# Patient Record
Sex: FEMALE | Race: WHITE | NOT HISPANIC OR LATINO | Employment: FULL TIME | URBAN - METROPOLITAN AREA
[De-identification: names, ages, dates, MRNs, and addresses within clinical notes are randomized per-mention and may not be internally consistent; named-entity substitution may affect disease eponyms.]

---

## 2017-01-05 ENCOUNTER — ALLSCRIPTS OFFICE VISIT (OUTPATIENT)
Dept: OTHER | Facility: OTHER | Age: 47
End: 2017-01-05

## 2017-01-06 ENCOUNTER — LAB REQUISITION (OUTPATIENT)
Dept: LAB | Facility: HOSPITAL | Age: 47
End: 2017-01-06

## 2017-01-06 DIAGNOSIS — R22.40 LOCALIZED SWELLING, MASS, OR LUMP OF LOWER EXTREMITY: ICD-10-CM

## 2017-01-06 PROCEDURE — 87070 CULTURE OTHR SPECIMN AEROBIC: CPT | Performed by: SURGERY

## 2017-01-06 PROCEDURE — 87147 CULTURE TYPE IMMUNOLOGIC: CPT | Performed by: SURGERY

## 2017-01-06 PROCEDURE — 87186 SC STD MICRODIL/AGAR DIL: CPT | Performed by: SURGERY

## 2017-01-06 PROCEDURE — 87205 SMEAR GRAM STAIN: CPT | Performed by: SURGERY

## 2017-01-08 LAB
BACTERIA WND AEROBE CULT: NORMAL
GRAM STN SPEC: NORMAL
GRAM STN SPEC: NORMAL

## 2017-01-11 ENCOUNTER — GENERIC CONVERSION - ENCOUNTER (OUTPATIENT)
Dept: OTHER | Facility: OTHER | Age: 47
End: 2017-01-11

## 2017-05-29 ENCOUNTER — OFFICE VISIT (OUTPATIENT)
Dept: URGENT CARE | Age: 47
End: 2017-05-29
Payer: COMMERCIAL

## 2017-05-29 PROCEDURE — 99213 OFFICE O/P EST LOW 20 MIN: CPT | Performed by: FAMILY MEDICINE

## 2017-05-29 PROCEDURE — S9088 SERVICES PROVIDED IN URGENT: HCPCS | Performed by: FAMILY MEDICINE

## 2017-08-09 ENCOUNTER — APPOINTMENT (OUTPATIENT)
Dept: LAB | Facility: HOSPITAL | Age: 47
End: 2017-08-09
Payer: COMMERCIAL

## 2017-08-09 ENCOUNTER — TRANSCRIBE ORDERS (OUTPATIENT)
Dept: LAB | Facility: HOSPITAL | Age: 47
End: 2017-08-09

## 2017-08-09 DIAGNOSIS — Z00.8 HEALTH EXAMINATION IN POPULATION SURVEY: ICD-10-CM

## 2017-08-09 DIAGNOSIS — Z00.8 HEALTH EXAMINATION IN POPULATION SURVEY: Primary | ICD-10-CM

## 2017-08-09 LAB
CHOLEST SERPL-MCNC: 225 MG/DL (ref 50–200)
EST. AVERAGE GLUCOSE BLD GHB EST-MCNC: 324 MG/DL
HBA1C MFR BLD: 12.9 % (ref 4.2–6.3)
HDLC SERPL-MCNC: 64 MG/DL (ref 40–60)
LDLC SERPL CALC-MCNC: 142 MG/DL (ref 0–100)
TRIGL SERPL-MCNC: 93 MG/DL

## 2017-08-09 PROCEDURE — 36415 COLL VENOUS BLD VENIPUNCTURE: CPT

## 2017-08-09 PROCEDURE — 83036 HEMOGLOBIN GLYCOSYLATED A1C: CPT

## 2017-08-09 PROCEDURE — 80061 LIPID PANEL: CPT

## 2017-08-16 ENCOUNTER — ALLSCRIPTS OFFICE VISIT (OUTPATIENT)
Dept: OTHER | Facility: OTHER | Age: 47
End: 2017-08-16

## 2017-11-13 DIAGNOSIS — E78.5 HYPERLIPIDEMIA: ICD-10-CM

## 2017-11-13 DIAGNOSIS — E11.40 TYPE 2 DIABETES MELLITUS WITH DIABETIC NEUROPATHY (HCC): ICD-10-CM

## 2017-11-14 ENCOUNTER — TRANSCRIBE ORDERS (OUTPATIENT)
Dept: LAB | Facility: HOSPITAL | Age: 47
End: 2017-11-14

## 2017-11-14 ENCOUNTER — GENERIC CONVERSION - ENCOUNTER (OUTPATIENT)
Dept: OTHER | Facility: OTHER | Age: 47
End: 2017-11-14

## 2017-11-14 ENCOUNTER — APPOINTMENT (OUTPATIENT)
Dept: LAB | Facility: HOSPITAL | Age: 47
End: 2017-11-14
Payer: COMMERCIAL

## 2017-11-14 DIAGNOSIS — E78.5 HYPERLIPIDEMIA: ICD-10-CM

## 2017-11-14 DIAGNOSIS — E11.40 TYPE 2 DIABETES MELLITUS WITH DIABETIC NEUROPATHY (HCC): ICD-10-CM

## 2017-11-14 LAB
ALBUMIN SERPL BCP-MCNC: 3 G/DL (ref 3.5–5)
ALP SERPL-CCNC: 98 U/L (ref 46–116)
ALT SERPL W P-5'-P-CCNC: 19 U/L (ref 12–78)
ANION GAP SERPL CALCULATED.3IONS-SCNC: 4 MMOL/L (ref 4–13)
AST SERPL W P-5'-P-CCNC: 18 U/L (ref 5–45)
BILIRUB SERPL-MCNC: 0.34 MG/DL (ref 0.2–1)
BUN SERPL-MCNC: 13 MG/DL (ref 5–25)
CALCIUM SERPL-MCNC: 8.6 MG/DL (ref 8.3–10.1)
CHLORIDE SERPL-SCNC: 110 MMOL/L (ref 100–108)
CHOLEST SERPL-MCNC: 163 MG/DL (ref 50–200)
CO2 SERPL-SCNC: 27 MMOL/L (ref 21–32)
CREAT SERPL-MCNC: 0.71 MG/DL (ref 0.6–1.3)
CREAT UR-MCNC: 129 MG/DL
EST. AVERAGE GLUCOSE BLD GHB EST-MCNC: 163 MG/DL
GFR SERPL CREATININE-BSD FRML MDRD: 102 ML/MIN/1.73SQ M
GLUCOSE P FAST SERPL-MCNC: 105 MG/DL (ref 65–99)
HBA1C MFR BLD: 7.3 % (ref 4.2–6.3)
HDLC SERPL-MCNC: 51 MG/DL (ref 40–60)
LDLC SERPL CALC-MCNC: 97 MG/DL (ref 0–100)
MICROALBUMIN UR-MCNC: 184 MG/L (ref 0–20)
MICROALBUMIN/CREAT 24H UR: 143 MG/G CREATININE (ref 0–30)
POTASSIUM SERPL-SCNC: 4.6 MMOL/L (ref 3.5–5.3)
PROT SERPL-MCNC: 6.8 G/DL (ref 6.4–8.2)
SODIUM SERPL-SCNC: 141 MMOL/L (ref 136–145)
TRIGL SERPL-MCNC: 73 MG/DL
TSH SERPL DL<=0.05 MIU/L-ACNC: 1.58 UIU/ML (ref 0.36–3.74)

## 2017-11-14 PROCEDURE — 83036 HEMOGLOBIN GLYCOSYLATED A1C: CPT

## 2017-11-14 PROCEDURE — 80053 COMPREHEN METABOLIC PANEL: CPT

## 2017-11-14 PROCEDURE — 80061 LIPID PANEL: CPT

## 2017-11-14 PROCEDURE — 82043 UR ALBUMIN QUANTITATIVE: CPT

## 2017-11-14 PROCEDURE — 36415 COLL VENOUS BLD VENIPUNCTURE: CPT

## 2017-11-14 PROCEDURE — 82570 ASSAY OF URINE CREATININE: CPT

## 2017-11-14 PROCEDURE — 84443 ASSAY THYROID STIM HORMONE: CPT

## 2017-11-16 ENCOUNTER — ALLSCRIPTS OFFICE VISIT (OUTPATIENT)
Dept: OTHER | Facility: OTHER | Age: 47
End: 2017-11-16

## 2017-11-17 NOTE — PROGRESS NOTES
Assessment    1  Benign essential hypertension (401 1) (I10)   2  Diabetes mellitus with diabetic neuropathy (250 60,357 2) (E11 40)   3  Hyperlipidemia (272 4) (E78 5)   4  Peripheral neuropathy (356 9) (G62 9)    Plan  Diabetes mellitus with diabetic neuropathy    · Invokana 100 MG Oral Tablet; Take 1 tablet daily   · (1) HEMOGLOBIN A1C; Status:Active; Requested for:41Tmq4321;    · Continue with our present treatment plan ; Status:Complete;   Done: 87YXM2746   · Follow a diabetic diet with 1500 calories ; Status:Complete;   Done: 42XRE1802   · Inspect your feet daily ; Status:Complete;   Done: 71LZS9838   · It is important to take good care of your feet ; Status:Complete;   Done: 98YKD2221   · Call [phone] if: Your blood sugar is still over 300 after taking insulin  ;Status:Complete;   Done: 48HBT1496   · Follow-up visit in 3 months Evaluation and Treatment  Follow-up  Status: Complete Done: 77GUW2980    Chief Complaint  Patient here for 3 month follow up on Hypertension, Hyperlipidemia, diabetes   Patient is here today for follow up of chronic conditions described in HPI  History of Present Illness  here for follow upimproved sugars still need a little tweeking   The patient states her hyperlipidemia has been under good control since the last visit  Comorbid Illnesses: diabetes mellitus-- and-- hypertension  She has no significant interval events  Symptoms: The patient is currently asymptomatic  Medications: the patient is adherent with her medication regimen  The patient presents for follow-up of essential hypertension  The patient states she has been doing well with her blood pressure control since the last visit  She has no significant interval events  Symptoms: The patient is currently asymptomatic  Medications: the patient is adherent with her medication regimen  The patient states she has been stable with her Type II Diabetes control since the last visit   Comorbid Illnesses: hypertension-- and-- hyperlipidemia  Complications: peripheral neuropathy-- and-- nephropathy  She has no significant interval events  Symptoms: The patient is currently asymptomatic  Medications: the patient is adherent with her medication regimen  The patient is being seen for follow-up of diabetic neuropathy  The patient reports doing well  She has had no significant interval events  The patient is currently asymptomatic  Medications:  the patient is adherent to her medication regimen  Review of Systems   Constitutional: No fever, no chills, feels well, no tiredness, no recent weight gain or weight loss  Eyes: No complaints of eye pain, no red eyes, no eyesight problems, no discharge, no dry eyes, no itching of eyes  ENT: no complaints of earache, no loss of hearing, no nose bleeds, no nasal discharge, no sore throat, no hoarseness  Cardiovascular: No complaints of slow heart rate, no fast heart rate, no chest pain, no palpitations, no leg claudication, no lower extremity edema  Respiratory: No complaints of shortness of breath, no wheezing, no cough, no SOB on exertion, no orthopnea, no PND  Gastrointestinal: No complaints of abdominal pain, no constipation, no nausea or vomiting, no diarrhea, no bloody stools  Genitourinary: No complaints of dysuria, no incontinence, no pelvic pain, no dysmenorrhea, no vaginal discharge or bleeding  Musculoskeletal: No complaints of arthralgias, no myalgias, no joint swelling or stiffness, no limb pain or swelling  Integumentary: No complaints of skin rash or lesions, no itching, no skin wounds, no breast pain or lump  Neurological: No complaints of headache, no confusion, no convulsions, no numbness, no dizziness or fainting, no tingling, no limb weakness, no difficulty walking  Psychiatric: Not suicidal, no sleep disturbance, no anxiety or depression, no change in personality, no emotional problems    Endocrine: No complaints of proptosis, no hot flashes, no muscle weakness, no deepening of the voice, no feelings of weakness  Hematologic/Lymphatic: No complaints of swollen glands, no swollen glands in the neck, does not bleed easily, does not bruise easily  Active Problems    1  Benign essential hypertension (401 1) (I10)   2  Breast cancer screening (V76 10) (Z12 39)   3  Diabetes mellitus with diabetic neuropathy (250 60,357 2) (E11 40)   4  Hyperlipidemia (272 4) (E78 5)   5  Need for prophylactic vaccination against Streptococcus pneumoniae (pneumococcus) (V03 82) (Z23)   6  Need for revaccination (V05 9) (Z23)   7  Peripheral neuropathy (356 9) (G62 9)    Past Medical History    1  History of Cellulitis of lower leg (682 6) (L03 119)   2  History of Cellulitis of thigh (682 6) (L03 119)   3  History of Cellulitis of trunk (682 2) (L03 319)   4  History of Edema of both legs (782 3) (R60 0)   5  History Of 1  Previous Pregnancies (V61 5)   6  History of Nausea (787 02) (R11 0)   7  History of Right leg pain (729 5) (M79 604)   8  History of Right leg swelling (729 81) (M79 89)   9  History of Skin Abscess Of Foot (682 7)   10  History of Superficial phlebitis and thrombophlebitis of right lower extremity (451 0)  (I80 01)    The active problems and past medical history were reviewed and updated today  Surgical History  1  History of Tonsillectomy With Adenoidectomy    The surgical history was reviewed and updated today  Family History  Mother    1  Family history of Benign Essential Hypertension   2  Family history of Coronary Artery Disease (V17 49)  Father    3  Family history of Lung Cancer (V16 1)  Sister    4  Family history of Diabetes Mellitus (V18 0)  Maternal Grandfather    5  Family history of Diabetes Mellitus (V18 0)    The family history was reviewed and updated today  Social History     · Alcohol Use (History)   · Denied: History of Drug Use   · Never a smoker  The social history was reviewed and updated today        Current Meds   1  Aspirin 325 MG Oral Tablet; Therapy: (Recorded:18Xna1725) to Recorded   2  GlipiZIDE 5 MG Oral Tablet; take 1 tablet by mouth twice a day; Therapy: 01KRE4746 to (Last Rx:97Spq6775)  Requested for: 67Bip5937 Ordered   3  Lisinopril-Hydrochlorothiazide 10-12 5 MG Oral Tablet; take 1 tablet by mouth daily; Therapy: 44LIV4514 to (Last Rx:17Jln4260)  Requested for: 35Wyj5926 Ordered   4  MetFORMIN HCl - 1000 MG Oral Tablet; take 1 tablet by mouth twice a day; Therapy: 47WGH2098 to (Last Rx:25Xvv2515)  Requested for: 28Ytp7638 Ordered   5  Simvastatin 40 MG Oral Tablet; TAKE 1 TABLET AT BEDTIME; Therapy: 48KDM4620 to (Evaluate:14Nov2017)  Requested for: 13BUI9742; Last Rx:85Qvz3420 Ordered    The medication list was reviewed and updated today  Allergies  1  Keflex CAPS    Vitals  Vital Signs    Recorded: 40ZNC9756 09:47AM   Heart Rate 88   Respiration 18   Systolic 876   Diastolic 82   Height 5 ft 9 in   Weight 222 lb 2 oz   BMI Calculated 32 8   BSA Calculated 2 16       Physical Exam   Constitutional  General appearance: No acute distress, well appearing and well nourished  Pulmonary  Respiratory effort: No increased work of breathing or signs of respiratory distress  Auscultation of lungs: Clear to auscultation  Cardiovascular  Auscultation of heart: Normal rate and rhythm, normal S1 and S2, without murmurs  Examination of extremities for edema and/or varicosities: Normal    Abdomen  Abdomen: Non-tender, no masses  Liver and spleen: No hepatomegaly or splenomegaly  Lymphatic  Palpation of lymph nodes in neck: No lymphadenopathy  Musculoskeletal  Gait and station: Normal    Skin  Skin and subcutaneous tissue: Normal without rashes or lesions  Results/Data  PHQ-2 Adult Depression Screening 96GID1053 10:02AM User, Ahs     Test Name Result Flag Reference   PHQ-2 Adult Depression Score 0       Over the last two weeks, how often have you been bothered by any of the following problems? Little interest or pleasure in doing things: Not at all - 0 Feeling down, depressed, or hopeless: Not at all - 0   PHQ-2 Adult Depression Screening Negative       (1) COMPREHENSIVE METABOLIC PANEL 54BKT7311 53:09AY [facility] Order Number: [id number]     Test Name Result Flag Reference   SODIUM 141 mmol/L  136-145   POTASSIUM 4 6 mmol/L  3 5-5 3   CHLORIDE 110 mmol/L H 100-108   CARBON DIOXIDE 27 mmol/L  21-32   ANION GAP (CALC) 4 mmol/L  4-13   BLOOD UREA NITROGEN 13 mg/dL  5-25   CREATININE 0 71 mg/dL  0 60-1 30   Standardized to IDMS reference method   CALCIUM 8 6 mg/dL  8 3-10 1   BILI, TOTAL 0 34 mg/dL  0 20-1 00   ALK PHOSPHATAS 98 U/L     ALT (SGPT) 19 U/L  12-78   Specimen collection should occur prior to Sulfasalazine and/or Sulfapyridine administration due to the potential for falsely depressed results  AST(SGOT) 18 U/L  5-45   Specimen collection should occur prior to Sulfasalazine administration due to the potential for falsely depressed results  ALBUMIN 3 0 g/dL L 3 5-5 0   TOTAL PROTEIN 6 8 g/dL  6 4-8 2   eGFR 102 ml/min/1 73sq [facility] Disease Education Program recommendations are as follows: GFR calculation is accurate only with a steady state creatinine Chronic Kidney disease less than 60 ml/min/1 73 sq  meters Kidney failure less than 15 ml/min/1 73 sq  meters  GLUCOSE FASTING 105 mg/dL H 65-99   Specimen collection should occur prior to Sulfasalazine administration due to the potential for falsely depressed results  Specimen collection should occur prior to Sulfapyridine administration due to the potential for falsely elevated results  (1) HEMOGLOBIN A1C 50KHK0269 09:47AM [facility] Order Number: [id number]     Test Name Result Flag Reference   HEMOGLOBIN A1C 7 3 % H 4 2-6 3   EST  AVG   GLUCOSE 163 mg/dl       (1) LIPID PANEL FASTING W DIRECT LDL REFLEX 04UHI5029 09:47AM [facility] Order Number: [id number] Test Name Result Flag Reference   CHOLESTEROL 163 mg/dL     LDL CHOLESTEROL CALCULATED 97 mg/dL  0-100     Triglyceride:       Normal <150 mg/dl  Borderline High 150-199 mg/dl  High 200-499 mg/dl  Very High >499 mg/dl   Cholesterol:      Desirable <200 mg/dl   Borderline High 200-239 mg/dl   High >239 mg/dl   HDL Cholesterol:      High>59 mg/dL   Low <41 mg/dL   HDL Cholesterol:      High>59 mg/dL   Low <41 mg/dL   This screening LDL is a calculated result  It does not have the accuracy of the Direct Measured LDL in the monitoring of patients with hyperlipidemia and/or statin therapy  Direct Measure LDL (LWT449) must be ordered separately in these patients  TRIGLYCERIDES 73 mg/dL  <=150   Specimen collection should occur prior to N-Acetylcysteine or Metamizole administration due to the potential for falsely depressed results  HDL,DIRECT 51 mg/dL  40-60   Specimen collection should occur prior to Metamizole administration due to the potential for falsley depressed results  (1) TSH WITH FT4 REFLEX 08CNW9430 09:47AM [facility] Order Number: [id number]     Test Name Result Flag Reference   TSH 1 580 uIU/mL  0 358-3 740     Patients undergoing fluorescein dye angiography may retain small amounts of fluorescein in the body for 48-72 hours post procedure  Samples containing fluorescein can produce falsely depressed TSH values  If the patient had this procedure,a specimen should be resubmitted post fluorescein clearance       The recommended reference ranges for TSH during pregnancy are as follows: First trimester 0 1 to 2 5 uIU/mL Second trimester  0 2 to 3 0 uIU/mL Third trimester 0 3 to 3 0 uIU/m     (1) MICROALBUMIN CREATININE RATIO, RANDOM URINE 23TWT9717 09:47AM [facility] Order Number: [id number]     Test Name Result Flag Reference   MICROALBUMIN/ CREAT R 143 mg/g creatinine H 0-30   MICROALBUMIN,URINE 184 0 mg/L H 0 0-20 0   CREATININE URINE 129 0 mg/dL Future Appointments    Date/Time Provider Specialty Site   02/19/2018 10:00 AM Sade Buck DO Family Medicine [address] [facility]   11/19/2018 08:15 AM Sade Buck DO Family Medicine [address] [place] [address]       Signatures   Electronically signed by : Danny Medina DO; Nov 16 2017 10:46AM EST                       (Author)

## 2017-12-08 ENCOUNTER — HOSPITAL ENCOUNTER (EMERGENCY)
Facility: HOSPITAL | Age: 47
Discharge: HOME | End: 2017-12-09
Attending: EMERGENCY MEDICINE
Payer: COMMERCIAL

## 2017-12-08 ENCOUNTER — APPOINTMENT (EMERGENCY)
Dept: RADIOLOGY | Facility: HOSPITAL | Age: 47
End: 2017-12-08
Payer: COMMERCIAL

## 2017-12-08 DIAGNOSIS — R06.02 SOB (SHORTNESS OF BREATH): Primary | ICD-10-CM

## 2017-12-08 DIAGNOSIS — R50.9 FEVER: ICD-10-CM

## 2017-12-08 DIAGNOSIS — R05.9 COUGH: ICD-10-CM

## 2017-12-08 LAB
ALBUMIN SERPL BCP-MCNC: 3.3 G/DL (ref 3.5–5)
ALP SERPL-CCNC: 107 U/L (ref 46–116)
ALT SERPL W P-5'-P-CCNC: 19 U/L (ref 12–78)
ANION GAP SERPL CALCULATED.3IONS-SCNC: 9 MMOL/L (ref 4–13)
ANISOCYTOSIS BLD QL SMEAR: PRESENT
AST SERPL W P-5'-P-CCNC: 19 U/L (ref 5–45)
BASOPHILS # BLD MANUAL: 0.22 THOUSAND/UL (ref 0–0.1)
BASOPHILS NFR MAR MANUAL: 1 % (ref 0–1)
BILIRUB SERPL-MCNC: 0.69 MG/DL (ref 0.2–1)
BUN SERPL-MCNC: 14 MG/DL (ref 5–25)
CALCIUM SERPL-MCNC: 9.3 MG/DL (ref 8.3–10.1)
CHLORIDE SERPL-SCNC: 102 MMOL/L (ref 100–108)
CO2 SERPL-SCNC: 23 MMOL/L (ref 21–32)
CREAT SERPL-MCNC: 0.95 MG/DL (ref 0.6–1.3)
EOSINOPHIL # BLD MANUAL: 0 THOUSAND/UL (ref 0–0.4)
EOSINOPHIL NFR BLD MANUAL: 0 % (ref 0–6)
ERYTHROCYTE [DISTWIDTH] IN BLOOD BY AUTOMATED COUNT: 13.2 % (ref 11.6–15.1)
GFR SERPL CREATININE-BSD FRML MDRD: 72 ML/MIN/1.73SQ M
GLUCOSE SERPL-MCNC: 170 MG/DL (ref 65–140)
HCT VFR BLD AUTO: 37.9 % (ref 34.8–46.1)
HGB BLD-MCNC: 12.8 G/DL (ref 11.5–15.4)
LACTATE SERPL-SCNC: 1.7 MMOL/L (ref 0.5–2)
LYMPHOCYTES # BLD AUTO: 0.44 THOUSAND/UL (ref 0.6–4.47)
LYMPHOCYTES # BLD AUTO: 2 % (ref 14–44)
MCH RBC QN AUTO: 29 PG (ref 26.8–34.3)
MCHC RBC AUTO-ENTMCNC: 33.8 G/DL (ref 31.4–37.4)
MCV RBC AUTO: 86 FL (ref 82–98)
MONOCYTES # BLD AUTO: 0.88 THOUSAND/UL (ref 0–1.22)
MONOCYTES NFR BLD: 4 % (ref 4–12)
NEUTROPHILS # BLD MANUAL: 20.53 THOUSAND/UL (ref 1.85–7.62)
NEUTS SEG NFR BLD AUTO: 93 % (ref 43–75)
NRBC BLD AUTO-RTO: 0 /100 WBCS
PLATELET # BLD AUTO: 327 THOUSANDS/UL (ref 149–390)
PLATELET BLD QL SMEAR: ADEQUATE
PMV BLD AUTO: 9.1 FL (ref 8.9–12.7)
POTASSIUM SERPL-SCNC: 3.8 MMOL/L (ref 3.5–5.3)
PROT SERPL-MCNC: 8.2 G/DL (ref 6.4–8.2)
RBC # BLD AUTO: 4.42 MILLION/UL (ref 3.81–5.12)
RBC MORPH BLD: PRESENT
SODIUM SERPL-SCNC: 134 MMOL/L (ref 136–145)
SPECIMEN SOURCE: NORMAL
TROPONIN I BLD-MCNC: 0.02 NG/ML (ref 0–0.08)
WBC # BLD AUTO: 22.08 THOUSAND/UL (ref 4.31–10.16)

## 2017-12-08 PROCEDURE — 81002 URINALYSIS NONAUTO W/O SCOPE: CPT | Performed by: EMERGENCY MEDICINE

## 2017-12-08 PROCEDURE — 93005 ELECTROCARDIOGRAM TRACING: CPT | Performed by: EMERGENCY MEDICINE

## 2017-12-08 PROCEDURE — 80053 COMPREHEN METABOLIC PANEL: CPT | Performed by: EMERGENCY MEDICINE

## 2017-12-08 PROCEDURE — 96375 TX/PRO/DX INJ NEW DRUG ADDON: CPT

## 2017-12-08 PROCEDURE — 36415 COLL VENOUS BLD VENIPUNCTURE: CPT

## 2017-12-08 PROCEDURE — 85027 COMPLETE CBC AUTOMATED: CPT | Performed by: EMERGENCY MEDICINE

## 2017-12-08 PROCEDURE — 87040 BLOOD CULTURE FOR BACTERIA: CPT | Performed by: EMERGENCY MEDICINE

## 2017-12-08 PROCEDURE — 85007 BL SMEAR W/DIFF WBC COUNT: CPT | Performed by: EMERGENCY MEDICINE

## 2017-12-08 PROCEDURE — 71020 HB CHEST X-RAY 2VW FRONTAL&LATL: CPT

## 2017-12-08 PROCEDURE — 96361 HYDRATE IV INFUSION ADD-ON: CPT

## 2017-12-08 PROCEDURE — 84484 ASSAY OF TROPONIN QUANT: CPT

## 2017-12-08 PROCEDURE — 83605 ASSAY OF LACTIC ACID: CPT | Performed by: EMERGENCY MEDICINE

## 2017-12-08 PROCEDURE — 96374 THER/PROPH/DIAG INJ IV PUSH: CPT

## 2017-12-08 RX ORDER — SIMVASTATIN 40 MG
40 TABLET ORAL DAILY
COMMUNITY
Start: 2011-10-24 | End: 2018-12-17 | Stop reason: SDUPTHER

## 2017-12-08 RX ORDER — ONDANSETRON 2 MG/ML
4 INJECTION INTRAMUSCULAR; INTRAVENOUS ONCE
Status: COMPLETED | OUTPATIENT
Start: 2017-12-08 | End: 2017-12-08

## 2017-12-08 RX ORDER — GLIPIZIDE 5 MG/1
1 TABLET ORAL 2 TIMES DAILY
COMMUNITY
Start: 2013-01-20 | End: 2018-03-17 | Stop reason: SDUPTHER

## 2017-12-08 RX ORDER — ACETAMINOPHEN 325 MG/1
650 TABLET ORAL ONCE
Status: COMPLETED | OUTPATIENT
Start: 2017-12-08 | End: 2017-12-08

## 2017-12-08 RX ORDER — KETOROLAC TROMETHAMINE 30 MG/ML
15 INJECTION, SOLUTION INTRAMUSCULAR; INTRAVENOUS ONCE
Status: COMPLETED | OUTPATIENT
Start: 2017-12-08 | End: 2017-12-08

## 2017-12-08 RX ORDER — LISINOPRIL AND HYDROCHLOROTHIAZIDE 12.5; 1 MG/1; MG/1
1 TABLET ORAL DAILY
COMMUNITY
Start: 2012-04-24 | End: 2018-12-17 | Stop reason: SDUPTHER

## 2017-12-08 RX ADMIN — SODIUM CHLORIDE 1000 ML: 0.9 INJECTION, SOLUTION INTRAVENOUS at 22:42

## 2017-12-08 RX ADMIN — ACETAMINOPHEN 650 MG: 325 TABLET, FILM COATED ORAL at 22:44

## 2017-12-08 RX ADMIN — KETOROLAC TROMETHAMINE 15 MG: 30 INJECTION, SOLUTION INTRAMUSCULAR at 22:44

## 2017-12-08 RX ADMIN — ONDANSETRON 4 MG: 2 INJECTION INTRAMUSCULAR; INTRAVENOUS at 22:51

## 2017-12-09 ENCOUNTER — APPOINTMENT (EMERGENCY)
Dept: RADIOLOGY | Facility: HOSPITAL | Age: 47
End: 2017-12-09
Payer: COMMERCIAL

## 2017-12-09 VITALS
WEIGHT: 222 LBS | HEIGHT: 67 IN | BODY MASS INDEX: 34.84 KG/M2 | TEMPERATURE: 100.2 F | HEART RATE: 96 BPM | RESPIRATION RATE: 21 BRPM | SYSTOLIC BLOOD PRESSURE: 173 MMHG | DIASTOLIC BLOOD PRESSURE: 70 MMHG | OXYGEN SATURATION: 100 %

## 2017-12-09 LAB
BACTERIA UR QL AUTO: ABNORMAL /HPF
BILIRUB UR QL STRIP: NEGATIVE
CLARITY UR: ABNORMAL
COLOR UR: YELLOW
GLUCOSE UR STRIP-MCNC: ABNORMAL MG/DL
HGB UR QL STRIP.AUTO: ABNORMAL
HYALINE CASTS #/AREA URNS LPF: ABNORMAL /LPF
KETONES UR STRIP-MCNC: ABNORMAL MG/DL
LEUKOCYTE ESTERASE UR QL STRIP: NEGATIVE
NITRITE UR QL STRIP: NEGATIVE
NON-SQ EPI CELLS URNS QL MICRO: ABNORMAL /HPF
PH UR STRIP.AUTO: 5.5 [PH] (ref 4.5–8)
PROT UR STRIP-MCNC: ABNORMAL MG/DL
RBC #/AREA URNS AUTO: ABNORMAL /HPF
SP GR UR STRIP.AUTO: 1.02 (ref 1–1.03)
UROBILINOGEN UR QL STRIP.AUTO: 0.2 E.U./DL
WBC #/AREA URNS AUTO: ABNORMAL /HPF

## 2017-12-09 PROCEDURE — 74177 CT ABD & PELVIS W/CONTRAST: CPT

## 2017-12-09 PROCEDURE — 81001 URINALYSIS AUTO W/SCOPE: CPT

## 2017-12-09 PROCEDURE — 71275 CT ANGIOGRAPHY CHEST: CPT

## 2017-12-09 PROCEDURE — 99285 EMERGENCY DEPT VISIT HI MDM: CPT

## 2017-12-09 PROCEDURE — 87086 URINE CULTURE/COLONY COUNT: CPT

## 2017-12-09 RX ORDER — CEPHALEXIN 500 MG/1
500 CAPSULE ORAL EVERY 12 HOURS SCHEDULED
Qty: 10 CAPSULE | Refills: 0 | Status: SHIPPED | OUTPATIENT
Start: 2017-12-09 | End: 2017-12-15 | Stop reason: HOSPADM

## 2017-12-09 RX ADMIN — IOHEXOL 100 ML: 350 INJECTION, SOLUTION INTRAVENOUS at 01:39

## 2017-12-09 NOTE — ED ATTENDING ATTESTATION
I, Taran Bowman DO, saw and evaluated the patient  I have discussed the patient with the resident/non-physician practitioner and agree with the resident's/non-physician practitioner's findings, Plan of Care, and MDM as documented in the resident's/non-physician practitioner's note, except where noted  All available labs and Radiology studies were reviewed  At this point I agree with the current assessment done in the Emergency Department  I have conducted an independent evaluation of this patient a history and physical is as follows:      Critical Care Time  CritCare Time      52 yr old fem with URI for the last two weeks with worsening in last two days  Feverish, incr in sob and [place]  Sputum productive yellow  No week  HR to 130  No abd pain  Some flank pain  Exm: Tachypneic with good ox; Lungs: cta and no whz with cough  Skin warm and dry  Heart: tachy at 117  Pln: septic and cardiac eval; adm

## 2017-12-09 NOTE — ED PROVIDER NOTES
History  Chief Complaint   Patient presents with    Shortness of Breath     Pt states having a hard time catching breath, heart feels like it is racing, having indigestion  This is a 51-year-old female with a history of diabetes, hypertension, hyperlipidemia who presents with chest pain and shortness of breath  Over the past couple weeks, she has been experiencing URI symptoms including runny nose and cough  Today, her symptoms got acutely worse  States that she cannot catch her breath and is experiencing an intermittent substernal chest discomfort that is nonradiating  She is also experiencing a productive cough and fevers for which she is taking Motrin last taken at approximately 6:00 p m  Anderson Linares She feels extremely tired and states that she is unable to perform her regular task today because of her tiredness  Denies history of DVT/PE, unilateral calf pain/swelling, hemoptysis, recent trauma/surgery, recent travel, cancer/cancer treatment, exogenous estrogen use  She does have a history of diabetes, hypertension, hyperlipidemia, and a strong family history of MI  No tobacco use or personal history of MI  No sick contacts at home  Denies nausea/vomiting, lightheadedness/dizziness, numbness/weakness, headache, change in vision, URI symptoms, neck pain, back pain, flank pain, abdominal pain, diarrhea, hematochezia, melena, dysuria, hematuria, abnormal vaginal discharge/bleeding  Prior to Admission Medications   Prescriptions Last Dose Informant Patient Reported? Taking?    canagliflozin (INVOKANA) 100 mg 12/7/2017 at Unknown time  Yes Yes   Sig: Take 1 tablet by mouth daily   glipiZIDE (GLUCOTROL) 5 mg tablet 12/8/2017 at Unknown time  Yes Yes   Sig: Take 1 tablet by mouth 2 (two) times a day   lisinopril-hydrochlorothiazide (PRINZIDE,ZESTORETIC) 10-12 5 MG per tablet 12/8/2017 at Unknown time  Yes Yes   Sig: Take 1 tablet by mouth daily   metFORMIN (GLUCOPHAGE) 1000 MG tablet 12/8/2017 at Unknown time  Yes Yes   Sig: Take 1 tablet by mouth 2 (two) times a day   simvastatin (ZOCOR) 40 mg tablet 12/7/2017 at Unknown time  Yes Yes   Sig: Take 1 tablet by mouth      Facility-Administered Medications: None       Past Medical History:   Diagnosis Date    Diabetes mellitus ([facility] [address] )     Hyperlipidemia     Hypertension        History reviewed  No pertinent surgical history  History reviewed  No pertinent family history  I have reviewed and agree with the history as documented  Social History   Substance Use Topics    Smoking status: Never Smoker    Smokeless tobacco: Never Used    Alcohol use Yes      Comment: weekends        Review of Systems   Constitutional: Positive for fever  Negative for chills  HENT: Positive for rhinorrhea  Negative for sore throat and trouble swallowing  Eyes: Negative for photophobia and visual disturbance  Respiratory: Positive for cough, chest tightness and shortness of breath  Cardiovascular: Positive for chest pain  Negative for palpitations and leg swelling  Gastrointestinal: Negative for abdominal pain, blood in stool, diarrhea, nausea and vomiting  Endocrine: Negative for polyuria  Genitourinary: Negative for dysuria, flank pain, hematuria, vaginal bleeding and vaginal discharge  Musculoskeletal: Negative for back pain and neck pain  Skin: Negative for color change and rash  Allergic/Immunologic: Negative for immunocompromised state  Neurological: Negative for dizziness, weakness, light-headedness, numbness and headaches  All other systems reviewed and are negative        Physical Exam  ED Triage Vitals   Temperature Pulse Respirations Blood Pressure SpO2   12/08/17 2045 12/08/17 2045 12/08/17 2045 12/08/17 2045 12/08/17 2045   100 2 °F (37 9 °C) (!) 122 18 155/92 100 %      Temp Source Heart Rate Source Patient Position - Orthostatic VS BP Location FiO2 (%)   12/08/17 2045 12/08/17 2205 12/09/17 0030 12/08/17 2300 --   Tympanic Monitor Lying Right arm       Pain Score       12/08/17 2045       No Pain           Orthostatic Vital Signs  Vitals:    12/08/17 2300 12/09/17 0030 12/09/17 0100 12/09/17 0200   BP: 165/70 120/56 117/56 141/66   Pulse: 104 98 94 96   Patient Position - Orthostatic VS:  Lying Lying Lying       Physical Exam   Constitutional: Vital signs are normal  She appears well-developed  She is cooperative  No distress  HENT:   Mouth/Throat: Uvula is midline, oropharynx is clear and moist and mucous membranes are normal    Eyes: Conjunctivae and EOM are normal  Pupils are equal, round, and reactive to light  Neck: Trachea normal  No thyroid mass and no thyromegaly present  Cardiovascular: Regular rhythm, normal heart sounds, intact distal pulses and normal pulses  Tachycardia present  No murmur heard  Pulmonary/Chest: Effort normal and breath sounds normal    Abdominal: Soft  Normal appearance and bowel sounds are normal  There is no tenderness  There is CVA tenderness (right)  There is no rebound and no guarding  Neurological: She is alert  Skin: Skin is warm, dry and intact  Psychiatric: She has a normal mood and affect   Her speech is normal and behavior is normal  Thought content normal        ED Medications  Medications   sodium chloride 0 9 % bolus 1,000 mL (not administered)   sodium chloride 0 9 % bolus 1,000 mL (1,000 mL Intravenous New Bag 12/8/17 2242)   ketorolac (TORADOL) injection 15 mg (15 mg Intravenous Given 12/8/17 2244)   acetaminophen (TYLENOL) tablet 650 mg (650 mg Oral Given 12/8/17 2244)   ondansetron (ZOFRAN) injection 4 mg (4 mg Intravenous Given 12/8/17 2251)   iohexol (OMNIPAQUE) 350 MG/ML injection (MULTI-DOSE) 100 mL (100 mL Intravenous Given 12/9/17 0139)       Diagnostic Studies  Results Reviewed     Procedure Component Value Units Date/Time    Urine Microscopic [[id number]]  (Abnormal) Collected:  12/09/17 0045    Lab Status:  Final result Specimen:  Urine from Urine, Clean Catch Updated:  12/09/17 0055     RBC, UA Innumerable (A) /hpf      WBC, UA 10-20 (A) /hpf      Epithelial Cells Moderate (A) /hpf      Bacteria, UA Occasional /hpf      Hyaline Casts, UA None Seen /lpf     Urine culture [[id number]] Collected:  12/09/17 0045    Lab Status: In process Specimen:  Urine from Urine, Clean Catch Updated:  12/09/17 0055    POCT urinalysis dipstick [[id number]]  (Abnormal) Resulted:  12/09/17 0047    Lab Status:  Final result Specimen:  Urine Updated:  12/09/17 0047    ED Urine Macroscopic [[id number]]  (Abnormal) Collected:  12/09/17 0045    Lab Status:  Final result Specimen:  Urine Updated:  12/09/17 0045     Color, UA Yellow     Clarity, UA Cloudy     pH, UA 5 5     Leukocytes, UA Negative     Nitrite, UA Negative     Protein,  (2+) (A) mg/dl      Glucose,  (1/2%) (A) mg/dl      Ketones, UA Trace (A) mg/dl      Urobilinogen, UA 0 2 E U /dl      Bilirubin, UA Negative     Blood, UA Large (A)     Specific [place], UA 1 025    Narrative:       CLINITEK RESULT    Lactic acid, plasma [[id number]]  (Normal) Collected:  12/08/17 2248    Lab Status:  Final result Specimen:  Blood from Arm, Right Updated:  12/08/17 2314     LACTIC ACID 1 7 mmol/L     Narrative:         Result may be elevated if tourniquet was used during collection  Blood culture [[id number]] Collected:  12/08/17 2248    Lab Status: In process Specimen:  Blood from Arm, Right Updated:  12/08/17 2252    Blood culture [[id number]] Collected:  12/08/17 2248    Lab Status:   In process Specimen:  Blood from Arm, Left Updated:  12/08/17 2252    CBC and differential [[id number]]  (Abnormal) Collected:  12/08/17 2111    Lab Status:  Final result Specimen:  Blood from Arm, Left Updated:  12/08/17 2151     WBC 22 08 (H) Thousand/uL      RBC 4 42 Million/uL      Hemoglobin 12 8 g/dL      Hematocrit 37 9 %      MCV 86 fL      MCH 29 0 pg      MCHC 33 8 g/dL      RDW 13 2 %      MPV 9 1 fL      Platelets 071 Thousands/uL      nRBC 0 /100 WBCs     Narrative: This is an appended report  These results have been appended to a previously verified report  Comprehensive metabolic panel [[id number]]  (Abnormal) Collected:  12/08/17 2111    Lab Status:  Final result Specimen:  Blood from Arm, Left Updated:  12/08/17 2147     Sodium 134 (L) mmol/L      Potassium 3 8 mmol/L      Chloride 102 mmol/L      CO2 23 mmol/L      Anion Gap 9 mmol/L      BUN 14 mg/dL      Creatinine 0 95 mg/dL      Glucose 170 (H) mg/dL      Calcium 9 3 mg/dL      AST 19 U/L      ALT 19 U/L      Alkaline Phosphatase 107 U/L      Total Protein 8 2 g/dL      Albumin 3 3 (L) g/dL      Total Bilirubin 0 69 mg/dL      eGFR 72 ml/min/1 73sq m     Narrative:         National Kidney Disease Education Program recommendations are as follows:  GFR calculation is accurate only with a steady state creatinine  Chronic Kidney disease less than 60 ml/min/1 73 sq  meters  Kidney failure less than 15 ml/min/1 73 sq  meters  POCT troponin [[id number]]  (Normal) Collected:  12/08/17 2115    Lab Status:  Final result Updated:  12/08/17 2129     POC Troponin I 0 02 ng/ml      Specimen Type VENOUS    Narrative:         [employer] i-Stat handheld analyzer 99% cutoff is > 0 08ng/mL in [facility] Emergency Departments    o cTnI 99% cutoff is useful only when applied to patients in the clinical setting of myocardial ischemia  o cTnI 99% cutoff should be interpreted in the context of clinical history, ECG findings and possibly cardiac imaging to establish correct diagnosis  o cTnI 99% cutoff may be suggestive but clearly not indicative of a coronary event without the clinical setting of myocardial ischemia                   X-ray chest 2 views    (Results Pending)   PE Study with CT Abdomen and Pelvis with contrast    (Results Pending)         Procedures  ECG 12 Lead Documentation  Date/Time: 12/8/2017 9:03 PM  Performed by: Kishore Gates  Authorized by: Shana Wilson     ECG reviewed by me, the ED Provider: yes    Patient location:  ED  Previous ECG:     Previous ECG:  Unavailable  Interpretation:     Interpretation: abnormal    Rate:     ECG rate:  114    ECG rate assessment: tachycardic    Rhythm:     Rhythm: sinus rhythm    Ectopy:     Ectopy: none    QRS:     QRS axis:  Normal    QRS intervals:  Normal  Conduction:     Conduction: normal    ST segments:     ST segments:  Normal  T waves:     T waves: normal            Phone Consults  ED Phone Contact    ED Course  ED Course as of Dec 09 0238   Fri Dec 08, 2017   2319 LACTIC ACID: 1 7   Sat Dec 09, 2017   0047 Leukocytes, UA: Negative   0047 Nitrite, UA: Negative   0047 Blood, UA: (!) Large   0058 WBC, UA: (!) 10-20   0058 Bacteria, UA: Occasional   0227 No pneumonia or PE on CT chest   CT abdomen revealed mildly enlarged bilateral inguinal lymph nodes with mild subcutaneous stranding in the left inguinal region  Mild urinary bladder wall thickening, which may be secondary to incomplete distension or cystitis  The patient states that she is feeling slightly better  Initial Sepsis Screening     Row Name 12/08/17 5188                Is the patient's history suggestive of a new or worsening infection? (!)  Yes (Proceed)  -CC        Suspected source of infection pneumonia  -CC        Are two or more of the following signs & symptoms of infection both present and new to the patient? (!)  Yes (Proceed)  -CC        Indicate SIRS criteria Tachycardia > 90 bpm;Leukocytosis (WBC > 87[address] IJL)  -CC        If the answer is yes to both questions, suspicion of sepsis is present          If severe sepsis is present AND tissue hypoperfusion perists in the hour after fluid resuscitation or lactate > 4, the patient meets criteria for SEPTIC SHOCK          Are any of the following organ dysfunction criteria present within 6 hours of suspected infection and SIRS criteria that are NOT considered to be chronic conditions?  No  -CC        Organ dysfunction          Date of presentation of severe sepsis          Time of presentation of severe sepsis          Tissue hypoperfusion persists in the hour after crystalloid fluid administration, evidenced, by either:          Was hypotension present within one hour of the conclusion of crystalloid fluid administration?         Date of presentation of septic shock          Time of presentation of septic shock            User Key  (r) = Recorded By, (t) = Taken By, (c) = Cosigned By    [address] Name Provider Type    Andres Kimball MD Resident                  MDM  Number of Diagnoses or Management Options  Diagnosis management comments: Concern for possible pneumonia versus ACS  The patient is tachycardic and has a high white count  I will perform the septic workup and cardiac workup  I will begin with 1 L of fluid  She will likely need more fluids  Toradol and Tylenol for fever  The urinalysis for CVA tenderness  She does have a history of pyelonephritis  CritCare Time    Disposition  Final diagnoses:   SOB (shortness of breath)   Cough   Fever     Time reflects when diagnosis was documented in both MDM as applicable and the Disposition within this note     Time User Action Codes Description Comment    12/9/2017  2:30 AM Letha Pound P Add [R06 02] SOB (shortness of breath)     12/9/2017  2:30 AM Letha Cerritos P Add [R05] Cough     12/9/2017  2:30 AM Aleksandr Rebecaing Add [R50 9] Fever       ED Disposition     ED Disposition Condition Comment    Discharge  Layman Maritza discharge to home/self care      Condition at discharge: Stable        Follow-up Information     Follow up With Specialties Details Why Contact Info Additional [address] Emergency Department Emergency Medicine Go to If symptoms worsen [address] [place] [address], [address], [place], [place], [address]        Patient's Medications   Discharge Prescriptions    CEPHALEXIN (KEFLEX) 500 MG CAPSULE    Take 1 capsule by mouth every 12 (twelve) hours for 5 days       Start Date: 12/9/2017 End Date: 12/14/2017       Order Dose: 500 mg       Quantity: 10 capsule    Refills: 0     No discharge procedures on file  ED Provider  Attending physically available and evaluated Mone Bhagat I managed the patient along with the ED Attending      Electronically Signed by         Mateusz Iglesias MD  Resident  12/09/17 8548

## 2017-12-09 NOTE — ED NOTES
ECG completed at 2102  Viewed and signed by Dr Edward Shannon, copy on chart       Chika Mckay  12/08/17 9220

## 2017-12-09 NOTE — DISCHARGE INSTRUCTIONS
Acute Cough   WHAT YOU NEED TO KNOW:   An acute cough can last up to 3 weeks  Common causes of an acute cough include a cold, allergies, or a lung infection  DISCHARGE INSTRUCTIONS:   Return to the emergency department if:   · You have trouble breathing or feel short of breath  · You cough up blood, or you see blood in your mucus  · You faint or feel weak or dizzy  · You have chest pain when you cough or take a deep breath  · You have new wheezing  Contact your healthcare provider if:   · You have a fever  · Your cough lasts longer than 4 weeks  · Your symptoms do not improve with treatment  · You have questions or concerns about your condition or care  Medicines:   · Medicines  may be needed to stop the cough, decrease swelling in your airways, or help open your airways  Medicine may also be given to help you cough up mucus  Ask your healthcare provider what over-the-counter medicines you can take  If you have an infection caused by bacteria, you may need antibiotics  · Take your medicine as directed  Contact your healthcare provider if you think your medicine is not helping or if you have side effects  Tell him or her if you are allergic to any medicine  Keep a list of the medicines, vitamins, and herbs you take  Include the amounts, and when and why you take them  Bring the list or the pill bottles to follow-up visits  Carry your medicine list with you in case of an emergency  Manage your symptoms:   · Do not smoke and stay away from others who smoke  Nicotine and other chemicals in cigarettes and cigars can cause lung damage and make your cough worse  Ask your healthcare provider for information if you currently smoke and need help to quit  E-cigarettes or smokeless tobacco still contain nicotine  Talk to your healthcare provider before you use these products  · Drink extra liquids as directed  Liquids will help thin and loosen mucus so you can cough it up   Liquids will also help prevent dehydration  Examples of good liquids to drink include water, fruit juice, and broth  Do not drink liquids that contain caffeine  Caffeine can increase your risk for dehydration  Ask your healthcare provider how much liquid to drink each day  · Rest as directed  Do not do activities that make your cough worse, such as exercise  · Use a humidifier or vaporizer  Use a cool mist humidifier or a vaporizer to increase air moisture in your home  This may make it easier for you to breathe and help decrease your cough  · Eat 2 to 5 mL of honey 2 times each day  Honey can help thin mucus and decrease your cough  · Use cough drops or lozenges  These can help decrease throat irritation and your cough  Follow up with your healthcare provider as directed:  Write down your questions so you remember to ask them during your visits  © 2017 [address] [employer] Information is for End User's use only and may not be sold, redistributed or otherwise used for commercial purposes  All illustrations and images included in CareNotes® are the copyrighted property of A [employer]  or [employer]  The above information is an  only  It is not intended as medical advice for individual conditions or treatments  Talk to your doctor, nurse or pharmacist before following any medical regimen to see if it is safe and effective for you  Fever in Adults   WHAT YOU NEED TO KNOW:   A fever is an increase in your body temperature  Normal body temperature is 98 6°F (37°C)  Fever is generally defined as greater than 100 4°F (38°C)  Common causes include an infection, injury, or disease such as arthritis  DISCHARGE INSTRUCTIONS:   Return to the emergency department if:   · Your fever does not go away or gets worse even after treatment  · You have a stiff neck and a bad headache  · You are confused   You may not be able to think clearly or remember things like you normally do  · Your heart beats faster than usual even after treatment  · You have shortness of breath or chest pain when you breathe  · You urinate small amounts or not at all  · Your skin, lips, or nails turn blue  Contact your healthcare provider if:   · You have abdominal pain or you feel bloated  · You have nausea or are vomiting  · You have pain or burning when you urinate, or you have pain in your back  · You have questions or concerns about your condition or care  Medicines: You may need any of the following:  · NSAIDs , such as ibuprofen, help decrease swelling, pain, and fever  This medicine is available with or without a doctor's order  NSAIDs can cause stomach bleeding or kidney problems in certain people  If you take blood thinner medicine, always ask if NSAIDs are safe for you  Always read the medicine label and follow directions  Do not give these medicines to children under 10months of age without direction from your child's healthcare provider  · Acetaminophen  decreases pain and fever  It is available without a doctor's order  Ask how much to take and how often to take it  Follow directions  Read the labels of all other medicines you are using to see if they also contain acetaminophen, or ask your doctor or pharmacist  Acetaminophen can cause liver damage if not taken correctly  Do not use more than 4 grams (4,000 milligrams) total of acetaminophen in one day  · Antibiotics  may be given if you have an infection caused by bacteria  · Take your medicine as directed  Contact your healthcare provider if you think your medicine is not helping or if you have side effects  Tell him of her if you are allergic to any medicine  Keep a list of the medicines, vitamins, and herbs you take  Include the amounts, and when and why you take them  Bring the list or the pill bottles to follow-up visits  Carry your medicine list with you in case of an emergency    Follow up with your healthcare provider as directed:  Write down your questions so you remember to ask them during your visits  Self-care:   · Drink more liquids as directed  A fever makes you sweat  This can increase your risk for dehydration  Liquids can help prevent dehydration  ¨ Drink at least 6 to 8 eight-ounce cups of clear liquids each day  Drink water, juice, or broth  Do not drink sports drinks  They may contain caffeine  ¨ Ask your healthcare provider if you should drink an oral rehydration solution (ORS)  An ORS has the right amounts of water, salts, and sugar you need to replace body fluids  · Dress in lightweight clothes  Shivers may be a sign that your fever is rising  Do not put extra blankets or clothes on  This may cause your fever to rise even higher  Dress in light, comfortable clothing  Use a lightweight blanket or sheet when you sleep  Change your clothes, blanket, or sheets if they get wet  · Cool yourself safely  Take a bath in cool or lukewarm water  Use an ice pack wrapped in a small towel or wet a washcloth with cool water  Place the ice pack or wet washcloth on your forehead or the back of your neck  © 2017 [employer] [employer]  Information is for End User's use only and may not be sold, redistributed or otherwise used for commercial purposes  All illustrations and images included in CareNotes® are the copyrighted property of [employer]  or [address]  The above information is an  only  It is not intended as medical advice for individual conditions or treatments  Talk to your doctor, nurse or pharmacist before following any medical regimen to see if it is safe and effective for you  Shortness of Breath   WHAT YOU NEED TO KNOW:   Shortness of breath is a feeling that you cannot get enough air when you breathe in  You may have this feeling only during activity, or all the time  Your symptoms can range from mild to severe   Shortness of breath may be a sign of a serious health condition that needs immediate care  DISCHARGE INSTRUCTIONS:   Return to the emergency department if:   · Your signs and symptoms are the same or worse within 24 hours of treatment  · The skin over your ribs or on your neck sinks in when you breathe  · You feel confused or dizzy  Contact your healthcare provider if:   · You have new or worsening symptoms  · You have questions or concerns about your condition or care  Medicines:   · Medicines  may be used to treat the cause of your symptoms  You may need medicine to treat a bacterial infection or reduce anxiety  Other medicines may be used to open your airway, reduce swelling, or remove extra fluid  If you have a heart condition, you may need medicine to help your heart beat more strongly or regularly  · Take your medicine as directed  Contact your healthcare provider if you think your medicine is not helping or if you have side effects  Tell him or her if you are allergic to any medicine  Keep a list of the medicines, vitamins, and herbs you take  Include the amounts, and when and why you take them  Bring the list or the pill bottles to follow-up visits  Carry your medicine list with you in case of an emergency  Manage shortness of breath:   · Create an action plan  You and your healthcare provider can work together to create a plan for how to handle shortness of breath  The plan can include daily activities, treatment changes, and what to do if you have severe breathing problems  · Lean forward on your elbows when you sit  This helps your lungs expand and may make it easier to breathe  · Use pursed-lip breathing any time you feel short of breath  Breathe in through your nose and then slowly breathe out through your mouth with your lips slightly puckered  It should take you twice as long to breathe out as it did to breathe in  · Do not smoke    Nicotine and other chemicals in cigarettes and cigars can cause lung damage and make shortness of breath worse  Ask your healthcare provider for information if you currently smoke and need help to quit  E-cigarettes or smokeless tobacco still contain nicotine  Talk to your healthcare provider before you use these products  · Reach or maintain a healthy weight  Your healthcare provider can help you create a safe weight loss plan if you are overweight  · Exercise as directed  Exercise can help your lungs work more easily  Exercise can also help you lose weight if needed  Try to get at least 30 minutes of exercise most days of the week  Follow up with your healthcare provider or specialist as directed:  Write down your questions so you remember to ask them during your visits  © 2017 [address] [facility] Information is for End User's use only and may not be sold, redistributed or otherwise used for commercial purposes  All illustrations and images included in CareNotes® are the copyrighted property of [employer]  or [employer]  The above information is an  only  It is not intended as medical advice for individual conditions or treatments  Talk to your doctor, nurse or pharmacist before following any medical regimen to see if it is safe and effective for you

## 2017-12-10 LAB — BACTERIA UR CULT: NORMAL

## 2017-12-11 ENCOUNTER — ALLSCRIPTS OFFICE VISIT (OUTPATIENT)
Dept: OTHER | Facility: OTHER | Age: 47
End: 2017-12-11

## 2017-12-11 ENCOUNTER — HOSPITAL ENCOUNTER (INPATIENT)
Facility: HOSPITAL | Age: 47
LOS: 3 days | Discharge: HOME | DRG: 872 | End: 2017-12-15
Attending: EMERGENCY MEDICINE | Admitting: INTERNAL MEDICINE
Payer: COMMERCIAL

## 2017-12-11 ENCOUNTER — TRANSCRIBE ORDERS (OUTPATIENT)
Dept: ADMINISTRATIVE | Facility: HOSPITAL | Age: 47
End: 2017-12-11

## 2017-12-11 ENCOUNTER — APPOINTMENT (EMERGENCY)
Dept: ULTRASOUND IMAGING | Facility: HOSPITAL | Age: 47
DRG: 872 | End: 2017-12-11
Payer: COMMERCIAL

## 2017-12-11 DIAGNOSIS — L97.909 LEG ULCER (HCC): ICD-10-CM

## 2017-12-11 DIAGNOSIS — L03.116 LEFT LEG CELLULITIS: Primary | ICD-10-CM

## 2017-12-11 DIAGNOSIS — L97.509 FOOT ULCER (HCC): ICD-10-CM

## 2017-12-11 DIAGNOSIS — M79.605 LEFT LEG PAIN: Primary | ICD-10-CM

## 2017-12-11 DIAGNOSIS — R60.9 SWELLING: ICD-10-CM

## 2017-12-11 PROBLEM — E11.9 TYPE 2 DIABETES MELLITUS (HCC): Status: ACTIVE | Noted: 2017-12-11

## 2017-12-11 PROBLEM — E87.6 HYPOKALEMIA: Status: ACTIVE | Noted: 2017-12-11

## 2017-12-11 PROBLEM — I10 HYPERTENSION: Status: ACTIVE | Noted: 2017-12-11

## 2017-12-11 PROBLEM — E87.1 HYPONATREMIA: Status: ACTIVE | Noted: 2017-12-11

## 2017-12-11 PROBLEM — D72.829 LEUKOCYTOSIS: Status: ACTIVE | Noted: 2017-12-11

## 2017-12-11 LAB
ALBUMIN SERPL BCP-MCNC: 2.4 G/DL (ref 3.5–5)
ALP SERPL-CCNC: 117 U/L (ref 46–116)
ALT SERPL W P-5'-P-CCNC: 21 U/L (ref 12–78)
ANION GAP SERPL CALCULATED.3IONS-SCNC: 12 MMOL/L (ref 4–13)
APTT PPP: 42 SECONDS (ref 23–35)
AST SERPL W P-5'-P-CCNC: 21 U/L (ref 5–45)
ATRIAL RATE: 114 BPM
BASOPHILS # BLD AUTO: 0.05 THOUSANDS/ΜL (ref 0–0.1)
BASOPHILS NFR BLD AUTO: 0 % (ref 0–1)
BILIRUB SERPL-MCNC: 0.4 MG/DL (ref 0.2–1)
BUN SERPL-MCNC: 15 MG/DL (ref 5–25)
CALCIUM SERPL-MCNC: 9.1 MG/DL (ref 8.3–10.1)
CHLORIDE SERPL-SCNC: 97 MMOL/L (ref 100–108)
CO2 SERPL-SCNC: 22 MMOL/L (ref 21–32)
CREAT SERPL-MCNC: 0.92 MG/DL (ref 0.6–1.3)
EOSINOPHIL # BLD AUTO: 0.02 THOUSAND/ΜL (ref 0–0.61)
EOSINOPHIL NFR BLD AUTO: 0 % (ref 0–6)
ERYTHROCYTE [DISTWIDTH] IN BLOOD BY AUTOMATED COUNT: 13 % (ref 11.6–15.1)
GFR SERPL CREATININE-BSD FRML MDRD: 74 ML/MIN/1.73SQ M
GLUCOSE SERPL-MCNC: 197 MG/DL (ref 65–140)
GLUCOSE SERPL-MCNC: 235 MG/DL (ref 65–140)
HCT VFR BLD AUTO: 33.1 % (ref 34.8–46.1)
HGB BLD-MCNC: 11 G/DL (ref 11.5–15.4)
INR PPP: 1.07 (ref 0.86–1.16)
LACTATE SERPL-SCNC: 1.2 MMOL/L (ref 0.5–2)
LYMPHOCYTES # BLD AUTO: 1.73 THOUSANDS/ΜL (ref 0.6–4.47)
LYMPHOCYTES NFR BLD AUTO: 8 % (ref 14–44)
MCH RBC QN AUTO: 28.1 PG (ref 26.8–34.3)
MCHC RBC AUTO-ENTMCNC: 33.2 G/DL (ref 31.4–37.4)
MCV RBC AUTO: 84 FL (ref 82–98)
MONOCYTES # BLD AUTO: 2.57 THOUSAND/ΜL (ref 0.17–1.22)
MONOCYTES NFR BLD AUTO: 12 % (ref 4–12)
NEUTROPHILS # BLD AUTO: 17.36 THOUSANDS/ΜL (ref 1.85–7.62)
NEUTS SEG NFR BLD AUTO: 80 % (ref 43–75)
P AXIS: 8 DEGREES
PLATELET # BLD AUTO: 314 THOUSANDS/UL (ref 149–390)
PMV BLD AUTO: 9.4 FL (ref 8.9–12.7)
POTASSIUM SERPL-SCNC: 3.3 MMOL/L (ref 3.5–5.3)
PR INTERVAL: 122 MS
PROT SERPL-MCNC: 7.5 G/DL (ref 6.4–8.2)
PROTHROMBIN TIME: 14.2 SECONDS (ref 12.1–14.4)
QRS AXIS: 52 DEGREES
QRSD INTERVAL: 80 MS
QT INTERVAL: 302 MS
QTC INTERVAL: 416 MS
RBC # BLD AUTO: 3.92 MILLION/UL (ref 3.81–5.12)
SODIUM SERPL-SCNC: 131 MMOL/L (ref 136–145)
SPECIMEN SOURCE: NORMAL
T WAVE AXIS: -7 DEGREES
TROPONIN I BLD-MCNC: 0 NG/ML (ref 0–0.08)
VENTRICULAR RATE: 114 BPM
WBC # BLD AUTO: 21.73 THOUSAND/UL (ref 4.31–10.16)

## 2017-12-11 PROCEDURE — 87040 BLOOD CULTURE FOR BACTERIA: CPT | Performed by: EMERGENCY MEDICINE

## 2017-12-11 PROCEDURE — 96375 TX/PRO/DX INJ NEW DRUG ADDON: CPT

## 2017-12-11 PROCEDURE — 85730 THROMBOPLASTIN TIME PARTIAL: CPT | Performed by: EMERGENCY MEDICINE

## 2017-12-11 PROCEDURE — 93005 ELECTROCARDIOGRAM TRACING: CPT | Performed by: EMERGENCY MEDICINE

## 2017-12-11 PROCEDURE — 96361 HYDRATE IV INFUSION ADD-ON: CPT

## 2017-12-11 PROCEDURE — 93971 EXTREMITY STUDY: CPT

## 2017-12-11 PROCEDURE — 85610 PROTHROMBIN TIME: CPT | Performed by: EMERGENCY MEDICINE

## 2017-12-11 PROCEDURE — 96365 THER/PROPH/DIAG IV INF INIT: CPT

## 2017-12-11 PROCEDURE — 82948 REAGENT STRIP/BLOOD GLUCOSE: CPT

## 2017-12-11 PROCEDURE — 99285 EMERGENCY DEPT VISIT HI MDM: CPT

## 2017-12-11 PROCEDURE — 85025 COMPLETE CBC W/AUTO DIFF WBC: CPT | Performed by: EMERGENCY MEDICINE

## 2017-12-11 PROCEDURE — 36415 COLL VENOUS BLD VENIPUNCTURE: CPT | Performed by: EMERGENCY MEDICINE

## 2017-12-11 PROCEDURE — 84484 ASSAY OF TROPONIN QUANT: CPT

## 2017-12-11 PROCEDURE — 80053 COMPREHEN METABOLIC PANEL: CPT | Performed by: EMERGENCY MEDICINE

## 2017-12-11 PROCEDURE — 96374 THER/PROPH/DIAG INJ IV PUSH: CPT

## 2017-12-11 PROCEDURE — 83605 ASSAY OF LACTIC ACID: CPT | Performed by: EMERGENCY MEDICINE

## 2017-12-11 RX ORDER — INSULIN GLARGINE 100 [IU]/ML
10 INJECTION, SOLUTION SUBCUTANEOUS
Status: DISCONTINUED | OUTPATIENT
Start: 2017-12-11 | End: 2017-12-13

## 2017-12-11 RX ORDER — ONDANSETRON 2 MG/ML
4 INJECTION INTRAMUSCULAR; INTRAVENOUS ONCE
Status: COMPLETED | OUTPATIENT
Start: 2017-12-11 | End: 2017-12-11

## 2017-12-11 RX ORDER — OXYCODONE HYDROCHLORIDE AND ACETAMINOPHEN 5; 325 MG/1; MG/1
2 TABLET ORAL EVERY 4 HOURS PRN
Status: DISCONTINUED | OUTPATIENT
Start: 2017-12-11 | End: 2017-12-15 | Stop reason: HOSPADM

## 2017-12-11 RX ORDER — CLINDAMYCIN PHOSPHATE 600 MG/50ML
600 INJECTION INTRAVENOUS ONCE
Status: COMPLETED | OUTPATIENT
Start: 2017-12-11 | End: 2017-12-11

## 2017-12-11 RX ORDER — KETOROLAC TROMETHAMINE 30 MG/ML
30 INJECTION, SOLUTION INTRAMUSCULAR; INTRAVENOUS ONCE
Status: COMPLETED | OUTPATIENT
Start: 2017-12-11 | End: 2017-12-11

## 2017-12-11 RX ORDER — PRAVASTATIN SODIUM 20 MG
20 TABLET ORAL
Status: DISCONTINUED | OUTPATIENT
Start: 2017-12-12 | End: 2017-12-15 | Stop reason: HOSPADM

## 2017-12-11 RX ORDER — OXYCODONE HYDROCHLORIDE AND ACETAMINOPHEN 5; 325 MG/1; MG/1
1 TABLET ORAL EVERY 4 HOURS PRN
Status: DISCONTINUED | OUTPATIENT
Start: 2017-12-11 | End: 2017-12-15 | Stop reason: HOSPADM

## 2017-12-11 RX ORDER — SODIUM CHLORIDE AND POTASSIUM CHLORIDE .9; .15 G/100ML; G/100ML
125 SOLUTION INTRAVENOUS CONTINUOUS
Status: DISCONTINUED | OUTPATIENT
Start: 2017-12-11 | End: 2017-12-14

## 2017-12-11 RX ORDER — VANCOMYCIN HYDROCHLORIDE 1 G/200ML
10 INJECTION, SOLUTION INTRAVENOUS EVERY 12 HOURS
Status: DISCONTINUED | OUTPATIENT
Start: 2017-12-11 | End: 2017-12-13

## 2017-12-11 RX ORDER — ACETAMINOPHEN 325 MG/1
650 TABLET ORAL EVERY 6 HOURS PRN
Status: DISCONTINUED | OUTPATIENT
Start: 2017-12-11 | End: 2017-12-15 | Stop reason: HOSPADM

## 2017-12-11 RX ORDER — POTASSIUM CHLORIDE 20 MEQ/1
40 TABLET, EXTENDED RELEASE ORAL ONCE
Status: COMPLETED | OUTPATIENT
Start: 2017-12-11 | End: 2017-12-11

## 2017-12-11 RX ORDER — MORPHINE SULFATE 2 MG/ML
2 INJECTION, SOLUTION INTRAMUSCULAR; INTRAVENOUS EVERY 4 HOURS PRN
Status: DISCONTINUED | OUTPATIENT
Start: 2017-12-11 | End: 2017-12-15 | Stop reason: HOSPADM

## 2017-12-11 RX ADMIN — ONDANSETRON 4 MG: 2 INJECTION INTRAMUSCULAR; INTRAVENOUS at 16:58

## 2017-12-11 RX ADMIN — SODIUM CHLORIDE 1000 ML: 0.9 INJECTION, SOLUTION INTRAVENOUS at 15:41

## 2017-12-11 RX ADMIN — POTASSIUM CHLORIDE 40 MEQ: 1500 TABLET, EXTENDED RELEASE ORAL at 21:10

## 2017-12-11 RX ADMIN — SODIUM CHLORIDE AND POTASSIUM CHLORIDE 125 ML/HR: .9; .15 SOLUTION INTRAVENOUS at 21:11

## 2017-12-11 RX ADMIN — OXYCODONE HYDROCHLORIDE AND ACETAMINOPHEN 2 TABLET: 5; 325 TABLET ORAL at 21:10

## 2017-12-11 RX ADMIN — KETOROLAC TROMETHAMINE 30 MG: 30 INJECTION, SOLUTION INTRAMUSCULAR at 16:57

## 2017-12-11 RX ADMIN — INSULIN LISPRO 2 UNITS: 100 INJECTION, SOLUTION INTRAVENOUS; SUBCUTANEOUS at 21:21

## 2017-12-11 RX ADMIN — CLINDAMYCIN PHOSPHATE 600 MG: 12 INJECTION, SOLUTION INTRAMUSCULAR; INTRAVENOUS at 16:20

## 2017-12-11 RX ADMIN — VANCOMYCIN HYDROCHLORIDE 1000 MG: 1 INJECTION, SOLUTION INTRAVENOUS at 21:17

## 2017-12-11 NOTE — ED PROVIDER NOTES
History  Chief Complaint   Patient presents with    Leg Swelling     l/le swelling since friday, fevers & SOB as well; seen on friday @[facility], CT chest/abd pelvis were neg, blood work was neg; since friday SOB and fevers are about the same but leg swelling is much worse; deneis trauma, no plane/car rides     Complains of worsening left lower extremity swelling for 3 days  +fevers, nausea/vomiting  She was seen at [facility] ER 3 days ago for the same and shortness of breath-they did a CT of the chest which was negative for PE  They sent her home on Keflex for a UTI  Patient states that her left lower extremity became red over the past day            Prior to Admission Medications   Prescriptions Last Dose Informant Patient Reported? Taking? canagliflozin (INVOKANA) 100 mg 12/10/2017 at 2130 Self Yes Yes   Sig: Take 1 tablet by mouth daily   cephalexin (KEFLEX) 500 mg capsule 12/11/2017 at 0900 Self No Yes   Sig: Take 1 capsule by mouth every 12 (twelve) hours for 5 days   glipiZIDE (GLUCOTROL) 5 mg tablet 12/10/2017 at 2130 Self Yes Yes   Sig: Take 1 tablet by mouth 2 (two) times a day   lisinopril-hydrochlorothiazide (PRINZIDE,ZESTORETIC) 10-12 5 MG per tablet 12/10/2017 at 0900 Self Yes Yes   Sig: Take 1 tablet by mouth daily   metFORMIN (GLUCOPHAGE) 1000 MG tablet 12/10/2017 at 2130 Self Yes Yes   Sig: Take 500 mg by mouth 2 (two) times a day     simvastatin (ZOCOR) 40 mg tablet 12/10/2017 at 2130 Self Yes Yes   Sig: Take 40 mg by mouth daily        Facility-Administered Medications: None       Past Medical History:   Diagnosis Date    Diabetes mellitus ([facility] [place] [address] )     Hyperlipidemia     Hypertension        History reviewed  No pertinent surgical history  Family History   Problem Relation Age of Onset    Heart disease Mother     Diabetes Mother     Cancer Father     Diabetes Sister     Diabetes Maternal Grandfather      I have reviewed and agree with the history as documented      Social History   Substance Use Topics    Smoking status: Never Smoker    Smokeless tobacco: Never Used    Alcohol use Yes      Comment: weekends        Review of Systems   Constitutional: Positive for fever  Negative for fatigue  HENT: Negative for rhinorrhea and sore throat  Respiratory: Negative for cough  Cardiovascular: Negative for chest pain and leg swelling  Gastrointestinal: Positive for nausea and vomiting  Negative for abdominal pain and diarrhea  Genitourinary: Negative for difficulty urinating and flank pain  Musculoskeletal: Negative for back pain and myalgias  Skin: Positive for rash  Negative for color change  Neurological: Negative for dizziness, syncope and headaches  Physical Exam  ED Triage Vitals   Temperature Pulse Respirations Blood Pressure SpO2   12/11/17 1252 12/11/17 1252 12/11/17 1252 12/11/17 1252 12/11/17 1252   99 9 °F (37 7 °C) (!) 116 20 155/76 98 %      Temp Source Heart Rate Source Patient Position - Orthostatic VS BP Location FiO2 (%)   12/11/17 1252 12/11/17 1457 12/11/17 1252 12/11/17 1252 --   Oral Monitor Sitting Left arm       Pain Score       12/11/17 1457       Worst Possible Pain           Orthostatic Vital Signs  Vitals:    12/11/17 1457 12/11/17 1630 12/11/17 1730 12/11/17 1830   BP: (!) 180/80 (!) 187/79 (!) 180/79 157/72   Pulse: 100 102 100 102   Patient Position - Orthostatic VS: Lying Lying Lying Lying       Physical Exam   Constitutional: She is oriented to person, place, and time  She appears well-developed and well-nourished  HENT:   Head: Normocephalic and atraumatic  Neck: Normal range of motion  Neck supple  Cardiovascular: Normal rate and regular rhythm  Pulmonary/Chest: Effort normal and breath sounds normal    Abdominal: Soft  There is no tenderness  Musculoskeletal:   Left lower extremity-+swelling, tenderness, redness, no abscess, + N/v intact   Neurological: She is alert and oriented to person, place, and time     Skin: Skin is warm and dry  Nursing note and vitals reviewed  ED Medications  Medications   sodium chloride 0 9 % bolus 1,000 mL (0 mL Intravenous Stopped 12/11/17 1755)   clindamycin (CLEOCIN) IVPB (premix) 600 mg (0 mg Intravenous Stopped 12/11/17 1655)   ketorolac (TORADOL) injection 30 mg (30 mg Intravenous Given 12/11/17 1657)   ondansetron (ZOFRAN) injection 4 mg (4 mg Intravenous Given 12/11/17 1658)       Diagnostic Studies  Results Reviewed     Procedure Component Value Units Date/Time    Protime-INR [[id number]]  (Normal) Collected:  12/11/17 1614    Lab Status:  Final result Specimen:  Blood from Arm, Right Updated:  12/11/17 1655     Protime 14 2 seconds      INR 1 07    APTT [[id number]]  (Abnormal) Collected:  12/11/17 1614    Lab Status:  Final result Specimen:  Blood from Arm, Right Updated:  12/11/17 1655     PTT 42 (H) seconds     Narrative: Therapeutic Heparin Range = 60-90 seconds    Lactic acid, plasma [[id number]]  (Normal) Collected:  12/11/17 1614    Lab Status:  Final result Specimen:  Blood from Arm, Right Updated:  12/11/17 1653     LACTIC ACID 1 2 mmol/L     Narrative:         Result may be elevated if tourniquet was used during collection      Comprehensive metabolic panel [[id number]]  (Abnormal) Collected:  12/11/17 1614    Lab Status:  Final result Specimen:  Blood from Arm, Right Updated:  12/11/17 1650     Sodium 131 (L) mmol/L      Potassium 3 3 (L) mmol/L      Chloride 97 (L) mmol/L      CO2 22 mmol/L      Anion Gap 12 mmol/L      BUN 15 mg/dL      Creatinine 0 92 mg/dL      Glucose 197 (H) mg/dL      Calcium 9 1 mg/dL      AST 21 U/L      ALT 21 U/L      Alkaline Phosphatase 117 (H) U/L      Total Protein 7 5 g/dL      Albumin 2 4 (L) g/dL      Total Bilirubin 0 40 mg/dL      eGFR 74 ml/min/1 73sq m     Narrative:         National Kidney Disease Education Program recommendations are as follows:  GFR calculation is accurate only with a steady state creatinine  Chronic Kidney disease less than 60 ml/min/1 73 sq  meters  Kidney failure less than 15 ml/min/1 73 sq  meters  POCT troponin [[id number]]  (Normal) Collected:  12/11/17 1614    Lab Status:  Final result Updated:  12/11/17 1628     POC Troponin I 0 00 ng/ml      Specimen Type VENOUS    Narrative:         [employer] i-Stat handheld analyzer 99% cutoff is > 0 08ng/mL in network Emergency Departments    o cTnI 99% cutoff is useful only when applied to patients in the clinical setting of myocardial ischemia  o cTnI 99% cutoff should be interpreted in the context of clinical history, ECG findings and possibly cardiac imaging to establish correct diagnosis  o cTnI 99% cutoff may be suggestive but clearly not indicative of a coronary event without the clinical setting of myocardial ischemia  Blood culture #1 [[id number]] Collected:  12/11/17 1617    Lab Status: In process Specimen:  Blood from Arm, Right Updated:  12/11/17 1622    CBC and differential [[id number]]  (Abnormal) Collected:  12/11/17 1614    Lab Status:  Final result Specimen:  Blood from Arm, Right Updated:  12/11/17 1621     WBC 21 73 (H) Thousand/uL      RBC 3 92 Million/uL      Hemoglobin 11 0 (L) g/dL      Hematocrit 33 1 (L) %      MCV 84 fL      MCH 28 1 pg      MCHC 33 2 g/dL      RDW 13 0 %      MPV 9 4 fL      Platelets 426 Thousands/uL      Neutrophils Relative 80 (H) %      Lymphocytes Relative 8 (L) %      Monocytes Relative 12 %      Eosinophils Relative 0 %      Basophils Relative 0 %      Neutrophils Absolute 17 36 (H) Thousands/µL      Lymphocytes Absolute 1 73 Thousands/µL      Monocytes Absolute 2 57 (H) Thousand/µL      Eosinophils Absolute 0 02 Thousand/µL      Basophils Absolute 0 05 Thousands/µL     Blood culture #2 [[id number]] Collected:  12/11/17 1435    Lab Status:   In process Specimen:  Blood from Arm, Left Updated:  12/11/17 1437                 VAS lower limb venous duplex study, unilateral/limited   Final Result by Anabel San MD (12/11 1531) Procedures  ECG 12 Lead Documentation  Date/Time: 12/11/2017 2:43 PM  Performed by: SEBASTIÁN Eli  Authorized by: SEBASTIÁN Eli     Rate:     ECG rate:  100    ECG rate assessment: normal    Rhythm:     Rhythm: sinus rhythm    Comments:      No st elevation or depression           Phone Contacts  ED Phone Contact    ED Course  ED Course                                MDM  Number of Diagnoses or Management Options  Left leg cellulitis:      Amount and/or Complexity of Data Reviewed  Clinical lab tests: ordered and reviewed  Tests in the radiology section of CPT®: ordered and reviewed    Risk of Complications, Morbidity, and/or Mortality  Presenting problems: moderate  General comments: Venous dopplers of LLE - no dvt    Patient admitted for further workup including IV antibiotics  She failed outpatient treatment with Keflex      CritCare Time    Disposition  Final diagnoses:   Left leg cellulitis     Time reflects when diagnosis was documented in both MDM as applicable and the Disposition within this note     Time User Action Codes Description Comment    12/11/2017  6:46 PM Pancho Muhammad Add [L03 [id number]] Left leg cellulitis       ED Disposition     ED Disposition Condition Comment    Admit  Case was discussed with RYAN and the patient's admission status was agreed to be observation/med/surg  Follow-up Information    None       Patient's Medications   Discharge Prescriptions    No medications on file     No discharge procedures on file      ED Provider  Electronically Signed by           Clay Braun MD  12/11/17 0191

## 2017-12-11 NOTE — ED NOTES
Charge RN unable to obtain bloodwork  Midline team, greg notified  He will try next with US guidance  Dr Gilmar Sykes notified       Solitario Betancur RN  12/11/17 3459

## 2017-12-11 NOTE — ED NOTES
Unable to obtain Bloodwork or IV access, Dr Melia Bro notified  Charge RN will attempt next when US done       Katie Tao RN  12/11/17 1611

## 2017-12-12 LAB
ALBUMIN SERPL BCP-MCNC: 2 G/DL (ref 3.5–5)
ALP SERPL-CCNC: 104 U/L (ref 46–116)
ALT SERPL W P-5'-P-CCNC: 17 U/L (ref 12–78)
ANION GAP SERPL CALCULATED.3IONS-SCNC: 11 MMOL/L (ref 4–13)
AST SERPL W P-5'-P-CCNC: 20 U/L (ref 5–45)
ATRIAL RATE: 100 BPM
BILIRUB SERPL-MCNC: 0.4 MG/DL (ref 0.2–1)
BUN SERPL-MCNC: 16 MG/DL (ref 5–25)
CALCIUM SERPL-MCNC: 8.3 MG/DL (ref 8.3–10.1)
CHLORIDE SERPL-SCNC: 102 MMOL/L (ref 100–108)
CO2 SERPL-SCNC: 19 MMOL/L (ref 21–32)
CREAT SERPL-MCNC: 0.83 MG/DL (ref 0.6–1.3)
ERYTHROCYTE [DISTWIDTH] IN BLOOD BY AUTOMATED COUNT: 13.5 % (ref 11.6–15.1)
GFR SERPL CREATININE-BSD FRML MDRD: 84 ML/MIN/1.73SQ M
GLUCOSE SERPL-MCNC: 143 MG/DL (ref 65–140)
GLUCOSE SERPL-MCNC: 169 MG/DL (ref 65–140)
GLUCOSE SERPL-MCNC: 173 MG/DL (ref 65–140)
GLUCOSE SERPL-MCNC: 203 MG/DL (ref 65–140)
GLUCOSE SERPL-MCNC: 261 MG/DL (ref 65–140)
HCT VFR BLD AUTO: 31.8 % (ref 34.8–46.1)
HGB BLD-MCNC: 10.9 G/DL (ref 11.5–15.4)
MCH RBC QN AUTO: 28 PG (ref 26.8–34.3)
MCHC RBC AUTO-ENTMCNC: 34.3 G/DL (ref 31.4–37.4)
MCV RBC AUTO: 82 FL (ref 82–98)
P AXIS: 13 DEGREES
PLATELET # BLD AUTO: 337 THOUSANDS/UL (ref 149–390)
PMV BLD AUTO: 9.2 FL (ref 8.9–12.7)
POTASSIUM SERPL-SCNC: 3.9 MMOL/L (ref 3.5–5.3)
PR INTERVAL: 120 MS
PROT SERPL-MCNC: 6.8 G/DL (ref 6.4–8.2)
QRS AXIS: 41 DEGREES
QRSD INTERVAL: 78 MS
QT INTERVAL: 340 MS
QTC INTERVAL: 438 MS
RBC # BLD AUTO: 3.89 MILLION/UL (ref 3.81–5.12)
SODIUM SERPL-SCNC: 132 MMOL/L (ref 136–145)
T WAVE AXIS: 15 DEGREES
VENTRICULAR RATE: 100 BPM
WBC # BLD AUTO: 19.83 THOUSAND/UL (ref 4.31–10.16)

## 2017-12-12 PROCEDURE — 80053 COMPREHEN METABOLIC PANEL: CPT | Performed by: FAMILY MEDICINE

## 2017-12-12 PROCEDURE — 82948 REAGENT STRIP/BLOOD GLUCOSE: CPT

## 2017-12-12 PROCEDURE — 85027 COMPLETE CBC AUTOMATED: CPT | Performed by: FAMILY MEDICINE

## 2017-12-12 RX ORDER — LISINOPRIL 10 MG/1
10 TABLET ORAL DAILY
Status: DISCONTINUED | OUTPATIENT
Start: 2017-12-13 | End: 2017-12-15 | Stop reason: HOSPADM

## 2017-12-12 RX ADMIN — LISINOPRIL: 10 TABLET ORAL at 08:02

## 2017-12-12 RX ADMIN — INSULIN LISPRO 1 UNITS: 100 INJECTION, SOLUTION INTRAVENOUS; SUBCUTANEOUS at 21:19

## 2017-12-12 RX ADMIN — OXYCODONE HYDROCHLORIDE AND ACETAMINOPHEN 2 TABLET: 5; 325 TABLET ORAL at 05:18

## 2017-12-12 RX ADMIN — PRAVASTATIN SODIUM 20 MG: 20 TABLET ORAL at 17:26

## 2017-12-12 RX ADMIN — SODIUM CHLORIDE AND POTASSIUM CHLORIDE 125 ML/HR: .9; .15 SOLUTION INTRAVENOUS at 16:01

## 2017-12-12 RX ADMIN — ACETAMINOPHEN 650 MG: 325 TABLET ORAL at 15:57

## 2017-12-12 RX ADMIN — INSULIN LISPRO 2 UNITS: 100 INJECTION, SOLUTION INTRAVENOUS; SUBCUTANEOUS at 12:47

## 2017-12-12 RX ADMIN — INSULIN LISPRO 1 UNITS: 100 INJECTION, SOLUTION INTRAVENOUS; SUBCUTANEOUS at 07:59

## 2017-12-12 RX ADMIN — INSULIN LISPRO 3 UNITS: 100 INJECTION, SOLUTION INTRAVENOUS; SUBCUTANEOUS at 07:58

## 2017-12-12 RX ADMIN — INSULIN GLARGINE 10 UNITS: 100 INJECTION, SOLUTION SUBCUTANEOUS at 21:19

## 2017-12-12 RX ADMIN — SODIUM CHLORIDE AND POTASSIUM CHLORIDE 125 ML/HR: .9; .15 SOLUTION INTRAVENOUS at 05:25

## 2017-12-12 RX ADMIN — VANCOMYCIN HYDROCHLORIDE 1000 MG: 1 INJECTION, SOLUTION INTRAVENOUS at 21:19

## 2017-12-12 RX ADMIN — INSULIN LISPRO 1 UNITS: 100 INJECTION, SOLUTION INTRAVENOUS; SUBCUTANEOUS at 17:26

## 2017-12-12 RX ADMIN — VANCOMYCIN HYDROCHLORIDE 1000 MG: 1 INJECTION, SOLUTION INTRAVENOUS at 08:05

## 2017-12-12 RX ADMIN — ENOXAPARIN SODIUM 40 MG: 40 INJECTION SUBCUTANEOUS at 08:01

## 2017-12-12 RX ADMIN — OXYCODONE HYDROCHLORIDE AND ACETAMINOPHEN 2 TABLET: 5; 325 TABLET ORAL at 20:26

## 2017-12-12 NOTE — CONSULTS
Consultation - Infectious Disease   Jordan Pierre 52 y o  female MRN: [id number]  Unit/Bed#: -01 Encounter: [id number]      IMPRESSION & RECOMMENDATIONS:   Impression/Recommendations:  1  Sepsis, POA  Leukocytosis, tachycardia  Likely secondary to #2  T-max was 99 9° on admission  Temp has normalized  Lactic acid was 1 2  Patient was started on vancomycin on admission and is showing clinical improvement  Otherwise, hemodynamically stable and nontoxic appearing  Blood cultures drawn from the ER on 12/08 remain negative  Blood cultures from admission on 12/11 are pending     -antibiotic as below  -monitor temperatures and white blood cell count  -monitor hemodynamics  -follow-up blood cultures    2  Left lower extremity cellulitis  Patient does have onychomycosis as well as a small scabbed over lesion on 1 of her toes, which may have been the portal of entry for infection  This clinically looks like a strep infection  No evidence of purulence to suggest a staph infection  However, it seemed to have worsened while patient was on oral Keflex  Now clinically improving on IV vancomycin  Would continue for now since we are seeing good clinical response     -continue with IV vancomycin  Check vancomycin trough prior to 4th dose tomorrow   -serial leg exams    3  Asymptomatic bacteriuria  Patient did not have any urinary symptoms when she presented to the ER on 12/08  CT revealed mild possible cystitis  However, urine culture grew only mixed contaminants  Patient remains asymptomatic  No indication to treat for a UTI  -monitor symptoms    4  Diabetes mellitus type 2, with hyperglycemia  -recommend good blood sugar control in the setting of acute infection    5  Onychomycosis  May have contributed to the development of cellulitis  -recommend outpatient follow-up with Podiatry    6  Cough  Has been ongoing for about 2 and half weeks as per patient    CT of chest did not show any evidence of pulmonary infection  This is probably a resolving viral URI  O2 sat stable on room air  Patient currently not short of breath   -monitor symptoms    Antibiotics:  Vancomycin D2    Thank you for this consultation  We will follow along with you  HISTORY OF PRESENT ILLNESS:  Reason for Consult:  Cellulitis    HPI: Erlin Shipley is a 52 y o  female with history of diabetes admitted on 12/11 with worsening swelling of her left lower extremity, as well as areas of redness and pain  Patient had presented to [facility] ER on 12/08 with complaint of shortness of breath along with fever  She had a CT which was suggestive of a mild cystitis and patient was given oral Keflex  She did not have urinary symptoms at that time  Patient also had some left thigh pain at the time but thought nothing of it  She did take the Keflex but developed worsening swelling of her left leg along with significant redness and pain  She also complained of subjective fevers and chills  Patient denies any trauma to the leg  No open wounds  She has dogs at home but no known scratches or bites  She was started on IV vancomycin and states today that the redness and swelling is already subsiding  Her fevers have improved  She denies shortness of breath now  She has had a cough for the past 2 and half weeks with congestion, but this has not worsened  No sore throat  No nausea, vomiting, diarrhea  No urinary symptoms  No abdominal pain  No chest pain or palpitations  REVIEW OF SYSTEMS:  A complete system-based review of systems is otherwise negative  PAST MEDICAL HISTORY:  Past Medical History:   Diagnosis Date    Diabetes mellitus ([facility] [address] )     Hyperlipidemia     Hypertension      History reviewed  No pertinent surgical history      FAMILY HISTORY:  Non-contributory    SOCIAL HISTORY:  History   Alcohol Use    Yes     Comment: weekends     History   Drug Use No     History   Smoking Status    Never Smoker Smokeless Tobacco    Never Used       ALLERGIES:  No Known Allergies    MEDICATIONS:  All current active medications have been reviewed  PHYSICAL EXAM:  Vitals:  HR:  [] 100  Resp:  [14-20] 16  BP: (132-187)/(60-80) 139/62  SpO2:  [97 %-100 %] 98 %  Temp (24hrs), Av 6 °F (37 °C), Min:98 1 °F (36 7 °C), Max:99 9 °F (37 7 °C)  Current: Temperature: 98 1 °F (36 7 °C)     Physical Exam:  General:  Well-nourished, well-developed, in no acute distress  Eyes:  Conjunctive clear with no hemorrhages or effusions  Oropharynx:  No ulcers, no lesions  Neck:  Supple, no lymphadenopathy  Lungs:  Clear to auscultation bilaterally, no accessory muscle use  Cardiac:  Regular rate and rhythm, no murmurs  Abdomen:  Soft, non-tender, non-distended  Extremities:  No peripheral cyanosis, clubbing, or edema  Skin:  Diffuse left lower extremity edema  Scattered areas of erythema with streaking up to the thigh  No open wounds or purulence  Onychomycosis is noted  Neurological:  Moves all four extremities spontaneously, sensation grossly intact      LABS, IMAGING, & OTHER STUDIES:  Lab Results:  I have personally reviewed pertinent labs      Results from last 7 days  Lab Units 17  0501 17  1614 17  2111   SODIUM mmol/L 132* 131* 134*   POTASSIUM mmol/L 3 9 3 3* 3 8   CHLORIDE mmol/L 102 97* 102   CO2 mmol/L 19* 22 23   ANION GAP mmol/L 11 12 9   BUN mg/dL 16 15 14   CREATININE mg/dL 0 83 0 92 0 95   EGFR ml/min/1 73sq m 84 74 72   GLUCOSE RANDOM mg/dL 143* 197* 170*   CALCIUM mg/dL 8 3 9 1 9 3   AST U/L 20 21 19   ALT U/L 17 21 19   ALK PHOS U/L 104 117* 107   TOTAL PROTEIN g/dL 6 8 7 5 8 2   ALBUMIN g/dL 2 0* 2 4* 3 3*   BILIRUBIN TOTAL mg/dL 0 40 0 40 0 69       Results from last 7 days  Lab Units 17  0532 17  1614 17  2111   WBC Thousand/uL 19 83* 21 73* 22 08*   HEMOGLOBIN g/dL 10 9* 11 0* 12 8   PLATELETS Thousands/uL 337 314 327       Results from last 7 days  Lab Units 12/09/17  0045 12/08/17  2248   BLOOD CULTURE   --  No Growth at 72 hrs  No Growth at 72 hrs  URINE CULTURE  50,000-59,000 cfu/ml   --        Imaging Studies:   I have personally reviewed pertinent imaging study reports and images in PACS  CT of chest, abdomen, pelvis from 12/9 negative for PE  Mild abnormal appearance of the urinary bladder which may suggest mild cystitis  Bilateral inguinal lymphadenitis  Lower extremity Doppler negative for DVT    EKG, Pathology, and Other Studies:   I have personally reviewed pertinent reports      Urine culture showed mixed contaminant

## 2017-12-12 NOTE — PROGRESS NOTES
Assessment    1  Cellulitis of left lower leg (682 6) (L03 116)   2  Acute bronchitis (466 0) (J20 9)    Plan  Acute bronchitis, Cellulitis of left lower leg    · Follow-up PRN Evaluation and Treatment  Follow-up  Status: Complete  Done:[id number]    Discussion/Summary    Janeen Lemus does not appear well at this time - I have significant concern for cellulitis of the LLE in a diabetic; also with superimposed bronchitic infection as well  Case was discussed with Senior ER Resident at the [facility], and they will evaluate the pt  She is to f/u PRN  was discharged in stable condition, with her son Michael Dowling), driving her to the ER  The patient, patient's family was counseled regarding instructions for management,-- impressions,-- importance of compliance with treatment  Possible side effects of new medications were reviewed with the patient/guardian today  The treatment plan was reviewed with the patient/guardian  The patient/guardian understands and agrees with the treatment plan      Chief Complaint  PT is being seen today due to having, SOB, cough, nasal congestion, chest congestion  PT also has a left swollen leg with pain from thigh to ankle  PT ankle is swollen, red and warm to touch - pain is severe in the LLE, to the groin  PT has had the pain in thigh since yesterday and has gone to [facility] for SOB  Feverish since Friday, but sick for 2 weeks  No CP  No diarrhea  Glucose at home running a little high  Presents with her son  reviewed - WBC 22K, ECG tachy, Troponin neg, CT chest negative for PE, negative for stone  Cephalexin  History of Present Illness  HPI: As above  Review of Systems   Constitutional: as noted in HPI   ENT: as noted in HPI  Cardiovascular: as noted in HPI  Respiratory: as noted in HPI  Gastrointestinal: as noted in HPI  Musculoskeletal: as noted in HPI  Integumentary: as noted in HPI  Active Problems    1   Benign essential hypertension (401 1) (I10) 2  Breast cancer screening (V76 10) (Z12 39)   3  Diabetes mellitus with diabetic neuropathy (250 60,357 2) (E11 40)   4  Hyperlipidemia (272 4) (E78 5)   5  Need for prophylactic vaccination against Streptococcus pneumoniae (pneumococcus) (V03 82) (Z23)   6  Need for revaccination (V05 9) (Z23)   7  Peripheral neuropathy (356 9) (G62 9)    Past Medical History    1  History of Cellulitis of lower leg (682 6) (L03 119)   2  History of Cellulitis of thigh (682 6) (L03 119)   3  History of Cellulitis of trunk (682 2) (L03 319)   4  History of Edema of both legs (782 3) (R60 0)   5  History Of 1  Previous Pregnancies (V61 5)   6  History of Nausea (787 02) (R11 0)   7  History of Right leg pain (729 5) (M79 604)   8  History of Right leg swelling (729 81) (M79 89)   9  History of Skin Abscess Of Foot (682 7)   10  History of Superficial phlebitis and thrombophlebitis of right lower extremity (451 0)  (I80 01)  Active Problems And Past Medical History Reviewed: The active problems and past medical history were reviewed and updated today  Family History  Mother    1  Family history of Benign Essential Hypertension   2  Family history of Coronary Artery Disease (V17 49)  Father    3  Family history of Lung Cancer (V16 1)  Sister    4  Family history of Diabetes Mellitus (V18 0)  Maternal Grandfather    5  Family history of Diabetes Mellitus (V18 0)    Social History   · Alcohol Use (History)   · Denied: History of Drug Use   · Never a smoker    Surgical History    1  History of Tonsillectomy With Adenoidectomy    Current Meds   1  Aspirin 325 MG Oral Tablet; Therapy: (Recorded:29Oct2014) to Recorded   2  GlipiZIDE 5 MG Oral Tablet; take 1 tablet by mouth twice a day; Therapy: 65SCE8350 to (Last Rx:70Iqb9654)  Requested for: 71Jgj1625 Ordered   3  Invokana 100 MG Oral Tablet; Take 1 tablet daily; Therapy: 03WIW7004 to (Evaluate:63Kna5962)  Requested for: 63UAS9244; Last Rx:64Bvm3321 Ordered   4  Lisinopril-Hydrochlorothiazide 10-12 5 MG Oral Tablet; take one tablet by mouth every day; Therapy: 38LER6257 to (Last Rx:28Nov2017)  Requested for: 28Nov2017 Ordered   5  MetFORMIN HCl - 1000 MG Oral Tablet; take 1 tablet by mouth twice a day; Therapy: 36UMY3864 to (Last Rx:46Mqs9331)  Requested for: 16Aug2017 Ordered   6  Simvastatin 40 MG Oral Tablet; TAKE 1 TABLET AT BEDTIME; Therapy: 69YNW2001 to (Last [id number])  Requested for: 28Nov2017 Ordered    The medication list was reviewed and updated today  Allergies  1  Keflex CAPS    Vitals   Recorded: 11Dec2017 11:33AM   Temperature 100 8 F   Heart Rate 112   Respiration 16   Systolic 245   Diastolic 50       Physical Exam   Constitutional  General appearance: Abnormal   well developed,-- acutely ill,-- uncomfortable,-- well nourished,-- clothing appropriate,-- well groomed,-- appears tired,-- well hydrated-- and-- appearance reflects stated age  Eyes  Conjunctiva and lids: No swelling, erythema or discharge  Pulmonary  Respiratory effort: No increased work of breathing or signs of respiratory distress  Auscultation of lungs: Clear to auscultation  Cardiovascular  Auscultation of heart: Normal rate and rhythm, normal S1 and S2, without murmurs  -- Slightly tachy today, but regular  Examination of extremities for edema and/or varicosities: Abnormal   right ankle 2+ pitting edema-- and-- right pretibial 2+ pitting edema, but-- no left ankle edema,-- no left pretibial edema,-- no right knee edema-- and-- no left knee edema  +TTP to the skin of the medial left thigh, calf, anterior tibial region  There is pinkness to the skin of the distal LLE - with straking up the medial / posterior calf to just about the knee  Abdomen  Abdomen: Non-tender, no masses  Liver and spleen: No hepatomegaly or splenomegaly  Lymphatic  Palpation of lymph nodes in neck: No lymphadenopathy     Psychiatric  Orientation to person, place, and time: Normal    Mood and affect: Normal          Future Appointments    Date/Time Provider Specialty Site   02/19/2018 10:00 AM Vandana Lund DO Family Medicine [address] [facility]   11/19/2018 08:15 AM Vandana Lund DO Family Medicine [facility]       Signatures   Electronically signed by : Shanell Turner DO; Dec 11 2017 12:13PM EST                       (Author)

## 2017-12-12 NOTE — H&P
History and Physical - [facility] Medicine    Patient Information: Tri Thomas 52 y o  female MRN: [id number]  Unit/Bed#: ED 26 Encounter: [id number]  Admitting Physician: Loni Somers MD  PCP: Lolly Rouse MD  Date of Admission:  12/11/17    Assessment/Plan:    Hospital Problem List:     Active Problems:    Cellulitis of left lower extremity    Leukocytosis    Hypokalemia    Hypertension    Type 2 diabetes mellitus ([facility] [address] )    Hyponatremia      Plan  1  Cellulitis of the left lower extremity-patient will be started on vancomycin  Patient was on cefazolin as an outpatient but she developed dot the cellulitis on the cefazolin  Patient was given a dose of clindamycin in the emergency room and not sure patient need to be on clindamycin at this point  Will obtain infectious disease consultation  Doppler is negative for any DVT  2   Leukocytosis-likely secondary  To the infectious process monitor for  now  3  Hyponatremia-IV fluids and monitor likely secondary to  Decreased p o  intake/vomiting  Patient is on hydrochlorothiazide  If the hyponatremia is persistent we will discontinue hydrochlorothiazide  4  Diabetes mellitus-patient patient is on metformin and glipizide and also Invokana as an outpatient  Changed to Lantus and sliding scale insulin  5  Hypertension-patient is on lisinopril hydrochlorothiazide combination at home  Blood pressure appears to be elevated continue with the medication and monitor  6   Hyperlipidemia-continue with the statin  VTE Prophylaxis: Enoxaparin (Lovenox)  / sequential compression device   Code Status: full code  POLST: POLST form is not discussed and not completed at this time  Anticipated Length of Stay:  Patient will be admitted on an Observation basis with an anticipated length of stay of less  2 midnights  Total Time for Visit, including Counseling / Coordination of Care: 1 hour    Greater than 50% of this total time spent on direct patient counseling and coordination of care  Chief Complaint:   Left lower extremity swelling    History of Present Illness:    Radha Baron is a 52 y o  female who presents with left lower extremities swelling  Patient was recently in [facility] emergency room with shortness of breath and indigestion along with the fever  Patient was recently seen in the emergency room in [facility] at that time she properly was diagnosed with cystitis and started on Keflex  Patient reported that she was taking Keflex as prescribed for the past 2 and half the days  Today morning patient noted that she had left-sided leg swelling along with redness and pain  Patient also reported that she was having fever and chills  Patient denies any trauma including any itching or scratching  Patient with type 2 diabetes with diabetic neuropathy  Review of Systems:    Review of Systems   Constitutional: Positive for appetite change, chills, fatigue and fever  Respiratory: Negative  Cardiovascular: Positive for leg swelling  Gastrointestinal: Positive for nausea and vomiting  Genitourinary: Negative  Musculoskeletal: Negative  Neurological: Negative  Hematological: Negative  Psychiatric/Behavioral: Negative  Past Medical and Surgical History:     Past Medical History:   Diagnosis Date    Diabetes mellitus ([facility] [address] )     Hyperlipidemia     Hypertension        History reviewed  No pertinent surgical history  Meds/Allergies:    Prior to Admission medications    Medication Sig Start Date End Date Taking?  Authorizing Provider   canagliflozin [facility]) 100 mg Take 1 tablet by mouth daily 11/16/17  Yes Historical Provider, MD   cephalexin (KEFLEX) 500 mg capsule Take 1 capsule by mouth every 12 (twelve) hours for 5 days 12/9/17 12/14/17 Yes Lalitha Yip MD   glipiZIDE (GLUCOTROL) 5 mg tablet Take 1 tablet by mouth 2 (two) times a day 1/20/13  Yes Historical Provider, MD   lisinopril-hydrochlorothiazide Antionette Nett) 10-12 5 MG per tablet Take 1 tablet by mouth daily 4/24/12  Yes Historical Provider, MD   metFORMIN (GLUCOPHAGE) 1000 MG tablet Take 500 mg by mouth 2 (two) times a day   10/24/11  Yes Historical Provider, MD   simvastatin (ZOCOR) 40 mg tablet Take 40 mg by mouth daily   10/24/11  Yes Historical Provider, MD     I have reviewed home medications with patient personally  Allergies: No Known Allergies    Social History:     Marital Status: /Civil Union   Occupation: nurse in the [employer]  Patient Pre-hospital Living Situation:  lives at home with family  Patient Pre-hospital Level of Mobility:  Independent  Patient Pre-hospital Diet Restrictions:  Substance Use History:   History   Alcohol Use    Yes     Comment: weekends     History   Smoking Status    Never Smoker   Smokeless Tobacco    Never Used     History   Drug Use No       Family History:    Family History   Problem Relation Age of Onset    Heart disease Mother     Diabetes Mother     Cancer Father     Diabetes Sister     Diabetes Maternal Grandfather        Physical Exam:     Vitals:   Blood Pressure: 157/72 (12/11/17 1830)  Pulse: 102 (12/11/17 1830)  Temperature: 99 9 °F (37 7 °C) (12/11/17 1252)  Temp Source: Oral (12/11/17 1252)  Respirations: 14 (12/11/17 1830)  SpO2: 99 % (12/11/17 1830)    Physical Exam   Constitutional: She is oriented to person, place, and time  She appears well-developed and well-nourished  HENT:   Head: Normocephalic and atraumatic  Eyes: EOM are normal  Pupils are equal, round, and reactive to light  Neck: Normal range of motion  Cardiovascular: Normal rate and regular rhythm  No murmur heard  Pulmonary/Chest: Effort normal and breath sounds normal  No respiratory distress  She has no wheezes  Abdominal: Soft  Bowel sounds are normal  She exhibits no distension  Musculoskeletal: Normal range of motion  She exhibits edema     Left  lower extremity erythema and edema noted  Tenderness to palpation present  Streaking up to the upper thighs  See the picture    Neurological: She is alert and oriented to person, place, and time  Skin: Skin is warm and dry  Additional Data:     Lab Results: I have personally reviewed pertinent reports  Results from last 7 days  Lab Units 12/11/17  1614   WBC Thousand/uL 21 73*   HEMOGLOBIN g/dL 11 0*   HEMATOCRIT % 33 1*   PLATELETS Thousands/uL 314   NEUTROS PCT % 80*   LYMPHS PCT % 8*   MONOS PCT % 12   EOS PCT % 0       Results from last 7 days  Lab Units 12/11/17  1614   SODIUM mmol/L 131*   POTASSIUM mmol/L 3 3*   CHLORIDE mmol/L 97*   CO2 mmol/L 22   BUN mg/dL 15   CREATININE mg/dL 0 92   CALCIUM mg/dL 9 1   TOTAL PROTEIN g/dL 7 5   BILIRUBIN TOTAL mg/dL 0 40   ALK PHOS U/L 117*   ALT U/L 21   AST U/L 21   GLUCOSE RANDOM mg/dL 197*       Results from last 7 days  Lab Units 12/11/17  1614   INR  1 07       Imaging: I have personally reviewed pertinent films in PACS    X-ray Chest 2 Views    Result Date: 12/9/2017  Narrative: CHEST INDICATION:  Shortness of breath and congestion for 2 weeks  Fever  COMPARISON:  None VIEWS:  Frontal and lateral projections IMAGES:  2 FINDINGS:     Cardiomediastinal silhouette appears unremarkable  The lungs are clear  No pneumothorax or pleural effusion  Visualized osseous structures appear within normal limits for the patient's age  Impression: No active pulmonary disease  Workstation performed: [id number]     Pe Study With Ct Abdomen And Pelvis With Contrast    Result Date: 12/9/2017  Narrative: CT PULMONARY ANGIOGRAM OF THE CHEST AND CT ABDOMEN AND PELVIS WITH INTRAVENOUS CONTRAST INDICATION:  Chest pain, cough, fever and flank tenderness  COMPARISON: CT abdomen pelvis November 29, 2014  TECHNIQUE:  CT examination of the chest, abdomen and pelvis was performed    Thin section CT angiographic technique was used in the chest in order to evaluate for pulmonary embolus and coronal 3D MIP postprocessing was performed on the acquisition scanner  Reformatted images were created in axial, sagittal, and coronal planes  Radiation dose length product (DLP) for this visit:  1741 34 mGy-cm   This examination, like all CT scans performed in the [facility], was performed utilizing techniques to minimize radiation dose exposure, including the use of iterative reconstruction and automated exposure control  IV Contrast:  100 mL of iohexol (OMNIPAQUE)         Enteric Contrast:  Enteric contrast was not administered  FINDINGS: CHEST PULMONARY ARTERIAL TREE:  No emboli are seen in the 1st and 2nd order branches  Tertiary branches are obscured by respiratory motion  LUNGS:  The lungs are well aerated  There is a 5 mm noncalcified nodule posterior lateral aspect right lower lobe (series 4, image 30 and series 604, image 104)  There is a 5 mm nodule in the posterior medial aspect of the right lower lobe (series 4, image 37 and series 604, image 126)  This has not significantly changed from the prior study  No focal pneumonia  PLEURA:  Unremarkable  HEART/AORTA:  The heart is mildly enlarged  Coronary artery calcifications  No evidence of a pericardial effusion  MEDIASTINUM AND KURT:  Unremarkable  CHEST WALL AND LOWER NECK:       Normal  ABDOMEN LIVER/BILIARY TREE:  Mild fatty infiltrative changes  GALLBLADDER:  No calcified gallstones  No pericholecystic inflammatory change  SPLEEN:  Unremarkable  PANCREAS:  Unremarkable  ADRENAL GLANDS: Unremarkable  KIDNEYS/URETERS:  Mild caliectasis  Kidneys otherwise normal  STOMACH AND BOWEL:  Evaluation limited due to lack of oral contrast material   Hiatal hernia  No obstruction  Mild amount of feces throughout the colon, compatible with constipation  Scattered diverticula in the sigmoid colon  Nothing to suggest acute diverticulitis  APPENDIX:  No findings to suggest appendicitis  ABDOMINOPELVIC CAVITY:  No ascites or free intraperitoneal air  No lymphadenopathy   VESSELS: Unremarkable for patient's age  PELVIS REPRODUCTIVE ORGANS:  Unremarkable for patient's age  URINARY BLADDER:  Incompletely distended  Circumferential bladder wall thickening  ABDOMINAL WALL/INGUINAL REGIONS:  Enlarged bilateral inguinal lymph nodes  Mild hyperemia of the soft tissues surrounding the left inguinal lymph nodes  OSSEOUS STRUCTURES:  No acute fracture or destructive osseous lesion  Degenerative changes throughout the thoracolumbar spine  Impression: 1  No CT evidence of pulmonary embolism within the 1st and 2nd order branches  Tertiary branches are obscured by respiratory motion  2   5 mm noncalcified lower lobe pulmonary nodules as described above  Inferomedial nodule unchanged from prior study  Based on current Fleischner Society 2017 Guidelines on incidental pulmonary nodule, no routine follow-up is needed if the patient is considered low risk for lung cancer  If the patient is considered high risk for lung cancer, 12 month follow-up non-contrast chest CT is recommended  3   Mild abnormal appearance of the urinary bladder  Although the findings may be related to underdistention, mild cystitis not excluded  4   Enlarged bilateral inguinal lymph nodes with mild hyperemia surrounding the left inguinal nodes  Correlate for lymphadenitis  Findings are consistent with the preliminary report from [facility] which was provided shortly after completion of the exam              Workstation performed: [id number]     Vas Lower Limb Venous Duplex Study, Unilateral/limited    Result Date: 12/11/2017  Narrative:  [facility] REPORT CLINICAL: Indications: Left Limb Pain [M79 609]  The patient has left leg swelling, warmth and redness since last night  Operative History: denies Risk Factors: The patient has no history of DVT, limb trauma or malignancy  Clinical:Left Lower Limb There is complaint of pain, edema, tenderness and inflammation     FINDINGS:  Segment  Right            Left Impression       Impression       CFV      Normal (Patent)  Normal (Patent)     CONCLUSION: Impression: RIGHT LOWER LIMB LIMITED: NORMAL Evaluation shows no evidence of thrombus in the common femoral vein  Doppler evaluation shows a normal response to augmentation maneuvers  LEFT LOWER LIMB: NORMAL No evidence of acute or chronic deep vein thrombosis No evidence of superficial thrombophlebitis noted  Doppler evaluation shows a normal response to augmentation maneuvers  Popliteal, posterior tibial and anterior tibial arterial Doppler waveforms are biphasic  Technical findings were given to Dr Alberta Pratt at time of exam   SIGNATURE: Electronically Signed by: Anny Sánchez MD, [address] on 2017-12-11 03:31:46 PM      EKG, Pathology, and Other Studies Reviewed on Admission:   · EKG:     Allscripts / Epic Records Reviewed: Yes     ** Please Note: This note has been constructed using a voice recognition system   **

## 2017-12-12 NOTE — CASE MANAGEMENT
Initial Clinical Review    Admission: Date/Time/Statement: 12/11/2017  1845 OBSERVATION  AND CHANGED TO INPATIENT 12/12/2017  1128   Inpatient Admission (Order [id number])   Admission   Date: 12/12/2017 Department: [facility] 4th Floor Med Surg Unit Released By/Authorizing: Heraclio Carlin MD (auto-released)   Order History   Inpatient   Date/Time Action Taken User Additional Information   12/12/17 1127 Release Heraclio Carlin MD (auto-released) Annika Gonzalez   12/12/17 1127 Complete Heraclio Carlin MD    Order Details     Frequency Duration Priority Order Class   Once 1  occurrence Routine Hospital Performed   Order Questions     Question Answer Comment   Admitting Physician ELVIRA MCGARRY    Level of Care Med Surg    Estimated length of stay More than 2 Midnights    Certification I certify that inpatient services are medically necessary for this patient for a duration of greater than two midnights   See H&P and MD Progress Notes for additional information about the patient's course of treatment            Orders Placed This Encounter   Procedures    Place in Observation (expected length of stay for this patient is less than two midnights)     Standing Status:   Standing     Number of Occurrences:   1     Order Specific Question:   Admitting Physician     Answer:   Christopher Kulkarni     Order Specific Question:   Level of Care     Answer:   Med Surg [16]         ED: Date/Time/Mode of Arrival:   ED Arrival Information     Expected Arrival Acuity Means of Arrival Escorted By Service Admission Type    - 12/11/2017 12:12 Urgent Walk-In Self General Medicine Urgent    Arrival Complaint    leg swelling          Chief Complaint:   Chief Complaint   Patient presents with    Leg Swelling     l/le swelling since friday, fevers & SOB as well; seen on friday @[facility], CT chest/abd pelvis were neg, blood work was neg; since friday SOB and fevers are about the same but leg swelling is much worse; deneis trauma, no plane/car rides       History of Illness: 52 y o  female who presents with left lower extremities swelling  Patient was recently in [place] emergency room with shortness of breath and indigestion along with the fever  Patient was recently seen in the emergency room in [place] at that time she properly was diagnosed with cystitis and started on Keflex  Patient reported that she was taking Keflex as prescribed for the past 2 and half the days  Today morning patient noted that she had left-sided leg swelling along with redness and pain  Patient also reported that she was having fever and chills  Patient denies any trauma including any itching or scratching  Patient with type 2 diabetes with diabetic neuropathy  ED Vital Signs:   ED Triage Vitals   Temperature Pulse Respirations Blood Pressure SpO2   12/11/17 1252 12/11/17 1252 12/11/17 1252 12/11/17 1252 12/11/17 1252   99 9 °F (37 7 °C) (!) 116 20 155/76 98 %      Temp Source Heart Rate Source Patient Position - Orthostatic VS BP Location FiO2 (%)   12/11/17 1252 12/11/17 1457 12/11/17 1252 12/11/17 1252 --   Oral Monitor Sitting Left arm       Pain Score       12/11/17 1457       Worst Possible Pain        Wt Readings from Last 1 Encounters:   12/11/17 98 8 kg (217 lb 13 oz)       Vital Signs (abnormal):  Pulse 100 - 116  /80 - 187/89  Exam - LLE + swelling, tenderness, redness  Streaking up to the upper thighs  Abnormal Labs/Diagnostic Test Results:   Na 131  K 3 3 cl 97  Glucose 197  Alkaline phosphatase 117  Albumin 2 4  Wbc 21 73, hgb 11, hct 33 1     2028- glucose 235  Labs 12/12/2017- wbc 19 83, hgb 10 9, hct 31 8  Na 132  CO2-19  Glucose 143  Albumin 2      1048 - glucose 261    ED Treatment: blood cultures     Medication Administration from 12/11/2017 1212 to 12/11/2017 2009       Date/Time Order Dose Route Action Action by Comments     12/11/2017 9901 sodium chloride 0 9 % bolus 1,000 mL 0 mL Intravenous [address], RN      12/11/2017 1541 sodium chloride 0 9 % bolus 1,000 mL 1,000 mL Intravenous [address] Lizzie Krishna RN      12/11/2017 1655 clindamycin (CLEOCIN) IVPB (premix) 600 mg 0 mg Intravenous Stopped Lizzie Krishna RN      12/11/2017 1620 clindamycin (CLEOCIN) IVPB (premix) 600 mg 600 mg Intravenous [address] Lizzie Krishna RN      12/11/2017 1657 ketorolac (TORADOL) injection 30 mg 30 mg Intravenous Given Lizzie Krishna RN      12/11/2017 1658 ondansetron (ZOFRAN) injection 4 mg 4 mg Intravenous Given Lizzie Krishna RN           Past Medical/Surgical History: Active Ambulatory Problems     Diagnosis Date Noted    No Active Ambulatory Problems     Resolved Ambulatory Problems     Diagnosis Date Noted    No Resolved Ambulatory Problems     Past Medical History:   Diagnosis Date    Diabetes mellitus ([facility] [address] )     Hyperlipidemia     Hypertension        Admitting Diagnosis: Balloon like swelling in an artery of the leg ([facility] [address] ) [I77 89]  Left leg cellulitis [G45 282]    Age/Sex: 52 y o  female    Assessment/Plan: Cellulitis of the left lower extremity-patient will be started on vancomycin  Patient was on cefazolin as an outpatient but she developed dot the cellulitis on the cefazolin  Patient was given a dose of clindamycin in the emergency room and not sure patient need to be on clindamycin at this point  Will obtain infectious disease consultation  Doppler is negative for any DVT  2   Leukocytosis-likely secondary  To the infectious process monitor for  now  3  Hyponatremia-IV fluids and monitor likely secondary to  Decreased p o  intake/vomiting  Patient is on hydrochlorothiazide  If the hyponatremia is persistent we will discontinue hydrochlorothiazide  4  Diabetes mellitus-patient patient is on metformin and glipizide and also Invokana as an outpatient  Changed to Lantus and sliding scale insulin  5    Hypertension-patient is on lisinopril hydrochlorothiazide combination at home   Blood pressure appears to be elevated continue with the medication and monitor  6   Hyperlipidemia-continue with the statin    Admission Orders:  12/11/2017  1845 OBSERVATION AND CHANGED TO INPATIENT 12/12/2017  1128  Scheduled Meds:   enoxaparin 40 mg Subcutaneous Daily   insulin glargine 10 Units Subcutaneous HS   insulin lispro 1-5 Units Subcutaneous TID AC   insulin lispro 1-5 Units Subcutaneous HS   insulin lispro 3 Units Subcutaneous Daily With Breakfast   lisinopril-hydrochlorothiazide (PRINZIDE 10/12  5) combo dose  Oral Daily   pravastatin 20 mg Oral Daily With Dinner   vancomycin 10 mg/kg Intravenous Q12H     Continuous Infusions:   sodium chloride 0 9 % with KCl 20 mEq/L 125 mL/hr Last Rate: 125 mL/hr (12/12/17 9213)     PRN Meds:   acetaminophen    morphine injection    oxyCODONE-acetaminophen - used x 2      oxyCODONE-acetaminophen    OTHER ORDERS:  Fingerstick glucose qid    scds  Continuous cardiac monitoring  Consult ID      Per ID 12/12:   Sepsis, POA  Leukocytosis, tachycardia  Likely secondary to #2  T-max was 99 9° on admission  Temp has normalized  Lactic acid was 1 2  Patient was started on vancomycin on admission and is showing clinical improvement  Otherwise, hemodynamically stable and nontoxic appearing  Blood cultures drawn from the ER on 12/08 remain negative  Blood cultures from admission on 12/11 are pending      -antibiotic as below  -monitor temperatures and white blood cell count  -monitor hemodynamics  -follow-up blood cultures     2  Left lower extremity cellulitis  Patient does have onychomycosis as well as a small scabbed over lesion on 1 of her toes, which may have been the portal of entry for infection  This clinically looks like a strep infection  No evidence of purulence to suggest a staph infection  However, it seemed to have worsened while patient was on oral Keflex  Now clinically improving on IV vancomycin    Would continue for now since we are seeing good clinical response      -continue with IV vancomycin  Check vancomycin trough prior to 4th dose tomorrow   -serial leg exams     3  Asymptomatic bacteriuria  Patient did not have any urinary symptoms when she presented to the ER on 12/08  CT revealed mild possible cystitis  However, urine culture grew only mixed contaminants  Patient remains asymptomatic  No indication to treat for a UTI  -monitor symptoms     4  Diabetes mellitus type 2, with hyperglycemia  -recommend good blood sugar control in the setting of acute infection     5  Onychomycosis  May have contributed to the development of cellulitis  -recommend outpatient follow-up with Podiatry     6  Cough  Has been ongoing for about 2 and half weeks as per patient  CT of chest did not show any evidence of pulmonary infection  This is probably a resolving viral URI  O2 sat stable on room air  Patient currently not short of breath   -monitor symptoms    [address] [facility] in the [facility] by [address] for 2017  Network Utilization Review Department  Phone: [phone]; Fax [phone]  ATTENTION: The Network Utilization Review Department is now centralized for our 7 Facilities  Make a note that we have a new phone and fax numbers for our Department  Please call with any questions or concerns to [phone] and carefully follow the prompts so that you are directed to the right person  All voicemails are confidential  Fax any determinations, approvals, denials, and requests for initial or continue stay review clinical to [phone]  Due to HIGH CALL volume, it would be easier if you could please send faxed requests to expedite your requests and in part, help us provide discharge notifications faster

## 2017-12-13 LAB
ANION GAP SERPL CALCULATED.3IONS-SCNC: 9 MMOL/L (ref 4–13)
BASOPHILS # BLD AUTO: 0.05 THOUSANDS/ΜL (ref 0–0.1)
BASOPHILS NFR BLD AUTO: 0 % (ref 0–1)
BUN SERPL-MCNC: 10 MG/DL (ref 5–25)
CALCIUM SERPL-MCNC: 8.1 MG/DL (ref 8.3–10.1)
CHLORIDE SERPL-SCNC: 103 MMOL/L (ref 100–108)
CO2 SERPL-SCNC: 22 MMOL/L (ref 21–32)
CREAT SERPL-MCNC: 0.87 MG/DL (ref 0.6–1.3)
EOSINOPHIL # BLD AUTO: 0.19 THOUSAND/ΜL (ref 0–0.61)
EOSINOPHIL NFR BLD AUTO: 1 % (ref 0–6)
ERYTHROCYTE [DISTWIDTH] IN BLOOD BY AUTOMATED COUNT: 13.6 % (ref 11.6–15.1)
GFR SERPL CREATININE-BSD FRML MDRD: 80 ML/MIN/1.73SQ M
GLUCOSE SERPL-MCNC: 123 MG/DL (ref 65–140)
GLUCOSE SERPL-MCNC: 148 MG/DL (ref 65–140)
GLUCOSE SERPL-MCNC: 160 MG/DL (ref 65–140)
GLUCOSE SERPL-MCNC: 176 MG/DL (ref 65–140)
GLUCOSE SERPL-MCNC: 207 MG/DL (ref 65–140)
HCT VFR BLD AUTO: 30.2 % (ref 34.8–46.1)
HGB BLD-MCNC: 9.7 G/DL (ref 11.5–15.4)
LYMPHOCYTES # BLD AUTO: 1.77 THOUSANDS/ΜL (ref 0.6–4.47)
LYMPHOCYTES NFR BLD AUTO: 11 % (ref 14–44)
MCH RBC QN AUTO: 27.9 PG (ref 26.8–34.3)
MCHC RBC AUTO-ENTMCNC: 32.1 G/DL (ref 31.4–37.4)
MCV RBC AUTO: 87 FL (ref 82–98)
MONOCYTES # BLD AUTO: 1.72 THOUSAND/ΜL (ref 0.17–1.22)
MONOCYTES NFR BLD AUTO: 10 % (ref 4–12)
NEUTROPHILS # BLD AUTO: 12.97 THOUSANDS/ΜL (ref 1.85–7.62)
NEUTS SEG NFR BLD AUTO: 78 % (ref 43–75)
PLATELET # BLD AUTO: 328 THOUSANDS/UL (ref 149–390)
PMV BLD AUTO: 9.2 FL (ref 8.9–12.7)
POTASSIUM SERPL-SCNC: 3.8 MMOL/L (ref 3.5–5.3)
RBC # BLD AUTO: 3.48 MILLION/UL (ref 3.81–5.12)
SODIUM SERPL-SCNC: 134 MMOL/L (ref 136–145)
VANCOMYCIN TROUGH SERPL-MCNC: 11 UG/ML (ref 10–20)
WBC # BLD AUTO: 16.7 THOUSAND/UL (ref 4.31–10.16)

## 2017-12-13 PROCEDURE — 80202 ASSAY OF VANCOMYCIN: CPT | Performed by: INTERNAL MEDICINE

## 2017-12-13 PROCEDURE — 82948 REAGENT STRIP/BLOOD GLUCOSE: CPT

## 2017-12-13 PROCEDURE — 80048 BASIC METABOLIC PNL TOTAL CA: CPT | Performed by: INTERNAL MEDICINE

## 2017-12-13 PROCEDURE — 85025 COMPLETE CBC W/AUTO DIFF WBC: CPT | Performed by: INTERNAL MEDICINE

## 2017-12-13 RX ORDER — INSULIN GLARGINE 100 [IU]/ML
14 INJECTION, SOLUTION SUBCUTANEOUS
Status: DISCONTINUED | OUTPATIENT
Start: 2017-12-13 | End: 2017-12-15 | Stop reason: HOSPADM

## 2017-12-13 RX ADMIN — LISINOPRIL 10 MG: 10 TABLET ORAL at 09:14

## 2017-12-13 RX ADMIN — SODIUM CHLORIDE AND POTASSIUM CHLORIDE 125 ML/HR: .9; .15 SOLUTION INTRAVENOUS at 09:17

## 2017-12-13 RX ADMIN — VANCOMYCIN HYDROCHLORIDE 1250 MG: 1 INJECTION, POWDER, LYOPHILIZED, FOR SOLUTION INTRAVENOUS at 23:04

## 2017-12-13 RX ADMIN — PRAVASTATIN SODIUM 20 MG: 20 TABLET ORAL at 17:33

## 2017-12-13 RX ADMIN — SODIUM CHLORIDE AND POTASSIUM CHLORIDE 125 ML/HR: .9; .15 SOLUTION INTRAVENOUS at 19:39

## 2017-12-13 RX ADMIN — INSULIN LISPRO 1 UNITS: 100 INJECTION, SOLUTION INTRAVENOUS; SUBCUTANEOUS at 17:34

## 2017-12-13 RX ADMIN — OXYCODONE HYDROCHLORIDE AND ACETAMINOPHEN 2 TABLET: 5; 325 TABLET ORAL at 19:42

## 2017-12-13 RX ADMIN — SODIUM CHLORIDE AND POTASSIUM CHLORIDE 125 ML/HR: .9; .15 SOLUTION INTRAVENOUS at 01:08

## 2017-12-13 RX ADMIN — ENOXAPARIN SODIUM 40 MG: 40 INJECTION SUBCUTANEOUS at 09:14

## 2017-12-13 RX ADMIN — INSULIN LISPRO 1 UNITS: 100 INJECTION, SOLUTION INTRAVENOUS; SUBCUTANEOUS at 12:49

## 2017-12-13 RX ADMIN — VANCOMYCIN HYDROCHLORIDE 1250 MG: 1 INJECTION, POWDER, LYOPHILIZED, FOR SOLUTION INTRAVENOUS at 11:49

## 2017-12-13 RX ADMIN — INSULIN LISPRO 1 UNITS: 100 INJECTION, SOLUTION INTRAVENOUS; SUBCUTANEOUS at 21:29

## 2017-12-13 RX ADMIN — OXYCODONE HYDROCHLORIDE AND ACETAMINOPHEN 1 TABLET: 5; 325 TABLET ORAL at 09:14

## 2017-12-13 RX ADMIN — INSULIN GLARGINE 14 UNITS: 100 INJECTION, SOLUTION SUBCUTANEOUS at 21:29

## 2017-12-13 NOTE — PROGRESS NOTES
[facility] Progress Note  Patient: Monie Quintero 52 y o  female   MRN: [id number]  PCP: Jose Angel Tucker MD  Unit/Bed#: -E[facility]ardo Encounter: [id number]  Date Of Visit: 17    Assessment/plan:  1  Left lower extremity cellulitis - failed Keflex as outpatient  On Vancomycin(D#3)  Erythema and tenderness improved but has increased left leg edema temperature spike of 100 9 last evening  Discussed with Dr Johannah Lanes - CT left lower extremity with contrast if she has any spiking temperature  Continue vancomycin  Monitor local response, temperature, WBC  2  Hyponatremia - improved with IV fluids  Continue to hold HCTZ  Monitor  3  HTN - continue lisinopril  HCTZ held per #2  BP acceptable  4  Type 2 diabetes with hyperglycemia - oral hypoglycemics held  Was on Invokana/glipizide/metformin  Increase dose of Lantus to 14 units hs and Humalog to 4 units 3 times a day  5  HLD - statins  6   5 mm right lower lobe nodules - outpatient follow-up with serial CT    VTE Pharmacologic Prophylaxis:   Pharmacologic: Enoxaparin (Lovenox)  Mechanical: Mechanical VTE prophylaxis in place  Discussions with Specialists or Other Care Team Provider: Discussed with Dr Ijeoma Stanford with RN    Education and Discussions with Family / Patient: Rashawn Dill family update    Time Spent for Care: 20 minutes  More than 50% of total time spent on counseling and coordination of care as described above  Current Length of Stay: 1 day(s)    Current Patient Status: Inpatient   Certification Statement: The patient will continue to require additional inpatient hospital stay due to Cellulitis    Code Status: Level 1 - Full Code    Subjective:   Left lower extremity erythema improved but leg tense and edematous today  T-max 100 9° last evening  No nausea or vomiting  No diarrhea      Objective:     Vitals:   Temp (24hrs), Av 3 °F (36 8 °C), Min:98 1 °F (36 7 °C), Max:98 5 °F (36 9 °C)    HR:  [] 90  Resp: [18-20] 20  BP: (114-150)/(56-69) 135/64  SpO2:  [96 %-100 %] 100 %  Body mass index is 34 11 kg/m²  Physical Exam:     Physical Exam   HENT:   Head: Atraumatic  Eyes: EOM are normal    Neck: Neck supple  No JVD present  No tracheal deviation present  No thyromegaly present  Cardiovascular: Normal rate, regular rhythm and normal heart sounds  Pulmonary/Chest: Effort normal and breath sounds normal  No respiratory distress  She has no wheezes  She has no rales  Abdominal: Soft  Bowel sounds are normal  She exhibits no distension  There is no tenderness  There is no rebound  Musculoskeletal:   Left leg 2+ edema   Neurological: She is alert  Skin:   Left thigh and leg erythema and tenderness improved   Psychiatric: She has a normal mood and affect  Additional Data:     Labs:      Results from last 7 days  Lab Units 12/13/17  0457   WBC Thousand/uL 16 70*   HEMOGLOBIN g/dL 9 7*   HEMATOCRIT % 30 2*   PLATELETS Thousands/uL 328   NEUTROS PCT % 78*   LYMPHS PCT % 11*   MONOS PCT % 10   EOS PCT % 1       Results from last 7 days  Lab Units 12/13/17  0457 12/12/17  0501   SODIUM mmol/L 134* 132*   POTASSIUM mmol/L 3 8 3 9   CHLORIDE mmol/L 103 102   CO2 mmol/L 22 19*   BUN mg/dL 10 16   CREATININE mg/dL 0 87 0 83   CALCIUM mg/dL 8 1* 8 3   TOTAL PROTEIN g/dL  --  6 8   BILIRUBIN TOTAL mg/dL  --  0 40   ALK PHOS U/L  --  104   ALT U/L  --  17   AST U/L  --  20   GLUCOSE RANDOM mg/dL 123 143*       Results from last 7 days  Lab Units 12/11/17  1614   INR  1 07       * I Have Reviewed All Lab Data Listed Above  * Additional Pertinent Lab Tests Reviewed:  Anand 66 Admission Reviewed      Last 24 Hours Medication List:     enoxaparin 40 mg Subcutaneous Daily   insulin glargine 10 Units Subcutaneous HS   insulin lispro 1-5 Units Subcutaneous TID AC   insulin lispro 1-5 Units Subcutaneous HS   insulin lispro 3 Units Subcutaneous TID With Meals   lisinopril 10 mg Oral Daily pravastatin 20 mg Oral Daily With Dinner   vancomycin 12 5 mg/kg Intravenous Q12H        Today, Patient Was Seen By: Ricardo Arias MD    ** Please Note: Dragon 360 Dictation voice to text software may have been used in the creation of this document   **

## 2017-12-13 NOTE — PROGRESS NOTES
[facility] Progress Note  Patient: Clare Perdue 52 y o  female   MRN: [id number]  PCP: Logan Fleming MD  Unit/Bed#: -E[facility]ardo Encounter: [id number]  Date Of Visit: 17    Assessment/plan:  1  Left lower extremity cellulitis - failed Keflex as outpatient  On vancomycin  Monitor local response, temperature, WBC  2  Hyponatremia - hold HCTZ  Continue IV fluids  Monitor  3  HTN - continue lisinopril  HCTZ held per #2   4   Type 2 diabetes - oral hypoglycemics held  Continue current dose of insulin  5  HLD - statins      VTE Pharmacologic Prophylaxis:   Pharmacologic: Enoxaparin (Lovenox)  Mechanical: Mechanical VTE prophylaxis in place  Discussions with Specialists or Other Care Team Provider: Rounded with RN    Education and Discussions with Family / Patient: Alivia Prieto family update    Time Spent for Care: 20 minutes  More than 50% of total time spent on counseling and coordination of care as described above  Current Length of Stay: 0 day(s)    Current Patient Status: Inpatient   Certification Statement: The patient, admitted on an observation basis, will now require > 2 midnight hospital stay due to Severe cellulitis requiring prolonged intravenous antibiotics    Code Status: Level 1 - Full Code    Subjective:   Mild improvement in redness over left lower extremity  No nausea, vomiting or diarrhea    Objective:     Vitals:   Temp (24hrs), Av 5 °F (37 5 °C), Min:98 1 °F (36 7 °C), Max:100 9 °F (38 3 °C)    HR:  [] 66  Resp:  [16-17] 17  BP: (128-139)/(60-62) 128/60  SpO2:  [98 %-99 %] 99 %  Body mass index is 34 11 kg/m²  Physical Exam:     Physical Exam   Constitutional: She is oriented to person, place, and time  HENT:   Head: Atraumatic  Eyes: EOM are normal    Neck: Neck supple  No JVD present  No tracheal deviation present  No thyromegaly present  Cardiovascular: Normal rate and normal heart sounds      Pulmonary/Chest: Effort normal and breath sounds normal  No respiratory distress  She has no wheezes  She has no rales  Abdominal: Soft  Bowel sounds are normal  She exhibits no distension  There is no tenderness  There is no rebound  Musculoskeletal: She exhibits edema  Neurological: She is alert and oriented to person, place, and time  Skin:   Diffuse erythema over left lower extremity  Right foot ulcer   Psychiatric: She has a normal mood and affect  Additional Data:     Labs:      Results from last 7 days  Lab Units 12/12/17  0532 12/11/17  1614   WBC Thousand/uL 19 83* 21 73*   HEMOGLOBIN g/dL 10 9* 11 0*   HEMATOCRIT % 31 8* 33 1*   PLATELETS Thousands/uL 337 314   NEUTROS PCT %  --  80*   LYMPHS PCT %  --  8*   MONOS PCT %  --  12   EOS PCT %  --  0       Results from last 7 days  Lab Units 12/12/17  0501   SODIUM mmol/L 132*   POTASSIUM mmol/L 3 9   CHLORIDE mmol/L 102   CO2 mmol/L 19*   BUN mg/dL 16   CREATININE mg/dL 0 83   CALCIUM mg/dL 8 3   TOTAL PROTEIN g/dL 6 8   BILIRUBIN TOTAL mg/dL 0 40   ALK PHOS U/L 104   ALT U/L 17   AST U/L 20   GLUCOSE RANDOM mg/dL 143*       Results from last 7 days  Lab Units 12/11/17  1614   INR  1 07       * I Have Reviewed All Lab Data Listed Above  * Additional Pertinent Lab Tests Reviewed: Anand 66 Admission Reviewed        Last 24 Hours Medication List:     enoxaparin 40 mg Subcutaneous Daily   insulin glargine 10 Units Subcutaneous HS   insulin lispro 1-5 Units Subcutaneous TID AC   insulin lispro 1-5 Units Subcutaneous HS   insulin lispro 3 Units Subcutaneous TID With Meals   [START ON 12/13/2017] lisinopril 10 mg Oral Daily   pravastatin 20 mg Oral Daily With Dinner   vancomycin 10 mg/kg Intravenous Q12H        Today, Patient Was Seen By: Irina Lux MD    ** Please Note: Dragon 360 Dictation voice to text software may have been used in the creation of this document   **

## 2017-12-13 NOTE — PROGRESS NOTES
Progress Note - Infectious Disease   Tri Thomas 52 y o  female MRN: [id number]  Unit/Bed#: -01 Encounter: [id number]      Impression/Recommendations:  1  Sepsis, POA  Leukocytosis, tachycardia  Likely secondary to #2  Patient had a temperature of a 100 9° yesterday  Otherwise, hemodynamically stable and nontoxic appearing  Leukocytosis trending down  Blood cultures drawn from the ER on 12/08 remain negative  Blood cultures from admission on 12/11 are negative      -antibiotic as below  -monitor temperatures and white blood cell count  -monitor hemodynamics  -follow-up blood cultures-so far no growth to date     2  Left lower extremity cellulitis  Patient does have onychomycosis as well as a small scabbed over lesion on 1 of her toes, which may have been the portal of entry for infection  This clinically looks like a strep infection  No evidence of purulence to suggest a staph infection  However, it seemed to have progressed while patient was on oral Keflex  Erythema has improved on IV vancomycin  There is still persistent edema and tenderness of left lower extremity traveling up the left thigh, which may be worsened by the IV fluids patient has been receiving  However, she had a low grade fever of 100 9 yesterday  If she has another fever, would have a low threshold to obtain a CT of the leg  Leukocytosis is trending down, which is reassuring      -continue with IV vancomycin  Vancomycin trough of 11 is acceptable for this infection     -low threshold to obtain imaging of lower extremity if fevers, leg swelling and pain persist  -serial leg exams     3  Asymptomatic bacteriuria  Patient did not have any urinary symptoms when she presented to the ER on 12/08  CT revealed mild possible cystitis  However, urine culture grew only mixed contaminants  Patient remains asymptomatic  No indication to treat for a UTI  -monitor symptoms     4    Diabetes mellitus type 2, with hyperglycemia  -recommend good blood sugar control in the setting of acute infection     5  Onychomycosis  May have contributed to the development of cellulitis  -recommend outpatient follow-up with Podiatry     6  Cough  Has been ongoing for about 2 and half weeks as per patient  CT of chest did not show any evidence of pulmonary infection  This is probably a resolving viral URI  O2 sat stable on room air  Patient currently not short of breath   -monitor symptoms     Antibiotics:  Vancomycin D3    Spoke with patient in detail regarding plan of care  Spoke with Dr Rhianna Walker in detail regarding plan of care  Subjective:  Patient had a low grade temperature of a 100 9° yesterday evening  She does feel a little feverish right now  No nausea, vomiting, diarrhea  She feels like the redness in her leg is better, but still has persistent swelling and pain  No drainage  Objective:  Vitals:  HR:  [] 102  Resp:  [17-18] 18  BP: (114-150)/(56-69) 150/69  SpO2:  [96 %-99 %] 97 %  Temp (24hrs), Av 3 °F (37 4 °C), Min:98 4 °F (36 9 °C), Max:100 9 °F (38 3 °C)  Current: Temperature: 98 5 °F (36 9 °C)    Physical Exam:   General:  No acute distress  Psychiatric:  Awake and alert  Pulmonary:  Normal respiratory excursion without accessory muscle use  Abdomen:  Soft, nontender  Extremities:  Left lower extremity edema  Skin:  Decreased erythema of left lower extremity, but persistent edema and tenderness with mild lymphangitis streak up the left thigh  No fluctuance or drainage    Lab Results:  I have personally reviewed pertinent labs      Results from last 7 days  Lab Units 17  0457 17  0501 17  1614 17  2111   SODIUM mmol/L 134* 132* 131* 134*   POTASSIUM mmol/L 3 8 3 9 3 3* 3 8   CHLORIDE mmol/L 103 102 97* 102   CO2 mmol/L 22 19* 22 23   ANION GAP mmol/L 9 11 12 9   BUN mg/dL 10 16 15 14   CREATININE mg/dL 0 87 0 83 0 92 0 95   EGFR ml/min/1 73sq m 80 84 74 72   GLUCOSE RANDOM mg/dL 123 143* 197* 170*   CALCIUM mg/dL 8 1* 8 3 9 1 9 3   AST U/L  --  20 21 19   ALT U/L  --  17 21 19   ALK PHOS U/L  --  104 117* 107   TOTAL PROTEIN g/dL  --  6 8 7 5 8 2   ALBUMIN g/dL  --  2 0* 2 4* 3 3*   BILIRUBIN TOTAL mg/dL  --  0 40 0 40 0 69       Results from last 7 days  Lab Units 12/13/17  0457 12/12/17  0532 12/11/17  1614   WBC Thousand/uL 16 70* 19 83* 21 73*   HEMOGLOBIN g/dL 9 7* 10 9* 11 0*   PLATELETS Thousands/uL 328 337 314       Results from last 7 days  Lab Units 12/11/17  1617 12/11/17  1435 12/09/17  0045 12/08/17  2248   BLOOD CULTURE  No Growth at 24 hrs  No Growth at 24 hrs   --  No Growth After 4 Days  No Growth After 4 Days  URINE CULTURE   --   --  50,000-59,000 cfu/ml   --        Imaging Studies:   I have personally reviewed pertinent imaging study reports and images in PACS  EKG, Pathology, and Other Studies:   I have personally reviewed pertinent reports

## 2017-12-13 NOTE — CONSULTS
Consult - Podiatry   Hali Ross 52 y o  female MRN: [id number]  Unit/Bed#: -01 Encounter: [id number]    Assessment/Plan     Assessment:  1  Left lower extremity cellulitis  2  Right submetatarsal 1 Singleton 1 ulcer, not infected  3   Left lesser digit abrasions, no infection  4  Diabetes with neuropathy    Plan:  1  The cellulitis is improving compared to her admission picture in the medial file in epic  Will continue vancomycin for now  There is no obvious area for portal of entry to her left lower extremity  The abrasions to the tips of the toes are relatively benign and there is no cellulitis on the foot  This could have served as a portal of entry previously but there is no further care is needed today foot  Will defer to Infectious Disease for antibiotic management  Encouraged the patient to elevate the foot as much as possible  She may benefit from Ace compression however today the leg is relatively painful and it may be too tight  2   Right foot diabetic ulcer  Wound care instructions given  There is no sign of infection  The wound is not deep  History of Present Illness     HPI:  Hali Ross is a 52 y o  female who presents with cellulitis of her left lower extremity  Over the course of the last week the patient began to feel sick and nauseous  Leg began to turn red  She went to the emergency department last Friday and was put on oral Keflex  By Sunday she felt terrible in came back to the hospital for admission  She is feeling slightly better today other left leg is very painful to touch  She has seen my partner Dr Anjelica Easton for diabetic foot wounds in the past   She has had a wound on her right foot for many weeks but has not seen him recently  She is admittedly noncompliant with following up on diabetic wounds with her podiatrist   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  She denies any recent wounds to her left foot       Consults  Review of Systems   Constitutional: Negative  HENT: Negative  Eyes: Negative  Respiratory: Negative  Cardiovascular: Negative  Gastrointestinal: Negative  Musculoskeletal:  Left lower extremity pain   Skin:  Right foot ulcer   Neurological:  Neuropathy  Psych: negative      Historical Information   Past Medical History:   Diagnosis Date    Diabetes mellitus ([facility] [address] )     Hyperlipidemia     Hypertension      History reviewed  No pertinent surgical history    Social History   History   Alcohol Use    Yes     Comment: weekends     History   Drug Use No     History   Smoking Status    Never Smoker   Smokeless Tobacco    Never Used     Family History:   Family History   Problem Relation Age of Onset    Heart disease Mother     Diabetes Mother     Cancer Father     Diabetes Sister     Diabetes Maternal Grandfather        Meds/Allergies   Prescriptions Prior to Admission   Medication    canagliflozin (INVOKANA) 100 mg    cephalexin (KEFLEX) 500 mg capsule    glipiZIDE (GLUCOTROL) 5 mg tablet    lisinopril-hydrochlorothiazide (PRINZIDE,ZESTORETIC) 10-12 5 MG per tablet    metFORMIN (GLUCOPHAGE) 1000 MG tablet    simvastatin (ZOCOR) 40 mg tablet     No Known Allergies    Objective   First Vitals:   Blood Pressure: 155/76 (12/11/17 1252)  Pulse: (!) 116 (12/11/17 1252)  Temperature: 99 9 °F (37 7 °C) (12/11/17 1252)  Temp Source: Oral (12/11/17 1252)  Respirations: 20 (12/11/17 1252)  Height: 5' 7" (170 2 cm) (12/11/17 2015)  Weight - Scale: 98 8 kg (217 lb 13 oz) (12/11/17 2015)  SpO2: 98 % (12/11/17 1252)    Current Vitals:   Blood Pressure: 150/69 (12/13/17 0700)  Pulse: 102 (12/13/17 0700)  Temperature: 98 5 °F (36 9 °C) (12/13/17 0700)  Temp Source: Oral (12/13/17 0700)  Respirations: 18 (12/13/17 0700)  Height: 5' 7" (170 2 cm) (12/11/17 2015)  Weight - Scale: 98 8 kg (217 lb 13 oz) (12/11/17 2015)  SpO2: 97 % (12/13/17 0700)        /69   Pulse 102   Temp 98 5 °F (36 9 °C) (Oral) Resp 18   Ht 5' 7" (1 702 m)   Wt 98 8 kg (217 lb 13 oz)   LMP 12/07/2017   SpO2 97%   BMI 34 11 kg/m²   Physical Exam:  General:    Alert, cooperative, no distress   Head:    Normocephalic, without obvious abnormality, atraumatic   Eyes:    PERRL, conjunctiva/corneas clear, EOM's intact            Nose:   Moist mucous membranes, no drainage or sinus tenderness   Throat:   No tenderness, no exudates   Neck:   Supple, symmetrical, trachea midline, no JVD   Back:     Symmetric, no CVA tenderness   Lungs:     Clear to auscultation bilaterally, respirations unlabored   Chest wall:    No tenderness or deformity   Heart:    Regular rate and rhythm, S1 and S2 normal   Abdomen:     Soft, non-tender, bowel sounds active all four quadrants           Extremities:   Left lower extremity cellulitis to the leg and lower thigh  The entire left lower extremity is edematous from the foot up to the thigh  There is no specific area of induration however the entire leg is tender to touch  No radha open wounds to suggest portal of entry  Patient does have palpable pedal pulses  Left 3rd 4th and 5th dorsal digit areas of abrasion likely from shoe gear  No open wound or drainage at this time no erythema to the foot  No sign of pedal infection  Interdigital spaces are benign  Right lower extremity submetatarsal 1 Singleton 1 ulceration  No drainage today  Wound appears partial thickness  No sign of any infection  Diminished protective sensation bilateral lower extremity  Neurologic:   CNII-XII intact        Lab Results:   Admission on 12/11/2017   Component Date Value    WBC 12/11/2017 21 73*    RBC 12/11/2017 3 92     Hemoglobin 12/11/2017 11 0*    Hematocrit 12/11/2017 33 1*    MCV 12/11/2017 84     [address] 12/11/2017 28 1     MCHC 12/11/2017 33 2     RDW 12/11/2017 13 0     MPV 12/11/2017 9 4     Platelets 62/67/4044 314     Neutrophils Relative 12/11/2017 80*    Lymphocytes Relative 12/11/2017 8*    Monocytes Relative 12/11/2017 12     Eosinophils Relative 12/11/2017 0     Basophils Relative 12/11/2017 0     Neutrophils Absolute 12/11/2017 17 36*    Lymphocytes Absolute 12/11/2017 1 73     Monocytes Absolute 12/11/2017 2 57*    Eosinophils Absolute 12/11/2017 0 02     Basophils Absolute 12/11/2017 0 05     Protime 12/11/2017 14 2     INR 12/11/2017 1 07     PTT 12/11/2017 42*    Sodium 12/11/2017 131*    Potassium 12/11/2017 3 3*    Chloride 12/11/2017 97*    CO2 12/11/2017 22     Anion Gap 12/11/2017 12     BUN 12/11/2017 15     Creatinine 12/11/2017 0 92     Glucose 12/11/2017 197*    Calcium 12/11/2017 9 1     AST 12/11/2017 21     ALT 12/11/2017 21     Alkaline Phosphatase 12/11/2017 117*    Total Protein 12/11/2017 7 5     Albumin 12/11/2017 2 4*    Total Bilirubin 12/11/2017 0 40     eGFR 12/11/2017 74     Blood Culture 12/12/2017 No Growth at 24 hrs   Blood Culture 12/12/2017 No Growth at 24 hrs       LACTIC ACID 12/11/2017 1 2     Ventricular Rate 12/12/2017 100     Atrial Rate 12/12/2017 100     MO Interval 12/12/2017 120     QRSD Interval 12/12/2017 78     QT Interval 12/12/2017 340     QTC Interval 12/12/2017 438     P Axis 12/12/2017 13     QRS Axis 12/12/2017 41     T Wave Axis 12/12/2017 15     POC Troponin I 12/11/2017 0 00     Specimen Type 12/11/2017 VENOUS     POC Glucose 12/11/2017 235*    WBC 12/12/2017 19 83*    RBC 12/12/2017 3 89     Hemoglobin 12/12/2017 10 9*    Hematocrit 12/12/2017 31 8*    MCV 12/12/2017 82     MCH 12/12/2017 28 0     MCHC 12/12/2017 34 3     RDW 12/12/2017 13 5     Platelets 79/65/6884 337     MPV 12/12/2017 9 2     Sodium 12/12/2017 132*    Potassium 12/12/2017 3 9     Chloride 12/12/2017 102     CO2 12/12/2017 19*    Anion Gap 12/12/2017 11     BUN 12/12/2017 16     Creatinine 12/12/2017 0 83     Glucose 12/12/2017 143*    Calcium 12/12/2017 8 3     AST 12/12/2017 20     ALT 12/12/2017 17     Alkaline Phosphatase 12/12/2017 104     Total Protein 12/12/2017 6 8     Albumin 12/12/2017 2 0*    Total Bilirubin 12/12/2017 0 40     eGFR 12/12/2017 84     POC Glucose 12/12/2017 173*    POC Glucose 12/12/2017 261*    POC Glucose 12/12/2017 203*    POC Glucose 12/12/2017 169*    Vancomycin Tr 12/13/2017 11 0     WBC 12/13/2017 16 70*    RBC 12/13/2017 3 48*    Hemoglobin 12/13/2017 9 7*    Hematocrit 12/13/2017 30 2*    MCV 12/13/2017 87     MCH 12/13/2017 27 9     MCHC 12/13/2017 32 1     RDW 12/13/2017 13 6     MPV 12/13/2017 9 2     Platelets 39/11/4500 328     Neutrophils Relative 12/13/2017 78*    Lymphocytes Relative 12/13/2017 11*    Monocytes Relative 12/13/2017 10     Eosinophils Relative 12/13/2017 1     Basophils Relative 12/13/2017 0     Neutrophils Absolute 12/13/2017 12 97*    Lymphocytes Absolute 12/13/2017 1 77     Monocytes Absolute 12/13/2017 1 72*    Eosinophils Absolute 12/13/2017 0 19     Basophils Absolute 12/13/2017 0 05     Sodium 12/13/2017 134*    Potassium 12/13/2017 3 8     Chloride 12/13/2017 103     CO2 12/13/2017 22     Anion Gap 12/13/2017 9     BUN 12/13/2017 10     Creatinine 12/13/2017 0 87     Glucose 12/13/2017 123     Calcium 12/13/2017 8 1*    eGFR 12/13/2017 80      Imaging: I have personally reviewed pertinent films in PACS  EKG, Pathology, and Other Studies: I have personally reviewed pertinent reports  Code Status: Level 1 - Full Code  Advance Directive and Living Will:      Power of :    POLST:        Portions of the record may have been created with voice recognition software  Occasional wrong word or "sound a like" substitutions may have occurred due to the inherent limitations of voice recognition software  Read the chart carefully and recognize, using context, where substitutions have occurred

## 2017-12-13 NOTE — PLAN OF CARE
INFECTION - ADULT     Absence or prevention of progression during hospitalization Progressing     Absence of fever/infection during neutropenic period Progressing        METABOLIC, FLUID AND ELECTROLYTES - ADULT     Glucose maintained within target range Progressing        PAIN - ADULT     Verbalizes/displays adequate comfort level or baseline comfort level Progressing        Potential for Falls     Patient will remain free of falls Progressing        SAFETY ADULT     Maintain or return to baseline ADL function Progressing     Maintain or return mobility status to optimal level Progressing

## 2017-12-14 LAB
ANION GAP SERPL CALCULATED.3IONS-SCNC: 11 MMOL/L (ref 4–13)
BACTERIA BLD CULT: NORMAL
BACTERIA BLD CULT: NORMAL
BUN SERPL-MCNC: 7 MG/DL (ref 5–25)
CALCIUM SERPL-MCNC: 8.3 MG/DL (ref 8.3–10.1)
CHLORIDE SERPL-SCNC: 107 MMOL/L (ref 100–108)
CO2 SERPL-SCNC: 19 MMOL/L (ref 21–32)
CREAT SERPL-MCNC: 0.8 MG/DL (ref 0.6–1.3)
ERYTHROCYTE [DISTWIDTH] IN BLOOD BY AUTOMATED COUNT: 13.8 % (ref 11.6–15.1)
GFR SERPL CREATININE-BSD FRML MDRD: 88 ML/MIN/1.73SQ M
GLUCOSE SERPL-MCNC: 103 MG/DL (ref 65–140)
GLUCOSE SERPL-MCNC: 106 MG/DL (ref 65–140)
GLUCOSE SERPL-MCNC: 109 MG/DL (ref 65–140)
GLUCOSE SERPL-MCNC: 131 MG/DL (ref 65–140)
GLUCOSE SERPL-MCNC: 158 MG/DL (ref 65–140)
HCT VFR BLD AUTO: 28.8 % (ref 34.8–46.1)
HGB BLD-MCNC: 9 G/DL (ref 11.5–15.4)
MCH RBC QN AUTO: 27.5 PG (ref 26.8–34.3)
MCHC RBC AUTO-ENTMCNC: 31.3 G/DL (ref 31.4–37.4)
MCV RBC AUTO: 88 FL (ref 82–98)
PLATELET # BLD AUTO: 369 THOUSANDS/UL (ref 149–390)
PMV BLD AUTO: 9.4 FL (ref 8.9–12.7)
POTASSIUM SERPL-SCNC: 3.7 MMOL/L (ref 3.5–5.3)
RBC # BLD AUTO: 3.27 MILLION/UL (ref 3.81–5.12)
SODIUM SERPL-SCNC: 137 MMOL/L (ref 136–145)
WBC # BLD AUTO: 15.46 THOUSAND/UL (ref 4.31–10.16)

## 2017-12-14 PROCEDURE — 82948 REAGENT STRIP/BLOOD GLUCOSE: CPT

## 2017-12-14 PROCEDURE — 85027 COMPLETE CBC AUTOMATED: CPT | Performed by: INTERNAL MEDICINE

## 2017-12-14 PROCEDURE — 80048 BASIC METABOLIC PNL TOTAL CA: CPT | Performed by: INTERNAL MEDICINE

## 2017-12-14 RX ADMIN — PRAVASTATIN SODIUM 20 MG: 20 TABLET ORAL at 17:23

## 2017-12-14 RX ADMIN — OXYCODONE HYDROCHLORIDE AND ACETAMINOPHEN 1 TABLET: 5; 325 TABLET ORAL at 21:54

## 2017-12-14 RX ADMIN — ENOXAPARIN SODIUM 40 MG: 40 INJECTION SUBCUTANEOUS at 09:01

## 2017-12-14 RX ADMIN — VANCOMYCIN HYDROCHLORIDE 1250 MG: 1 INJECTION, POWDER, LYOPHILIZED, FOR SOLUTION INTRAVENOUS at 11:55

## 2017-12-14 RX ADMIN — INSULIN GLARGINE 14 UNITS: 100 INJECTION, SOLUTION SUBCUTANEOUS at 21:53

## 2017-12-14 RX ADMIN — VANCOMYCIN HYDROCHLORIDE 1250 MG: 1 INJECTION, POWDER, LYOPHILIZED, FOR SOLUTION INTRAVENOUS at 23:34

## 2017-12-14 RX ADMIN — SODIUM CHLORIDE AND POTASSIUM CHLORIDE 125 ML/HR: .9; .15 SOLUTION INTRAVENOUS at 05:51

## 2017-12-14 RX ADMIN — OXYCODONE HYDROCHLORIDE AND ACETAMINOPHEN 1 TABLET: 5; 325 TABLET ORAL at 05:50

## 2017-12-14 RX ADMIN — LISINOPRIL 10 MG: 10 TABLET ORAL at 09:01

## 2017-12-14 RX ADMIN — INSULIN LISPRO 1 UNITS: 100 INJECTION, SOLUTION INTRAVENOUS; SUBCUTANEOUS at 12:23

## 2017-12-14 NOTE — PROGRESS NOTES
Progress Note - Infectious Disease   Danielle Abraham 52 y o  female MRN: [id number]  Unit/Bed#: -01 Encounter: [id number]         Impression/Recommendations:  1   Sepsis, POA   Leukocytosis, tachycardia   Likely secondary to #2  Fevers now improved    Otherwise, hemodynamically stable and nontoxic appearing    Leukocytosis continues to trend down  Blood cultures drawn from the ER on 12/08 negative   Blood cultures from admission on 12/11 are negative      -antibiotic as below  -monitor temperatures and white blood cell count  -monitor hemodynamics  -follow-up blood cultures-so far no growth to date     2   Left lower extremity cellulitis   Patient does have onychomycosis as well as a small scabbed over lesion on 1 of her toes, which may have been the portal of entry for infection   This clinically looks like a strep infection   No evidence of purulence to suggest a staph infection   However, it seemed to have progressed while patient was on oral Keflex    Erythema has significantly improved on IV vancomycin  Edema and tenderness also appears to be improving  I suspect edema was worsened by IV fluids patient was receiving  Her fevers have improved  Leukocytosis continues to improve      -continue with IV vancomycin  Vancomycin trough of 11 is acceptable for this infection  If continues to improve with no more fevers by tomorrow, will switch to oral antibiotic   -would try to limit IV fluids at this point as patient is taking p o  well      -low threshold to obtain imaging of lower extremity if fevers, leg swelling and pain persist  -serial leg exams     3   Asymptomatic bacteriuria   Patient did not have any urinary symptoms when she presented to the ER on 12/08   CT revealed mild possible cystitis   However, urine culture grew only mixed contaminants   Patient remains asymptomatic   No indication to treat for a UTI  -monitor symptoms     4   Diabetes mellitus type 2, with hyperglycemia    -recommend good blood sugar control in the setting of acute infection     5   Onychomycosis   May have contributed to the development of cellulitis  -recommend outpatient follow-up with Podiatry     6  Ane Calhoun been ongoing for about 2 and half weeks as per patient   CT of chest did not show any evidence of pulmonary infection   This is probably a resolving viral URI   O2 sat stable on room air   Patient currently not short of breath   -monitor symptoms     Antibiotics:  Vancomycin D4     Spoke with patient in detail regarding plan of care  Subjective:  Erythema much improved of left lower extremity  There is still some edema and tenderness, but this is improving  No fevers or chills  No shortness of breath, cough, nausea, vomiting, diarrhea  Objective:  Vitals:  HR:  [85-90] 85  Resp:  [18-20] 18  BP: (124-135)/(58-64) 129/64  SpO2:  [96 %-100 %] 97 %  Temp (24hrs), Av 1 °F (36 7 °C), Min:98 °F (36 7 °C), Max:98 1 °F (36 7 °C)  Current: Temperature: 98 1 °F (36 7 °C)    Physical Exam:   General:  No acute distress  Psychiatric:  Awake and alert  Pulmonary:  Normal respiratory excursion without accessory muscle use  Abdomen:  Soft, nontender  Extremities:  Left lower extremity edema  Skin:  Decreased left lower extremity edema and tenderness compared to yesterday  Also significantly decreased erythema of the left lower extremity  No fluctuance or drainage  Lab Results:  I have personally reviewed pertinent labs      Results from last 7 days  Lab Units 17  0539 17  0457 17  0501 17  1614 17  2111   SODIUM mmol/L 137 134* 132* 131* 134*   POTASSIUM mmol/L 3 7 3 8 3 9 3 3* 3 8   CHLORIDE mmol/L 107 103 102 97* 102   CO2 mmol/L 19* 22 19* 22 23   ANION GAP mmol/L 11 9 11 12 9   BUN mg/dL 7 10 16 15 14   CREATININE mg/dL 0 80 0 87 0 83 0 92 0 95   EGFR ml/min/1 73sq m 88 80 84 74 72   GLUCOSE RANDOM mg/dL 103 123 143* 197* 170*   CALCIUM mg/dL 8 3 8 1* 8 3 9 1 9 3   AST U/L  -- --  20 21 19   ALT U/L  --   --  17 21 19   ALK PHOS U/L  --   --  104 117* 107   TOTAL PROTEIN g/dL  --   --  6 8 7 5 8 2   ALBUMIN g/dL  --   --  2 0* 2 4* 3 3*   BILIRUBIN TOTAL mg/dL  --   --  0 40 0 40 0 69       Results from last 7 days  Lab Units 12/14/17  0539 12/13/17  0457 12/12/17  0532   WBC Thousand/uL 15 46* 16 70* 19 83*   HEMOGLOBIN g/dL 9 0* 9 7* 10 9*   PLATELETS Thousands/uL 369 328 337       Results from last 7 days  Lab Units 12/11/17  1617 12/11/17  1435 12/09/17  0045 12/08/17  2248   BLOOD CULTURE  No Growth at 48 hrs  No Growth at 48 hrs  --  No Growth After 5 Days  No Growth After 5 Days  URINE CULTURE   --   --  50,000-59,000 cfu/ml   --        Imaging Studies:   I have personally reviewed pertinent imaging study reports and images in PACS  EKG, Pathology, and Other Studies:   I have personally reviewed pertinent reports

## 2017-12-14 NOTE — PLAN OF CARE
INFECTION - ADULT     Absence of fever/infection during neutropenic period Completed          INFECTION - ADULT     Absence or prevention of progression during hospitalization Progressing        METABOLIC, FLUID AND ELECTROLYTES - ADULT     Glucose maintained within target range Progressing        PAIN - ADULT     Verbalizes/displays adequate comfort level or baseline comfort level Progressing        Potential for Falls     Patient will remain free of falls Progressing        SAFETY ADULT     Maintain or return to baseline ADL function Progressing     Maintain or return mobility status to optimal level Progressing

## 2017-12-14 NOTE — PLAN OF CARE
Problem: Potential for Falls  Goal: Patient will remain free of falls  INTERVENTIONS:  - Assess patient frequently for physical needs  -  Identify cognitive and physical deficits and behaviors that affect risk of falls    -  [place] fall precautions as indicated by assessment   - Educate patient/family on patient safety including physical limitations  - Instruct patient to call for assistance with activity based on assessment  - Modify environment to reduce risk of injury  - Consider OT/PT consult to assist with strengthening/mobility   Outcome: Progressing      Problem: METABOLIC, FLUID AND ELECTROLYTES - ADULT  Goal: Glucose maintained within target range  INTERVENTIONS:  - Monitor Blood Glucose as ordered  - Assess for signs and symptoms of hyperglycemia and hypoglycemia  - Administer ordered medications to maintain glucose within target range  - Assess nutritional intake and initiate nutrition service referral as needed   Outcome: Progressing      Problem: PAIN - ADULT  Goal: Verbalizes/displays adequate comfort level or baseline comfort level  Interventions:  - Encourage patient to monitor pain and request assistance  - Assess pain using appropriate pain scale  - Administer analgesics based on type and severity of pain and evaluate response  - Implement non-pharmacological measures as appropriate and evaluate response  - Consider cultural and social influences on pain and pain management  - Notify physician/advanced practitioner if interventions unsuccessful or patient reports new pain   Outcome: Progressing      Problem: INFECTION - ADULT  Goal: Absence or prevention of progression during hospitalization  INTERVENTIONS:  - Assess and monitor for signs and symptoms of infection  - Monitor lab/diagnostic results  - Monitor all insertion sites, i e  indwelling lines, tubes, and drains  - Monitor endotracheal (as able) and nasal secretions for changes in amount and color  - [place] appropriate cooling/warming therapies per order  - Administer medications as ordered  - Instruct and encourage patient and family to use good hand hygiene technique  - Identify and instruct in appropriate isolation precautions for identified infection/condition   Outcome: Progressing    Goal: Absence of fever/infection during neutropenic period  INTERVENTIONS:  - Monitor WBC  - Implement neutropenic guidelines   Outcome: Progressing      Problem: SAFETY ADULT  Goal: Maintain or return to baseline ADL function  INTERVENTIONS:  -  Assess patient's ability to carry out ADLs; assess patient's baseline for ADL function and identify physical deficits which impact ability to perform ADLs (bathing, care of mouth/teeth, toileting, grooming, dressing, etc )  - Assess/evaluate cause of self-care deficits   - Assess range of motion  - Assess patient's mobility; develop plan if impaired  - Assess patient's need for assistive devices and provide as appropriate  - Encourage maximum independence but intervene and supervise when necessary  ¯ Involve family in performance of ADLs  ¯ Assess for home care needs following discharge   ¯ Request OT consult to assist with ADL evaluation and planning for discharge  ¯ Provide patient education as appropriate   Outcome: Progressing    Goal: Maintain or return mobility status to optimal level  INTERVENTIONS:  - Assess patient's baseline mobility status (ambulation, transfers, stairs, etc )    - Identify cognitive and physical deficits and behaviors that affect mobility  - Identify mobility aids required to assist with transfers and/or ambulation (gait belt, sit-to-stand, lift, walker, cane, etc )  - [place] fall precautions as indicated by assessment  - Record patient progress and toleration of activity level on Mobility SBAR; progress patient to next Phase/Stage  - Instruct patient to call for assistance with activity based on assessment  - Request Rehabilitation consult to assist with strengthening/weightbearing, etc    Outcome: Progressing

## 2017-12-14 NOTE — PROGRESS NOTES
Epitaxis noted /56 pulse 96  No distress noted  Room noted to be very warm and dry  Temp in room lowered per her request   Will observe

## 2017-12-15 VITALS
OXYGEN SATURATION: 97 % | RESPIRATION RATE: 18 BRPM | HEART RATE: 78 BPM | HEIGHT: 67 IN | WEIGHT: 217.81 LBS | TEMPERATURE: 98.3 F | SYSTOLIC BLOOD PRESSURE: 141 MMHG | DIASTOLIC BLOOD PRESSURE: 69 MMHG | BODY MASS INDEX: 34.19 KG/M2

## 2017-12-15 PROBLEM — E87.1 HYPONATREMIA: Status: RESOLVED | Noted: 2017-12-11 | Resolved: 2017-12-15

## 2017-12-15 LAB
ANION GAP SERPL CALCULATED.3IONS-SCNC: 10 MMOL/L (ref 4–13)
BUN SERPL-MCNC: 5 MG/DL (ref 5–25)
CALCIUM SERPL-MCNC: 8.5 MG/DL (ref 8.3–10.1)
CHLORIDE SERPL-SCNC: 106 MMOL/L (ref 100–108)
CO2 SERPL-SCNC: 22 MMOL/L (ref 21–32)
CREAT SERPL-MCNC: 0.7 MG/DL (ref 0.6–1.3)
ERYTHROCYTE [DISTWIDTH] IN BLOOD BY AUTOMATED COUNT: 13.7 % (ref 11.6–15.1)
GFR SERPL CREATININE-BSD FRML MDRD: 104 ML/MIN/1.73SQ M
GLUCOSE SERPL-MCNC: 163 MG/DL (ref 65–140)
GLUCOSE SERPL-MCNC: 83 MG/DL (ref 65–140)
GLUCOSE SERPL-MCNC: 88 MG/DL (ref 65–140)
HCT VFR BLD AUTO: 29.2 % (ref 34.8–46.1)
HGB BLD-MCNC: 9.2 G/DL (ref 11.5–15.4)
MAGNESIUM SERPL-MCNC: 2.2 MG/DL (ref 1.6–2.6)
MCH RBC QN AUTO: 27.5 PG (ref 26.8–34.3)
MCHC RBC AUTO-ENTMCNC: 31.5 G/DL (ref 31.4–37.4)
MCV RBC AUTO: 87 FL (ref 82–98)
PLATELET # BLD AUTO: 464 THOUSANDS/UL (ref 149–390)
PMV BLD AUTO: 9.4 FL (ref 8.9–12.7)
POTASSIUM SERPL-SCNC: 4.5 MMOL/L (ref 3.5–5.3)
RBC # BLD AUTO: 3.34 MILLION/UL (ref 3.81–5.12)
SODIUM SERPL-SCNC: 138 MMOL/L (ref 136–145)
WBC # BLD AUTO: 15.29 THOUSAND/UL (ref 4.31–10.16)

## 2017-12-15 PROCEDURE — 83735 ASSAY OF MAGNESIUM: CPT | Performed by: INTERNAL MEDICINE

## 2017-12-15 PROCEDURE — 85027 COMPLETE CBC AUTOMATED: CPT | Performed by: INTERNAL MEDICINE

## 2017-12-15 PROCEDURE — 80048 BASIC METABOLIC PNL TOTAL CA: CPT | Performed by: INTERNAL MEDICINE

## 2017-12-15 PROCEDURE — 82948 REAGENT STRIP/BLOOD GLUCOSE: CPT

## 2017-12-15 RX ORDER — AMOXICILLIN AND CLAVULANATE POTASSIUM 875; 125 MG/1; MG/1
1 TABLET, FILM COATED ORAL EVERY 12 HOURS SCHEDULED
Qty: 10 TABLET | Refills: 0 | Status: SHIPPED | OUTPATIENT
Start: 2017-12-15 | End: 2017-12-24 | Stop reason: HOSPADM

## 2017-12-15 RX ADMIN — LISINOPRIL 10 MG: 10 TABLET ORAL at 09:34

## 2017-12-15 RX ADMIN — INSULIN LISPRO 1 UNITS: 100 INJECTION, SOLUTION INTRAVENOUS; SUBCUTANEOUS at 12:17

## 2017-12-15 RX ADMIN — VANCOMYCIN HYDROCHLORIDE 1250 MG: 1 INJECTION, POWDER, LYOPHILIZED, FOR SOLUTION INTRAVENOUS at 12:58

## 2017-12-15 RX ADMIN — OXYCODONE HYDROCHLORIDE AND ACETAMINOPHEN 1 TABLET: 5; 325 TABLET ORAL at 13:03

## 2017-12-15 RX ADMIN — ENOXAPARIN SODIUM 40 MG: 40 INJECTION SUBCUTANEOUS at 09:34

## 2017-12-15 NOTE — PROGRESS NOTES
Progress Note - Infectious Disease   Gregor Howell 52 y o  female MRN: [id number]  Unit/Bed#: -01 Encounter: [id number]      Impression/Recommendations:  1   Sepsis, POA   Leukocytosis, tachycardia  Marycruz Bingham secondary to #2  Surjit Haq now improved    Otherwise, hemodynamically stable and nontoxic appearing    Leukocytosis continues to trend down   Blood cultures drawn from the ER on 12/08 negative   Blood cultures from admission on 12/11 are negative      -antibiotic as below  -monitor temperatures and white blood cell count  -monitor hemodynamics  -follow-up blood cultures-so far no growth to date     2   Left lower extremity cellulitis   Patient does have onychomycosis as well as a small scabbed over lesion on 1 of her toes, which may have been the portal of entry for infection   This clinically looks like a strep infection   No evidence of purulence to suggest a staph infection   However, it seemed to have progressed while patient was on oral Keflex    Erythema has significantly improved on IV vancomycin  Edema and tenderness also appears to be improving  I suspect edema was worsened by IV fluids patient was receiving  Her fevers have improved  Leukocytosis continues to improve      -we can transition to oral Augmentin 875/125 mg twice a day  Would give for another 5 days, through 12/20  Patient instructed to keep her leg elevated at home  If there is worsening of fevers, leg swelling, pain, erythema, she should return to hospital   If she has any questions or concerns, she can contact me       3   Asymptomatic bacteriuria   Patient did not have any urinary symptoms when she presented to the ER on 12/08   CT revealed mild possible cystitis   However, urine culture grew only mixed contaminants   Patient remains asymptomatic   No indication to treat for a UTI  -monitor symptoms     4   Diabetes mellitus type 2, with hyperglycemia    -recommend good blood sugar control in the setting of acute infection     5   Onychomycosis   May have contributed to the development of cellulitis  -recommend outpatient follow-up with Podiatry     6  Phong Jones been ongoing for about 2 and half weeks as per patient   CT of chest did not show any evidence of pulmonary infection   This is probably a resolving viral URI   O2 sat stable on room air   Patient currently not short of breath   -monitor symptoms     Antibiotics:  Vancomycin D5     Spoke with patient in detail regarding plan of care  Spoke with Dr María Davis regarding plan of care        Subjective:  Patient feels like there is less redness in her lower extremity  Still has some swelling, but it has shown some improvement  No more fevers or chills  No nausea, vomiting, diarrhea  Patient would like to go home  Objective:  Vitals:  HR:  [] 78  Resp:  [18-20] 18  BP: (141-165)/(69-77) 141/69  SpO2:  [96 %-97 %] 97 %  Temp (24hrs), Av 7 °F (37 1 °C), Min:98 3 °F (36 8 °C), Max:99 6 °F (37 6 °C)  Current: Temperature: 98 3 °F (36 8 °C)    Physical Exam:   General:  No acute distress  Psychiatric:  Awake and alert  Pulmonary:  Normal respiratory excursion without accessory muscle use  Abdomen:  Soft, nontender  Extremities:  No edema  Skin:  Decreased left lower extremity edema and tenderness  Decreased erythema of the left lower extremity  No fluctuance or drainage  Lab Results:  I have personally reviewed pertinent labs      Results from last 7 days  Lab Units 12/15/17  0444 17  0539 17  0457 17  0501 17  1614 17  2111   SODIUM mmol/L 138 137 134* 132* 131* 134*   POTASSIUM mmol/L 4 5 3 7 3 8 3 9 3 3* 3 8   CHLORIDE mmol/L 106 107 103 102 97* 102   CO2 mmol/L 22 19* 22 19* 22 23   ANION GAP mmol/L 10 11 9 11 12 9   BUN mg/dL 5 7 10 16 15 14   CREATININE mg/dL 0 70 0 80 0 87 0 83 0 92 0 95   EGFR ml/min/1 73sq m 104 88 80 84 74 72   GLUCOSE RANDOM mg/dL 83 103 123 143* 197* 170*   CALCIUM mg/dL 8 5 8 3 8 1* 8 3 9 1 9 3 AST U/L  --   --   --  20 21 19   ALT U/L  --   --   --  17 21 19   ALK PHOS U/L  --   --   --  104 117* 107   TOTAL PROTEIN g/dL  --   --   --  6 8 7 5 8 2   ALBUMIN g/dL  --   --   --  2 0* 2 4* 3 3*   BILIRUBIN TOTAL mg/dL  --   --   --  0 40 0 40 0 69       Results from last 7 days  Lab Units 12/15/17  0444 12/14/17  0539 12/13/17  0457   WBC Thousand/uL 15 29* 15 46* 16 70*   HEMOGLOBIN g/dL 9 2* 9 0* 9 7*   PLATELETS Thousands/uL 464* 369 328       Results from last 7 days  Lab Units 12/11/17  1617 12/11/17  1435 12/09/17  0045 12/08/17  2248   BLOOD CULTURE  No Growth at 72 hrs  No Growth at 72 hrs   --  No Growth After 5 Days  No Growth After 5 Days  URINE CULTURE   --   --  50,000-59,000 cfu/ml   --        Imaging Studies:   I have personally reviewed pertinent imaging study reports and images in PACS  EKG, Pathology, and Other Studies:   I have personally reviewed pertinent reports

## 2017-12-15 NOTE — PLAN OF CARE
Problem: Potential for Falls  Goal: Patient will remain free of falls  INTERVENTIONS:  - Assess patient frequently for physical needs  -  Identify cognitive and physical deficits and behaviors that affect risk of falls    -  [place] fall precautions as indicated by assessment   - Educate patient/family on patient safety including physical limitations  - Instruct patient to call for assistance with activity based on assessment  - Modify environment to reduce risk of injury  - Consider OT/PT consult to assist with strengthening/mobility   Outcome: Adequate for Discharge      Problem: METABOLIC, FLUID AND ELECTROLYTES - ADULT  Goal: Glucose maintained within target range  INTERVENTIONS:  - Monitor Blood Glucose as ordered  - Assess for signs and symptoms of hyperglycemia and hypoglycemia  - Administer ordered medications to maintain glucose within target range  - Assess nutritional intake and initiate nutrition service referral as needed   Outcome: Progressing      Problem: PAIN - ADULT  Goal: Verbalizes/displays adequate comfort level or baseline comfort level  Interventions:  - Encourage patient to monitor pain and request assistance  - Assess pain using appropriate pain scale  - Administer analgesics based on type and severity of pain and evaluate response  - Implement non-pharmacological measures as appropriate and evaluate response  - Consider cultural and social influences on pain and pain management  - Notify physician/advanced practitioner if interventions unsuccessful or patient reports new pain   Outcome: Progressing      Problem: INFECTION - ADULT  Goal: Absence or prevention of progression during hospitalization  INTERVENTIONS:  - Assess and monitor for signs and symptoms of infection  - Monitor lab/diagnostic results  - Monitor all insertion sites, i e  indwelling lines, tubes, and drains  - Monitor endotracheal (as able) and nasal secretions for changes in amount and color  - [place] appropriate cooling/warming therapies per order  - Administer medications as ordered  - Instruct and encourage patient and family to use good hand hygiene technique  - Identify and instruct in appropriate isolation precautions for identified infection/condition   Outcome: Progressing      Problem: SAFETY ADULT  Goal: Maintain or return to baseline ADL function  INTERVENTIONS:  -  Assess patient's ability to carry out ADLs; assess patient's baseline for ADL function and identify physical deficits which impact ability to perform ADLs (bathing, care of mouth/teeth, toileting, grooming, dressing, etc )  - Assess/evaluate cause of self-care deficits   - Assess range of motion  - Assess patient's mobility; develop plan if impaired  - Assess patient's need for assistive devices and provide as appropriate  - Encourage maximum independence but intervene and supervise when necessary  ¯ Involve family in performance of ADLs  ¯ Assess for home care needs following discharge   ¯ Request OT consult to assist with ADL evaluation and planning for discharge  ¯ Provide patient education as appropriate   Outcome: Progressing    Goal: Maintain or return mobility status to optimal level  INTERVENTIONS:  - Assess patient's baseline mobility status (ambulation, transfers, stairs, etc )    - Identify cognitive and physical deficits and behaviors that affect mobility  - Identify mobility aids required to assist with transfers and/or ambulation (gait belt, sit-to-stand, lift, walker, cane, etc )  - [place] fall precautions as indicated by assessment  - Record patient progress and toleration of activity level on Mobility SBAR; progress patient to next Phase/Stage  - Instruct patient to call for assistance with activity based on assessment  - Request Rehabilitation consult to assist with strengthening/weightbearing, etc    Outcome: Progressing

## 2017-12-15 NOTE — PLAN OF CARE
INFECTION - ADULT     Absence or prevention of progression during hospitalization Progressing        METABOLIC, FLUID AND ELECTROLYTES - ADULT     Glucose maintained within target range Progressing        PAIN - ADULT     Verbalizes/displays adequate comfort level or baseline comfort level Progressing        Potential for Falls     Patient will remain free of falls Progressing        SAFETY ADULT     Maintain or return to baseline ADL function Progressing     Maintain or return mobility status to optimal level Progressing

## 2017-12-15 NOTE — PROGRESS NOTES
[facility] Progress Note  Patient: Anthony Park 52 y o  female   MRN: [id number]  PCP: Celso Urbina MD  Unit/Bed#: -[facility] Encounter: [id number]  Date Of Visit: 17    Assessment/plan:  1   Left lower extremity cellulitis - failed Keflex as outpatient   On Vancomycin(D#4)  Gradual improvement  2   Hyponatremia - resolved with IV fluids  Continue to hold HCTZ     3   HTN - continue lisinopril   HCTZ held per #2  BP acceptable  4   Type 2 diabetes with hyperglycemia - oral hypoglycemics held    Was on Invokana/glipizide/metformin  Blood sugars improved - ct current dose of insulin  5  HLD - statins  6   5 mm right lower lobe nodules - outpatient follow-up with serial CT    VTE Pharmacologic Prophylaxis:   Pharmacologic: Enoxaparin (Lovenox)  Mechanical: Mechanical VTE prophylaxis in place  Discussions with Specialists or Other Care Team Provider: Rounded with RN    Education and Discussions with Family / Patient: Francisco J Ortiz family update    Time Spent for Care: 20 minutes  More than 50% of total time spent on counseling and coordination of care as described above  Current Length of Stay: 2 day(s)    Current Patient Status: Inpatient   Certification Statement: The patient will continue to require additional inpatient hospital stay due to Cellulitis    Code Status: Level 1 - Full Code    Subjective:   Left lower extremity redness and tenderness continue to improve  Mild improvement in leg swelling  No fever  Objective:     Vitals:   Temp (24hrs), Av 5 °F (36 9 °C), Min:98 °F (36 7 °C), Max:99 4 °F (37 4 °C)    HR:  [85-92] 92  Resp:  [18-20] 20  BP: (124-149)/(58-69) 149/69  SpO2:  [96 %-98 %] 98 %  Body mass index is 34 11 kg/m²  Physical Exam:     Physical Exam   HENT:   Head: Atraumatic  Eyes: EOM are normal    Neck: Neck supple  No JVD present  No tracheal deviation present  No thyromegaly present     Cardiovascular: Normal rate, regular rhythm and normal heart sounds  Pulmonary/Chest: Effort normal and breath sounds normal  No respiratory distress  She has no wheezes  She has no rales  Abdominal: Soft  Bowel sounds are normal  She exhibits no distension  There is no tenderness  There is no rebound  Musculoskeletal: She exhibits edema  Neurological: She is alert  Skin:   Diffuse left lower extremity erythema and tenderness - improved   Psychiatric: She has a normal mood and affect  Additional Data:     Labs:      Results from last 7 days  Lab Units 12/14/17  0539 12/13/17  0457   WBC Thousand/uL 15 46* 16 70*   HEMOGLOBIN g/dL 9 0* 9 7*   HEMATOCRIT % 28 8* 30 2*   PLATELETS Thousands/uL 369 328   NEUTROS PCT %  --  78*   LYMPHS PCT %  --  11*   MONOS PCT %  --  10   EOS PCT %  --  1       Results from last 7 days  Lab Units 12/14/17  0539  12/12/17  0501   SODIUM mmol/L 137  < > 132*   POTASSIUM mmol/L 3 7  < > 3 9   CHLORIDE mmol/L 107  < > 102   CO2 mmol/L 19*  < > 19*   BUN mg/dL 7  < > 16   CREATININE mg/dL 0 80  < > 0 83   CALCIUM mg/dL 8 3  < > 8 3   TOTAL PROTEIN g/dL  --   --  6 8   BILIRUBIN TOTAL mg/dL  --   --  0 40   ALK PHOS U/L  --   --  104   ALT U/L  --   --  17   AST U/L  --   --  20   GLUCOSE RANDOM mg/dL 103  < > 143*   < > = values in this interval not displayed  Results from last 7 days  Lab Units 12/11/17  1614   INR  1 07       * I Have Reviewed All Lab Data Listed Above  * Additional Pertinent Lab Tests Reviewed:  [facility]    Last 24 Hours Medication List:     enoxaparin 40 mg Subcutaneous Daily   insulin glargine 14 Units Subcutaneous HS   insulin lispro 1-5 Units Subcutaneous TID AC   insulin lispro 1-5 Units Subcutaneous HS   insulin lispro 4 Units Subcutaneous TID With Meals   lisinopril 10 mg Oral Daily   pravastatin 20 mg Oral Daily With Dinner   vancomycin 12 5 mg/kg Intravenous Q12H        Today, Patient Was Seen By: Aftab Zacarias MD    ** Please Note: Dragon 360 Dictation voice to text software may have been used in the creation of this document   **

## 2017-12-15 NOTE — CASE MANAGEMENT
Continued Stay Review    Date: 12/15/2017     Vital Signs: /70   Pulse 93   Temp 98 3 °F (36 8 °C) (Oral)   Resp 18   Ht 5' 7" (1 702 m)   Wt 98 8 kg (217 lb 13 oz)   LMP 12/07/2017   SpO2 96%   BMI 34 11 kg/m²     Medications:   Scheduled Meds:   enoxaparin 40 mg Subcutaneous Daily   insulin glargine 14 Units Subcutaneous HS   insulin lispro 1-5 Units Subcutaneous TID AC   insulin lispro 1-5 Units Subcutaneous HS   insulin lispro 4 Units Subcutaneous TID With Meals   lisinopril 10 mg Oral Daily   pravastatin 20 mg Oral Daily With Dinner   vancomycin 12 5 mg/kg Intravenous Q12H     Continuous Infusions:    PRN Meds: not used:   acetaminophen    morphine injection    oxyCODONE-acetaminophen    oxyCODONE-acetaminophen    Abnormal Labs/Diagnostic Results:   Glucose 163  Wbc 15 29, hgb 9 2, hct 29 2  Platelets 149  Age/Sex: 52 y o  female     Assessment/Plan: Left lower extremity cellulitis - failed Keflex as outpatient   On Vancomycin(D#5)   Gradual improvement  2   Hyponatremia - resolved with IV fluids   Continue to hold HCTZ     3   HTN - continue lisinopril   HCTZ held per #2   BP acceptable  4   Type 2 diabetes with hyperglycemia - oral hypoglycemics held    HJV on Invokana/glipizide/metformin   Blood sugars improved - ct current dose of insulin  5  HLD - statins  6   5 mm right lower lobe nodules - outpatient follow-up with serial CT    Discharge Plan: To be determined  [address] [place] [address] in the [facility] by Yao Cornelius for 2017  Network Utilization Review Department  Phone: [phone]; Fax [phone]  ATTENTION: The Network Utilization Review Department is now centralized for our 7 Facilities  Make a note that we have a new phone and fax numbers for our Department  Please call with any questions or concerns to [phone] and carefully follow the prompts so that you are directed to the right person   All voicemails are confidential  Fax any determinations, approvals, denials, and requests for initial or continue stay review clinical to [phone]  Due to HIGH CALL volume, it would be easier if you could please send faxed requests to expedite your requests and in part, help us provide discharge notifications faster

## 2017-12-15 NOTE — PROGRESS NOTES
[facility] Progress Note  Patient: Ulysses Mcgee 52 y o  female   MRN: [id number]  PCP: Raiford Brittle, MD  Unit/Bed#: -[facility] Encounter: [id number]  Date Of Visit: 12/15/17    Assessment/plan:  1   Left lower extremity cellulitis - clinically improved  On Vancomycin(D#5)     Discussed with Dr Yanira Linton - ok for discharge on Augmentin 875 mg BID x 5 days  2   Hyponatremia - resolved with IV fluids   Restart HCTZ   3   HTN - ct Lisinopril  Restart HCTZ on discharge  BP acceptable  4   Type 2 diabetes with hyperglycemia - Restart Invokana/glipizide/metformin on discharge     5  HLD - statins  6   5 mm right lower lobe nodules - outpatient follow-up with serial CT    VTE Pharmacologic Prophylaxis:   Pharmacologic: Enoxaparin (Lovenox)  Mechanical: Mechanical VTE prophylaxis in place  Discussions with Specialists or Other Care Team Provider: Discussed with Dr Chiquita Zambrano with RN    Education and Discussions with Family / Patient: Discussed with  at the bedside    Time Spent for Care: 20 minutes  More than 50% of total time spent on counseling and coordination of care as described above  Current Length of Stay: 3 day(s)    Current Patient Status: Inpatient     Discharge Plan: Discharge home today    Code Status: Level 1 - Full Code    Subjective:   Feels much better  Left lower extremity redness and tenderness improved  Swelling mildly improved  Wants to go home  No fever  No nausea, vomiting or diarrhea  Objective:     Vitals:   Temp (24hrs), Av 7 °F (37 1 °C), Min:98 3 °F (36 8 °C), Max:99 6 °F (37 6 °C)    HR:  [] 78  Resp:  [18-20] 18  BP: (141-165)/(69-77) 141/69  SpO2:  [96 %-97 %] 97 %  Body mass index is 34 11 kg/m²  Physical Exam:     Physical Exam   Constitutional: She is oriented to person, place, and time  HENT:   Head: Atraumatic  Eyes: EOM are normal    Neck: Neck supple  No JVD present  No tracheal deviation present  No thyromegaly present  Cardiovascular: Normal rate, regular rhythm and normal heart sounds  Pulmonary/Chest: Effort normal and breath sounds normal  No respiratory distress  She has no wheezes  She has no rales  Abdominal: Soft  Bowel sounds are normal  She exhibits no distension  There is no tenderness  There is no rebound  Musculoskeletal:   Left lower extremity 2+ edema   Neurological: She is alert and oriented to person, place, and time  Skin:   Erythema over the left lower leg - improved  No erythema over thigh       Additional Data:     Labs:      Results from last 7 days  Lab Units 12/15/17  0444  12/13/17  0457   WBC Thousand/uL 15 29*  < > 16 70*   HEMOGLOBIN g/dL 9 2*  < > 9 7*   HEMATOCRIT % 29 2*  < > 30 2*   PLATELETS Thousands/uL 464*  < > 328   NEUTROS PCT %  --   --  78*   LYMPHS PCT %  --   --  11*   MONOS PCT %  --   --  10   EOS PCT %  --   --  1   < > = values in this interval not displayed  Results from last 7 days  Lab Units 12/15/17  0444  12/12/17  0501   SODIUM mmol/L 138  < > 132*   POTASSIUM mmol/L 4 5  < > 3 9   CHLORIDE mmol/L 106  < > 102   CO2 mmol/L 22  < > 19*   BUN mg/dL 5  < > 16   CREATININE mg/dL 0 70  < > 0 83   CALCIUM mg/dL 8 5  < > 8 3   TOTAL PROTEIN g/dL  --   --  6 8   BILIRUBIN TOTAL mg/dL  --   --  0 40   ALK PHOS U/L  --   --  104   ALT U/L  --   --  17   AST U/L  --   --  20   GLUCOSE RANDOM mg/dL 83  < > 143*   < > = values in this interval not displayed  Results from last 7 days  Lab Units 12/11/17  1614   INR  1 07       * I Have Reviewed All Lab Data Listed Above  * Additional Pertinent Lab Tests Reviewed:  [facility] Admission Reviewed    Last 24 Hours Medication List:     enoxaparin 40 mg Subcutaneous Daily   insulin glargine 14 Units Subcutaneous HS   insulin lispro 1-5 Units Subcutaneous TID AC   insulin lispro 1-5 Units Subcutaneous HS   insulin lispro 4 Units Subcutaneous TID With Meals   lisinopril 10 mg Oral Daily   pravastatin 20 mg Oral Daily With Dinner   vancomycin 12 5 mg/kg Intravenous Q12H        Today, Patient Was Seen By: Alpesh Sinclair MD    ** Please Note: Dragon 360 Dictation voice to text software may have been used in the creation of this document   **

## 2017-12-16 LAB
BACTERIA BLD CULT: NORMAL
BACTERIA BLD CULT: NORMAL

## 2017-12-16 NOTE — DISCHARGE SUMMARY
Discharge Summary - [place] [address] Internal Medicine    Patient Information: Donny Benjamin 52 y o  female MRN: [id number]  Unit/Bed#: -01 Encounter: [id number]    Discharging Physician / Practitioner: Lisa Burns MD  PCP: Fidencio Sosa DO  Admission Date: 12/11/2017  Discharge Date: 12/15/17    Principal discharge diagnosis:  Left lower extremity cellulitis    Secondary diagnoses:  1  Hypertension  2  Type 2 diabetes  3  Hyperlipidemia      Consultations During Hospital Stay:  Infectious Disease  Podiatry    Procedures Performed:   Lower extremity venous Doppler - no evidence of acute or chronic deep vein thrombosis     Outpatient Tests Requested:  Recent CTA chest done on 12/09/2017 showed 5 mm nodules in the right lower lobe - repeat CT chest in 6 months  Hospital Course:     Donny Benjamin is a 52 y o  female patient who originally presented to the hospital on 12/11/2017 with left lower extremity swelling, areas of redness and pain  She had been evaluated at [facility] ED on 12/08/2017 with shortness of breath and fever  CT was suggestive mild cystitis and she was discharged on Keflex 500 mg twice daily  She was noted to have left lower extremity cellulitis and was begun on vancomycin with clinical improvement  She is being discharged on Augmentin 875/125 mg twice daily for 5 days  She has a right foot diabetic ulcer and was evaluated by Podiatry  Her ulcer was not infected and wound care instructions were given by Podiatry  Left foot showed no ulcers  Condition at Discharge: stable     Discharge Day Visit / Exam:     * Please refer to separate progress for these details *    Discharge instructions/Information to patient and family:   See after visit summary for information provided to patient and family  Provisions for Follow-Up Care:  See after visit summary for information related to follow-up care and any pertinent home health orders        Disposition: Home    Planned Readmission: No    Discharge Statement:  I spent 40 minutes discharging the patient  This time was spent on the day of discharge  I had direct contact with the patient on the day of discharge  Greater than 50% of the total time was spent examining patient, answering all patient questions, arranging and discussing plan of care with patient as well as directly providing post-discharge instructions  Additional time then spent on discharge activities  Discharge Medications:  See after visit summary for reconciled discharge medications provided to patient and family  ** Please Note: Dragon 360 Dictation voice to text software may have been used in the creation of this document   **

## 2017-12-18 ENCOUNTER — GENERIC CONVERSION - ENCOUNTER (OUTPATIENT)
Dept: OTHER | Facility: OTHER | Age: 47
End: 2017-12-18

## 2017-12-18 ENCOUNTER — APPOINTMENT (EMERGENCY)
Dept: CT IMAGING | Facility: HOSPITAL | Age: 47
DRG: 872 | End: 2017-12-18
Payer: COMMERCIAL

## 2017-12-18 ENCOUNTER — ALLSCRIPTS OFFICE VISIT (OUTPATIENT)
Dept: OTHER | Facility: OTHER | Age: 47
End: 2017-12-18

## 2017-12-18 ENCOUNTER — HOSPITAL ENCOUNTER (INPATIENT)
Facility: HOSPITAL | Age: 47
LOS: 6 days | Discharge: HOME | DRG: 872 | End: 2017-12-24
Attending: EMERGENCY MEDICINE | Admitting: INTERNAL MEDICINE
Payer: COMMERCIAL

## 2017-12-18 ENCOUNTER — APPOINTMENT (EMERGENCY)
Dept: ULTRASOUND IMAGING | Facility: HOSPITAL | Age: 47
DRG: 872 | End: 2017-12-18
Payer: COMMERCIAL

## 2017-12-18 DIAGNOSIS — L03.116 LEFT LEG CELLULITIS: ICD-10-CM

## 2017-12-18 DIAGNOSIS — A41.9 SEPSIS (HCC): Primary | ICD-10-CM

## 2017-12-18 DIAGNOSIS — L03.116 CELLULITIS OF LEFT LOWER EXTREMITY: ICD-10-CM

## 2017-12-18 PROBLEM — E11.9 TYPE 2 DIABETES MELLITUS (HCC): Chronic | Status: ACTIVE | Noted: 2017-12-11

## 2017-12-18 PROBLEM — E78.5 HLD (HYPERLIPIDEMIA): Chronic | Status: ACTIVE | Noted: 2017-12-18

## 2017-12-18 LAB
ALBUMIN SERPL BCP-MCNC: 2 G/DL (ref 3.5–5)
ALP SERPL-CCNC: 114 U/L (ref 46–116)
ALT SERPL W P-5'-P-CCNC: 20 U/L (ref 12–78)
ANION GAP SERPL CALCULATED.3IONS-SCNC: 9 MMOL/L (ref 4–13)
APTT PPP: 44 SECONDS (ref 23–35)
AST SERPL W P-5'-P-CCNC: 23 U/L (ref 5–45)
ATRIAL RATE: 91 BPM
BASOPHILS # BLD AUTO: 0.08 THOUSANDS/ΜL (ref 0–0.1)
BASOPHILS NFR BLD AUTO: 0 % (ref 0–1)
BILIRUB DIRECT SERPL-MCNC: 0.13 MG/DL (ref 0–0.2)
BILIRUB SERPL-MCNC: 0.3 MG/DL (ref 0.2–1)
BUN SERPL-MCNC: 8 MG/DL (ref 5–25)
CALCIUM SERPL-MCNC: 9.4 MG/DL (ref 8.3–10.1)
CHLORIDE SERPL-SCNC: 100 MMOL/L (ref 100–108)
CO2 SERPL-SCNC: 27 MMOL/L (ref 21–32)
CREAT SERPL-MCNC: 0.78 MG/DL (ref 0.6–1.3)
EOSINOPHIL # BLD AUTO: 0.35 THOUSAND/ΜL (ref 0–0.61)
EOSINOPHIL NFR BLD AUTO: 2 % (ref 0–6)
ERYTHROCYTE [DISTWIDTH] IN BLOOD BY AUTOMATED COUNT: 14.1 % (ref 11.6–15.1)
GFR SERPL CREATININE-BSD FRML MDRD: 91 ML/MIN/1.73SQ M
GLUCOSE SERPL-MCNC: 147 MG/DL (ref 65–140)
GLUCOSE SERPL-MCNC: 176 MG/DL (ref 65–140)
GLUCOSE SERPL-MCNC: 194 MG/DL (ref 65–140)
HCT VFR BLD AUTO: 30.6 % (ref 34.8–46.1)
HGB BLD-MCNC: 9.7 G/DL (ref 11.5–15.4)
INR PPP: 0.97 (ref 0.86–1.16)
LACTATE SERPL-SCNC: 1.4 MMOL/L (ref 0.5–2)
LYMPHOCYTES # BLD AUTO: 1.6 THOUSANDS/ΜL (ref 0.6–4.47)
LYMPHOCYTES NFR BLD AUTO: 8 % (ref 14–44)
MCH RBC QN AUTO: 27.9 PG (ref 26.8–34.3)
MCHC RBC AUTO-ENTMCNC: 31.7 G/DL (ref 31.4–37.4)
MCV RBC AUTO: 88 FL (ref 82–98)
MONOCYTES # BLD AUTO: 1.94 THOUSAND/ΜL (ref 0.17–1.22)
MONOCYTES NFR BLD AUTO: 10 % (ref 4–12)
NEUTROPHILS # BLD AUTO: 15.68 THOUSANDS/ΜL (ref 1.85–7.62)
NEUTS SEG NFR BLD AUTO: 80 % (ref 43–75)
NT-PROBNP SERPL-MCNC: 1086 PG/ML
P AXIS: 56 DEGREES
PLATELET # BLD AUTO: 615 THOUSANDS/UL (ref 149–390)
PMV BLD AUTO: 9.8 FL (ref 8.9–12.7)
POTASSIUM SERPL-SCNC: 3.8 MMOL/L (ref 3.5–5.3)
PR INTERVAL: 126 MS
PROT SERPL-MCNC: 8 G/DL (ref 6.4–8.2)
PROTHROMBIN TIME: 13.2 SECONDS (ref 12.1–14.4)
QRS AXIS: 11 DEGREES
QRSD INTERVAL: 82 MS
QT INTERVAL: 350 MS
QTC INTERVAL: 430 MS
RBC # BLD AUTO: 3.48 MILLION/UL (ref 3.81–5.12)
SODIUM SERPL-SCNC: 136 MMOL/L (ref 136–145)
T WAVE AXIS: -6 DEGREES
TROPONIN I SERPL-MCNC: <0.02 NG/ML
VENTRICULAR RATE: 91 BPM
WBC # BLD AUTO: 19.65 THOUSAND/UL (ref 4.31–10.16)

## 2017-12-18 PROCEDURE — 83880 ASSAY OF NATRIURETIC PEPTIDE: CPT | Performed by: EMERGENCY MEDICINE

## 2017-12-18 PROCEDURE — 73701 CT LOWER EXTREMITY W/DYE: CPT

## 2017-12-18 PROCEDURE — 85610 PROTHROMBIN TIME: CPT | Performed by: EMERGENCY MEDICINE

## 2017-12-18 PROCEDURE — 80076 HEPATIC FUNCTION PANEL: CPT | Performed by: EMERGENCY MEDICINE

## 2017-12-18 PROCEDURE — 36415 COLL VENOUS BLD VENIPUNCTURE: CPT | Performed by: EMERGENCY MEDICINE

## 2017-12-18 PROCEDURE — 85730 THROMBOPLASTIN TIME PARTIAL: CPT | Performed by: EMERGENCY MEDICINE

## 2017-12-18 PROCEDURE — 93005 ELECTROCARDIOGRAM TRACING: CPT

## 2017-12-18 PROCEDURE — 82948 REAGENT STRIP/BLOOD GLUCOSE: CPT

## 2017-12-18 PROCEDURE — 96375 TX/PRO/DX INJ NEW DRUG ADDON: CPT

## 2017-12-18 PROCEDURE — 93971 EXTREMITY STUDY: CPT

## 2017-12-18 PROCEDURE — 96365 THER/PROPH/DIAG IV INF INIT: CPT

## 2017-12-18 PROCEDURE — 87040 BLOOD CULTURE FOR BACTERIA: CPT | Performed by: EMERGENCY MEDICINE

## 2017-12-18 PROCEDURE — 80048 BASIC METABOLIC PNL TOTAL CA: CPT | Performed by: EMERGENCY MEDICINE

## 2017-12-18 PROCEDURE — 83605 ASSAY OF LACTIC ACID: CPT | Performed by: EMERGENCY MEDICINE

## 2017-12-18 PROCEDURE — 85025 COMPLETE CBC W/AUTO DIFF WBC: CPT | Performed by: EMERGENCY MEDICINE

## 2017-12-18 PROCEDURE — 96367 TX/PROPH/DG ADDL SEQ IV INF: CPT

## 2017-12-18 PROCEDURE — 93005 ELECTROCARDIOGRAM TRACING: CPT | Performed by: EMERGENCY MEDICINE

## 2017-12-18 PROCEDURE — 84484 ASSAY OF TROPONIN QUANT: CPT | Performed by: EMERGENCY MEDICINE

## 2017-12-18 PROCEDURE — 99285 EMERGENCY DEPT VISIT HI MDM: CPT

## 2017-12-18 RX ORDER — MORPHINE SULFATE 2 MG/ML
2 INJECTION, SOLUTION INTRAMUSCULAR; INTRAVENOUS EVERY 4 HOURS PRN
Status: DISCONTINUED | OUTPATIENT
Start: 2017-12-18 | End: 2017-12-24 | Stop reason: HOSPADM

## 2017-12-18 RX ORDER — INSULIN GLARGINE 100 [IU]/ML
10 INJECTION, SOLUTION SUBCUTANEOUS
Status: DISCONTINUED | OUTPATIENT
Start: 2017-12-18 | End: 2017-12-19

## 2017-12-18 RX ORDER — PRAVASTATIN SODIUM 80 MG/1
80 TABLET ORAL
Status: DISCONTINUED | OUTPATIENT
Start: 2017-12-18 | End: 2017-12-24 | Stop reason: HOSPADM

## 2017-12-18 RX ORDER — MORPHINE SULFATE 10 MG/ML
6 INJECTION, SOLUTION INTRAMUSCULAR; INTRAVENOUS ONCE
Status: COMPLETED | OUTPATIENT
Start: 2017-12-18 | End: 2017-12-18

## 2017-12-18 RX ORDER — ACETAMINOPHEN 325 MG/1
650 TABLET ORAL EVERY 6 HOURS PRN
Status: DISCONTINUED | OUTPATIENT
Start: 2017-12-18 | End: 2017-12-24 | Stop reason: HOSPADM

## 2017-12-18 RX ORDER — OXYCODONE HYDROCHLORIDE 5 MG/1
5 TABLET ORAL EVERY 4 HOURS PRN
Status: DISCONTINUED | OUTPATIENT
Start: 2017-12-18 | End: 2017-12-24 | Stop reason: HOSPADM

## 2017-12-18 RX ORDER — ONDANSETRON 2 MG/ML
4 INJECTION INTRAMUSCULAR; INTRAVENOUS ONCE
Status: COMPLETED | OUTPATIENT
Start: 2017-12-18 | End: 2017-12-18

## 2017-12-18 RX ORDER — ONDANSETRON 2 MG/ML
4 INJECTION INTRAMUSCULAR; INTRAVENOUS EVERY 6 HOURS PRN
Status: DISCONTINUED | OUTPATIENT
Start: 2017-12-18 | End: 2017-12-24 | Stop reason: HOSPADM

## 2017-12-18 RX ADMIN — INSULIN GLARGINE 10 UNITS: 100 INJECTION, SOLUTION SUBCUTANEOUS at 21:38

## 2017-12-18 RX ADMIN — INSULIN LISPRO 1 UNITS: 100 INJECTION, SOLUTION INTRAVENOUS; SUBCUTANEOUS at 21:38

## 2017-12-18 RX ADMIN — MORPHINE SULFATE 6 MG: 10 INJECTION, SOLUTION INTRAMUSCULAR; INTRAVENOUS at 14:30

## 2017-12-18 RX ADMIN — ACETAMINOPHEN 650 MG: 325 TABLET ORAL at 23:49

## 2017-12-18 RX ADMIN — VANCOMYCIN HYDROCHLORIDE 1500 MG: 1 INJECTION, POWDER, LYOPHILIZED, FOR SOLUTION INTRAVENOUS at 14:54

## 2017-12-18 RX ADMIN — IOHEXOL 100 ML: 350 INJECTION, SOLUTION INTRAVENOUS at 16:35

## 2017-12-18 RX ADMIN — LISINOPRIL: 10 TABLET ORAL at 19:23

## 2017-12-18 RX ADMIN — OXYCODONE HYDROCHLORIDE 5 MG: 5 TABLET ORAL at 19:30

## 2017-12-18 RX ADMIN — PRAVASTATIN SODIUM 80 MG: 80 TABLET ORAL at 19:23

## 2017-12-18 RX ADMIN — ONDANSETRON 4 MG: 2 INJECTION INTRAMUSCULAR; INTRAVENOUS at 14:30

## 2017-12-18 RX ADMIN — SODIUM CHLORIDE 3 G: 9 INJECTION, SOLUTION INTRAVENOUS at 13:56

## 2017-12-18 NOTE — ED NOTES
Pt is sleeping comfortably w/ the lights off  Denies any needs or complaints at this time        Hira Martínez RN  12/18/17 3391

## 2017-12-18 NOTE — ED PROVIDER NOTES
History  Chief Complaint   Patient presents with    Leg Swelling     Pt presents w/ cellulitis of the left leg, she was tx in the hospital x5 days with IV abx, was d/c saturday and now returns w/ increased swelling and redness      80-year-old female Re presents for cellulitis of left leg  , was recently discharged from the hospital for the cellulitis was discharged on oral Augmentin for which patient has been taking correctly, patient followed up her PCP today, unfortunately patient's course has worsened  , erythema has significantly returned, patient has large amount of worsening edema  Pain has become severe, greatly worsened with any palpation or movement  Currently an 8/10        History provided by:  Patient  Leg Pain   Location:  Leg  Time since incident:  2 weeks  Injury: no    Leg location:  L leg  Pain details:     Quality:  Aching    Radiates to:  Does not radiate    Severity:  Severe    Onset quality:  Gradual    Duration:  2 weeks    Timing:  Intermittent    Progression:  Worsening  Chronicity:  Recurrent  Prior injury to area:  No  Relieved by:  Rest  Worsened by: Activity and bearing weight  Ineffective treatments: Antibiotics  Associated symptoms: fever    Associated symptoms: no neck pain    Risk factors comment:  Recent hospitalization      Prior to Admission Medications   Prescriptions Last Dose Informant Patient Reported?  Taking?   amoxicillin-clavulanate (AUGMENTIN) 875-125 mg per tablet   No Yes   Sig: Take 1 tablet by mouth every 12 (twelve) hours for 5 days   canagliflozin (INVOKANA) 100 mg  Self Yes Yes   Sig: Take 1 tablet by mouth daily   glipiZIDE (GLUCOTROL) 5 mg tablet  Self Yes Yes   Sig: Take 1 tablet by mouth 2 (two) times a day   lisinopril-hydrochlorothiazide (PRINZIDE,ZESTORETIC) 10-12 5 MG per tablet  Self Yes Yes   Sig: Take 1 tablet by mouth daily   metFORMIN (GLUCOPHAGE) 1000 MG tablet  Self Yes Yes   Sig: Take 500 mg by mouth 2 (two) times a day     simvastatin (ZOCOR) 40 mg tablet  Self Yes Yes   Sig: Take 40 mg by mouth daily        Facility-Administered Medications: None       Past Medical History:   Diagnosis Date    Diabetes mellitus ([address] )     Hyperlipidemia     Hypertension        History reviewed  No pertinent surgical history  Family History   Problem Relation Age of Onset    Heart disease Mother     Diabetes Mother     Cancer Father     Diabetes Sister     Diabetes Maternal Grandfather      I have reviewed and agree with the history as documented  Social History   Substance Use Topics    Smoking status: Never Smoker    Smokeless tobacco: Never Used    Alcohol use Yes      Comment: weekends        Review of Systems   Constitutional: Positive for chills and fever  Negative for activity change and diaphoresis  HENT: Negative for congestion, sinus pressure and sore throat  Eyes: Negative for pain and visual disturbance  Respiratory: Negative for cough, chest tightness, shortness of breath, wheezing and stridor  Cardiovascular: Negative for chest pain and palpitations  Gastrointestinal: Negative for abdominal distention, abdominal pain, constipation, diarrhea, nausea and vomiting  Genitourinary: Negative for dysuria and frequency  Musculoskeletal: Negative for neck pain and neck stiffness  Skin: Negative for rash  Neurological: Negative for dizziness, speech difficulty, light-headedness, numbness and headaches         Physical Exam  ED Triage Vitals   Temperature Pulse Respirations Blood Pressure SpO2   12/18/17 1238 12/18/17 1238 12/18/17 1238 12/18/17 1238 12/18/17 1238   98 °F (36 7 °C) 92 16 163/74 99 %      Temp Source Heart Rate Source Patient Position - Orthostatic VS BP Location FiO2 (%)   12/18/17 1238 12/18/17 1238 12/18/17 1238 12/18/17 1238 --   Oral Monitor Sitting Right arm       Pain Score       12/18/17 1421       8           Orthostatic Vital Signs  Vitals:    12/18/17 1754 12/18/17 1813 12/18/17 2345 12/19/17 0700 BP: 168/76 163/72 149/68 146/68   Pulse: 96 105 (!) 107 95   Patient Position - Orthostatic VS: Sitting Lying Lying Lying       Physical Exam   Constitutional: She is oriented to person, place, and time  She appears well-developed  She appears distressed  HENT:   Head: Normocephalic and atraumatic  Eyes: Pupils are equal, round, and reactive to light  Neck: Normal range of motion  Neck supple  No tracheal deviation present  Cardiovascular: Normal rate, regular rhythm, normal heart sounds and intact distal pulses  No murmur heard  Pulmonary/Chest: Effort normal and breath sounds normal  No stridor  No respiratory distress  Abdominal: Soft  She exhibits no distension  There is no tenderness  There is no rebound and no guarding  Musculoskeletal: Normal range of motion  She exhibits edema  Neurological: She is alert and oriented to person, place, and time  Skin: Skin is warm and dry  She is not diaphoretic  There is erythema  No pallor  Psychiatric: She has a normal mood and affect  Vitals reviewed  ED Medications  Medications   lisinopril-hydrochlorothiazide (PRINZIDE 10/12  5) combo dose ( Oral Given 12/19/17 0809)   pravastatin (PRAVACHOL) tablet 80 mg (80 mg Oral Given 12/18/17 1923)   ondansetron (ZOFRAN) injection 4 mg (not administered)   enoxaparin (LOVENOX) subcutaneous injection 40 mg (40 mg Subcutaneous Given 12/19/17 0914)   acetaminophen (TYLENOL) tablet 650 mg (650 mg Oral Given 12/18/17 2349)   vancomycin (VANCOCIN) 1,500 mg in sodium chloride 0 9 % 250 mL IVPB (1,500 mg Intravenous New Bag 12/19/17 0805)   oxyCODONE (ROXICODONE) IR tablet 5 mg (5 mg Oral Given 12/18/17 1930)   morphine injection 2 mg (not administered)   insulin lispro (HumaLOG) 100 units/mL subcutaneous injection 3 Units (3 Units Subcutaneous Given 12/19/17 0911)   insulin glargine (LANTUS) subcutaneous injection 10 Units (10 Units Subcutaneous Given 12/18/17 2138)   insulin lispro (HumaLOG) 100 units/mL subcutaneous injection 1-5 Units (1 Units Subcutaneous Not Given 12/19/17 0809)   insulin lispro (HumaLOG) 100 units/mL subcutaneous injection 1-5 Units (1 Units Subcutaneous Given 12/18/17 2138)   ampicillin-sulbactam (UNASYN) 3 g in sodium chloride 0 9 % 100 mL IVPB (0 g Intravenous Stopped 12/18/17 1450)   vancomycin (VANCOCIN) 1,500 mg in sodium chloride 0 9 % 250 mL IVPB (0 mg/kg × 104 kg Intravenous Stopped 12/18/17 1624)   ondansetron (ZOFRAN) injection 4 mg (4 mg Intravenous Given 12/18/17 1430)   morphine (PF) 10 mg/mL injection 6 mg (6 mg Intravenous Given 12/18/17 1430)   iohexol (OMNIPAQUE) 350 MG/ML injection (MULTI-DOSE) 100 mL (100 mL Intravenous Given 12/18/17 1635)       Diagnostic Studies  Results Reviewed     Procedure Component Value Units Date/Time    Basic metabolic panel [[id number]]  (Abnormal) Collected:  12/18/17 1419    Lab Status:  Final result Specimen:  Blood from Arm, Left Updated:  12/18/17 1608     Sodium 136 mmol/L      Potassium 3 8 mmol/L      Chloride 100 mmol/L      CO2 27 mmol/L      Anion Gap 9 mmol/L      BUN 8 mg/dL      Creatinine 0 78 mg/dL      Glucose 176 (H) mg/dL      Calcium 9 4 mg/dL      eGFR 91 ml/min/1 73sq m     Narrative:         National Kidney Disease Education Program recommendations are as follows:  GFR calculation is accurate only with a steady state creatinine  Chronic Kidney disease less than 60 ml/min/1 73 sq  meters  Kidney failure less than 15 ml/min/1 73 sq  meters      Hepatic function panel [[id number]]  (Abnormal) Collected:  12/18/17 1419    Lab Status:  Final result Specimen:  Blood from Arm, Left Updated:  12/18/17 1554     Total Bilirubin 0 30 mg/dL      Bilirubin, Direct 0 13 mg/dL      Alkaline Phosphatase 114 U/L      AST 23 U/L      ALT 20 U/L      Total Protein 8 0 g/dL      Albumin 2 0 (L) g/dL     Troponin I [[id number]]  (Normal) Collected:  12/18/17 1419    Lab Status:  Final result Specimen:  Blood from Arm, Left Updated:  12/18/17 3346 Troponin I <0 02 ng/mL     Narrative:         [employer] Chemistry analyzer 99% cutoff is > 0 04 ng/mL in network labs    o cTnI 99% cutoff is useful only when applied to patients in the clinical setting of myocardial ischemia  o cTnI 99% cutoff should be interpreted in the context of clinical history, ECG findings and possibly cardiac imaging to establish correct diagnosis  o cTnI 99% cutoff may be suggestive but clearly not indicative of a coronary event without the clinical setting of myocardial ischemia  B-type natriuretic peptide [[id number]]  (Abnormal) Collected:  12/18/17 1355    Lab Status:  Final result Specimen:  Blood from Arm, Right Updated:  12/18/17 1443     NT-proBNP 1,086 (H) pg/mL     Protime-INR [[id number]]  (Normal) Collected:  12/18/17 1303    Lab Status:  Final result Specimen:  Blood from Arm, Left Updated:  12/18/17 1415     Protime 13 2 seconds      INR 0 97    APTT [[id number]]  (Abnormal) Collected:  12/18/17 1303    Lab Status:  Final result Specimen:  Blood from Arm, Left Updated:  12/18/17 1415     PTT 44 (H) seconds     Narrative: Therapeutic Heparin Range = 60-90 seconds    Blood culture #1 [[id number]] Collected:  12/18/17 1355    Lab Status: In process Specimen:  Blood from Arm, Right Updated:  12/18/17 1401    Lactic acid, plasma [[id number]]  (Normal) Collected:  12/18/17 1304    Lab Status:  Final result Specimen:  Blood from Arm, Left Updated:  12/18/17 1341     LACTIC ACID 1 4 mmol/L     Narrative:         Result may be elevated if tourniquet was used during collection      CBC and differential [[id number]]  (Abnormal) Collected:  12/18/17 1304    Lab Status:  Final result Specimen:  Blood from Arm, Left Updated:  12/18/17 1317     WBC 19 65 (H) Thousand/uL      RBC 3 48 (L) Million/uL      Hemoglobin 9 7 (L) g/dL      Hematocrit 30 6 (L) %      MCV 88 fL      MCH 27 9 pg      MCHC 31 7 g/dL      RDW 14 1 %      MPV 9 8 fL      Platelets 492 (H) Thousands/uL      Neutrophils Relative 80 (H) %      Lymphocytes Relative 8 (L) %      Monocytes Relative 10 %      Eosinophils Relative 2 %      Basophils Relative 0 %      Neutrophils Absolute 15 68 (H) Thousands/µL      Lymphocytes Absolute 1 60 Thousands/µL      Monocytes Absolute 1 94 (H) Thousand/µL      Eosinophils Absolute 0 35 Thousand/µL      Basophils Absolute 0 08 Thousands/µL     Blood culture #2 [[id number]] Collected:  12/18/17 1303    Lab Status: In process Specimen:  Blood from Arm, Left Updated:  12/18/17 1311                 VAS lower limb venous duplex study, unilateral/limited   Final Result by Alexia Albarado MD (12/18 7151)      CT femur left w contrast   Final Result by Celi Caro MD (12/18 4794)   Left lower extremity diffuse subcutaneous edema, especially below the knee with reactive left inguinal lymphadenopathy  Findings are consistent with nonspecific cellulitis without discrete abscess formation or osseous destructive change is    evident to indicate osteomyelitis  Workstation performed: [id number]                    Procedures  Procedures   Physician placed IV:  Multiple attempts by nursing staff for peripheral IV placement unsuccessful  Under ultrasound guidance I placed a 20-gauge IV in the left antecubital fossa  Area of skin was prepped with chlorhexidine prior to IV insertion  Patient tolerated procedure well  No complications  IV flushed without local infiltration or pain  Phone Contacts  ED Phone Contact    ED Course  ED Course                          Initial Sepsis Screening     [address] Name 12/18/17 1706                Is the patient's history suggestive of a new or worsening infection? (!)  Yes (Proceed)  -DA        Suspected source of infection soft tissue  -DA        Are two or more of the following signs & symptoms of infection both present and new to the patient?  (!)  Yes (Proceed)  -DA        Indicate SIRS criteria Tachycardia > 90 bpm;Leukocytosis (WBC > 13984 IJL)  -DA        If the answer is yes to both questions, suspicion of sepsis is present  --        If severe sepsis is present AND tissue hypoperfusion perists in the hour after fluid resuscitation or lactate > 4, the patient meets criteria for SEPTIC SHOCK  --        Are any of the following organ dysfunction criteria present within 6 hours of suspected infection and SIRS criteria that are NOT considered to be chronic conditions? No  -DA        Organ dysfunction  --        Date of presentation of severe sepsis  --        Time of presentation of severe sepsis  --        Tissue hypoperfusion persists in the hour after crystalloid fluid administration, evidenced, by either:  --        Was hypotension present within one hour of the conclusion of crystalloid fluid administration?  --        Date of presentation of septic shock  --        Time of presentation of septic shock  --          User Key  (r) = Recorded By, (t) = Taken By, (c) = Cosigned By    [address] Name Provider Type    VALERIE Wilder, DO Physician                  MDM  Number of Diagnoses or Management Options  Left leg cellulitis: new and requires workup  Sepsis [facility]): new and requires workup  Diagnosis management comments:  51-year-old female recurrent cellulitis, sepsis criteria, leukocytosis, tachycardia, normal lactate no other severe sepsis criteria  As this is so recurrence patient is taking antibiotics correctly will perform CT scan repeat ultrasound of the lower extremity to exclude deep abscess or DVT  Patient recently had a CT of chest abdomen pelvis, so I doubt anything that would be obstructing venous outflow such as an inferior vena cava syndrome  Sole not include abdomen in CT scan  , patient will warrant Re admission to hospital for IV antibiotics and likely repeat infectious disease consultation         Amount and/or Complexity of Data Reviewed  Clinical lab tests: ordered and reviewed  Tests in the radiology section of CPT®: ordered and reviewed  Review and summarize past medical records: yes  Discuss the patient with other providers: yes  Independent visualization of images, tracings, or specimens: yes      The patient presented with a condition in which there was a high probability of imminent or life-threatening deterioration, and critical care services (excluding separately billable procedures) totalled 30-74 minutes  Disposition  Final diagnoses:   Sepsis ([facility] [address] )   Left leg cellulitis     Time reflects when diagnosis was documented in both MDM as applicable and the Disposition within this note     Time User Action Codes Description Comment    12/18/2017  5:07 PM Min MORALES Add [A41 [id number]] Sepsis ([facility] [address] )     12/18/2017  5:07 PM Raven Oneill Add [[id number]] Left leg cellulitis     12/18/2017  6:15 PM Félix Padilla Add [[id number]] Cellulitis of left lower extremity     12/18/2017  6:15 PM Ekaterina Bee, [address] [[id number]] Cellulitis of left lower extremity       ED Disposition     ED Disposition Condition Comment    Admit  Case was discussed with Dr Ekaterina Bee and the patient's admission status was agreed to be Admission Status: inpatient status to the service of Dr Ekaterina Bee   Follow-up Information    None       Current Discharge Medication List      CONTINUE these medications which have NOT CHANGED    Details   amoxicillin-clavulanate (AUGMENTIN) 875-125 mg per tablet Take 1 tablet by mouth every 12 (twelve) hours for 5 days  Qty: 10 tablet, Refills: 0      canagliflozin (INVOKANA) 100 mg Take 1 tablet by mouth daily      glipiZIDE (GLUCOTROL) 5 mg tablet Take 1 tablet by mouth 2 (two) times a day      lisinopril-hydrochlorothiazide (PRINZIDE,ZESTORETIC) 10-12 5 MG per tablet Take 1 tablet by mouth daily      metFORMIN (GLUCOPHAGE) 1000 MG tablet Take 500 mg by mouth 2 (two) times a day        simvastatin (ZOCOR) 40 mg tablet Take 40 mg by mouth daily             No discharge procedures on file      ED Provider  Electronically Signed by           Lucinda Guevara Susan Clark, [facility]  12/19/17 1574

## 2017-12-18 NOTE — ED NOTES
Patient transported to [address] [place] [address]  12/18/17 [facility]

## 2017-12-18 NOTE — H&P
History and Physical - [facility] Internal Medicine    Patient Information: Ras Nair 52 y o  female MRN: [id number]  Unit/Bed#: -01 Encounter: [id number]  Admitting Physician: Luc Gil MD  PCP: Keanu Rojas DO  Date of Admission:  12/18/17     Assessment/plan:  1  Left lower extremity cellulitis - had improved on Vancomycin - symptoms now worsened  Discussed with Dr Tariq Flowers - r/o MRSA  Ct Vancomycin, f/u cultures, monitor temp, WBC  2  Type 2 diabetes - hold oral meds(Canagliflozin, Glipizide, Metformin)  Place on Lantus 10 hs, Humalog 3 TID, SSI  3  Hypertension - ct home meds - Lisinopril/HCTZ (10/12 5 mg) daily  4  Hyperlipidemia - ct statins  5  Leucocytosis - due to #1  Monitor    VTE Prophylaxis: Enoxaparin (Lovenox)  / sequential compression device   Code Status: Full code  POLST: There is no POLST form on file for this patient (pre-hospital)    Anticipated Length of Stay:  Patient will be admitted on an Inpatient basis with an anticipated length of stay of  Greater than 2 midnights  Justification for Hospital Stay: left lower extremity cellulitis    Total Time for Visit, including Counseling / Coordination of Care: 45 minutes  Greater than 50% of this total time spent on direct patient counseling and coordination of care  Chief Complaint:     Left leg swelling and redness    History of Present Illness:    Ras Nair is a 52 y o  female who presents with left leg swelling and redness  She was admitted from 12/11/17 to 12/15/17 with left lower extremity cellulitis and improved clinically with Vancomycin  She was discharged on Augmentin (875/125) mg twice daily  She had left leg swelling at the time of discharge with minimal erythema  Since 1 day she noted increased erythema and tenderness over her left leg with diffuse tender spots over her left thigh  She has fever and chills since last night  Temperature was not recorded at home   She was seen at her primary care physician's office today who advised her to come to the ED  CT of the left lower extremity in the ED showed no abscess  Lower extremity venous doppler study repeated showed no DVT  1 soft stool this morning  No nausea or vomiting  Review of Systems:    Review of Systems   Constitutional: Positive for chills and fever  Cardiovascular: Positive for leg swelling  Musculoskeletal:        Left lower extremity pain   Skin: Positive for color change  Past Medical and Surgical History:     Past Medical History:   Diagnosis Date    Diabetes mellitus ([address] )     Hyperlipidemia     Hypertension        History reviewed  No pertinent surgical history  Meds/Allergies:    all medications and allergies reviewed    Allergies: No Known Allergies  History:     Marital Status: /Civil Union   Substance Use History:   History   Alcohol Use    Yes     Comment: weekends     History   Smoking Status    Never Smoker   Smokeless Tobacco    Never Used     History   Drug Use No       Family History:    Family History   Problem Relation Age of Onset    Heart disease Mother     Diabetes Mother     Cancer Father     Diabetes Sister     Diabetes Maternal Grandfather        Physical Exam:     Vitals:   Blood Pressure: 163/72 (12/18/17 1813)  Pulse: 105 (12/18/17 1813)  Temperature: 99 2 °F (37 3 °C) (12/18/17 1813)  Temp Source: Oral (12/18/17 1813)  Respirations: 18 (12/18/17 1813)  Height: 5' 7" (170 2 cm) (12/18/17 1813)  Weight - Scale: 104 kg (228 lb 2 8 oz) (12/18/17 1813)  SpO2: 100 % (12/18/17 1813)    Physical Exam   Constitutional: She is oriented to person, place, and time  HENT:   Head: Atraumatic  Eyes: EOM are normal    Neck: Neck supple  No JVD present  No tracheal deviation present  No thyromegaly present  Cardiovascular: Normal rate, regular rhythm and normal heart sounds  Pulmonary/Chest: Effort normal and breath sounds normal  No respiratory distress  She has no wheezes  She has no rales     Abdominal: Soft  Bowel sounds are normal  She exhibits no distension  There is no tenderness  There is no rebound  Musculoskeletal:   Left leg 2-3+ edema   Neurological: She is alert and oriented to person, place, and time  Skin:   Left leg diffuse erythema/tenderness  Left thigh/groin - tender lymphadenopathy   Psychiatric: She has a normal mood and affect  Lab Results: I have personally reviewed pertinent reports  Results from last 7 days  Lab Units 12/18/17  1304   WBC Thousand/uL 19 65*   HEMOGLOBIN g/dL 9 7*   HEMATOCRIT % 30 6*   PLATELETS Thousands/uL 615*   NEUTROS PCT % 80*   LYMPHS PCT % 8*   MONOS PCT % 10   EOS PCT % 2       Results from last 7 days  Lab Units 12/18/17  1419   SODIUM mmol/L 136   POTASSIUM mmol/L 3 8   CHLORIDE mmol/L 100   CO2 mmol/L 27   BUN mg/dL 8   CREATININE mg/dL 0 78   CALCIUM mg/dL 9 4   TOTAL PROTEIN g/dL 8 0   BILIRUBIN TOTAL mg/dL 0 30   ALK PHOS U/L 114   ALT U/L 20   AST U/L 23   GLUCOSE RANDOM mg/dL 176*       Results from last 7 days  Lab Units 12/18/17  1303   INR  0 97       Imaging: I have personally reviewed pertinent reports  X-ray Chest 2 Views    Result Date: 12/9/2017  Narrative: CHEST INDICATION:  Shortness of breath and congestion for 2 weeks  Fever  COMPARISON:  None VIEWS:  Frontal and lateral projections IMAGES:  2 FINDINGS:     Cardiomediastinal silhouette appears unremarkable  The lungs are clear  No pneumothorax or pleural effusion  Visualized osseous structures appear within normal limits for the patient's age  Impression: No active pulmonary disease  Workstation performed: [id number]     Ct Femur Left W Contrast    Result Date: 12/18/2017  Narrative: CT left femur with IV contrast INDICATION: Recurrent cellulitis  COMPARISON: None  TECHNIQUE: CT examination of the left femur was performed    This examination, like all CT scans performed in the [facility], was performed utilizing techniques to minimize radiation dose exposure, including the use of iterative reconstruction and automated exposure control software  Sagittal and coronal two dimensional reconstructed images were also submitted for interpretation  100 ml of Omnipaque 350 was injected intravenously  Rad dose  707 mGy-cm FINDINGS: OSSEOUS STRUCTURES:  No fracture, dislocation or destructive osseous lesion  VISUALIZED MUSCULATURE:  Unremarkable  SOFT TISSUES:  Left lower extremity demonstrates diffuse subcutaneous edema especially below the knee consistent with nonspecific cellulitis  No discrete abscess collection identified  No radha osseous destructive changes evident to indicate osteomyelitis at this time  Left inguinal reactive lymphadenopathy noted  OTHER PERTINENT FINDINGS:  None  Impression: Left lower extremity diffuse subcutaneous edema, especially below the knee with reactive left inguinal lymphadenopathy  Findings are consistent with nonspecific cellulitis without discrete abscess formation or osseous destructive change is evident to indicate osteomyelitis  Workstation performed: [id number]     Pe Study With Ct Abdomen And Pelvis With Contrast    Result Date: 12/9/2017  Narrative: CT PULMONARY ANGIOGRAM OF THE CHEST AND CT ABDOMEN AND PELVIS WITH INTRAVENOUS CONTRAST INDICATION:  Chest pain, cough, fever and flank tenderness  COMPARISON: CT abdomen pelvis November 29, 2014  TECHNIQUE:  CT examination of the chest, abdomen and pelvis was performed  Thin section CT angiographic technique was used in the chest in order to evaluate for pulmonary embolus and coronal 3D MIP postprocessing was performed on the acquisition scanner  Reformatted images were created in axial, sagittal, and coronal planes  Radiation dose length product (DLP) for this visit:  1741 34 mGy-cm     This examination, like all CT scans performed in the [facility], was performed utilizing techniques to minimize radiation dose exposure, including the use of iterative reconstruction and automated exposure control  IV Contrast:  100 mL of iohexol (OMNIPAQUE)         Enteric Contrast:  Enteric contrast was not administered  FINDINGS: CHEST PULMONARY ARTERIAL TREE:  No emboli are seen in the 1st and 2nd order branches  Tertiary branches are obscured by respiratory motion  LUNGS:  The lungs are well aerated  There is a 5 mm noncalcified nodule posterior lateral aspect right lower lobe (series 4, image 30 and series 604, image 104)  There is a 5 mm nodule in the posterior medial aspect of the right lower lobe (series 4, image 37 and series 604, image 126)  This has not significantly changed from the prior study  No focal pneumonia  PLEURA:  Unremarkable  HEART/AORTA:  The heart is mildly enlarged  Coronary artery calcifications  No evidence of a pericardial effusion  MEDIASTINUM AND UKRT:  Unremarkable  CHEST WALL AND LOWER NECK:       Normal  ABDOMEN LIVER/BILIARY TREE:  Mild fatty infiltrative changes  GALLBLADDER:  No calcified gallstones  No pericholecystic inflammatory change  SPLEEN:  Unremarkable  PANCREAS:  Unremarkable  ADRENAL GLANDS: Unremarkable  KIDNEYS/URETERS:  Mild caliectasis  Kidneys otherwise normal  STOMACH AND BOWEL:  Evaluation limited due to lack of oral contrast material   Hiatal hernia  No obstruction  Mild amount of feces throughout the colon, compatible with constipation  Scattered diverticula in the sigmoid colon  Nothing to suggest acute diverticulitis  APPENDIX:  No findings to suggest appendicitis  ABDOMINOPELVIC CAVITY:  No ascites or free intraperitoneal air  No lymphadenopathy  VESSELS:  Unremarkable for patient's age  PELVIS REPRODUCTIVE ORGANS:  Unremarkable for patient's age  URINARY BLADDER:  Incompletely distended  Circumferential bladder wall thickening  ABDOMINAL WALL/INGUINAL REGIONS:  Enlarged bilateral inguinal lymph nodes  Mild hyperemia of the soft tissues surrounding the left inguinal lymph nodes   OSSEOUS STRUCTURES: No acute fracture or destructive osseous lesion  Degenerative changes throughout the thoracolumbar spine  Impression: 1  No CT evidence of pulmonary embolism within the 1st and 2nd order branches  Tertiary branches are obscured by respiratory motion  2   5 mm noncalcified lower lobe pulmonary nodules as described above  Inferomedial nodule unchanged from prior study  Based on current Fleischner Society 2017 Guidelines on incidental pulmonary nodule, no routine follow-up is needed if the patient is considered low risk for lung cancer  If the patient is considered high risk for lung cancer, 12 month follow-up non-contrast chest CT is recommended  3   Mild abnormal appearance of the urinary bladder  Although the findings may be related to underdistention, mild cystitis not excluded  4   Enlarged bilateral inguinal lymph nodes with mild hyperemia surrounding the left inguinal nodes  Correlate for lymphadenitis  Findings are consistent with the preliminary report from [facility] which was provided shortly after completion of the exam              Workstation performed: [id number]     Vas Lower Limb Venous Duplex Study, Unilateral/limited    Result Date: 12/18/2017  Narrative:  [facility] REPORT CLINICAL: Indications: Left Limb Pain [M79 609]  The patient is being treated for left leg cellulitis  She states the pain and swellin has increased  Operative History: denies Risk Factors: The patient has history of obesity  She has no history of DVT or limb trauma  Clinical:Left Lower Limb There is complaint of pain, edema, tenderness and inflammation  FINDINGS:  Segment  Right            Left              Impression       Impression       CFV      Normal (Patent)  Normal (Patent)     CONCLUSION: Impression: RIGHT LOWER LIMB LIMITED: NORMAL Evaluation shows no evidence of thrombus in the common femoral vein  Doppler evaluation shows a normal response to augmentation maneuvers    LEFT LOWER LIMB: NORMAL No evidence of acute or chronic deep vein thrombosis No evidence of superficial thrombophlebitis noted  Doppler evaluation shows a normal response to augmentation maneuvers  Popliteal, posterior tibial and anterior tibial arterial Doppler waveforms are triphasic  Tech Note: There is an echogenic structure located in the inguinal region and is consistent with enlarged lymph node and channels  Technical findings were given to Dr Delfin Caicedo at time of exam   SIGNATURE: Electronically Signed by: Joel Khan MD on 2017-12-18 05:57:23 PM    Vas Lower Limb Venous Duplex Study, Unilateral/limited    Result Date: 12/11/2017  Narrative:  [facility] REPORT CLINICAL: Indications: Left Limb Pain [M79 609]  The patient has left leg swelling, warmth and redness since last night  Operative History: denies Risk Factors: The patient has no history of DVT, limb trauma or malignancy  Clinical:Left Lower Limb There is complaint of pain, edema, tenderness and inflammation  FINDINGS:  Segment  Right            Left              Impression       Impression       CFV      Normal (Patent)  Normal (Patent)     CONCLUSION: Impression: RIGHT LOWER LIMB LIMITED: NORMAL Evaluation shows no evidence of thrombus in the common femoral vein  Doppler evaluation shows a normal response to augmentation maneuvers  LEFT LOWER LIMB: NORMAL No evidence of acute or chronic deep vein thrombosis No evidence of superficial thrombophlebitis noted  Doppler evaluation shows a normal response to augmentation maneuvers  Popliteal, posterior tibial and anterior tibial arterial Doppler waveforms are biphasic  Technical findings were given to Dr Argelia Gunter at time of exam   SIGNATURE: Electronically Signed by: Torin Rodríguez MD, [address] on 2017-12-11 03:31:46 PM    [employer] Records Reviewed: Yes     ** Please Note: Dragon 360 Dictation voice to text software may have been used in the creation of this document   **

## 2017-12-19 PROBLEM — D72.829 LEUKOCYTOSIS: Status: ACTIVE | Noted: 2017-12-19

## 2017-12-19 PROBLEM — E87.6 HYPOKALEMIA: Status: ACTIVE | Noted: 2017-12-19

## 2017-12-19 PROBLEM — D64.9 ANEMIA: Status: ACTIVE | Noted: 2017-12-19

## 2017-12-19 LAB
ANION GAP SERPL CALCULATED.3IONS-SCNC: 9 MMOL/L (ref 4–13)
BACTERIA UR QL AUTO: ABNORMAL /HPF
BILIRUB UR QL STRIP: NEGATIVE
BUN SERPL-MCNC: 8 MG/DL (ref 5–25)
CALCIUM SERPL-MCNC: 8.3 MG/DL (ref 8.3–10.1)
CHLORIDE SERPL-SCNC: 102 MMOL/L (ref 100–108)
CLARITY UR: CLEAR
CO2 SERPL-SCNC: 25 MMOL/L (ref 21–32)
COLOR UR: YELLOW
CREAT SERPL-MCNC: 0.84 MG/DL (ref 0.6–1.3)
ERYTHROCYTE [DISTWIDTH] IN BLOOD BY AUTOMATED COUNT: 14 % (ref 11.6–15.1)
GFR SERPL CREATININE-BSD FRML MDRD: 83 ML/MIN/1.73SQ M
GLUCOSE SERPL-MCNC: 104 MG/DL (ref 65–140)
GLUCOSE SERPL-MCNC: 109 MG/DL (ref 65–140)
GLUCOSE SERPL-MCNC: 114 MG/DL (ref 65–140)
GLUCOSE SERPL-MCNC: 131 MG/DL (ref 65–140)
GLUCOSE SERPL-MCNC: 177 MG/DL (ref 65–140)
GLUCOSE UR STRIP-MCNC: NEGATIVE MG/DL
HCT VFR BLD AUTO: 27.4 % (ref 34.8–46.1)
HGB BLD-MCNC: 8.3 G/DL (ref 11.5–15.4)
HGB UR QL STRIP.AUTO: ABNORMAL
KETONES UR STRIP-MCNC: NEGATIVE MG/DL
LEUKOCYTE ESTERASE UR QL STRIP: NEGATIVE
MCH RBC QN AUTO: 26.9 PG (ref 26.8–34.3)
MCHC RBC AUTO-ENTMCNC: 30.3 G/DL (ref 31.4–37.4)
MCV RBC AUTO: 89 FL (ref 82–98)
NITRITE UR QL STRIP: NEGATIVE
NON-SQ EPI CELLS URNS QL MICRO: ABNORMAL /HPF
PH UR STRIP.AUTO: 5 [PH] (ref 4.5–8)
PLATELET # BLD AUTO: 553 THOUSANDS/UL (ref 149–390)
PMV BLD AUTO: 9 FL (ref 8.9–12.7)
POTASSIUM SERPL-SCNC: 3.3 MMOL/L (ref 3.5–5.3)
PROT UR STRIP-MCNC: NEGATIVE MG/DL
RBC # BLD AUTO: 3.09 MILLION/UL (ref 3.81–5.12)
RBC #/AREA URNS AUTO: ABNORMAL /HPF
SODIUM SERPL-SCNC: 136 MMOL/L (ref 136–145)
SP GR UR STRIP.AUTO: 1.01 (ref 1–1.03)
UROBILINOGEN UR QL STRIP.AUTO: 0.2 E.U./DL
WBC # BLD AUTO: 17.93 THOUSAND/UL (ref 4.31–10.16)
WBC #/AREA URNS AUTO: ABNORMAL /HPF

## 2017-12-19 PROCEDURE — 80048 BASIC METABOLIC PNL TOTAL CA: CPT | Performed by: INTERNAL MEDICINE

## 2017-12-19 PROCEDURE — 81001 URINALYSIS AUTO W/SCOPE: CPT | Performed by: INTERNAL MEDICINE

## 2017-12-19 PROCEDURE — 82948 REAGENT STRIP/BLOOD GLUCOSE: CPT

## 2017-12-19 PROCEDURE — 85027 COMPLETE CBC AUTOMATED: CPT | Performed by: INTERNAL MEDICINE

## 2017-12-19 RX ORDER — POTASSIUM CHLORIDE 20 MEQ/1
40 TABLET, EXTENDED RELEASE ORAL ONCE
Status: COMPLETED | OUTPATIENT
Start: 2017-12-19 | End: 2017-12-19

## 2017-12-19 RX ORDER — INSULIN GLARGINE 100 [IU]/ML
12 INJECTION, SOLUTION SUBCUTANEOUS
Status: DISCONTINUED | OUTPATIENT
Start: 2017-12-19 | End: 2017-12-24 | Stop reason: HOSPADM

## 2017-12-19 RX ADMIN — INSULIN LISPRO 3 UNITS: 100 INJECTION, SOLUTION INTRAVENOUS; SUBCUTANEOUS at 17:10

## 2017-12-19 RX ADMIN — INSULIN LISPRO 1 UNITS: 100 INJECTION, SOLUTION INTRAVENOUS; SUBCUTANEOUS at 12:19

## 2017-12-19 RX ADMIN — OXYCODONE HYDROCHLORIDE 5 MG: 5 TABLET ORAL at 20:03

## 2017-12-19 RX ADMIN — OXYCODONE HYDROCHLORIDE 5 MG: 5 TABLET ORAL at 11:07

## 2017-12-19 RX ADMIN — INSULIN LISPRO 3 UNITS: 100 INJECTION, SOLUTION INTRAVENOUS; SUBCUTANEOUS at 09:11

## 2017-12-19 RX ADMIN — POTASSIUM CHLORIDE 40 MEQ: 1500 TABLET, EXTENDED RELEASE ORAL at 15:48

## 2017-12-19 RX ADMIN — INSULIN GLARGINE 12 UNITS: 100 INJECTION, SOLUTION SUBCUTANEOUS at 22:13

## 2017-12-19 RX ADMIN — ENOXAPARIN SODIUM 40 MG: 40 INJECTION SUBCUTANEOUS at 09:14

## 2017-12-19 RX ADMIN — VANCOMYCIN HYDROCHLORIDE 1500 MG: 1 INJECTION, POWDER, LYOPHILIZED, FOR SOLUTION INTRAVENOUS at 08:05

## 2017-12-19 RX ADMIN — INSULIN LISPRO 3 UNITS: 100 INJECTION, SOLUTION INTRAVENOUS; SUBCUTANEOUS at 12:19

## 2017-12-19 RX ADMIN — LISINOPRIL: 10 TABLET ORAL at 08:09

## 2017-12-19 RX ADMIN — VANCOMYCIN HYDROCHLORIDE 1500 MG: 1 INJECTION, POWDER, LYOPHILIZED, FOR SOLUTION INTRAVENOUS at 20:03

## 2017-12-19 RX ADMIN — PRAVASTATIN SODIUM 80 MG: 80 TABLET ORAL at 17:10

## 2017-12-19 NOTE — ASSESSMENT & PLAN NOTE
Appears to have chronic anemia by reviewing the records hemoglobin is down to 8 2    Obtain an studies and also vitamin B12 and folate

## 2017-12-19 NOTE — CASE MANAGEMENT
Initial Clinical Review    Admission: Date/Time/Statement: 12/18/17 @ 1713     Orders Placed This Encounter   Procedures    Inpatient Admission (expected length of stay for this patient is greater than two midnights)     Standing Status:   Standing     Number of Occurrences:   1     Order Specific Question:   Admitting Physician     Answer:   Maddie Garber [[id number]]     Order Specific Question:   Level of Care     Answer:   Med Surg [[id number]]     Order Specific Question:   Estimated length of stay     Answer:   More than 2 Midnights     Order Specific Question:   Certification     Answer:   I certify that inpatient services are medically necessary for this patient for a duration of greater than two midnights  See H&P and MD Progress Notes for additional information about the patient's course of treatment  ED: Date/Time/Mode of Arrival:   ED Arrival Information     Expected Arrival Acuity Means of Arrival Escorted By Service Admission Type    - 12/18/2017 12:28 Urgent Walk-In Self General Medicine Urgent    Arrival Complaint    leg swelling          Chief Complaint:   Chief Complaint   Patient presents with    Leg Swelling     Pt presents w/ cellulitis of the left leg, she was tx in the hospital x5 days with IV abx, was d/c saturday and now returns w/ increased swelling and redness       History of Illness: 52 y o  female who presents with left leg swelling and redness  She was admitted from 12/11/17 to 12/15/17 with left lower extremity cellulitis and improved clinically with Vancomycin  She was discharged on Augmentin (875/125) mg twice daily  She had left leg swelling at the time of discharge with minimal erythema  Since 1 day she noted increased erythema and tenderness over her left leg with diffuse tender spots over her left thigh  She has fever and chills since last night  Temperature was not recorded at home  She was seen at her primary care physician's office today who advised her to come to the ED   CT of the left lower extremity in the ED showed no abscess  Lower extremity venous doppler study repeated showed no DVT  1 soft stool this morning  No nausea or vomiting  ED Vital Signs:   ED Triage Vitals   Temperature Pulse Respirations Blood Pressure SpO2   12/18/17 1238 12/18/17 1238 12/18/17 1238 12/18/17 1238 12/18/17 1238   98 °F (36 7 °C) 92 16 163/74 99 %      Temp Source Heart Rate Source Patient Position - Orthostatic VS BP Location FiO2 (%)   12/18/17 1238 12/18/17 1238 12/18/17 1238 12/18/17 1238 --   Oral Monitor Sitting Right arm       Pain Score       12/18/17 1421       8        Wt Readings from Last 1 Encounters:   12/18/17 104 kg (228 lb 2 8 oz)       Vital Signs (abnormal):  Maximum pulse 107  Maximum temperature 100 5  Exam - distressed  Left leg +2- +3 Edema; Erythema  Left thigh/groin tender lymphadenopathy  Abnormal Labs/Diagnostic Test Results:   glucose 176  Albumin 2  NT-proBNP 1086  Wbc 19 65  hgb 9 7, hct 30 6  Ct left femur - Left lower extremity diffuse subcutaneous edema, especially below the knee with reactive left inguinal lymphadenopathy   Findings are consistent with nonspecific cellulitis without discrete abscess formation or osseous destructive change is   evident to indicate osteomyelitis  ED Treatment: blood cultures     Medication Administration from 12/18/2017 1205 to 12/18/2017 1817       Date/Time Order Dose Route Action Action by Comments     12/18/2017 1450 ampicillin-sulbactam (UNASYN) 3 g in sodium chloride 0 9 % 100 mL IVPB 0 g Intravenous Stopped Beatrice Mitchell RN      12/18/2017 1356 ampicillin-sulbactam (UNASYN) 3 g in sodium chloride 0 9 % 100 mL IVPB 3 g Intravenous New Bag Beatrice Mitchell RN      12/18/2017 1624 vancomycin (VANCOCIN) 1,500 mg in sodium chloride 0 9 % 250 mL IVPB 0 mg/kg Intravenous Stopped Beatrice Mitchell RN      12/18/2017 1454 vancomycin (VANCOCIN) 1,500 mg in sodium chloride 0 9 % 250 mL IVPB 1,500 mg Intravenous New Bag Tamar Dave, RN      12/18/2017 1356 vancomycin (VANCOCIN) 1,500 mg in sodium chloride 0 9 % 250 mL IVPB 0 mg Intravenous Hold Tamar Dave RN first abx infusing     12/18/2017 1430 ondansetron (ZOFRAN) injection 4 mg 4 mg Intravenous Given Tamar Dave, RN      12/18/2017 1430 morphine (PF) 10 mg/mL injection 6 mg 6 mg Intravenous Given Tamar Dave, RN      12/18/2017 1635 iohexol (OMNIPAQUE) 350 MG/ML injection (MULTI-DOSE) 100 mL 100 mL Intravenous Given Julia Cirilo           Past Medical/Surgical History: Active Ambulatory Problems     Diagnosis Date Noted    Cellulitis of left lower extremity 12/11/2017    Hypertension 12/11/2017    Type 2 diabetes mellitus ([facility] ) 12/11/2017     Resolved Ambulatory Problems     Diagnosis Date Noted    Hyponatremia 12/11/2017     Past Medical History:   Diagnosis Date    Diabetes mellitus ([facility] )     Hyperlipidemia     Hypertension        Admitting Diagnosis: Leg swelling [M79 89]  Left leg cellulitis [L03 116]  Cellulitis of left lower extremity [L03 116]  Sepsis ([facility] [address] ) [A41 9]    Age/Sex: 52 y o  female    Assessment/Plan: Left lower extremity cellulitis - had improved on Vancomycin - symptoms now worsened  Discussed with Dr Maya Kaiser - r/o MRSA  Ct Vancomycin, f/u cultures, monitor temp, WBC  2  Type 2 diabetes - hold oral meds(Canagliflozin, Glipizide, Metformin)  Place on Lantus 10 hs, Humalog 3 TID, SSI  3  Hypertension - ct home meds - Lisinopril/HCTZ (10/12 5 mg) daily  4  Hyperlipidemia - ct statins  5  Leucocytosis - due to #1  Monitor    Admission Orders:  Scheduled Meds:   enoxaparin 40 mg Subcutaneous Daily   insulin glargine 10 Units Subcutaneous HS   insulin lispro 1-5 Units Subcutaneous TID AC   insulin lispro 1-5 Units Subcutaneous HS   insulin lispro 3 Units Subcutaneous TID With Meals   lisinopril-hydrochlorothiazide (PRINZIDE 10/12  5) combo dose  Oral Daily   pravastatin 80 mg Oral Daily With Dinner   vancomycin 15 mg/kg Intravenous Q12H     Continuous Infusions:    PRN Meds:   acetaminophen    morphine injection    ondansetron    oxyCODONE     OTHER ORDERS:      [address] [facility] in the [facility] [place] [place] by [address] for 2017  Network Utilization Review Department  Phone: [phone]; Fax [phone]  ATTENTION: The Network Utilization Review Department is now centralized for our 7 Facilities  Make a note that we have a new phone and fax numbers for our Department  Please call with any questions or concerns to [phone] and carefully follow the prompts so that you are directed to the right person  All voicemails are confidential  Fax any determinations, approvals, denials, and requests for initial or continue stay review clinical to [phone]  Due to HIGH CALL volume, it would be easier if you could please send faxed requests to expedite your requests and in part, help us provide discharge notifications faster

## 2017-12-19 NOTE — ASSESSMENT & PLAN NOTE
Patient is currently on IV vancomycin with improvement in the rash and also pain and swelling      Infectious Disease on board antibiotics per Infectious Disease

## 2017-12-19 NOTE — ASSESSMENT & PLAN NOTE
Likely secondary to the infectious process    Monitor, appears to be improving from yesterday, recheck in the morning

## 2017-12-19 NOTE — CASE MANAGEMENT
Continued Stay Review    Date:  12/19/2017     Vital Signs: /68   Pulse 95   Temp 99 4 °F (37 4 °C) (Oral)   Resp 18   Ht 5' 7" (1 702 m)   Wt 104 kg (228 lb 2 8 oz)   LMP 12/07/2017   SpO2 97%   BMI 35 74 kg/m²     Medications:   Scheduled Meds:   enoxaparin 40 mg Subcutaneous Daily   insulin glargine 10 Units Subcutaneous HS   insulin lispro 1-5 Units Subcutaneous TID AC   insulin lispro 1-5 Units Subcutaneous HS   insulin lispro 3 Units Subcutaneous TID With Meals   lisinopril-hydrochlorothiazide (PRINZIDE 10/12  5) combo dose  Oral Daily   pravastatin 80 mg Oral Daily With Dinner   vancomycin 15 mg/kg Intravenous Q12H     Continuous Infusions:    PRN Meds:   Acetaminophen - used x 1    morphine injection    ondansetron    oxyCODONE - used x 2  Abnormal Labs/Diagnostic Results:   Serial glucose 109; 177  K 3 3  Wbc 17 93, hgb 8 3, hct 27 4    Age/Sex: 52 y o  female     Assessment/Plan:  Per ID- Sepsis, POA  Tachycardia, leukocytosis  Likely secondary to #2  Patient started on IV vancomycin on admission  Leukocytosis already trending down  Patient had a low-grade fever of 100 5, but no documented high-grade fevers  All previous blood cultures have been negative  She is hemodynamically stable and nontoxic appearing      -antibiotics as below  -monitor temperatures and white blood cell count closely  -monitor hemodynamics  -follow-up admission blood cultures  -supportive care per primary team     2  Severe cellulitis of left lower extremity  Refractory to oral antibiotic treatment  The erythema did markedly improve during last hospitalization while the patient was on IV vancomycin  She still had some swelling on the date of discharge, but this was felt to have been worsened due to the IV fluids she had been receiving  Currently, the erythema and edema does clinically appear better than when she first presented on last admission  CT negative for deeper infection or abscess  Patient denies any exposure history that would make me concerned for an atypical cause of the cellulitis  She is not immunocompromised  The fact that there was good clinical response to IV vancomycin is reassuring      -continue with IV vancomycin   -check vancomycin trough prior to 4th dose  -close serial leg exams     3  Left inguinal lymphangitis  Likely the cause of her left thigh pain on examination and is related to #2  There is no erythema or significant edema of the left thigh itself      -antibiotics as above  -monitor left thigh pain     4  Diabetes type 2, with hyperglycemia  Glucose is likely elevated in the setting of acute infection  -recommend good blood glucose control  -management per primary team     5   Urinary frequency and mild dysuria  This has developed since hospital admission  No pelvic or flank pain  May be related to IV fluids   -check urinalysis with reflex to culture    Discharge Plan: To be determined  [address] [place] [address] in the [employer] by [facility]el[facility] for 2017  Network Utilization Review Department  Phone: [phone]; Fax [phone]  ATTENTION: The Network Utilization Review Department is now centralized for our 7 Facilities  Make a note that we have a new phone and fax numbers for our Department  Please call with any questions or concerns to [phone] and carefully follow the prompts so that you are directed to the right person  All voicemails are confidential  Fax any determinations, approvals, denials, and requests for initial or continue stay review clinical to [phone]  Due to HIGH CALL volume, it would be easier if you could please send faxed requests to expedite your requests and in part, help us provide discharge notifications faster

## 2017-12-19 NOTE — CONSULTS
Consultation - Infectious Disease   Monie Quintero 52 y o  female MRN: [id number]  Unit/Bed#: -01 Encounter: [id number]      IMPRESSION & RECOMMENDATIONS:   Impression/Recommendations:  1  Sepsis, POA  Tachycardia, leukocytosis  Likely secondary to #2  Patient started on IV vancomycin on admission  Leukocytosis already trending down  Patient had a low-grade fever of 100 5, but no documented high-grade fevers  All previous blood cultures have been negative  She is hemodynamically stable and nontoxic appearing     -antibiotics as below  -monitor temperatures and white blood cell count closely  -monitor hemodynamics  -follow-up admission blood cultures  -supportive care per primary team    2  Severe cellulitis of left lower extremity  Refractory to oral antibiotic treatment  The erythema did markedly improve during last hospitalization while the patient was on IV vancomycin  She still had some swelling on the date of discharge, but this was felt to have been worsened due to the IV fluids she had been receiving  Currently, the erythema and edema does clinically appear better than when she first presented on last admission  CT negative for deeper infection or abscess  Patient denies any exposure history that would make me concerned for an atypical cause of the cellulitis  She is not immunocompromised  The fact that there was good clinical response to IV vancomycin is reassuring     -continue with IV vancomycin   -check vancomycin trough prior to 4th dose  -close serial leg exams    3  Left inguinal lymphangitis  Likely the cause of her left thigh pain on examination and is related to #2  There is no erythema or significant edema of the left thigh itself     -antibiotics as above  -monitor left thigh pain    4  Diabetes type 2, with hyperglycemia  Glucose is likely elevated in the setting of acute infection  -recommend good blood glucose control  -management per primary team    5    Urinary frequency and mild dysuria  This has developed since hospital admission  No pelvic or flank pain  May be related to IV fluids   -check urinalysis with reflex to culture    Antibiotics:  Vancomycin D2    Spoke with patient in detail regarding plan of care  Sp[facility] with Dr Lexie Cameron from [facility] regarding plan of care  Thank you for this consultation  We will follow along with you  HISTORY OF PRESENT ILLNESS:  Reason for Consult:  Cellulitis    HPI: Ras Nair is a 52 y o  female with history of diabetes, recent admission on 12/11 to 12/15 for left lower extremity cellulitis that had improved on IV vancomycin  Patient was discharged on oral Augmentin as there was no signs to suggest purulent cellulitis  Patient took the oral Augmentin as prescribed and followed up with her PCP on 12/18 at which time there was concern for worsening redness and swelling of her left lower extremity, so she was referred back to the ER  On last admission, patient was maintained on IV vancomycin as her cellulitis had developed while she had been on low-dose Keflex as outpatient  The redness and swelling had improved during hospitalization  Her fevers had improved as well  On the night of 12/17, patient states she started feeling feverish again at home  She does not have a thermometer at home so did not take her temperature  In the ER, patient had a CT of the left lower extremity which was negative for deeper infection or abscess formation  Lower extremity Doppler was negative for DVT  She was admitted and restarted on IV vancomycin  She had a low grade fever of 100 5 last evening  She denies shortness of breath, cough, nausea, vomiting, diarrhea  She does feel some urinary frequency, but no burning she since she was admitted  No abdominal pain  No drainage from the leg  No trauma to the leg  She does have dogs and a cat at home but cannot recall a bite or scratch    Denies any water exposure such as hot tubs, may also wants, salt water or freshwater exposure  No tick or bug bites  REVIEW OF SYSTEMS:  A complete system-based review of systems is otherwise negative  PAST MEDICAL HISTORY:  Past Medical History:   Diagnosis Date    Diabetes mellitus ([address] )     Hyperlipidemia     Hypertension      History reviewed  No pertinent surgical history  FAMILY HISTORY:  Non-contributory    SOCIAL HISTORY:  History   Alcohol Use    Yes     Comment: weekends     History   Drug Use No     History   Smoking Status    Never Smoker   Smokeless Tobacco    Never Used       ALLERGIES:  No Known Allergies    MEDICATIONS:  All current active medications have been reviewed  PHYSICAL EXAM:  Vitals:  HR:  [] 95  Resp:  [14-18] 18  BP: (146-168)/(68-77) 146/68  SpO2:  [95 %-100 %] 97 %  Temp (24hrs), Av 4 °F (37 4 °C), Min:98 °F (36 7 °C), Max:100 5 °F (38 1 °C)  Current: Temperature: 99 4 °F (37 4 °C)     Physical Exam:  General:  Well-nourished, well-developed, in no acute distress  Eyes:  Conjunctive clear with no hemorrhages or effusions  Oropharynx:  No ulcers, no lesions  Neck:  Supple, no lymphadenopathy  Lungs:  Clear to auscultation bilaterally, no accessory muscle use  Cardiac:  Regular rate and rhythm, no murmurs  Abdomen:  Soft, non-tender, non-distended  Extremities:  Diffuse edema of left lower extremity with erythema mostly involving the shin  No fluctuance, open lesions, active drainage noted  Range of motion of left ankle and knee are intact  Skin:  No rashes, no ulcers  Neurological:  Moves all four extremities spontaneously, sensation grossly intact      LABS, IMAGING, & OTHER STUDIES:  Lab Results:  I have personally reviewed pertinent labs      Results from last 7 days  Lab Units 17  0434 17  1419 12/15/17  0444   SODIUM mmol/L 136 136 138   POTASSIUM mmol/L 3 3* 3 8 4 5   CHLORIDE mmol/L 102 100 106   CO2 mmol/L 25 27 22   ANION GAP mmol/L 9 9 10   BUN mg/dL 8 8 5   CREATININE mg/dL 0 84 0 78 0 70   EGFR ml/min/1 73sq m 83 91 104   GLUCOSE RANDOM mg/dL 114 176* 83   CALCIUM mg/dL 8 3 9 4 8 5   AST U/L  --  23  --    ALT U/L  --  20  --    ALK PHOS U/L  --  114  --    TOTAL PROTEIN g/dL  --  8 0  --    ALBUMIN g/dL  --  2 0*  --    BILIRUBIN TOTAL mg/dL  --  0 30  --        Results from last 7 days  Lab Units 12/19/17  0434 12/18/17  1304 12/15/17  0444   WBC Thousand/uL 17 93* 19 65* 15 29*   HEMOGLOBIN g/dL 8 3* 9 7* 9 2*   PLATELETS Thousands/uL 553* 615* 464*           Imaging Studies:   I have personally reviewed pertinent imaging study reports and images in PACS  CT of the left femur with contrast shows left lower extremity diffuse subcutaneous edema, especially below the knee with reactive left inguinal lymphadenopathy consistent with nonspecific cellulitis  No discrete abscess or osseous destruction noted  EKG, Pathology, and Other Studies:   I have personally reviewed pertinent reports

## 2017-12-19 NOTE — ASSESSMENT & PLAN NOTE
Blood sugar is not currently at goal likely secondary to the infectious process adjust the dose of insulin and monitor  Obtain a hemoglobin A1c

## 2017-12-19 NOTE — PROGRESS NOTES
Progress Note - Rosa M Salas 1970, 52 y o  female MRN: [id number]    Unit/Bed#: -01 Encounter: [id number]    Primary Care Provider: Rossy Rucker DO   Date and time admitted to hospital: 12/18/2017 12:33 PM        Hypokalemia   Assessment & Plan    Replete today and monitor        Leukocytosis   Assessment & Plan    Likely secondary to the infectious process  Monitor, appears to be improving from yesterday, recheck in the morning        * Cellulitis of left lower extremity   Assessment & Plan    Patient is currently on IV vancomycin with improvement in the rash and also pain and swelling    Infectious Disease on board antibiotics per Infectious Disease          HLD (hyperlipidemia)   Assessment & Plan    Continue with the statin        Type 2 diabetes mellitus ([facility] )   Assessment & Plan    Blood sugar is not currently at goal likely secondary to the infectious process adjust the dose of insulin and monitor  Obtain a hemoglobin A1c        Anemia   Assessment & Plan    Appears to have chronic anemia by reviewing the records hemoglobin is down to 8 2  Obtain an studies and also vitamin B12 and folate            VTE Pharmacologic Prophylaxis:   Pharmacologic: Enoxaparin (Lovenox)  Mechanical VTE Prophylaxis in Place: Yes    Patient Centered Rounds: I have performed bedside rounds with nursing staff today  Discussions with Specialists or Other Care Team Provider:  Infectious Disease    Education and Discussions with Family / Patient:  Patient updated in detail  Time Spent for Care:  30 minutes  More than 50% of total time spent on counseling and coordination of care as described above      Current Length of Stay: 1 day(s)    Current Patient Status: Inpatient   Certification Statement: The patient will continue to require additional inpatient hospital stay due to Continued need for intravenous antibiotics for cellulitis    Discharge Plan:     Code Status: Level 1 - Full Code      Subjective: Patient seen and examined  Patient reported that her pain is improving  Patient also reported that the redness and swelling is not as bad as last time    Objective:     Vitals:   Temp (24hrs), Av 6 °F (37 6 °C), Min:99 1 °F (37 3 °C), Max:100 5 °F (38 1 °C)    HR:  [] 95  Resp:  [14-18] 18  BP: (146-168)/(68-77) 146/68  SpO2:  [95 %-100 %] 97 %  Body mass index is 35 74 kg/m²  Input and Output Summary (last 24 hours): Intake/Output Summary (Last 24 hours) at 17 1437  Last data filed at 17 1207   Gross per 24 hour   Intake             1140 ml   Output                0 ml   Net             1140 ml       Physical Exam:     Physical Exam   Constitutional: She is oriented to person, place, and time  She appears well-developed and well-nourished  HENT:   Head: Normocephalic and atraumatic  Eyes: EOM are normal  Pupils are equal, round, and reactive to light  Neck: Normal range of motion  Cardiovascular: Normal rate and regular rhythm  No murmur heard  Pulmonary/Chest: Effort normal and breath sounds normal  No respiratory distress  She has no wheezes  Abdominal: Soft  Bowel sounds are normal  She exhibits no distension  There is no tenderness  Musculoskeletal: Normal range of motion  She exhibits edema  Edema and erythema of the left lower extremity noted  Warmth and tenderness on palpation  Her right sole showed a small ulceration without any signs of infection   Neurological: She is alert and oriented to person, place, and time  No cranial nerve deficit  Skin: Skin is warm and dry         Additional Data:     Labs:      Results from last 7 days  Lab Units 17  0434 17  1304   WBC Thousand/uL 17 93* 19 65*   HEMOGLOBIN g/dL 8 3* 9 7*   HEMATOCRIT % 27 4* 30 6*   PLATELETS Thousands/uL 553* 615*   NEUTROS PCT %  --  80*   LYMPHS PCT %  --  8*   MONOS PCT %  --  10   EOS PCT %  --  2       Results from last 7 days  Lab Units 17  0434 17  1419 SODIUM mmol/L 136 136   POTASSIUM mmol/L 3 3* 3 8   CHLORIDE mmol/L 102 100   CO2 mmol/L 25 27   BUN mg/dL 8 8   CREATININE mg/dL 0 84 0 78   CALCIUM mg/dL 8 3 9 4   TOTAL PROTEIN g/dL  --  8 0   BILIRUBIN TOTAL mg/dL  --  0 30   ALK PHOS U/L  --  114   ALT U/L  --  20   AST U/L  --  23   GLUCOSE RANDOM mg/dL 114 176*       Results from last 7 days  Lab Units 12/18/17  1303   INR  0 97       * I Have Reviewed All Lab Data Listed Above  * Additional Pertinent Lab Tests Reviewed: [facility] Admission Reviewed    Imaging:    Imaging Reports Reviewed Today Include:  CT, Doppler  Imaging Personally Reviewed by Myself Includes:     Recent Cultures (last 7 days):           Last 24 Hours Medication List:     enoxaparin 40 mg Subcutaneous Daily   insulin glargine 12 Units Subcutaneous HS   insulin lispro 1-5 Units Subcutaneous TID AC   insulin lispro 1-5 Units Subcutaneous HS   insulin lispro 3 Units Subcutaneous TID With Meals   lisinopril-hydrochlorothiazide (PRINZIDE 10/12  5) combo dose  Oral Daily   potassium chloride 40 mEq Oral Once   pravastatin 80 mg Oral Daily With Dinner   vancomycin 15 mg/kg Intravenous Q12H        Today, Patient Was Seen By: Renato Strong MD    ** Please Note: Dictation voice to text software may have been used in the creation of this document   **

## 2017-12-20 ENCOUNTER — APPOINTMENT (INPATIENT)
Dept: MRI IMAGING | Facility: HOSPITAL | Age: 47
DRG: 872 | End: 2017-12-20
Payer: COMMERCIAL

## 2017-12-20 LAB
ALBUMIN SERPL BCP-MCNC: 1.6 G/DL (ref 3.5–5)
ALP SERPL-CCNC: 86 U/L (ref 46–116)
ALT SERPL W P-5'-P-CCNC: 17 U/L (ref 12–78)
ANION GAP SERPL CALCULATED.3IONS-SCNC: 10 MMOL/L (ref 4–13)
AST SERPL W P-5'-P-CCNC: 21 U/L (ref 5–45)
BASOPHILS # BLD AUTO: 0.09 THOUSANDS/ΜL (ref 0–0.1)
BASOPHILS NFR BLD AUTO: 1 % (ref 0–1)
BILIRUB SERPL-MCNC: 0.3 MG/DL (ref 0.2–1)
BUN SERPL-MCNC: 7 MG/DL (ref 5–25)
CALCIUM SERPL-MCNC: 8.6 MG/DL (ref 8.3–10.1)
CHLORIDE SERPL-SCNC: 100 MMOL/L (ref 100–108)
CO2 SERPL-SCNC: 26 MMOL/L (ref 21–32)
CREAT SERPL-MCNC: 0.77 MG/DL (ref 0.6–1.3)
EOSINOPHIL # BLD AUTO: 0.41 THOUSAND/ΜL (ref 0–0.61)
EOSINOPHIL NFR BLD AUTO: 2 % (ref 0–6)
ERYTHROCYTE [DISTWIDTH] IN BLOOD BY AUTOMATED COUNT: 13.7 % (ref 11.6–15.1)
FERRITIN SERPL-MCNC: 295 NG/ML (ref 8–388)
FOLATE SERPL-MCNC: 10.9 NG/ML (ref 3.1–17.5)
GFR SERPL CREATININE-BSD FRML MDRD: 92 ML/MIN/1.73SQ M
GLUCOSE SERPL-MCNC: 102 MG/DL (ref 65–140)
GLUCOSE SERPL-MCNC: 129 MG/DL (ref 65–140)
GLUCOSE SERPL-MCNC: 160 MG/DL (ref 65–140)
GLUCOSE SERPL-MCNC: 180 MG/DL (ref 65–140)
GLUCOSE SERPL-MCNC: 98 MG/DL (ref 65–140)
HCT VFR BLD AUTO: 28.6 % (ref 34.8–46.1)
HGB BLD-MCNC: 8.9 G/DL (ref 11.5–15.4)
IRON SATN MFR SERPL: 9 %
IRON SERPL-MCNC: 16 UG/DL (ref 50–170)
LYMPHOCYTES # BLD AUTO: 2.06 THOUSANDS/ΜL (ref 0.6–4.47)
LYMPHOCYTES NFR BLD AUTO: 11 % (ref 14–44)
MCH RBC QN AUTO: 27.3 PG (ref 26.8–34.3)
MCHC RBC AUTO-ENTMCNC: 31.1 G/DL (ref 31.4–37.4)
MCV RBC AUTO: 88 FL (ref 82–98)
MONOCYTES # BLD AUTO: 2.22 THOUSAND/ΜL (ref 0.17–1.22)
MONOCYTES NFR BLD AUTO: 12 % (ref 4–12)
NEUTROPHILS # BLD AUTO: 14.43 THOUSANDS/ΜL (ref 1.85–7.62)
NEUTS SEG NFR BLD AUTO: 74 % (ref 43–75)
PLATELET # BLD AUTO: 606 THOUSANDS/UL (ref 149–390)
PMV BLD AUTO: 9 FL (ref 8.9–12.7)
POTASSIUM SERPL-SCNC: 3.3 MMOL/L (ref 3.5–5.3)
PROT SERPL-MCNC: 6.2 G/DL (ref 6.4–8.2)
RBC # BLD AUTO: 3.26 MILLION/UL (ref 3.81–5.12)
SODIUM SERPL-SCNC: 136 MMOL/L (ref 136–145)
TIBC SERPL-MCNC: 180 UG/DL (ref 250–450)
VANCOMYCIN TROUGH SERPL-MCNC: 12.2 UG/ML (ref 10–20)
VIT B12 SERPL-MCNC: 1050 PG/ML (ref 100–900)
WBC # BLD AUTO: 19.21 THOUSAND/UL (ref 4.31–10.16)

## 2017-12-20 PROCEDURE — 83540 ASSAY OF IRON: CPT | Performed by: FAMILY MEDICINE

## 2017-12-20 PROCEDURE — 82728 ASSAY OF FERRITIN: CPT | Performed by: FAMILY MEDICINE

## 2017-12-20 PROCEDURE — 82607 VITAMIN B-12: CPT | Performed by: FAMILY MEDICINE

## 2017-12-20 PROCEDURE — 82746 ASSAY OF FOLIC ACID SERUM: CPT | Performed by: FAMILY MEDICINE

## 2017-12-20 PROCEDURE — 73720 MRI LWR EXTREMITY W/O&W/DYE: CPT

## 2017-12-20 PROCEDURE — A9585 GADOBUTROL INJECTION: HCPCS | Performed by: FAMILY MEDICINE

## 2017-12-20 PROCEDURE — 80202 ASSAY OF VANCOMYCIN: CPT | Performed by: INTERNAL MEDICINE

## 2017-12-20 PROCEDURE — 83550 IRON BINDING TEST: CPT | Performed by: FAMILY MEDICINE

## 2017-12-20 PROCEDURE — 85025 COMPLETE CBC W/AUTO DIFF WBC: CPT | Performed by: FAMILY MEDICINE

## 2017-12-20 PROCEDURE — 82948 REAGENT STRIP/BLOOD GLUCOSE: CPT

## 2017-12-20 PROCEDURE — 80053 COMPREHEN METABOLIC PANEL: CPT | Performed by: FAMILY MEDICINE

## 2017-12-20 RX ORDER — POTASSIUM CHLORIDE 20 MEQ/1
40 TABLET, EXTENDED RELEASE ORAL ONCE
Status: COMPLETED | OUTPATIENT
Start: 2017-12-20 | End: 2017-12-20

## 2017-12-20 RX ADMIN — INSULIN GLARGINE 12 UNITS: 100 INJECTION, SOLUTION SUBCUTANEOUS at 22:12

## 2017-12-20 RX ADMIN — PRAVASTATIN SODIUM 80 MG: 80 TABLET ORAL at 15:36

## 2017-12-20 RX ADMIN — INSULIN LISPRO 1 UNITS: 100 INJECTION, SOLUTION INTRAVENOUS; SUBCUTANEOUS at 22:13

## 2017-12-20 RX ADMIN — LISINOPRIL: 10 TABLET ORAL at 08:06

## 2017-12-20 RX ADMIN — OXYCODONE HYDROCHLORIDE 5 MG: 5 TABLET ORAL at 22:12

## 2017-12-20 RX ADMIN — VANCOMYCIN HYDROCHLORIDE 1750 MG: 1 INJECTION, POWDER, LYOPHILIZED, FOR SOLUTION INTRAVENOUS at 08:07

## 2017-12-20 RX ADMIN — VANCOMYCIN HYDROCHLORIDE 1750 MG: 1 INJECTION, POWDER, LYOPHILIZED, FOR SOLUTION INTRAVENOUS at 19:47

## 2017-12-20 RX ADMIN — INSULIN LISPRO 3 UNITS: 100 INJECTION, SOLUTION INTRAVENOUS; SUBCUTANEOUS at 16:43

## 2017-12-20 RX ADMIN — INSULIN LISPRO 3 UNITS: 100 INJECTION, SOLUTION INTRAVENOUS; SUBCUTANEOUS at 08:15

## 2017-12-20 RX ADMIN — POTASSIUM CHLORIDE 40 MEQ: 1500 TABLET, EXTENDED RELEASE ORAL at 11:23

## 2017-12-20 RX ADMIN — INSULIN LISPRO 3 UNITS: 100 INJECTION, SOLUTION INTRAVENOUS; SUBCUTANEOUS at 11:23

## 2017-12-20 RX ADMIN — INSULIN LISPRO 1 UNITS: 100 INJECTION, SOLUTION INTRAVENOUS; SUBCUTANEOUS at 11:24

## 2017-12-20 RX ADMIN — ENOXAPARIN SODIUM 40 MG: 40 INJECTION SUBCUTANEOUS at 08:06

## 2017-12-20 RX ADMIN — OXYCODONE HYDROCHLORIDE 5 MG: 5 TABLET ORAL at 10:07

## 2017-12-20 RX ADMIN — GADOBUTROL 10 ML: 604.72 INJECTION INTRAVENOUS at 19:40

## 2017-12-20 RX ADMIN — OXYCODONE HYDROCHLORIDE 5 MG: 5 TABLET ORAL at 17:54

## 2017-12-20 NOTE — PROGRESS NOTES
Progress Note - Infectious Disease   Aura Manzo 52 y o  female MRN: [id number]  Unit/Bed#: -01 Encounter: [id number]      Impression/Recommendations:  1  Sepsis, POA  Tachycardia, leukocytosis  Likely secondary to #2  Patient started on IV vancomycin on admission  Leukocytosis persistent  Patient having very low grade fevers, but no documented high-grade fevers  All previous blood cultures have been negative  She is hemodynamically stable and nontoxic appearing      -antibiotics as below  -monitor temperatures and white blood cell count closely  -monitor hemodynamics  -follow-up admission blood cultures-so far no growth to date  -supportive care per primary team     2  Severe cellulitis of left lower extremity  Refractory to oral antibiotic treatment  The erythema did markedly improve during last hospitalization while the patient was on IV vancomycin  She still had some swelling on the date of discharge, but this was felt to have been worsened due to the IV fluids she had been receiving  Currently, the erythema and edema does clinically appear better than when she first presented on last admission  CT negative for deeper infection or abscess  Patient denies any exposure history that would make me concerned for an atypical cause of the cellulitis  She is not immunocompromised  The fact that there was good clinical response to IV vancomycin is reassuring  Although CT is negative, we may need to consider an MRI to better evaluate the deeper soft tissues and definitively rule out any potential drainable collection      -continue with IV vancomycin  Vancomycin trough was 12 2  Being that the cellulitis is so slow to respond, will target a higher trough of 15-20  Dose has been increased to 1750 mg q 12 hours  Will need another vancomycin trough prior to 4th dose   -check MRI of left lower extremity  -close serial leg exams     3  Left inguinal lymphangitis    Likely the cause of her left thigh pain on examination and is related to #2  There is no erythema or significant edema of the left thigh itself      -antibiotics as above  -monitor left thigh pain     4  Diabetes type 2, with hyperglycemia  Glucose is likely elevated in the setting of acute infection  -recommend good blood glucose control  -management per primary team     5   Urinary frequency and mild dysuria  This has developed since hospital admission  No pelvic or flank pain  May be related to IV fluids  Urinalysis was negative for signs of infection      Antibiotics:  Vancomycin D3     Spoke with patient in detail regarding plan of care  Spoke with Dr Yonis Duran from [facility] regarding plan of care  Subjective:  Denies fevers or chills  She states the left lower extremity swelling and pain is about the same, not worse  She only is able to walk with difficulty due to pain in the leg  No nausea, vomiting, diarrhea, shortness of breath, cough, abdominal pain, urinary symptoms  Objective:  Vitals:  HR:  [92-98] 92  Resp:  [18-19] 18  BP: (130-159)/(59-70) 142/67  SpO2:  [93 %-94 %] 94 %  Temp (24hrs), Av 8 °F (37 7 °C), Min:99 5 °F (37 5 °C), Max:100 2 °F (37 9 °C)  Current: Temperature: 99 5 °F (37 5 °C)    Physical Exam:   General:  No acute distress  Psychiatric:  Awake and alert  Pulmonary:  Normal respiratory excursion without accessory muscle use  Abdomen:  Soft, nontender  Extremities:  Diffuse edema of left lower extremity with erythema mostly involving the shin  No definitive area of fluctuance or open lesions  No drainage noted  Range of motion of left ankle and knee are intact  Skin:  No diffuse rash    Lab Results:  I have personally reviewed pertinent labs      Results from last 7 days  Lab Units 17  0515 17  0434 17  1419   SODIUM mmol/L 136 136 136   POTASSIUM mmol/L 3 3* 3 3* 3 8   CHLORIDE mmol/L 100 102 100   CO2 mmol/L 26 25 27   ANION GAP mmol/L 10 9 9   BUN mg/dL 7 8 8   CREATININE mg/dL 0  77 0 84 0 78   EGFR ml/min/1 73sq m 92 83 91   GLUCOSE RANDOM mg/dL 102 114 176*   CALCIUM mg/dL 8 6 8 3 9 4   AST U/L 21  --  23   ALT U/L 17  --  20   ALK PHOS U/L 86  --  114   TOTAL PROTEIN g/dL 6 2*  --  8 0   ALBUMIN g/dL 1 6*  --  2 0*   BILIRUBIN TOTAL mg/dL 0 30  --  0 30       Results from last 7 days  Lab Units 12/20/17  0515 12/19/17  0434 12/18/17  1304   WBC Thousand/uL 19 21* 17 93* 19 65*   HEMOGLOBIN g/dL 8 9* 8 3* 9 7*   PLATELETS Thousands/uL 606* 553* 615*       Results from last 7 days  Lab Units 12/18/17  1355 12/18/17  1303   BLOOD CULTURE  No Growth at 24 hrs  No Growth at 24 hrs  Imaging Studies:   I have personally reviewed pertinent imaging study reports and images in PACS  EKG, Pathology, and Other Studies:   I have personally reviewed pertinent reports

## 2017-12-20 NOTE — PROGRESS NOTES
Progress Note - Carmen Mliler 1970, 52 y o  female MRN: [id number]    Unit/Bed#: -01 Encounter: [id number]    Primary Care Provider: Robe Nagy DO   Date and time admitted to hospital: 12/18/2017 12:33 PM        * Cellulitis of left lower extremity   Assessment & Plan    Patient is currently on IV vancomycin with only slight improvement from yesterday to today  Still with persistent edema and tenderness to palpation  Discussed with infectious disease plan is to get an MRI to rule out any deep infection        Type 2 diabetes mellitus ([facility] [address] )   Assessment & Plan    Blood sugar is acceptable at this point monitor for now          Anemia   Assessment & Plan    Appears to have chronic anemia by reviewing the records, discussed with the patient patient is not aware that she has anemia  Patient also reported that she has heavy periods which is likely contributing to her anemia        Hypokalemia   Assessment & Plan    Replete today and monitor  Obtain a magnesium level for tomorrow        HLD (hyperlipidemia)   Assessment & Plan    Continue with the statin,        Hypertension   Assessment & Plan    Blood pressure is acceptable continue with the current        Leukocytosis   Assessment & Plan    Leukocytosis is worse today monitor for now ,will obtain an MRI            VTE Pharmacologic Prophylaxis:   Pharmacologic: Enoxaparin (Lovenox)  Mechanical VTE Prophylaxis in Place: Yes    Patient Centered Rounds: I have performed bedside rounds with nursing staff today  Discussions with Specialists or Other Care Team Provider:   infectious disease  Education and Discussions with Family / Patient:  Updated patient in detail    Time Spent for Care: 30 minutes  More than 50% of total time spent on counseling and coordination of care as described above      Current Length of Stay: 2 day(s)    Current Patient Status: Inpatient   Certification Statement: The patient will continue to require additional inpatient hospital stay due to The cellulitis requiring IV antibiotics    Discharge Plan:     Code Status: Level 1 - Full Code      Subjective:   Patient seen and examined  Patient still have persistent lower extremity edema and tenderness to palpation  Plan is to get an MRI    Objective:     Vitals:   Temp (24hrs), Av 7 °F (37 6 °C), Min:99 5 °F (37 5 °C), Max:100 2 °F (37 9 °C)    HR:  [92-97] 96  Resp:  [18] 18  BP: (130-145)/(59-67) 145/65  SpO2:  [93 %-98 %] 98 %  Body mass index is 35 74 kg/m²  Input and Output Summary (last 24 hours): Intake/Output Summary (Last 24 hours) at 17 1558  Last data filed at 17 1230   Gross per 24 hour   Intake              480 ml   Output              600 ml   Net             -120 ml       Physical Exam:     Physical Exam   Constitutional: She is oriented to person, place, and time  She appears well-developed and well-nourished  HENT:   Head: Normocephalic and atraumatic  Eyes: EOM are normal  Pupils are equal, round, and reactive to light  Neck: Normal range of motion  Neck supple  Cardiovascular: Normal rate and regular rhythm  Pulmonary/Chest: Breath sounds normal  She is in respiratory distress  Abdominal: Soft  Bowel sounds are normal  She exhibits no distension  Musculoskeletal:   Left lower extremity erythema and edema present tenderness to palpation present  Warm to touch   Neurological: She is alert and oriented to person, place, and time  Skin: Skin is warm and dry         Additional Data:     Labs:      Results from last 7 days  Lab Units 17  0515   WBC Thousand/uL 19 21*   HEMOGLOBIN g/dL 8 9*   HEMATOCRIT % 28 6*   PLATELETS Thousands/uL 606*   NEUTROS PCT % 74   LYMPHS PCT % 11*   MONOS PCT % 12   EOS PCT % 2       Results from last 7 days  Lab Units 17  0515   SODIUM mmol/L 136   POTASSIUM mmol/L 3 3*   CHLORIDE mmol/L 100   CO2 mmol/L 26   BUN mg/dL 7   CREATININE mg/dL 0 77   CALCIUM mg/dL 8 6   TOTAL PROTEIN g/dL 6 2*   BILIRUBIN TOTAL mg/dL 0 30   ALK PHOS U/L 86   ALT U/L 17   AST U/L 21   GLUCOSE RANDOM mg/dL 102       Results from last 7 days  Lab Units 12/18/17  1303   INR  0 97       * I Have Reviewed All Lab Data Listed Above  * Additional Pertinent Lab Tests Reviewed: Anand 66 Admission Reviewed    Imaging:    Imaging Reports Reviewed Today Include:   Imaging Personally Reviewed by Myself Includes:      Recent Cultures (last 7 days):       Results from last 7 days  Lab Units 12/18/17  1355 12/18/17  1303   BLOOD CULTURE  No Growth at 24 hrs  No Growth at 24 hrs  Last 24 Hours Medication List:     enoxaparin 40 mg Subcutaneous Daily   insulin glargine 12 Units Subcutaneous HS   insulin lispro 1-5 Units Subcutaneous TID AC   insulin lispro 1-5 Units Subcutaneous HS   insulin lispro 3 Units Subcutaneous TID With Meals   lisinopril-hydrochlorothiazide (PRINZIDE 10/12  5) combo dose  Oral Daily   pravastatin 80 mg Oral Daily With Dinner   vancomycin 17 5 mg/kg Intravenous Q12H        Today, Patient Was Seen By: Jimmy Roth MD    ** Please Note: Dictation voice to text software may have been used in the creation of this document   **

## 2017-12-20 NOTE — PLAN OF CARE
DISCHARGE PLANNING     Discharge to home or other facility with appropriate resources Progressing        INFECTION - ADULT     Absence or prevention of progression during hospitalization Progressing     Absence of fever/infection during neutropenic period Progressing        PAIN - ADULT     Verbalizes/displays adequate comfort level or baseline comfort level Progressing        Potential for Falls     Patient will remain free of falls Progressing

## 2017-12-20 NOTE — ASSESSMENT & PLAN NOTE
Patient is currently on IV vancomycin with only slight improvement from yesterday to today  Still with persistent edema and tenderness to palpation    Discussed with infectious disease plan is to get an MRI to rule out any deep infection

## 2017-12-20 NOTE — PLAN OF CARE
Problem: Potential for Falls  Goal: Patient will remain free of falls  INTERVENTIONS:  - Assess patient frequently for physical needs  -  Identify cognitive and physical deficits and behaviors that affect risk of falls    -  [place] fall precautions as indicated by assessment   - Educate patient/family on patient safety including physical limitations  - Instruct patient to call for assistance with activity based on assessment  - Modify environment to reduce risk of injury  - Consider OT/PT consult to assist with strengthening/mobility   Outcome: Progressing

## 2017-12-20 NOTE — ASSESSMENT & PLAN NOTE
Appears to have chronic anemia by reviewing the records, discussed with the patient patient is not aware that she has anemia    Patient also reported that she has heavy periods which is likely contributing to her anemia

## 2017-12-21 LAB
ANION GAP SERPL CALCULATED.3IONS-SCNC: 9 MMOL/L (ref 4–13)
BASOPHILS # BLD MANUAL: 0.18 THOUSAND/UL (ref 0–0.1)
BASOPHILS NFR MAR MANUAL: 1 % (ref 0–1)
BUN SERPL-MCNC: 7 MG/DL (ref 5–25)
CALCIUM SERPL-MCNC: 8.5 MG/DL (ref 8.3–10.1)
CHLORIDE SERPL-SCNC: 100 MMOL/L (ref 100–108)
CO2 SERPL-SCNC: 26 MMOL/L (ref 21–32)
CREAT SERPL-MCNC: 0.69 MG/DL (ref 0.6–1.3)
EOSINOPHIL # BLD MANUAL: 0.18 THOUSAND/UL (ref 0–0.4)
EOSINOPHIL NFR BLD MANUAL: 1 % (ref 0–6)
ERYTHROCYTE [DISTWIDTH] IN BLOOD BY AUTOMATED COUNT: 13.7 % (ref 11.6–15.1)
GFR SERPL CREATININE-BSD FRML MDRD: 104 ML/MIN/1.73SQ M
GLUCOSE SERPL-MCNC: 106 MG/DL (ref 65–140)
GLUCOSE SERPL-MCNC: 160 MG/DL (ref 65–140)
GLUCOSE SERPL-MCNC: 191 MG/DL (ref 65–140)
GLUCOSE SERPL-MCNC: 240 MG/DL (ref 65–140)
GLUCOSE SERPL-MCNC: 99 MG/DL (ref 65–140)
HCT VFR BLD AUTO: 26.7 % (ref 34.8–46.1)
HGB BLD-MCNC: 8.2 G/DL (ref 11.5–15.4)
LYMPHOCYTES # BLD AUTO: 1.58 THOUSAND/UL (ref 0.6–4.47)
LYMPHOCYTES # BLD AUTO: 9 % (ref 14–44)
MAGNESIUM SERPL-MCNC: 1.9 MG/DL (ref 1.6–2.6)
MCH RBC QN AUTO: 26.8 PG (ref 26.8–34.3)
MCHC RBC AUTO-ENTMCNC: 30.7 G/DL (ref 31.4–37.4)
MCV RBC AUTO: 87 FL (ref 82–98)
MONOCYTES # BLD AUTO: 0.88 THOUSAND/UL (ref 0–1.22)
MONOCYTES NFR BLD: 5 % (ref 4–12)
NEUTROPHILS # BLD MANUAL: 14.61 THOUSAND/UL (ref 1.85–7.62)
NEUTS SEG NFR BLD AUTO: 83 % (ref 43–75)
PLATELET # BLD AUTO: 562 THOUSANDS/UL (ref 149–390)
PLATELET BLD QL SMEAR: ABNORMAL
PMV BLD AUTO: 8.9 FL (ref 8.9–12.7)
POLYCHROMASIA BLD QL SMEAR: PRESENT
POTASSIUM SERPL-SCNC: 3.5 MMOL/L (ref 3.5–5.3)
RBC # BLD AUTO: 3.06 MILLION/UL (ref 3.81–5.12)
SODIUM SERPL-SCNC: 135 MMOL/L (ref 136–145)
TOTAL CELLS COUNTED SPEC: 100
VANCOMYCIN TROUGH SERPL-MCNC: 14.6 UG/ML (ref 10–20)
VARIANT LYMPHS # BLD AUTO: 1 %
WBC # BLD AUTO: 17.6 THOUSAND/UL (ref 4.31–10.16)

## 2017-12-21 PROCEDURE — 80202 ASSAY OF VANCOMYCIN: CPT | Performed by: INTERNAL MEDICINE

## 2017-12-21 PROCEDURE — 83735 ASSAY OF MAGNESIUM: CPT | Performed by: FAMILY MEDICINE

## 2017-12-21 PROCEDURE — 85027 COMPLETE CBC AUTOMATED: CPT | Performed by: FAMILY MEDICINE

## 2017-12-21 PROCEDURE — 82948 REAGENT STRIP/BLOOD GLUCOSE: CPT

## 2017-12-21 PROCEDURE — 85007 BL SMEAR W/DIFF WBC COUNT: CPT | Performed by: FAMILY MEDICINE

## 2017-12-21 PROCEDURE — 80048 BASIC METABOLIC PNL TOTAL CA: CPT | Performed by: FAMILY MEDICINE

## 2017-12-21 RX ADMIN — LISINOPRIL: 10 TABLET ORAL at 09:09

## 2017-12-21 RX ADMIN — INSULIN LISPRO 1 UNITS: 100 INJECTION, SOLUTION INTRAVENOUS; SUBCUTANEOUS at 12:21

## 2017-12-21 RX ADMIN — VANCOMYCIN HYDROCHLORIDE 1750 MG: 1 INJECTION, POWDER, LYOPHILIZED, FOR SOLUTION INTRAVENOUS at 09:09

## 2017-12-21 RX ADMIN — VANCOMYCIN HYDROCHLORIDE 1750 MG: 1 INJECTION, POWDER, LYOPHILIZED, FOR SOLUTION INTRAVENOUS at 22:19

## 2017-12-21 RX ADMIN — INSULIN LISPRO 3 UNITS: 100 INJECTION, SOLUTION INTRAVENOUS; SUBCUTANEOUS at 12:21

## 2017-12-21 RX ADMIN — OXYCODONE HYDROCHLORIDE 5 MG: 5 TABLET ORAL at 19:28

## 2017-12-21 RX ADMIN — INSULIN LISPRO 1 UNITS: 100 INJECTION, SOLUTION INTRAVENOUS; SUBCUTANEOUS at 17:04

## 2017-12-21 RX ADMIN — INSULIN GLARGINE 12 UNITS: 100 INJECTION, SOLUTION SUBCUTANEOUS at 22:39

## 2017-12-21 RX ADMIN — INSULIN LISPRO 3 UNITS: 100 INJECTION, SOLUTION INTRAVENOUS; SUBCUTANEOUS at 17:05

## 2017-12-21 RX ADMIN — PRAVASTATIN SODIUM 80 MG: 80 TABLET ORAL at 17:02

## 2017-12-21 RX ADMIN — INSULIN LISPRO 2 UNITS: 100 INJECTION, SOLUTION INTRAVENOUS; SUBCUTANEOUS at 22:35

## 2017-12-21 RX ADMIN — ENOXAPARIN SODIUM 40 MG: 40 INJECTION SUBCUTANEOUS at 09:09

## 2017-12-21 RX ADMIN — INSULIN LISPRO 3 UNITS: 100 INJECTION, SOLUTION INTRAVENOUS; SUBCUTANEOUS at 09:10

## 2017-12-21 NOTE — PROGRESS NOTES
[facility] Progress Note  Patient: Maryann Hogue 52 y o  female   MRN: [id number]  PCP: Isha Bazan DO  Unit/Bed#: MS [facility] Encounter: [id number]  Date Of Visit: 12/21/17    Assessment:    Principal Problem:    Cellulitis of left lower extremity  Active Problems:    Type 2 diabetes mellitus (HCC)    HLD (hyperlipidemia)    Hypokalemia    Anemia    Hypertension    Leukocytosis      Plan: 1  Cellulitis of the left lower extremity-status post MRI of the left lower extremity appears to have no is abscess or osteomyelitis  Antibiotics per Infectious Disease continue with the vancomycin  2  Sepsis present on admission-appears to be improving  Still with leukocytosis  3  Leukocytosis-improving  4  Anemia-and studies reviewed appears to have anemia of chronic diseases  Iron supplementation  Patient with heavy periods  5  Diabetes mellitus type 2-adjust the dose of insulin  6  Hyponatremia-monitor  VTE Pharmacologic Prophylaxis:   Pharmacologic: Enoxaparin (Lovenox)  Mechanical VTE Prophylaxis in Place: Yes    Patient Centered Rounds: I have performed bedside rounds with nursing staff today  Discussions with Specialists or Other Care Team Provider:  Education and Discussions with Family / Patient:  Updated patient in detail    Time Spent for Care: 30 minutes  More than 50% of total time spent on counseling and coordination of care as described above  Current Length of Stay: 3 day(s)    Current Patient Status: Inpatient   Certification Statement: The patient will continue to require additional inpatient hospital stay due to IV antibiotics for the cellulitis    Discharge Plan / Estimated Discharge Date:     Code Status: Level 1 - Full Code      Subjective:   Patient seen and examined  Patient reported that she feels like the leg swelling is improving  No other specific complaints offered    Patient still has not had any bowel movements but offered a laxative and she declined it    Objective:     Vitals:   Temp (24hrs), Av 6 °F (37 °C), Min:98 6 °F (37 °C), Max:98 6 °F (37 °C)    HR:  [86-87] 87  Resp:  [18] 18  BP: (132-137)/(61-66) 137/66  SpO2:  [95 %-97 %] 97 %  Body mass index is 35 74 kg/m²  Input and Output Summary (last 24 hours): Intake/Output Summary (Last 24 hours) at 17 1518  Last data filed at 17 1300   Gross per 24 hour   Intake              600 ml   Output                0 ml   Net              600 ml       Physical Exam:     Physical Exam   Constitutional: She is oriented to person, place, and time  She appears well-developed and well-nourished  HENT:   Head: Normocephalic and atraumatic  Eyes: EOM are normal  Pupils are equal, round, and reactive to light  Neck: Normal range of motion  Neck supple  Cardiovascular: Normal rate and regular rhythm  No murmur heard  Pulmonary/Chest: Effort normal and breath sounds normal  No respiratory distress  Abdominal: Soft  Bowel sounds are normal  She exhibits no distension  There is no tenderness  Musculoskeletal: Normal range of motion  She exhibits edema  Edema and erythema of the left extremity  At its edema appears to be improving  No worsening of erythema   warmth and tenderness to palpation present   Neurological: She is alert and oriented to person, place, and time  No cranial nerve deficit  Skin: Skin is warm and dry  No erythema             Additional Data:     Labs:      Results from last 7 days  Lab Units 178 17  0515   WBC Thousand/uL 17 60* 19 21*   HEMOGLOBIN g/dL 8 2* 8 9*   HEMATOCRIT % 26 7* 28 6*   PLATELETS Thousands/uL 562* 606*   NEUTROS PCT %  --  74   LYMPHS PCT %  --  11*   LYMPHO PCT % 9*  --    MONOS PCT %  --  12   MONO PCT MAN % 5  --    EOS PCT %  --  2   EOSINO PCT MANUAL % 1  --        Results from last 7 days  Lab Units 17  0438 17  0515   SODIUM mmol/L 135* 136   POTASSIUM mmol/L 3 5 3 3*   CHLORIDE mmol/L 100 100   CO2 mmol/L 26 26   BUN mg/dL 7 7   CREATININE mg/dL 0 69 0 77   CALCIUM mg/dL 8 5 8 6   TOTAL PROTEIN g/dL  --  6 2*   BILIRUBIN TOTAL mg/dL  --  0 30   ALK PHOS U/L  --  86   ALT U/L  --  17   AST U/L  --  21   GLUCOSE RANDOM mg/dL 99 102       Results from last 7 days  Lab Units 12/18/17  1303   INR  0 97       * I Have Reviewed All Lab Data Listed Above  * Additional Pertinent Lab Tests Reviewed: Anand 66 Admission Reviewed    Imaging:    Imaging Reports Reviewed Today Include:   Imaging Personally Reviewed by Myself Includes:      Recent Cultures (last 7 days):       Results from last 7 days  Lab Units 12/18/17  1355 12/18/17  1303   BLOOD CULTURE  No Growth at 48 hrs  No Growth at 48 hrs  Last 24 Hours Medication List:     enoxaparin 40 mg Subcutaneous Daily   insulin glargine 12 Units Subcutaneous HS   insulin lispro 1-5 Units Subcutaneous TID AC   insulin lispro 1-5 Units Subcutaneous HS   insulin lispro 3 Units Subcutaneous TID With Meals   lisinopril-hydrochlorothiazide (PRINZIDE 10/12  5) combo dose  Oral Daily   pravastatin 80 mg Oral Daily With Dinner   vancomycin 17 5 mg/kg Intravenous Q12H        Today, Patient Was Seen By: Thom Javed MD    ** Please Note: This note has been constructed using a voice recognition system   **

## 2017-12-21 NOTE — PLAN OF CARE
Problem: Potential for Falls  Goal: Patient will remain free of falls  INTERVENTIONS:  - Assess patient frequently for physical needs  -  Identify cognitive and physical deficits and behaviors that affect risk of falls    -  [place] fall precautions as indicated by assessment   - Educate patient/family on patient safety including physical limitations  - Instruct patient to call for assistance with activity based on assessment  - Modify environment to reduce risk of injury  - Consider OT/PT consult to assist with strengthening/mobility   Outcome: Progressing      Problem: PAIN - ADULT  Goal: Verbalizes/displays adequate comfort level or baseline comfort level  Interventions:  - Encourage patient to monitor pain and request assistance  - Assess pain using appropriate pain scale  - Administer analgesics based on type and severity of pain and evaluate response  - Implement non-pharmacological measures as appropriate and evaluate response  - Consider cultural and social influences on pain and pain management  - Notify physician/advanced practitioner if interventions unsuccessful or patient reports new pain   Outcome: Progressing      Problem: INFECTION - ADULT  Goal: Absence or prevention of progression during hospitalization  INTERVENTIONS:  - Assess and monitor for signs and symptoms of infection  - Monitor lab/diagnostic results  - Monitor all insertion sites, i e  indwelling lines, tubes, and drains  - Monitor endotracheal (as able) and nasal secretions for changes in amount and color  - [place] appropriate cooling/warming therapies per order  - Administer medications as ordered  - Instruct and encourage patient and family to use good hand hygiene technique  - Identify and instruct in appropriate isolation precautions for identified infection/condition   Outcome: Progressing    Goal: Absence of fever/infection during neutropenic period  INTERVENTIONS:  - Monitor WBC  - Implement neutropenic guidelines   Outcome: Completed Date Met: 12/21/17      Problem: DISCHARGE PLANNING  Goal: Discharge to home or other facility with appropriate resources  INTERVENTIONS:  - Identify barriers to discharge w/patient and caregiver  - Arrange for needed discharge resources and transportation as appropriate  - Identify discharge learning needs (meds, wound care, etc )  - Arrange for interpretive services to assist at discharge as needed  - Refer to Case Management Department for coordinating discharge planning if the patient needs post-hospital services based on physician/advanced practitioner order or complex needs related to functional status, cognitive ability, or social support system   Outcome: Progressing

## 2017-12-21 NOTE — PROGRESS NOTES
Progress Note - Infectious Disease   Olivia Frank 52 y o  female MRN: [id number]  Unit/Bed#: -01 Encounter: [id number]         Impression/Recommendations:  1   Sepsis, POA   Tachycardia, leukocytosis   Likely secondary to #2   Patient started on IV vancomycin on admission  Leukocytosis trending down  Fevers have improved    All previous blood cultures have been negative   She is hemodynamically stable and nontoxic appearing      -antibiotics as below  -monitor temperatures and white blood cell count closely  -monitor hemodynamics  -follow-up admission blood cultures-so far no growth to date  -supportive care per primary team     2   Severe cellulitis of left lower extremity   Refractory to oral antibiotic treatment   The erythema did markedly improve during last hospitalization while the patient was on IV vancomycin   She still had some swelling on the date of discharge, but this was felt to have been worsened due to the IV fluids she had been receiving   Currently, the erythema and edema does clinically appear better than when she first presented on last admission   CT negative for deeper infection or abscess   Patient denies any exposure history that would make me concerned for an atypical cause of the cellulitis   She is not immunocompromised  The fact that there was good clinical response to IV vancomycin is reassuring  Although CT is negative, we will obtain MRI to better evaluate the deeper soft tissues and definitively rule out any potential drainable collection      -continue with IV vancomycin  Vancomycin trough was 12 2  Being that the cellulitis is so slow to respond, will target a higher trough of 15-20  Dose has been increased to 1750 mg q 12 hours    Will need another vancomycin trough prior to 4th dose, which is this evening   -follow-up MRI of left lower extremity  -close serial leg exams     3   Left inguinal lymphangitis  Wesly Boucher the cause of her left thigh pain on examination and is related to #2  Elinor Pry is no erythema or significant edema of the left thigh itself      -antibiotics as above  -monitor left thigh pain     4   Diabetes type 2, with hyperglycemia   Glucose is likely elevated in the setting of acute infection  -recommend good blood glucose control  -management per primary team     5   Urinary frequency and mild dysuria   This has developed since hospital admission   No pelvic or flank pain   May be related to IV fluids  Urinalysis was negative for signs of infection  Improved      Antibiotics:  Vancomycin D4     Spoke with patient in detail regarding plan of care  Subjective:  Patient is still having pain in her extremity  She feels like the swelling has mildly improved  Fevers have improved  No chills  No nausea, vomiting, diarrhea  Objective:  Vitals:  HR:  [86-96] 87  Resp:  [18] 18  BP: (132-145)/(61-66) 137/66  SpO2:  [95 %-98 %] 97 %  Temp (24hrs), Av 9 °F (37 2 °C), Min:98 6 °F (37 °C), Max:99 5 °F (37 5 °C)  Current: Temperature: 98 6 °F (37 °C)    Physical Exam:   General:  No acute distress  Psychiatric:  Awake and alert  Pulmonary:  Normal respiratory excursion without accessory muscle use  Abdomen:  Soft, nontender  Extremities:  No edema  Skin:  No rashes    Lab Results:  I have personally reviewed pertinent labs      Results from last 7 days  Lab Units 17  0438 17  0515 17  0434 17  1419   SODIUM mmol/L 135* 136 136 136   POTASSIUM mmol/L 3 5 3 3* 3 3* 3 8   CHLORIDE mmol/L 100 100 102 100   CO2 mmol/L 26 26 25 27   ANION GAP mmol/L 9 10 9 9   BUN mg/dL 7 7 8 8   CREATININE mg/dL 0 69 0 77 0 84 0 78   EGFR ml/min/1 73sq m 104 92 83 91   GLUCOSE RANDOM mg/dL 99 102 114 176*   CALCIUM mg/dL 8 5 8 6 8 3 9 4   AST U/L  --  21  --  23   ALT U/L  --  17  --  20   ALK PHOS U/L  --  86  --  114   TOTAL PROTEIN g/dL  --  6 2*  --  8 0   ALBUMIN g/dL  --  1 6*  --  2 0*   BILIRUBIN TOTAL mg/dL  --  0 30  --  0 30       Results from last 7 days  Lab Units 12/21/17  0438 12/20/17  0515 12/19/17  0434   WBC Thousand/uL 17 60* 19 21* 17 93*   HEMOGLOBIN g/dL 8 2* 8 9* 8 3*   PLATELETS Thousands/uL 562* 606* 553*       Results from last 7 days  Lab Units 12/18/17  1355 12/18/17  1303   BLOOD CULTURE  No Growth at 48 hrs  No Growth at 48 hrs  Imaging Studies:   I have personally reviewed pertinent imaging study reports and images in PACS  EKG, Pathology, and Other Studies:   I have personally reviewed pertinent reports

## 2017-12-22 LAB
BASOPHILS # BLD MANUAL: 0.18 THOUSAND/UL (ref 0–0.1)
BASOPHILS NFR MAR MANUAL: 1 % (ref 0–1)
EOSINOPHIL # BLD MANUAL: 0.36 THOUSAND/UL (ref 0–0.4)
EOSINOPHIL NFR BLD MANUAL: 2 % (ref 0–6)
ERYTHROCYTE [DISTWIDTH] IN BLOOD BY AUTOMATED COUNT: 13.6 % (ref 11.6–15.1)
GLUCOSE SERPL-MCNC: 113 MG/DL (ref 65–140)
GLUCOSE SERPL-MCNC: 144 MG/DL (ref 65–140)
GLUCOSE SERPL-MCNC: 83 MG/DL (ref 65–140)
GLUCOSE SERPL-MCNC: 91 MG/DL (ref 65–140)
HCT VFR BLD AUTO: 28 % (ref 34.8–46.1)
HGB BLD-MCNC: 8.7 G/DL (ref 11.5–15.4)
LG PLATELETS BLD QL SMEAR: PRESENT
LYMPHOCYTES # BLD AUTO: 1.78 THOUSAND/UL (ref 0.6–4.47)
LYMPHOCYTES # BLD AUTO: 10 % (ref 14–44)
MCH RBC QN AUTO: 27.2 PG (ref 26.8–34.3)
MCHC RBC AUTO-ENTMCNC: 31.1 G/DL (ref 31.4–37.4)
MCV RBC AUTO: 88 FL (ref 82–98)
MONOCYTES # BLD AUTO: 1.25 THOUSAND/UL (ref 0–1.22)
MONOCYTES NFR BLD: 7 % (ref 4–12)
NEUTROPHILS # BLD MANUAL: 13.34 THOUSAND/UL (ref 1.85–7.62)
NEUTS BAND NFR BLD MANUAL: 1 % (ref 0–8)
NEUTS SEG NFR BLD AUTO: 74 % (ref 43–75)
PLATELET # BLD AUTO: 610 THOUSANDS/UL (ref 149–390)
PLATELET BLD QL SMEAR: ABNORMAL
PMV BLD AUTO: 8.8 FL (ref 8.9–12.7)
RBC # BLD AUTO: 3.2 MILLION/UL (ref 3.81–5.12)
TOTAL CELLS COUNTED SPEC: 100
TOXIC GRANULES BLD QL SMEAR: PRESENT
VARIANT LYMPHS # BLD AUTO: 5 %
WBC # BLD AUTO: 17.79 THOUSAND/UL (ref 4.31–10.16)

## 2017-12-22 PROCEDURE — 85027 COMPLETE CBC AUTOMATED: CPT | Performed by: FAMILY MEDICINE

## 2017-12-22 PROCEDURE — 85007 BL SMEAR W/DIFF WBC COUNT: CPT | Performed by: FAMILY MEDICINE

## 2017-12-22 PROCEDURE — 82948 REAGENT STRIP/BLOOD GLUCOSE: CPT

## 2017-12-22 RX ORDER — SENNOSIDES 8.6 MG
1 TABLET ORAL
Status: DISCONTINUED | OUTPATIENT
Start: 2017-12-22 | End: 2017-12-24 | Stop reason: HOSPADM

## 2017-12-22 RX ORDER — DOCUSATE SODIUM 100 MG/1
100 CAPSULE, LIQUID FILLED ORAL 2 TIMES DAILY
Status: DISCONTINUED | OUTPATIENT
Start: 2017-12-22 | End: 2017-12-24 | Stop reason: HOSPADM

## 2017-12-22 RX ORDER — POLYETHYLENE GLYCOL 3350 17 G/17G
17 POWDER, FOR SOLUTION ORAL DAILY
Status: DISCONTINUED | OUTPATIENT
Start: 2017-12-22 | End: 2017-12-24 | Stop reason: HOSPADM

## 2017-12-22 RX ADMIN — INSULIN LISPRO 3 UNITS: 100 INJECTION, SOLUTION INTRAVENOUS; SUBCUTANEOUS at 08:10

## 2017-12-22 RX ADMIN — VANCOMYCIN HYDROCHLORIDE 1750 MG: 1 INJECTION, POWDER, LYOPHILIZED, FOR SOLUTION INTRAVENOUS at 08:07

## 2017-12-22 RX ADMIN — INSULIN LISPRO 3 UNITS: 100 INJECTION, SOLUTION INTRAVENOUS; SUBCUTANEOUS at 12:35

## 2017-12-22 RX ADMIN — VANCOMYCIN HYDROCHLORIDE 1750 MG: 1 INJECTION, POWDER, LYOPHILIZED, FOR SOLUTION INTRAVENOUS at 21:07

## 2017-12-22 RX ADMIN — OXYCODONE HYDROCHLORIDE 5 MG: 5 TABLET ORAL at 18:06

## 2017-12-22 RX ADMIN — LISINOPRIL: 10 TABLET ORAL at 08:08

## 2017-12-22 RX ADMIN — INSULIN LISPRO 3 UNITS: 100 INJECTION, SOLUTION INTRAVENOUS; SUBCUTANEOUS at 18:09

## 2017-12-22 RX ADMIN — PRAVASTATIN SODIUM 80 MG: 80 TABLET ORAL at 18:09

## 2017-12-22 RX ADMIN — ENOXAPARIN SODIUM 40 MG: 40 INJECTION SUBCUTANEOUS at 08:08

## 2017-12-22 RX ADMIN — INSULIN GLARGINE 12 UNITS: 100 INJECTION, SOLUTION SUBCUTANEOUS at 21:11

## 2017-12-22 RX ADMIN — SENNOSIDES 8.6 MG: 8.6 TABLET, FILM COATED ORAL at 21:10

## 2017-12-22 NOTE — PROGRESS NOTES
Progress Note - Carmen Miller 1970, 52 y o  female MRN: [id number]    Unit/Bed#: -01 Encounter: [id number]    Primary Care Provider: Robe Nagy DO   Date and time admitted to hospital: 12/18/2017 12:33 PM        * Cellulitis of left lower extremity   Assessment & Plan    Patient is currently on vancomycin for the left lower extremity cellulitis and it appears to be improving with improvement in pain and swelling  Erythema is also improving  Antibiotics per Infectious Disease  Continue with the current antibiotics  Serial exams         Type 2 diabetes mellitus ([address] )   Assessment & Plan    Blood sugar is acceptable after adjusting the insulin  Continue with the current CT head          Anemia   Assessment & Plan    Appears to have chronic anemia by reviewing the records, and studies reviewed  Patient appears to have anemia of chronic disease as evidenced by a low iron level and also low TIBC level  Iron supplementation  Discussed with the patient about oral iron supplementation and she is agreeable to try after the hospitalization  Patient with heavy periods likely contributing to the iron deficiency anemia        HLD (hyperlipidemia)   Assessment & Plan    Continue with the statin,        Hypertension   Assessment & Plan    Blood pressure is acceptable continue with the current care        Leukocytosis   Assessment & Plan    Leukocytosis is improving today  Continue with the antibiotics and trend the WBC count            VTE Pharmacologic Prophylaxis:   Pharmacologic: Enoxaparin (Lovenox)  Mechanical VTE Prophylaxis in Place: Yes    Patient Centered Rounds: I have performed bedside rounds with nursing staff today  Discussions with Specialists or Other Care Team Provider:     Education and Discussions with Family / Patient:     Time Spent for Care: 30 minutes  More than 50% of total time spent on counseling and coordination of care as described above      Current Length of Stay: 4 day(s)    Current Patient Status: Inpatient   Certification Statement: The patient will continue to require additional inpatient hospital stay due to IV antibiotics for the cellulitis    Discharge Plan:     Code Status: Level 1 - Full Code      Subjective:   Patient seen and examined  Patient reported that she feels like the swelling is improving less painful today  Discussed with the patient about the iron deficiency and she is agreeable to try oral iron as an outpatient    Objective:     Vitals:   Temp (24hrs), Av 5 °F (36 9 °C), Min:98 2 °F (36 8 °C), Max:98 9 °F (37 2 °C)    HR:  [85-90] 85  Resp:  [18] 18  BP: (125-141)/(60-68) 141/67  SpO2:  [96 %-99 %] 96 %  Body mass index is 35 74 kg/m²  Input and Output Summary (last 24 hours): Intake/Output Summary (Last 24 hours) at 17 1426  Last data filed at 17 8131   Gross per 24 hour   Intake              600 ml   Output                0 ml   Net              600 ml       Physical Exam:     Physical Exam   Constitutional: She is oriented to person, place, and time  She appears well-developed and well-nourished  HENT:   Head: Normocephalic and atraumatic  Eyes: EOM are normal  Pupils are equal, round, and reactive to light  Neck: Normal range of motion  Neck supple  Cardiovascular: Normal rate and regular rhythm  No murmur heard  Pulmonary/Chest: Effort normal and breath sounds normal  No respiratory distress  Abdominal: Soft  Bowel sounds are normal  She exhibits no distension  Musculoskeletal: Normal range of motion  She exhibits edema  Erythema present tenderness on palpation present  Overall the edema and erythema appears to be improved   Neurological: She is alert and oriented to person, place, and time  Skin: Skin is warm and dry         Additional Data:     Labs:      Results from last 7 days  Lab Units 17  1111  17  0515   WBC Thousand/uL 17 79*  < > 19 21*   HEMOGLOBIN g/dL 8 7*  < > 8 9* HEMATOCRIT % 28 0*  < > 28 6*   PLATELETS Thousands/uL 610*  < > 606*   NEUTROS PCT %  --   --  74   LYMPHS PCT %  --   --  11*   LYMPHO PCT % 10*  < >  --    MONOS PCT %  --   --  12   MONO PCT MAN % 7  < >  --    EOS PCT %  --   --  2   EOSINO PCT MANUAL % 2  < >  --    < > = values in this interval not displayed  Results from last 7 days  Lab Units 12/21/17  0438 12/20/17  0515   SODIUM mmol/L 135* 136   POTASSIUM mmol/L 3 5 3 3*   CHLORIDE mmol/L 100 100   CO2 mmol/L 26 26   BUN mg/dL 7 7   CREATININE mg/dL 0 69 0 77   CALCIUM mg/dL 8 5 8 6   TOTAL PROTEIN g/dL  --  6 2*   BILIRUBIN TOTAL mg/dL  --  0 30   ALK PHOS U/L  --  86   ALT U/L  --  17   AST U/L  --  21   GLUCOSE RANDOM mg/dL 99 102       Results from last 7 days  Lab Units 12/18/17  1303   INR  0 97       * I Have Reviewed All Lab Data Listed Above  * Additional Pertinent Lab Tests Reviewed: Anand Murphy Admission Reviewed    Imaging:    Imaging Reports Reviewed Today Include:   Imaging Personally Reviewed by Myself Includes:      Recent Cultures (last 7 days):       Results from last 7 days  Lab Units 12/18/17  1355 12/18/17  1303   BLOOD CULTURE  No Growth at 72 hrs  No Growth at 72 hrs  Last 24 Hours Medication List:     docusate sodium 100 mg Oral BID   enoxaparin 40 mg Subcutaneous Daily   insulin glargine 12 Units Subcutaneous HS   insulin lispro 1-5 Units Subcutaneous TID AC   insulin lispro 1-5 Units Subcutaneous HS   insulin lispro 3 Units Subcutaneous TID With Meals   lisinopril-hydrochlorothiazide (PRINZIDE 10/12  5) combo dose  Oral Daily   polyethylene glycol 17 g Oral Daily   pravastatin 80 mg Oral Daily With Dinner   senna 1 tablet Oral HS   vancomycin 17 5 mg/kg Intravenous Q12H        Today, Patient Was Seen By: mAanda Sinclair MD    ** Please Note: Dictation voice to text software may have been used in the creation of this document   **

## 2017-12-22 NOTE — CASE MANAGEMENT
Continued Stay Review    Date: 12/22/2017     Vital Signs: /67   Pulse 85   Temp 98 2 °F (36 8 °C) (Oral)   Resp 18   Ht 5' 7" (1 702 m)   Wt 104 kg (228 lb 2 8 oz)   LMP 12/07/2017   SpO2 96%   BMI 35 74 kg/m²     Medications:   Scheduled Meds:   enoxaparin 40 mg Subcutaneous Daily   insulin glargine 12 Units Subcutaneous HS   insulin lispro 1-5 Units Subcutaneous TID AC   insulin lispro 1-5 Units Subcutaneous HS   insulin lispro 3 Units Subcutaneous TID With Meals   lisinopril-hydrochlorothiazide (PRINZIDE 10/12  5) combo dose  Oral Daily   pravastatin 80 mg Oral Daily With Dinner   vancomycin 17 5 mg/kg Intravenous Q12H     Continuous Infusions:    PRN Meds: not used:    acetaminophen    morphine injection    ondansetron    oxyCODONE    Abnormal Labs/Diagnostic Results:   Wbc 17 79, hgb 8 7, hct 28        Age/Sex: 52 y o  female     Assessment/Plan:  Per ID- Sepsis, POA   Tachycardia, leukocytosis   Likely secondary to #2   Patient started on IV vancomycin on admission   Leukocytosis trending down   Fevers have improved    All previous blood cultures have been negative   She is hemodynamically stable and nontoxic appearing      -antibiotics as below  -monitor temperatures and white blood cell count closely  -monitor hemodynamics  -follow-up admission blood cultures-so far no growth to date  -supportive care per primary team     2   Severe cellulitis of left lower extremity   Refractory to oral antibiotic treatment   The erythema did markedly improve during last hospitalization while the patient was on IV vancomycin   She still had some swelling on the date of discharge, but this was felt to have been worsened due to the IV fluids she had been receiving   Currently, the erythema and edema does clinically appear better than when she first presented on last admission   CT negative for deeper infection or abscess   Patient denies any exposure history that would make me concerned for an atypical cause of the cellulitis  Mau Sanchez is not immunocompromised  The fact that there was good clinical response to IV vancomycin is reassuring   MRI of the left lower extremity also indicative of cellulitis with no evidence of osteo or abscess formation  There is noticeable decrease in the amount of swelling of the left lower extremity  Also less erythema      -continue with IV vancomycin   Vancomycin trough acceptable at 14  6   Continue with 1750 mg q 12 hours     -close serial leg exams     3   Left inguinal lymphangitis   Likely the cause of her left thigh pain on examination and is related to #2  Edward Jimenez is no erythema or significant edema of the left thigh itself      -antibiotics as above  -monitor left thigh pain     4   Diabetes type 2, with hyperglycemia   Glucose is likely elevated in the setting of acute infection  -recommend good blood glucose control  -management per primary team     5   Urinary frequency and mild dysuria   This has developed since hospital admission   No pelvic or flank pain   May be related to IV fluids   Urinalysis was negative for signs of infection   Improved  Discharge Plan: to be determined  Thank you,  [facility] in the [facility] by Yao Cornelius for 2017  Network Utilization Review Department  Phone: [phone]; Fax [phone]  ATTENTION: The Network Utilization Review Department is now centralized for our 7 Facilities  Make a note that we have a new phone and fax numbers for our Department  Please call with any questions or concerns to [phone] and carefully follow the prompts so that you are directed to the right person  All voicemails are confidential  Fax any determinations, approvals, denials, and requests for initial or continue stay review clinical to [phone]   Due to HIGH CALL volume, it would be easier if you could please send faxed requests to expedite your requests and in part, help us provide discharge notifications faster

## 2017-12-22 NOTE — ASSESSMENT & PLAN NOTE
Patient is currently on vancomycin for the left lower extremity cellulitis and it appears to be improving with improvement in pain and swelling  Erythema is also improving      Antibiotics per Infectious Disease  Continue with the current antibiotics  Serial exams

## 2017-12-22 NOTE — PROGRESS NOTES
Progress Note - Infectious Disease   Va Galicia 52 y o  female MRN: [id number]  Unit/Bed#: -01 Encounter: [id number]         Impression/Recommendations:  1   Sepsis, POA   Tachycardia, leukocytosis   Likely secondary to #2   Patient started on IV vancomycin on admission  Leukocytosis trending down  Fevers have improved    All previous blood cultures have been negative   She is hemodynamically stable and nontoxic appearing      -antibiotics as below  -monitor temperatures and white blood cell count closely  -monitor hemodynamics  -follow-up admission blood cultures-so far no growth to date  -supportive care per primary team     2   Severe cellulitis of left lower extremity   Refractory to oral antibiotic treatment   The erythema did markedly improve during last hospitalization while the patient was on IV vancomycin   She still had some swelling on the date of discharge, but this was felt to have been worsened due to the IV fluids she had been receiving   Currently, the erythema and edema does clinically appear better than when she first presented on last admission   CT negative for deeper infection or abscess   Patient denies any exposure history that would make me concerned for an atypical cause of the cellulitis   She is not immunocompromised  The fact that there was good clinical response to IV vancomycin is reassuring   MRI of the left lower extremity also indicative of cellulitis with no evidence of osteo or abscess formation  There is noticeable decrease in the amount of swelling of the left lower extremity  Also less erythema      -continue with IV vancomycin   Vancomycin trough acceptable at 14  6   Continue with 1750 mg q 12 hours     -close serial leg exams     3   Left inguinal lymphangitis  Renaye Kyle the cause of her left thigh pain on examination and is related to #2  Drinda Shaver is no erythema or significant edema of the left thigh itself      -antibiotics as above  -monitor left thigh pain     4   Diabetes type 2, with hyperglycemia   Glucose is likely elevated in the setting of acute infection  -recommend good blood glucose control  -management per primary team     5   Urinary frequency and mild dysuria   This has developed since hospital admission   No pelvic or flank pain   May be related to IV fluids   Urinalysis was negative for signs of infection  Improved      Antibiotics:  Vancomycin D5     Spoke with patient in detail regarding plan of care  Subjective:  Fevers have improved  Patient feels like the swelling is improving  She is also able to move the leg better now  Pain is still present  No nausea, vomiting, diarrhea  Objective:  Vitals:  HR:  [85-90] 85  Resp:  [18] 18  BP: (125-141)/(60-68) 141/67  SpO2:  [96 %-99 %] 96 %  Temp (24hrs), Av 5 °F (36 9 °C), Min:98 2 °F (36 8 °C), Max:98 9 °F (37 2 °C)  Current: Temperature: 98 2 °F (36 8 °C)    Physical Exam:   General:  No acute distress  Psychiatric:  Awake and alert  Pulmonary:  Normal respiratory excursion without accessory muscle use  Abdomen:  Soft, nontender  Extremities:  Diffuse left lower extremity swelling appears to be improving  Improving erythema   to palpation  No active drainage  No restriction of motion of the joints of the left lower extremity  Skin:  No rashes    Lab Results:  I have personally reviewed pertinent labs      Results from last 7 days  Lab Units 17  0438 17  0515 17  0434 17  1419   SODIUM mmol/L 135* 136 136 136   POTASSIUM mmol/L 3 5 3 3* 3 3* 3 8   CHLORIDE mmol/L 100 100 102 100   CO2 mmol/L 26 26 25 27   ANION GAP mmol/L 9 10 9 9   BUN mg/dL 7 7 8 8   CREATININE mg/dL 0 69 0 77 0 84 0 78   EGFR ml/min/1 73sq m 104 92 83 91   GLUCOSE RANDOM mg/dL 99 102 114 176*   CALCIUM mg/dL 8 5 8 6 8 3 9 4   AST U/L  --  21  --  23   ALT U/L  --  17  --  20   ALK PHOS U/L  --  86  --  114   TOTAL PROTEIN g/dL  --  6 2*  --  8 0   ALBUMIN g/dL  --  1 6*  -- 2 0*   BILIRUBIN TOTAL mg/dL  --  0 30  --  0 30       Results from last 7 days  Lab Units 12/21/17  0438 12/20/17  0515 12/19/17  0434   WBC Thousand/uL 17 60* 19 21* 17 93*   HEMOGLOBIN g/dL 8 2* 8 9* 8 3*   PLATELETS Thousands/uL 562* 606* 553*       Results from last 7 days  Lab Units 12/18/17  1355 12/18/17  1303   BLOOD CULTURE  No Growth at 72 hrs  No Growth at 72 hrs  Imaging Studies:   I have personally reviewed pertinent imaging study reports and images in PACS  MRI of the left lower extremity suggestive of cellulitis with no abscess or osteomyelitis    EKG, Pathology, and Other Studies:   I have personally reviewed pertinent reports

## 2017-12-22 NOTE — PLAN OF CARE
Problem: Potential for Falls  Goal: Patient will remain free of falls  INTERVENTIONS:  - Assess patient frequently for physical needs  -  Identify cognitive and physical deficits and behaviors that affect risk of falls    -  [place] fall precautions as indicated by assessment   - Educate patient/family on patient safety including physical limitations  - Instruct patient to call for assistance with activity based on assessment  - Modify environment to reduce risk of injury  - Consider OT/PT consult to assist with strengthening/mobility   Outcome: Progressing      Problem: PAIN - ADULT  Goal: Verbalizes/displays adequate comfort level or baseline comfort level  Interventions:  - Encourage patient to monitor pain and request assistance  - Assess pain using appropriate pain scale  - Administer analgesics based on type and severity of pain and evaluate response  - Implement non-pharmacological measures as appropriate and evaluate response  - Consider cultural and social influences on pain and pain management  - Notify physician/advanced practitioner if interventions unsuccessful or patient reports new pain   Outcome: Progressing      Problem: INFECTION - ADULT  Goal: Absence or prevention of progression during hospitalization  INTERVENTIONS:  - Assess and monitor for signs and symptoms of infection  - Monitor lab/diagnostic results  - Monitor all insertion sites, i e  indwelling lines, tubes, and drains  - Monitor endotracheal (as able) and nasal secretions for changes in amount and color  - [place] appropriate cooling/warming therapies per order  - Administer medications as ordered  - Instruct and encourage patient and family to use good hand hygiene technique  - Identify and instruct in appropriate isolation precautions for identified infection/condition   Outcome: Progressing

## 2017-12-22 NOTE — ASSESSMENT & PLAN NOTE
Appears to have chronic anemia by reviewing the records, and studies reviewed  Patient appears to have anemia of chronic disease as evidenced by a low iron level and also low TIBC level  Iron supplementation    Discussed with the patient about oral iron supplementation and she is agreeable to try after the hospitalization  Patient with heavy periods likely contributing to the iron deficiency anemia

## 2017-12-23 LAB
ALBUMIN SERPL BCP-MCNC: 1.6 G/DL (ref 3.5–5)
ALP SERPL-CCNC: 90 U/L (ref 46–116)
ALT SERPL W P-5'-P-CCNC: 17 U/L (ref 12–78)
ANION GAP SERPL CALCULATED.3IONS-SCNC: 9 MMOL/L (ref 4–13)
AST SERPL W P-5'-P-CCNC: 20 U/L (ref 5–45)
BACTERIA BLD CULT: NORMAL
BACTERIA BLD CULT: NORMAL
BASOPHILS # BLD AUTO: 0.1 THOUSANDS/ΜL (ref 0–0.1)
BASOPHILS NFR BLD AUTO: 1 % (ref 0–1)
BILIRUB SERPL-MCNC: 0.2 MG/DL (ref 0.2–1)
BUN SERPL-MCNC: 8 MG/DL (ref 5–25)
CALCIUM SERPL-MCNC: 8.4 MG/DL (ref 8.3–10.1)
CHLORIDE SERPL-SCNC: 103 MMOL/L (ref 100–108)
CO2 SERPL-SCNC: 26 MMOL/L (ref 21–32)
CREAT SERPL-MCNC: 0.72 MG/DL (ref 0.6–1.3)
EOSINOPHIL # BLD AUTO: 0.49 THOUSAND/ΜL (ref 0–0.61)
EOSINOPHIL NFR BLD AUTO: 4 % (ref 0–6)
ERYTHROCYTE [DISTWIDTH] IN BLOOD BY AUTOMATED COUNT: 13.7 % (ref 11.6–15.1)
GFR SERPL CREATININE-BSD FRML MDRD: 100 ML/MIN/1.73SQ M
GLUCOSE SERPL-MCNC: 129 MG/DL (ref 65–140)
GLUCOSE SERPL-MCNC: 131 MG/DL (ref 65–140)
GLUCOSE SERPL-MCNC: 149 MG/DL (ref 65–140)
GLUCOSE SERPL-MCNC: 153 MG/DL (ref 65–140)
GLUCOSE SERPL-MCNC: 222 MG/DL (ref 65–140)
HCT VFR BLD AUTO: 28.2 % (ref 34.8–46.1)
HGB BLD-MCNC: 8.7 G/DL (ref 11.5–15.4)
LYMPHOCYTES # BLD AUTO: 2.23 THOUSANDS/ΜL (ref 0.6–4.47)
LYMPHOCYTES NFR BLD AUTO: 16 % (ref 14–44)
MCH RBC QN AUTO: 26.9 PG (ref 26.8–34.3)
MCHC RBC AUTO-ENTMCNC: 30.9 G/DL (ref 31.4–37.4)
MCV RBC AUTO: 87 FL (ref 82–98)
MONOCYTES # BLD AUTO: 1.21 THOUSAND/ΜL (ref 0.17–1.22)
MONOCYTES NFR BLD AUTO: 9 % (ref 4–12)
NEUTROPHILS # BLD AUTO: 9.8 THOUSANDS/ΜL (ref 1.85–7.62)
NEUTS SEG NFR BLD AUTO: 70 % (ref 43–75)
PLATELET # BLD AUTO: 608 THOUSANDS/UL (ref 149–390)
PMV BLD AUTO: 9 FL (ref 8.9–12.7)
POTASSIUM SERPL-SCNC: 3.5 MMOL/L (ref 3.5–5.3)
PROT SERPL-MCNC: 6.2 G/DL (ref 6.4–8.2)
RBC # BLD AUTO: 3.23 MILLION/UL (ref 3.81–5.12)
SODIUM SERPL-SCNC: 138 MMOL/L (ref 136–145)
WBC # BLD AUTO: 13.83 THOUSAND/UL (ref 4.31–10.16)

## 2017-12-23 PROCEDURE — 82948 REAGENT STRIP/BLOOD GLUCOSE: CPT

## 2017-12-23 PROCEDURE — 85025 COMPLETE CBC W/AUTO DIFF WBC: CPT | Performed by: FAMILY MEDICINE

## 2017-12-23 PROCEDURE — 80053 COMPREHEN METABOLIC PANEL: CPT | Performed by: FAMILY MEDICINE

## 2017-12-23 RX ADMIN — INSULIN LISPRO 1 UNITS: 100 INJECTION, SOLUTION INTRAVENOUS; SUBCUTANEOUS at 14:08

## 2017-12-23 RX ADMIN — OXYCODONE HYDROCHLORIDE 5 MG: 5 TABLET ORAL at 21:59

## 2017-12-23 RX ADMIN — PRAVASTATIN SODIUM 80 MG: 80 TABLET ORAL at 17:25

## 2017-12-23 RX ADMIN — INSULIN LISPRO 3 UNITS: 100 INJECTION, SOLUTION INTRAVENOUS; SUBCUTANEOUS at 14:07

## 2017-12-23 RX ADMIN — SENNOSIDES 8.6 MG: 8.6 TABLET, FILM COATED ORAL at 21:50

## 2017-12-23 RX ADMIN — INSULIN LISPRO 3 UNITS: 100 INJECTION, SOLUTION INTRAVENOUS; SUBCUTANEOUS at 09:46

## 2017-12-23 RX ADMIN — DOCUSATE SODIUM 100 MG: 100 CAPSULE, LIQUID FILLED ORAL at 09:43

## 2017-12-23 RX ADMIN — ENOXAPARIN SODIUM 40 MG: 40 INJECTION SUBCUTANEOUS at 09:44

## 2017-12-23 RX ADMIN — INSULIN LISPRO 3 UNITS: 100 INJECTION, SOLUTION INTRAVENOUS; SUBCUTANEOUS at 17:27

## 2017-12-23 RX ADMIN — VANCOMYCIN HYDROCHLORIDE 1750 MG: 1 INJECTION, POWDER, LYOPHILIZED, FOR SOLUTION INTRAVENOUS at 09:39

## 2017-12-23 RX ADMIN — VANCOMYCIN HYDROCHLORIDE 1750 MG: 1 INJECTION, POWDER, LYOPHILIZED, FOR SOLUTION INTRAVENOUS at 21:50

## 2017-12-23 RX ADMIN — DOCUSATE SODIUM 100 MG: 100 CAPSULE, LIQUID FILLED ORAL at 17:25

## 2017-12-23 RX ADMIN — LISINOPRIL: 10 TABLET ORAL at 09:43

## 2017-12-23 RX ADMIN — INSULIN GLARGINE 12 UNITS: 100 INJECTION, SOLUTION SUBCUTANEOUS at 21:50

## 2017-12-23 RX ADMIN — INSULIN LISPRO 1 UNITS: 100 INJECTION, SOLUTION INTRAVENOUS; SUBCUTANEOUS at 21:50

## 2017-12-23 NOTE — PLAN OF CARE
DISCHARGE PLANNING     Discharge to home or other facility with appropriate resources Progressing        INFECTION - ADULT     Absence or prevention of progression during hospitalization Progressing        PAIN - ADULT     Verbalizes/displays adequate comfort level or baseline comfort level Progressing        Potential for Falls     Patient will remain free of falls Progressing

## 2017-12-23 NOTE — PROGRESS NOTES
Progress Note - Infectious Disease   Erlin Shipley 52 y o  female MRN: [id number]  Unit/Bed#: -01 Encounter: [id number]      Impression/Recommendations:  1   Sepsis, POA   Tachycardia, leukocytosis   Likely secondary to #2  Caryn Massed started on IV vancomycin on admission   Leukocytosis continues to trend down    Fevers have improved    All previous blood cultures have been negative   She is hemodynamically stable and nontoxic appearing      -antibiotics as below  -monitor temperatures and white blood cell count closely  -monitor hemodynamics  -follow-up admission blood cultures-so far no growth to date  -supportive care per primary team     2   Severe cellulitis of left lower extremity   Refractory to oral antibiotic treatment   The erythema did markedly improve during last hospitalization while the patient was on IV vancomycin   She still had some swelling on the date of discharge, but this was felt to have been worsened due to the IV fluids she had been receiving   Currently, the erythema and edema does clinically appear better than when she first presented on last admission   CT negative for deeper infection or abscess   Patient denies any exposure history that would make me concerned for an atypical cause of the cellulitis   She is not immunocompromised  The fact that there was good clinical response to IV vancomycin is reassuring   MRI of the left lower extremity also indicative of cellulitis with no evidence of osteo or abscess formation  The edema, erythema, warmth of the left lower extremity continues to improve      -continue with IV vancomycin   Vancomycin trough acceptable at 14  6   Continue with 1750 mg q 12 hours  Will plan to give IV antibiotic for at least 1 more day    If there is continued improvement in left lower extremity and patient remains afebrile with improving leukocytosis, we can transition to weight-based oral Bactrim DS 2 tabs p o  twice a day and Keflex 500 mg p o  q 6 hours with plan for 7 more days for total of 14 days, as cellulitis was very slow to improve   -close serial leg exams     3   Left inguinal lymphangitis  Marina Omer the cause of her left thigh pain on examination and is related to #2  Paulino Redman is no erythema or significant edema of the left thigh itself      -antibiotics as above  -monitor left thigh pain     4   Diabetes type 2, with hyperglycemia   Glucose is likely elevated in the setting of acute infection  -recommend good blood glucose control  -management per primary team     5   Urinary frequency and mild dysuria   This has developed since hospital admission   No pelvic or flank pain   May be related to IV fluids   Urinalysis was negative for signs of infection   Improved      Antibiotics:  Vancomycin D6     Spoke with patient in detail regarding plan of care  Subjective:  No fevers or chills  Feels like the swelling continues to improve  She is able to ambulate more comfortably now  No diarrhea  Objective:  Vitals:  HR:  [83-88] 83  Resp:  [18] 18  BP: (137-146)/(66-67) 137/66  SpO2:  [97 %-98 %] 97 %  Temp (24hrs), Av 2 °F (37 3 °C), Min:99 °F (37 2 °C), Max:99 5 °F (37 5 °C)  Current: Temperature: 99 °F (37 2 °C)    Physical Exam:   General:  No acute distress  Psychiatric:  Awake and alert  Pulmonary:  Normal respiratory excursion without accessory muscle use  Abdomen:  Soft, nontender  Extremities:  Significantly decreased erythema and warmth of left lower extremity  Decreased edema  No fluctuance or drainage  Skin:  No diffuse rashes    Lab Results:  I have personally reviewed pertinent labs      Results from last 7 days  Lab Units 17  0556 17  0438 17  0515  17  1419   SODIUM mmol/L 138 135* 136  < > 136   POTASSIUM mmol/L 3 5 3 5 3 3*  < > 3 8   CHLORIDE mmol/L 103 100 100  < > 100   CO2 mmol/L 26 26 26  < > 27   ANION GAP mmol/L 9 9 10  < > 9   BUN mg/dL 8 7 7  < > 8   CREATININE mg/dL 0 72 0 69 0 77  < > 0 78   EGFR ml/min/1 73sq m 100 104 92  < > 91   GLUCOSE RANDOM mg/dL 131 99 102  < > 176*   CALCIUM mg/dL 8 4 8 5 8 6  < > 9 4   AST U/L 20  --  21  --  23   ALT U/L 17  --  17  --  20   ALK PHOS U/L 90  --  86  --  114   TOTAL PROTEIN g/dL 6 2*  --  6 2*  --  8 0   ALBUMIN g/dL 1 6*  --  1 6*  --  2 0*   BILIRUBIN TOTAL mg/dL 0 20  --  0 30  --  0 30   < > = values in this interval not displayed  Results from last 7 days  Lab Units 12/23/17  0556 12/22/17  1111 12/21/17  0438   WBC Thousand/uL 13 83* 17 79* 17 60*   HEMOGLOBIN g/dL 8 7* 8 7* 8 2*   PLATELETS Thousands/uL 608* 610* 562*       Results from last 7 days  Lab Units 12/18/17  1355 12/18/17  1303   BLOOD CULTURE  No Growth After 4 Days  No Growth After 4 Days  Imaging Studies:   I have personally reviewed pertinent imaging study reports and images in PACS  EKG, Pathology, and Other Studies:   I have personally reviewed pertinent reports

## 2017-12-23 NOTE — PROGRESS NOTES
[facility] Progress Note  Patient: Anthony Park 52 y o  female   MRN: [id number]  PCP: Ruth Mcdonnell DO  Unit/Bed#: -[facility] Encounter: [id number]  Date Of Visit: 12/23/17    Assessment:    Principal Problem:    Cellulitis of left lower extremity  Active Problems:    Type 2 diabetes mellitus (HCC)    HLD (hyperlipidemia)    Hypokalemia    Anemia    Hypertension    Leukocytosis      Plan: 1  Cellulitis of the left lower extremity-currently patient is on vancomycin  Appears to be improving at this point  Antibiotics per Infectious Disease  Leukocytosis is improved  2  leukocytosis-trending down  3  Type 2 diabetes  mellitus-blood sugar acceptable  Continue with the current care  4   Anemia-H and H remained stable discussed with the patient about following up with Hemoccult and for possible colonoscopy  And deficiency will replete the iron once she is ready to be discharged  5  Hypertension-blood pressure acceptable continue with the current care      VTE Pharmacologic Prophylaxis:   Pharmacologic: Enoxaparin (Lovenox)  Mechanical VTE Prophylaxis in Place: Yes    Patient Centered Rounds: I have performed bedside rounds with nursing staff today  Discussions with Specialists or Other Care Team Provider:     Education and Discussions with Family / Patient:  Updated patient in detail    Time Spent for Care: 30 minutes  More than 50% of total time spent on counseling and coordination of care as described above  Current Length of Stay: 5 day(s)    Current Patient Status: Inpatient   Certification Statement: The patient will continue to require additional inpatient hospital stay due to IV antibiotics for the cellulitis    Discharge Plan / Estimated Discharge Date:     Code Status: Level 1 - Full Code      Subjective:   Patient seen and examined  Patient reported that she feels like her leg is improving at this point  Less so line    LEs painful    Objective:     Vitals:   Temp (24hrs), Av °F (37 2 °C), Min:98 3 °F (36 8 °C), Max:99 5 °F (37 5 °C)    HR:  [83-88] 85  Resp:  [18] 18  BP: (137-146)/(7-67) 144/7  SpO2:  [97 %-99 %] 99 %  Body mass index is 35 74 kg/m²  Input and Output Summary (last 24 hours): Intake/Output Summary (Last 24 hours) at 17 1519  Last data filed at 17 0700   Gross per 24 hour   Intake              480 ml   Output                0 ml   Net              480 ml       Physical Exam:     Physical Exam   Constitutional: She is oriented to person, place, and time  She appears well-developed and well-nourished  HENT:   Head: Normocephalic and atraumatic  Eyes: EOM are normal  Pupils are equal, round, and reactive to light  Neck: Normal range of motion  Neck supple  Cardiovascular: Normal rate and regular rhythm  No murmur heard  Pulmonary/Chest: Effort normal and breath sounds normal    Abdominal: Soft  Bowel sounds are normal  She exhibits no distension  Musculoskeletal: She exhibits edema  Erythema improving  Edema improving, less tender to palpation  Warmth present   Neurological: She is alert and oriented to person, place, and time  Skin: Skin is warm and dry  No erythema  Additional Data:     Labs:      Results from last 7 days  Lab Units 17  0556   WBC Thousand/uL 13 83*   HEMOGLOBIN g/dL 8 7*   HEMATOCRIT % 28 2*   PLATELETS Thousands/uL 608*   NEUTROS PCT % 70   LYMPHS PCT % 16   MONOS PCT % 9   EOS PCT % 4       Results from last 7 days  Lab Units 17  0556   SODIUM mmol/L 138   POTASSIUM mmol/L 3 5   CHLORIDE mmol/L 103   CO2 mmol/L 26   BUN mg/dL 8   CREATININE mg/dL 0 72   CALCIUM mg/dL 8 4   TOTAL PROTEIN g/dL 6 2*   BILIRUBIN TOTAL mg/dL 0 20   ALK PHOS U/L 90   ALT U/L 17   AST U/L 20   GLUCOSE RANDOM mg/dL 131       Results from last 7 days  Lab Units 17  1303   INR  0 97       * I Have Reviewed All Lab Data Listed Above  * Additional Pertinent Lab Tests Reviewed:  Anand Murphy Admission Reviewed    Imaging:    Imaging Reports Reviewed Today Include:   Imaging Personally Reviewed by Myself Includes:      Recent Cultures (last 7 days):       Results from last 7 days  Lab Units 12/18/17  1355 12/18/17  1303   BLOOD CULTURE  No Growth After 4 Days  No Growth After 4 Days  Last 24 Hours Medication List:     docusate sodium 100 mg Oral BID   enoxaparin 40 mg Subcutaneous Daily   insulin glargine 12 Units Subcutaneous HS   insulin lispro 1-5 Units Subcutaneous TID AC   insulin lispro 1-5 Units Subcutaneous HS   insulin lispro 3 Units Subcutaneous TID With Meals   lisinopril-hydrochlorothiazide (PRINZIDE 10/12  5) combo dose  Oral Daily   polyethylene glycol 17 g Oral Daily   pravastatin 80 mg Oral Daily With Dinner   senna 1 tablet Oral HS   vancomycin 17 5 mg/kg Intravenous Q12H        Today, Patient Was Seen By: Hazel Muñiz MD    ** Please Note: This note has been constructed using a voice recognition system   **

## 2017-12-24 VITALS
BODY MASS INDEX: 35.81 KG/M2 | TEMPERATURE: 98.3 F | HEART RATE: 81 BPM | SYSTOLIC BLOOD PRESSURE: 145 MMHG | RESPIRATION RATE: 18 BRPM | OXYGEN SATURATION: 96 % | WEIGHT: 228.18 LBS | HEIGHT: 67 IN | DIASTOLIC BLOOD PRESSURE: 69 MMHG

## 2017-12-24 LAB
BASOPHILS # BLD AUTO: 0.13 THOUSANDS/ΜL (ref 0–0.1)
BASOPHILS NFR BLD AUTO: 1 % (ref 0–1)
EOSINOPHIL # BLD AUTO: 0.44 THOUSAND/ΜL (ref 0–0.61)
EOSINOPHIL NFR BLD AUTO: 3 % (ref 0–6)
ERYTHROCYTE [DISTWIDTH] IN BLOOD BY AUTOMATED COUNT: 13.6 % (ref 11.6–15.1)
GLUCOSE SERPL-MCNC: 138 MG/DL (ref 65–140)
HCT VFR BLD AUTO: 27.2 % (ref 34.8–46.1)
HGB BLD-MCNC: 8.3 G/DL (ref 11.5–15.4)
LYMPHOCYTES # BLD AUTO: 2.65 THOUSANDS/ΜL (ref 0.6–4.47)
LYMPHOCYTES NFR BLD AUTO: 21 % (ref 14–44)
MCH RBC QN AUTO: 26.7 PG (ref 26.8–34.3)
MCHC RBC AUTO-ENTMCNC: 30.5 G/DL (ref 31.4–37.4)
MCV RBC AUTO: 88 FL (ref 82–98)
MONOCYTES # BLD AUTO: 1.09 THOUSAND/ΜL (ref 0.17–1.22)
MONOCYTES NFR BLD AUTO: 9 % (ref 4–12)
NEUTROPHILS # BLD AUTO: 8.49 THOUSANDS/ΜL (ref 1.85–7.62)
NEUTS SEG NFR BLD AUTO: 66 % (ref 43–75)
PLATELET # BLD AUTO: 601 THOUSANDS/UL (ref 149–390)
PMV BLD AUTO: 8.7 FL (ref 8.9–12.7)
RBC # BLD AUTO: 3.11 MILLION/UL (ref 3.81–5.12)
WBC # BLD AUTO: 12.8 THOUSAND/UL (ref 4.31–10.16)

## 2017-12-24 PROCEDURE — 85025 COMPLETE CBC W/AUTO DIFF WBC: CPT | Performed by: FAMILY MEDICINE

## 2017-12-24 PROCEDURE — 82948 REAGENT STRIP/BLOOD GLUCOSE: CPT

## 2017-12-24 RX ORDER — SULFAMETHOXAZOLE AND TRIMETHOPRIM 800; 160 MG/1; MG/1
2 TABLET ORAL EVERY 12 HOURS SCHEDULED
Qty: 28 TABLET | Refills: 0 | Status: SHIPPED | OUTPATIENT
Start: 2017-12-24 | End: 2017-12-31

## 2017-12-24 RX ORDER — SULFAMETHOXAZOLE AND TRIMETHOPRIM 800; 160 MG/1; MG/1
2 TABLET ORAL EVERY 12 HOURS SCHEDULED
Status: DISCONTINUED | OUTPATIENT
Start: 2017-12-24 | End: 2017-12-24 | Stop reason: HOSPADM

## 2017-12-24 RX ORDER — CEPHALEXIN 500 MG/1
500 CAPSULE ORAL EVERY 6 HOURS SCHEDULED
Qty: 28 CAPSULE | Refills: 0 | Status: SHIPPED | OUTPATIENT
Start: 2017-12-24 | End: 2017-12-31

## 2017-12-24 RX ORDER — CEPHALEXIN 500 MG/1
500 CAPSULE ORAL EVERY 6 HOURS SCHEDULED
Status: DISCONTINUED | OUTPATIENT
Start: 2017-12-24 | End: 2017-12-24 | Stop reason: HOSPADM

## 2017-12-24 RX ORDER — POLYETHYLENE GLYCOL 3350 17 G/17G
17 POWDER, FOR SOLUTION ORAL DAILY
Qty: 14 EACH | Refills: 0 | Status: SHIPPED | OUTPATIENT
Start: 2017-12-25 | End: 2019-03-24

## 2017-12-24 RX ADMIN — CEPHALEXIN 500 MG: 500 CAPSULE ORAL at 10:04

## 2017-12-24 RX ADMIN — LISINOPRIL: 10 TABLET ORAL at 08:48

## 2017-12-24 RX ADMIN — ENOXAPARIN SODIUM 40 MG: 40 INJECTION SUBCUTANEOUS at 08:48

## 2017-12-24 RX ADMIN — DOCUSATE SODIUM 100 MG: 100 CAPSULE, LIQUID FILLED ORAL at 08:50

## 2017-12-24 RX ADMIN — INSULIN LISPRO 3 UNITS: 100 INJECTION, SOLUTION INTRAVENOUS; SUBCUTANEOUS at 08:51

## 2017-12-24 RX ADMIN — SULFAMETHOXAZOLE AND TRIMETHOPRIM 2 TABLET: 800; 160 TABLET ORAL at 10:04

## 2017-12-24 NOTE — PROGRESS NOTES
Progress Note - Infectious Disease   Juan Mancia 52 y o  female MRN: [id number]  Unit/Bed#: -01 Encounter: [id number]      Impression/Recommendations:  1   Sepsis, POA     Tachycardia, leukocytosis  Likely secondary to #2     Resolved     -OK to D/C home on PO antibiotics as below     2   Severe cellulitis of left lower extremity     Consider Strep versus MRSA  Refractory to oral antibiotic treatment  CT negative for deeper infection or abscess  MRI of the left lower extremity also indicative of cellulitis with no evidence of osteo or abscess formation  Improved     -OK to D/C home today on weight-based oral Bactrim DS 2 tabs p o  twice a day and Keflex 500 mg p o  q 6 hours with plan for 7 more days for total of 14 days, as cellulitis was very slow to improve   -advised continued leg elevation and edema control at home     3  Diabetes type 2, with hyperglycemia       -recommend good blood glucose control  -management per primary team      Discussed in detail with the patient and Dr Callie Hicks    Antibiotics:  Vancomycin D7    Subjective:  Patient seen on AM rounds  Leg feeling better  Ready to go home  Denies fevers, chills, or sweats  Denies nausea, vomiting, or diarrhea  Objective:  Vitals:  HR:  [81-85] 81  Resp:  [18] 18  BP: (141-150)/(7-71) 145/69  SpO2:  [96 %-99 %] 96 %  Temp (24hrs), Av 5 °F (36 9 °C), Min:98 3 °F (36 8 °C), Max:98 8 °F (37 1 °C)  Current: Temperature: 98 3 °F (36 8 °C)    Physical Exam:   General:  No acute distress  Psychiatric:  Awake and alert  Pulmonary:  Normal respiratory excursion without accessory muscle use  Abdomen:  Soft, nontender  Extremities:  Nonpitting edema left leg, scattered hyperpigmented areas of resolving cellulitis left calf with superficial skin peeling  Skin:  No rashes    Lab Results:  I have personally reviewed pertinent labs      Results from last 7 days  Lab Units 17  0556 17  0438 17  0515  17  1419   SODIUM mmol/L 138 135* 136  < > 136   POTASSIUM mmol/L 3 5 3 5 3 3*  < > 3 8   CHLORIDE mmol/L 103 100 100  < > 100   CO2 mmol/L 26 26 26  < > 27   ANION GAP mmol/L 9 9 10  < > 9   BUN mg/dL 8 7 7  < > 8   CREATININE mg/dL 0 72 0 69 0 77  < > 0 78   EGFR ml/min/1 73sq m 100 104 92  < > 91   GLUCOSE RANDOM mg/dL 131 99 102  < > 176*   CALCIUM mg/dL 8 4 8 5 8 6  < > 9 4   AST U/L 20  --  21  --  23   ALT U/L 17  --  17  --  20   ALK PHOS U/L 90  --  86  --  114   TOTAL PROTEIN g/dL 6 2*  --  6 2*  --  8 0   ALBUMIN g/dL 1 6*  --  1 6*  --  2 0*   BILIRUBIN TOTAL mg/dL 0 20  --  0 30  --  0 30   < > = values in this interval not displayed  Results from last 7 days  Lab Units 12/24/17  0625 12/23/17  0556 12/22/17  1111   WBC Thousand/uL 12 80* 13 83* 17 79*   HEMOGLOBIN g/dL 8 3* 8 7* 8 7*   PLATELETS Thousands/uL 601* 608* 610*       Results from last 7 days  Lab Units 12/18/17  1355 12/18/17  1303   BLOOD CULTURE  No Growth After 5 Days  No Growth After 5 Days  Imaging Studies:   I have personally reviewed pertinent imaging study reports and images in PACS  EKG, Pathology, and Other Studies:   I have personally reviewed pertinent reports

## 2017-12-24 NOTE — DISCHARGE INSTRUCTIONS
Obtain Hemoccult and further workup for the anemia as an out patient  Repeat the vitamin B12 level as an outpatient arrange through the primary care physician  Check blood sugar before meals and at bedtime, keep a log and take the log to the primary care physician at the time of appointment

## 2017-12-24 NOTE — DISCHARGE SUMMARY
Discharge Summary - [facility] Internal Medicine    Patient Information: Janine Cruz 52 y o  female MRN: [id number]  Unit/Bed#: -01 Encounter: [id number]    Discharging Physician / Practitioner: Samia Narayan MD  PCP: Julisa Crisostomo DO  Admission Date: 12/18/2017  Discharge Date: 12/24/17    Reason for Admission:  Cellulitis of the left lower extremity    Discharge Diagnoses:     Principal Problem:    Cellulitis of left lower extremity  Active Problems:    Type 2 diabetes mellitus ([address] )    HLD (hyperlipidemia)    Hypokalemia    Anemia    Hypertension    Leukocytosis  Resolved Problems:    * No resolved hospital problems  *      Consultations During Hospital Stay:  · Infectious Disease    Procedures Performed:   MRI of the tibia and fibula-skin swelling consistent with cellulitis no abscess or osteomyelitis  CT femur-left lower extremity diffuse subcutaneous edema especially below the knee with reactive left inguinal lymphadenopathy findings are consistent with nonspecific cellulitis without discrete abscess formation or she has destructive changes  Lower extremity Doppler-no DVT  Significant Findings / Test Results:   Blood culture remained negative    Incidental Findings:   · none    Test Results Pending at Discharge (will require follow up):   ·      Outpatient Tests Requested:  Anemia workup  Complications:      Hospital Course:     Janine Cruz is a 52 y o  female patient who originally presented to the hospital on 12/18/2017 due to worsening of left lower extremity cellulitis  Patient was recently in the hospital and diagnosed with cellulitis and discharged home with antibiotics  Patient was on Augmentin as an outpatient  Patient initially responded to vancomycin in the hospital   Patient came back with worsening of the pain and swelling    Patient was admitted to the hospital patient also had leukocytosis and sepsis associated with the infection sepsis resolved while she is in the hospital leukocytosis significantly improved  Patient was admitted and on she had a CT scan which was negative for any abscess formation Doppler was negative for any DVT the patient was started on vancomycin and patient was followed by Infectious Disease the hospital   Because of the persistent leukocytosis and slow respond to antibiotics an MRI was obtained which confirmed the presence of cellulitis along without any evidence of chest colitis or deep infection  Patient finally started to respond to the antibiotics and her leukocytosis improved the plan is to finish a total of 14 day course of antibiotics since the patient was very slow to respond  Patient was discharged in a stable condition on 12/24/2017 with outpatient follow-up with the primary care physician  Patient's blood sugar remained more or less acceptable while in the hospital   Patient also found to have anemia and she was found to have iron-deficiency anemia  It was recommended that the patient should follow up with her primary care physician for further management  Also her vitamin B12 was found to be elevated  This needs to be followed as an outpatient  Recommended to take high iron but the patient is going to start this as an outpatient through her primary care physician  Patient had a colonoscopy and it was recommended that she should get a GI workup to rule out any source of bleed  Patient with heavy periods and this may be contributing to her iron deficiency  All other chronic medical conditions remained stable for details refer to the chart    Condition at Discharge: good     Discharge Day Visit / Exam:     Subjective:  Patient seen and examined patient reported that she feels like her leg swelling is improving she has less pain on ambulation    Wants to go home today  Vitals: Blood Pressure: 145/69 (12/24/17 0749)  Pulse: 81 (12/24/17 0749)  Temperature: 98 3 °F (36 8 °C) (12/24/17 0749)  Temp Source: Oral (12/24/17 0749)  Respirations: 18 (12/24/17 0749)  Height: 5' 7" (170 2 cm) (12/18/17 1813)  Weight - Scale: 104 kg (228 lb 2 8 oz) (12/18/17 1813)  SpO2: 96 % (12/24/17 0749)  Exam:   Physical Exam   Constitutional: She is oriented to person, place, and time  She appears well-developed and well-nourished  HENT:   Head: Normocephalic and atraumatic  Eyes: EOM are normal  Pupils are equal, round, and reactive to light  Neck: Normal range of motion  Neck supple  Cardiovascular: Normal rate and regular rhythm  No murmur heard  Pulmonary/Chest: Breath sounds normal  No respiratory distress  She has no wheezes  Abdominal: Soft  Bowel sounds are normal  She exhibits no distension  Musculoskeletal: Normal range of motion  She exhibits edema  Left lower extremity edema and erythema is improving  Warmth and tenderness is also improving   Neurological: She is alert and oriented to person, place, and time  No cranial nerve deficit  Skin: Skin is warm and dry  No erythema  Discussion with Family:     Discharge instructions/Information to patient and family:   See after visit summary for information provided to patient and family  Provisions for Follow-Up Care:  See after visit summary for information related to follow-up care and any pertinent home health orders  Disposition:     Home    For Discharges to [facility] SNF:   · Not Applicable to this Patient - Not Applicable to this Patient    Planned Readmission:none     Discharge Statement:  I spent 45 minutes discharging the patient  This time was spent on the day of discharge  I had direct contact with the patient on the day of discharge  Greater than 50% of the total time was spent examining patient, answering all patient questions, arranging and discussing plan of care with patient as well as directly providing post-discharge instructions  Additional time then spent on discharge activities      Discharge Medications:  See after visit summary for reconciled discharge medications provided to patient and family        ** Please Note: This note has been constructed using a voice recognition system **

## 2017-12-24 NOTE — PLAN OF CARE
Problem: Potential for Falls  Goal: Patient will remain free of falls  INTERVENTIONS:  - Assess patient frequently for physical needs  -  Identify cognitive and physical deficits and behaviors that affect risk of falls    -  [place] fall precautions as indicated by assessment   - Educate patient/family on patient safety including physical limitations  - Instruct patient to call for assistance with activity based on assessment  - Modify environment to reduce risk of injury  - Consider OT/PT consult to assist with strengthening/mobility   Outcome: Adequate for Discharge      Problem: PAIN - ADULT  Goal: Verbalizes/displays adequate comfort level or baseline comfort level  Interventions:  - Encourage patient to monitor pain and request assistance  - Assess pain using appropriate pain scale  - Administer analgesics based on type and severity of pain and evaluate response  - Implement non-pharmacological measures as appropriate and evaluate response  - Consider cultural and social influences on pain and pain management  - Notify physician/advanced practitioner if interventions unsuccessful or patient reports new pain   Outcome: Adequate for Discharge      Problem: INFECTION - ADULT  Goal: Absence or prevention of progression during hospitalization  INTERVENTIONS:  - Assess and monitor for signs and symptoms of infection  - Monitor lab/diagnostic results  - Monitor all insertion sites, i e  indwelling lines, tubes, and drains  - Monitor endotracheal (as able) and nasal secretions for changes in amount and color  - [place] appropriate cooling/warming therapies per order  - Administer medications as ordered  - Instruct and encourage patient and family to use good hand hygiene technique  - Identify and instruct in appropriate isolation precautions for identified infection/condition   Outcome: Adequate for Discharge      Problem: DISCHARGE PLANNING  Goal: Discharge to home or other facility with appropriate resources  INTERVENTIONS:  - Identify barriers to discharge w/patient and caregiver  - Arrange for needed discharge resources and transportation as appropriate  - Identify discharge learning needs (meds, wound care, etc )  - Arrange for interpretive services to assist at discharge as needed  - Refer to Case Management Department for coordinating discharge planning if the patient needs post-hospital services based on physician/advanced practitioner order or complex needs related to functional status, cognitive ability, or social support system   Outcome: Adequate for Discharge

## 2018-01-02 ENCOUNTER — ALLSCRIPTS OFFICE VISIT (OUTPATIENT)
Dept: OTHER | Facility: OTHER | Age: 48
End: 2018-01-02

## 2018-01-02 DIAGNOSIS — D64.9 ANEMIA: ICD-10-CM

## 2018-01-02 DIAGNOSIS — L03.116 CELLULITIS OF LEFT LOWER EXTREMITY: ICD-10-CM

## 2018-01-02 DIAGNOSIS — G62.9 POLYNEUROPATHY: ICD-10-CM

## 2018-01-03 ENCOUNTER — GENERIC CONVERSION - ENCOUNTER (OUTPATIENT)
Dept: OTHER | Facility: OTHER | Age: 48
End: 2018-01-03

## 2018-01-03 ENCOUNTER — APPOINTMENT (OUTPATIENT)
Dept: WOUND CARE | Facility: HOSPITAL | Age: 48
End: 2018-01-03
Payer: COMMERCIAL

## 2018-01-03 ENCOUNTER — APPOINTMENT (OUTPATIENT)
Dept: LAB | Facility: HOSPITAL | Age: 48
End: 2018-01-03
Payer: COMMERCIAL

## 2018-01-03 ENCOUNTER — TRANSCRIBE ORDERS (OUTPATIENT)
Dept: WOUND CARE | Facility: HOSPITAL | Age: 48
End: 2018-01-03

## 2018-01-03 DIAGNOSIS — D64.9 ANEMIA: ICD-10-CM

## 2018-01-03 DIAGNOSIS — L02.416 CUTANEOUS ABSCESS OF LEFT LOWER LIMB: Primary | ICD-10-CM

## 2018-01-03 DIAGNOSIS — G62.9 POLYNEUROPATHY: ICD-10-CM

## 2018-01-03 LAB
BASOPHILS # BLD AUTO: 0.15 THOUSANDS/ΜL (ref 0–0.1)
BASOPHILS NFR BLD AUTO: 2 % (ref 0–1)
EOSINOPHIL # BLD AUTO: 0.47 THOUSAND/ΜL (ref 0–0.61)
EOSINOPHIL NFR BLD AUTO: 7 % (ref 0–6)
ERYTHROCYTE [DISTWIDTH] IN BLOOD BY AUTOMATED COUNT: 14.5 % (ref 11.6–15.1)
FERRITIN SERPL-MCNC: 167 NG/ML (ref 8–388)
FOLATE SERPL-MCNC: 6.4 NG/ML (ref 3.1–17.5)
HCT VFR BLD AUTO: 29.8 % (ref 34.8–46.1)
HGB BLD-MCNC: 9.5 G/DL (ref 11.5–15.4)
IRON SATN MFR SERPL: 15 %
IRON SERPL-MCNC: 48 UG/DL (ref 50–170)
LYMPHOCYTES # BLD AUTO: 2.39 THOUSANDS/ΜL (ref 0.6–4.47)
LYMPHOCYTES NFR BLD AUTO: 34 % (ref 14–44)
MCH RBC QN AUTO: 27.1 PG (ref 26.8–34.3)
MCHC RBC AUTO-ENTMCNC: 31.9 G/DL (ref 31.4–37.4)
MCV RBC AUTO: 85 FL (ref 82–98)
MONOCYTES # BLD AUTO: 0.66 THOUSAND/ΜL (ref 0.17–1.22)
MONOCYTES NFR BLD AUTO: 10 % (ref 4–12)
NEUTROPHILS # BLD AUTO: 3.26 THOUSANDS/ΜL (ref 1.85–7.62)
NEUTS SEG NFR BLD AUTO: 47 % (ref 43–75)
NRBC BLD AUTO-RTO: 0 /100 WBCS
PLATELET # BLD AUTO: 427 THOUSANDS/UL (ref 149–390)
PMV BLD AUTO: 8.9 FL (ref 8.9–12.7)
RBC # BLD AUTO: 3.51 MILLION/UL (ref 3.81–5.12)
RETICS # AUTO: ABNORMAL 10*3/UL (ref 14097–95744)
RETICS # CALC: 1.9 % (ref 0.37–1.87)
TIBC SERPL-MCNC: 323 UG/DL (ref 250–450)
VIT B12 SERPL-MCNC: 736 PG/ML (ref 100–900)
WBC # BLD AUTO: 6.94 THOUSAND/UL (ref 4.31–10.16)

## 2018-01-03 PROCEDURE — 87070 CULTURE OTHR SPECIMN AEROBIC: CPT | Performed by: PREVENTIVE MEDICINE

## 2018-01-03 PROCEDURE — 82746 ASSAY OF FOLIC ACID SERUM: CPT

## 2018-01-03 PROCEDURE — 36415 COLL VENOUS BLD VENIPUNCTURE: CPT

## 2018-01-03 PROCEDURE — 82728 ASSAY OF FERRITIN: CPT

## 2018-01-03 PROCEDURE — 83550 IRON BINDING TEST: CPT

## 2018-01-03 PROCEDURE — 85045 AUTOMATED RETICULOCYTE COUNT: CPT

## 2018-01-03 PROCEDURE — 87205 SMEAR GRAM STAIN: CPT | Performed by: PREVENTIVE MEDICINE

## 2018-01-03 PROCEDURE — 83540 ASSAY OF IRON: CPT

## 2018-01-03 PROCEDURE — 11042 DBRDMT SUBQ TIS 1ST 20SQCM/<: CPT | Performed by: PREVENTIVE MEDICINE

## 2018-01-03 PROCEDURE — 10060 I&D ABSCESS SIMPLE/SINGLE: CPT | Performed by: PREVENTIVE MEDICINE

## 2018-01-03 PROCEDURE — 99213 OFFICE O/P EST LOW 20 MIN: CPT | Performed by: PREVENTIVE MEDICINE

## 2018-01-03 PROCEDURE — 82607 VITAMIN B-12: CPT

## 2018-01-03 PROCEDURE — 85025 COMPLETE CBC W/AUTO DIFF WBC: CPT

## 2018-01-05 LAB
BACTERIA WND AEROBE CULT: NORMAL
GRAM STN SPEC: NORMAL

## 2018-01-10 ENCOUNTER — GENERIC CONVERSION - ENCOUNTER (OUTPATIENT)
Dept: OTHER | Facility: OTHER | Age: 48
End: 2018-01-10

## 2018-01-11 ENCOUNTER — APPOINTMENT (OUTPATIENT)
Dept: WOUND CARE | Facility: HOSPITAL | Age: 48
End: 2018-01-11
Payer: COMMERCIAL

## 2018-01-11 PROCEDURE — 97597 DBRDMT OPN WND 1ST 20 CM/<: CPT | Performed by: PREVENTIVE MEDICINE

## 2018-01-11 PROCEDURE — 10060 I&D ABSCESS SIMPLE/SINGLE: CPT | Performed by: PREVENTIVE MEDICINE

## 2018-01-12 NOTE — RESULT NOTES
Discussion/Summary   BETTER BUT NOT AT GOAL  WILL DISCUSS AT VISIT  SOME KIDNEY INVOLEMENT     [facility]     Verified Results  (1) COMPREHENSIVE METABOLIC PANEL [id number] 09:22ML [place] Order Number: [id number]     Test Name Result Flag Reference   SODIUM 141 mmol/L  136-145   POTASSIUM 4 6 mmol/L  3 5-5 3   CHLORIDE 110 mmol/L H 100-108   CARBON DIOXIDE 27 mmol/L  21-32   ANION GAP (CALC) 4 mmol/L  4-13   BLOOD UREA NITROGEN 13 mg/dL  5-25   CREATININE 0 71 mg/dL  0 60-1 30   Standardized to IDMS reference method   CALCIUM 8 6 mg/dL  8 3-10 1   BILI, TOTAL 0 34 mg/dL  0 20-1 00   ALK PHOSPHATAS 98 U/L     ALT (SGPT) 19 U/L  12-78   Specimen collection should occur prior to Sulfasalazine and/or Sulfapyridine administration due to the potential for falsely depressed results  AST(SGOT) 18 U/L  5-45   Specimen collection should occur prior to Sulfasalazine administration due to the potential for falsely depressed results  ALBUMIN 3 0 g/dL L 3 5-5 0   TOTAL PROTEIN 6 8 g/dL  6 4-8 2   eGFR 102 ml/min/1 73sq m     [facility] Disease Education Program recommendations are as follows:  GFR calculation is accurate only with a steady state creatinine  Chronic Kidney disease less than 60 ml/min/1 73 sq  meters  Kidney failure less than 15 ml/min/1 73 sq  meters  GLUCOSE FASTING 105 mg/dL H 65-99   Specimen collection should occur prior to Sulfasalazine administration due to the potential for falsely depressed results  Specimen collection should occur prior to Sulfapyridine administration due to the potential for falsely elevated results  (1) HEMOGLOBIN A1C [id number] 09:47AM [place] Order Number: [id number]     Test Name Result Flag Reference   HEMOGLOBIN A1C 7 3 % H 4 2-6 3   EST  AVG   GLUCOSE 163 mg/dl       (1) LIPID PANEL FASTING W DIRECT LDL REFLEX [id number] 09:47AM [place] Order Number: [id number]     Test Name Result Flag Reference CHOLESTEROL 163 mg/dL     LDL CHOLESTEROL CALCULATED 97 mg/dL  0-100   Triglyceride:        Normal <150 mg/dl   Borderline High 150-199 mg/dl   High 200-499 mg/dl   Very High >499 mg/dl      Cholesterol:       Desirable <200 mg/dl    Borderline High 200-239 mg/dl    High >239 mg/dl      HDL Cholesterol:       High>59 mg/dL    Low <41 mg/dL      HDL Cholesterol:       High>59 mg/dL    Low <41 mg/dL      This screening LDL is a calculated result  It does not have the accuracy of the Direct Measured LDL in the monitoring of patients with hyperlipidemia and/or statin therapy  Direct Measure LDL (SWS444) must be ordered separately in these patients  TRIGLYCERIDES 73 mg/dL  <=150   Specimen collection should occur prior to N-Acetylcysteine or Metamizole administration due to the potential for falsely depressed results  HDL,DIRECT 51 mg/dL  40-60   Specimen collection should occur prior to Metamizole administration due to the potential for falsley depressed results  (1) TSH WITH FT4 REFLEX [id number] 09:47AM [place] Order Number: [id number]     Test Name Result Flag Reference   TSH 1 580 uIU/mL  0 358-3 740   Patients undergoing fluorescein dye angiography may retain small amounts of fluorescein in the body for 48-72 hours post procedure  Samples containing fluorescein can produce falsely depressed TSH values  If the patient had this procedure,a specimen should be resubmitted post fluorescein clearance            The recommended reference ranges for TSH during pregnancy are as follows:  First trimester 0 1 to 2 5 uIU/mL  Second trimester  0 2 to 3 0 uIU/mL  Third trimester 0 3 to 3 0 uIU/m     (1) MICROALBUMIN CREATININE RATIO, RANDOM URINE [id number] 09:47AM [place] Order Number: [id number]     Test Name Result Flag Reference   MICROALBUMIN/ CREAT R 143 mg/g creatinine H 0-30   MICROALBUMIN,URINE 184 0 mg/L H 0 0-20 0   CREATININE URINE 129 0 mg/dL

## 2018-01-13 VITALS
TEMPERATURE: 98.1 F | HEART RATE: 84 BPM | SYSTOLIC BLOOD PRESSURE: 158 MMHG | RESPIRATION RATE: 12 BRPM | BODY MASS INDEX: 32.9 KG/M2 | WEIGHT: 222.13 LBS | HEIGHT: 69 IN | DIASTOLIC BLOOD PRESSURE: 72 MMHG

## 2018-01-13 VITALS
DIASTOLIC BLOOD PRESSURE: 86 MMHG | HEIGHT: 69 IN | BODY MASS INDEX: 31.96 KG/M2 | RESPIRATION RATE: 16 BRPM | HEART RATE: 96 BPM | SYSTOLIC BLOOD PRESSURE: 140 MMHG | WEIGHT: 215.8 LBS

## 2018-01-15 VITALS
WEIGHT: 222.13 LBS | HEIGHT: 69 IN | DIASTOLIC BLOOD PRESSURE: 82 MMHG | SYSTOLIC BLOOD PRESSURE: 130 MMHG | HEART RATE: 88 BPM | RESPIRATION RATE: 18 BRPM | BODY MASS INDEX: 32.9 KG/M2

## 2018-01-16 NOTE — PROGRESS NOTES
History of Present Illness  [facility] Revaccination:   Revaccination   Vaccine Information: Vaccine(s) Given (names): Pneumovax [id number]  Spoke with patient regarding vaccine out of temperature range and risks and benefits of revaccination  Action(s): Pt will be revaccinated  Appointment scheduled: [id number]  Pt called (attempt 1): [id number] 16:24 JLF  Pt called (attempt 2): [id number] 13:22 JLF  Other Information: Revac Pneumovax  12/21/16 left message to call back  Revaccination Completed: [id number]  Active Problems    1  Benign essential hypertension (401 1) (I10)   2  Breast cancer screening (V76 10) (Z12 39)   3  Diabetes mellitus with diabetic neuropathy (250 60,357 2) (E11 40)   4  Foot ulcer, right (707 15) (L97 519)   5  Hyperlipidemia (272 4) (E78 5)   6  Need for prophylactic vaccination against Streptococcus pneumoniae (pneumococcus)   (V03 82) (Z23)   7  Need for revaccination (V05 9) (Z23)   8  Peripheral neuropathy (356 9) (G62 9)    Current Meds   1  Aspirin 325 MG Oral Tablet; Therapy: (Recorded:29Oct2014) to Recorded   2  GlipiZIDE 5 MG Oral Tablet; take 1 tablet by mouth twice a day; Therapy: 58IDW8500 to (Last Rx:12Bwt7535)  Requested for: 12Vio5830 Ordered   3  Lisinopril-Hydrochlorothiazide 10-12 5 MG Oral Tablet; take 1 tablet by mouth daily; Therapy: 07BUA8653 to (Last Rx:06Tzn6303)  Requested for: 64Avz5643 Ordered   4  MetFORMIN HCl - 1000 MG Oral Tablet; take 1 tablet by mouth twice a day; Therapy: 26WDK0768 to (Last Rx:40Tlm2051)  Requested for: 01Vzu6990 Ordered   5  Simvastatin 40 MG Oral Tablet; TAKE 1 TABLET AT BEDTIME; Therapy: 49VEJ6930 to (Evaluate:14Nov2017)  Requested for: 03FNZ8991; Last   Rx:11Tet1653 Ordered   6  Sulfamethoxazole-Trimethoprim 800-160 MG Oral Tablet; TAKE 1 TABLET TWICE   DAILY; Therapy: 62QWA1091 to (Complete:15Yrg1483)  Requested for: 35Rtt4610; Last   Rx:06Oxc0040 Ordered    Allergies    1   Keflex CAPS    Future Appointments    Date/Time Provider Specialty Site   11/16/2017 09:30 AM Romina Casper DO Family Medicine [facility] [address]   Electronically signed by : Lluvia Ramos DO; Aug 16 2017  3:54PM EST                       (Author)

## 2018-01-18 ENCOUNTER — APPOINTMENT (OUTPATIENT)
Dept: WOUND CARE | Facility: HOSPITAL | Age: 48
End: 2018-01-18
Payer: COMMERCIAL

## 2018-01-18 PROCEDURE — 97597 DBRDMT OPN WND 1ST 20 CM/<: CPT | Performed by: PREVENTIVE MEDICINE

## 2018-01-18 NOTE — RESULT NOTES
Verified Results  (1) WOUND CULTURE 39PLW4732 11:44AM [place] [facility]     Test Name Result Flag Reference   CLINICAL REPORT (Report)     Test:        Wound culture and Gram stain  Specimen Source:  Leg, Left  Specimen Type:    Wound  Specimen Date:   1/6/2017 11:44 AM  Result Date:    1/8/2017 4:15 PM  Result Status:   Final result  Resulting Lab:   [facility] [address] [place] [address]            Tel: [phone]      CULTURE                                       ------------------                                   1+ Growth of Staphylococcus aureus      STAIN                                        ------------------                                   Rare Polys    1+ Gram positive cocci in pairs      SUSCEPTIBILITY                                   ------------------                                                       Staphylococcus aureus  METHOD                 TEA  -------------------------------------  ----------------------------  AMOXICILLIN + CLAVULANATE        <=4/2 ug/ml   Susceptible  AMPICILLIN ($$)             >8 00 ug/ml   Resistant  AMPICILLIN + SULBACTAM ($)       <=8/4 ug/ml   Susceptible  CEFAZOLIN ($)              <=8 00 ug/ml   Susceptible  CLINDAMYCIN ($)             <=0 50 ug/ml   Susceptible  ERYTHROMYCIN ($$$$)           <=0 50 ug/ml   Susceptible  GENTAMICIN ($$)             <=4 ug/ml    Susceptible  OXACILLIN                0 50 ug/ml    Susceptible  TETRACYCLINE              <=4 ug/ml    Susceptible  TRIMETHOPRIM + SULFAMETHOXAZOLE ($$$)  <=0 5/9 5 ug/ml Susceptible  VANCOMYCIN ($)             2 00 ug/ml    Susceptible

## 2018-01-22 VITALS
SYSTOLIC BLOOD PRESSURE: 118 MMHG | WEIGHT: 213.5 LBS | BODY MASS INDEX: 31.62 KG/M2 | RESPIRATION RATE: 16 BRPM | HEIGHT: 69 IN | DIASTOLIC BLOOD PRESSURE: 64 MMHG | HEART RATE: 76 BPM

## 2018-01-23 VITALS
RESPIRATION RATE: 16 BRPM | DIASTOLIC BLOOD PRESSURE: 50 MMHG | TEMPERATURE: 100.8 F | HEART RATE: 112 BPM | SYSTOLIC BLOOD PRESSURE: 140 MMHG

## 2018-01-23 VITALS
TEMPERATURE: 97.3 F | RESPIRATION RATE: 18 BRPM | SYSTOLIC BLOOD PRESSURE: 114 MMHG | HEIGHT: 69 IN | HEART RATE: 88 BPM | DIASTOLIC BLOOD PRESSURE: 60 MMHG | WEIGHT: 223 LBS | BODY MASS INDEX: 33.03 KG/M2

## 2018-01-23 NOTE — MISCELLANEOUS
Assessment    1  Cellulitis of left lower leg (682 6) (L03 116)   2  Diabetes mellitus with diabetic neuropathy (250 60,357 2) (E11 40)   3  Anemia (285 9) (D64 9)   4  Peripheral neuropathy (356 9) (G62 9)    Plan  Anemia, Cellulitis of left lower leg, Diabetes mellitus with diabetic neuropathy, Peripheral  neuropathy    · Follow-up visit in 1 week Evaluation and Treatment  Follow-up  Status: Complete  Done:  97PUS0833   Ordered; For: Anemia, Cellulitis of left lower leg, Diabetes mellitus with diabetic neuropathy, Peripheral neuropathy; Ordered ByFoster Andres Performed:  Due: 46MOS2538; Last Updated By: Rufina Blanchard; 1/2/2018 3:09:19 PM  Anemia, Peripheral neuropathy    · (1) CBC/PLT/DIFF; Status:Active; Requested SHV:90LJU7819; Perform:[facility]; UFJ:39FSA6405;UGFNGTE; For:Anemia, Peripheral neuropathy; Ordered By:Greyson Montana;   · (1) FOLATE; Status:Active; Requested QRS:78VNU9515; Perform:[facility]; IRZ:15DQO2272;ARUYIFD; For:Anemia, Peripheral neuropathy; Ordered By:Greyson Montana;   · (1) IRON PANEL; Status:Active; Requested YIC:88DHL9973; Perform:[facility]; WEX:62JUA1198;INJQZRY; For:Anemia, Peripheral neuropathy; Ordered By:Greyson Montana;   · (1) RETICULOCYTE COUNT; Status:Active; Requested RJS:43WUG9238; Perform:[facility]; LJL:40LCC7331;QOPDKKO; For:Anemia, Peripheral neuropathy; Ordered By:Greyson Montana;   · (1) VITAMIN B12; Status:Active; Requested YMO:16HRC0771; Perform:[facility]; SPZ:57BHT9255;SXIKAOD; For:Anemia, Peripheral neuropathy; Ordered By:Greysno Montana; Cellulitis of left lower leg, Diabetes mellitus with diabetic neuropathy    · Amoxicillin-Pot Clavulanate 875-125 MG Oral Tablet (Augmentin); TAKE 1 TABLET  EVERY 12 HOURS WITH MEALS UNTIL GONE   Rx By: Eve Gannon; Dispense: 10 Days ; #:20 Tablet;  Refill: 0; For: Cellulitis of left lower leg, Diabetes mellitus with diabetic neuropathy; NASH = N; Verified Transmission to [address]; Msg to Pharmacy: Pt has taken and tolerated previously ; Last Updated By: System, SureScripts; 1/2/2018 2:51:59 PM   · *1 - [facility]-Management  *  51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, diabetic, Nurse,  with recent admission for LLE Cellulitis, with possible multiple abscess formation and  non-healing wound  Thanks  Olga Bear,   Status: Complete  Done:  06HWJ1529   Ordered; For: Cellulitis of left lower leg, Diabetes mellitus with diabetic neuropathy; Ordered ByGeneral Wan Performed:  Due: 07RWB4145; Last Updated By: Anjelica Jackson; 1/2/2018 3:09:50 PM  Care Summary provided  : Yes    Discussion/Summary  Discussion Summary:   Ambrose Garcia is stable on exam  She is to f/u in 1 week, or sooner PRN  She is to call or be taken to the ER, if she has fevers, the leg worsens, etc   Overall, from discussion with the pt she is better, but this does not appear fully resolved -> we will start her back on Augmentin, she is to elevate the leg when possible, and I referred her locally to Wound Care Management (it appears that the site on her leg may need treatment, I&D, debridement, etc)  She can use warm compresses to the site  Suspect her anemia was related to her menses at the time, and multiple draws -> will recheck some non-fasting labs  Counseling Documentation With Imm: The patient was counseled regarding instructions for management, risk factor reductions, impressions, importance of compliance with treatment  Medication SE Review and Pt Understands Tx: Possible side effects of new medications were reviewed with the patient/guardian today  The treatment plan was reviewed with the patient/guardian  The patient/guardian understands and agrees with the treatment plan      Chief Complaint  Chief Complaint Free Text Note Form: PT is being seen today for a STEPHANIE/hospital f/u  PT is feeling better but still has swollen left leg with drainage        History of Present Illness  TCM Communication [facility]: The patient is being contacted for follow-up after hospitalization  Hospital records were reviewed and Pt just in the hospital for LLE Cellulitis - finished Keflex and Augmentin  Saw ID while  US / CT / MRI of the LLE showed no DVT, no abscess, and no osteomyelitis  She was hospitalized at [place] [address]  The dates of hospitalization:, date of admission: 12/18/2017, date of discharge: 12/24/2017  Diagnosis: CELLULITIS OF LEFT LEG  She was discharged to home  She scheduled a follow up appointment  Symptoms: swelling and swelling location LLE - overall better  , but no fever, no shortness of breath, no chest pain, no upper abdominal pain, no middle abdominal pain, no lower abdominal pain, no anorexia and no loose stools  Some discomfort in the leg - improved overall  Menses chronically heavy -on menses in the hospital (off menses x 2 weeks); pt found to have an anemia in the hospital  Counseling was provided to the patient  Topics counseled included instructions for management, risk factor reductions and importance of compliance with treatment  Communication performed and completed by   HPI: As above  Review of Systems  Complete-Female:   Constitutional: as noted in HPI  Cardiovascular: as noted in HPI  Respiratory: as noted in HPI  Gastrointestinal: as noted in HPI  Genitourinary: as noted in HPI  Integumentary: as noted in HPI  Active Problems    1  Benign essential hypertension (401 1) (I10)   2  Breast cancer screening (V76 10) (Z12 39)   3  Cellulitis of left lower leg (682 6) (L03 116)   4  Diabetes mellitus with diabetic neuropathy (250 60,357 2) (E11 40)   5  Hospital discharge follow-up (V67 59) (Z09)   6  Hyperlipidemia (272 4) (E78 5)   7  Need for prophylactic vaccination against Streptococcus pneumoniae (pneumococcus)   (V03 82) (Z23)   8  Need for revaccination (V05 9) (Z23)   9   Peripheral neuropathy (356 9) (G62 9)    Past Medical History    1  History of Cellulitis of lower leg (682 6) (L03 119)   2  History of Cellulitis of thigh (682 6) (L03 119)   3  History of Cellulitis of trunk (682 2) (L03 319)   4  History of Edema of both legs (782 3) (R60 0)   5  History Of 1  Previous Pregnancies (V61 5)   6  History of Nausea (787 02) (R11 0)   7  History of Right leg pain (729 5) (M79 604)   8  History of Right leg swelling (729 81) (M79 89)   9  History of Skin Abscess Of Foot (682 7)   10  History of Superficial phlebitis and thrombophlebitis of right lower extremity (451 0)    (I80 01)    Surgical History    1  History of Tonsillectomy With Adenoidectomy    Family History  Mother    1  Family history of Benign Essential Hypertension   2  Family history of Coronary Artery Disease (V17 49)  Father    3  Family history of Lung Cancer (V16 1)  Sister    4  Family history of Diabetes Mellitus (V18 0)  Maternal Grandfather    5  Family history of Diabetes Mellitus (V18 0)    Social History    · Alcohol Use (History)   · Denied: History of Drug Use   · Never a smoker    Current Meds   1  Aspirin 325 MG Oral Tablet; Therapy: (Recorded:29Oct2014) to Recorded   2  GlipiZIDE 5 MG Oral Tablet; take 1 tablet by mouth twice a day; Therapy: 95JGR2752 to (Last Rx:51Ybs1890)  Requested for: 16Aug2017 Ordered   3  Invokana 100 MG Oral Tablet; Take 1 tablet daily; Therapy: 36JLM2650 to (Evaluate:86Qrh9874)  Requested for: 78RBT9854; Last   Rx:16Nov2017 Ordered   4  Lisinopril-Hydrochlorothiazide 10-12 5 MG Oral Tablet; take one tablet by mouth every   day; Therapy: 35AAZ1082 to (Last Rx:28Nov2017)  Requested for: 28Nov2017 Ordered   5  MetFORMIN HCl - 1000 MG Oral Tablet; take 1 tablet by mouth twice a day; Therapy: 44GNR7438 to (Last Rx:35Wha8660)  Requested for: 16Aug2017 Ordered   6  Simvastatin 40 MG Oral Tablet; TAKE 1 TABLET AT BEDTIME;    Therapy: 55XZQ7206 to (Last 9127 7933)  Requested for: 28Nov2017 Ordered  Medication List Reviewed: The medication list was reviewed and updated today  Allergies    1  Keflex CAPS    Vitals  Signs   Recorded: 02Jan2018 02:32PM   Heart Rate: 76  Respiration: 16  Systolic: 959  Diastolic: 64  Height: 5 ft 9 in  Weight: 213 lb 8 oz  BMI Calculated: 31 53  BSA Calculated: 2 12    Physical Exam    Constitutional   General appearance: No acute distress, well appearing and well nourished  NAD; VSS; pleasant  Pulmonary   Respiratory effort: No increased work of breathing or signs of respiratory distress  Auscultation of lungs: Clear to auscultation  Cardiovascular   Auscultation of heart: Normal rate and rhythm, normal S1 and S2, without murmurs  Musculoskeletal   Gait and station: Normal     Inspection/palpation of joints, bones, and muscles: Normal   Pt with still a mildly red 5 x 7 cm section in the mid left anterior tibial region; no streaking up the leg (pt notes that this is much improved from what it was); has two sections in this red patch that appear fluctuant - no active drainage at this time     Psychiatric   Orientation to person, place, and time: Normal     Mood and affect: Normal          Future Appointments    Date/Time Provider Specialty Site   02/19/2018 10:00 AM Jaqueline Bhagat DO Family Medicine [facility]   11/19/2018 08:15 AM Jaqueline Bhagat DO Family Medicine [address] [address]   Electronically signed by : Diana Carrera, ; Dec 26 2017  2:27PM EST                       (Author)    Electronically signed by : Bharti Caputo DO; Jan 2 2018  5:19PM EST                       (Author)

## 2018-01-23 NOTE — RESULT NOTES
Verified Results  (1) VITAMIN B12 70MXY4130 09:34AM [facility] [place] Order Number: [id number]     Test Name Result Flag Reference   VITAMIN B12 736 pg/mL  100-900     (1) CBC/PLT/DIFF 01EPJ8807 09:34AM [facility] [place] Order Number: [id number]     Test Name Result Flag Reference   WBC COUNT 6 94 Thousand/uL  4 31-10 16   RBC COUNT 3 51 Million/uL L 3 81-5 12   HEMOGLOBIN 9 5 g/dL L 11 5-15 4   HEMATOCRIT 29 8 % L 34 8-46  1   MCV 85 fL  82-98   MCH 27 1 pg  26 8-34 3   MCHC 31 9 g/dL  31 4-37 4   RDW 14 5 %  11 6-15 1   MPV 8 9 fL  8 9-12 7   PLATELET COUNT 678 Thousands/uL H 149-390   nRBC AUTOMATED 0 /100 WBCs     NEUTROPHILS RELATIVE PERCENT 47 %  43-75   LYMPHOCYTES RELATIVE PERCENT 34 %  14-44   MONOCYTES RELATIVE PERCENT 10 %  4-12   EOSINOPHILS RELATIVE PERCENT 7 % H 0-6   BASOPHILS RELATIVE PERCENT 2 % H 0-1   NEUTROPHILS ABSOLUTE COUNT 3 26 Thousands/? ??L  1 85-7 62   LYMPHOCYTES ABSOLUTE COUNT 2 39 Thousands/? ??L  0 60-4 47   MONOCYTES ABSOLUTE COUNT 0 66 Thousand/? ??L  0 17-1 22   EOSINOPHILS ABSOLUTE COUNT 0 47 Thousand/? ??L  0 00-0 61   BASOPHILS ABSOLUTE COUNT 0 15 Thousands/? ??L H 0 00-0 10     (1) FOLATE 50LZP7931 09:34AM [facility] Order Number: [id number]     Test Name Result Flag Reference   FOLATE 6 4 ng/mL  3 1-17 5     (1) RETICULOCYTE COUNT 72XYF8659 09:34AM E[facility] [place] Order Number: [id number]     Test Name Result Flag Reference   RETIC ABS # [id number]   RETIC % 1 90 % H 0 37-1 87     (1) IRON PANEL 12ZRV5661 09:34AM [facility] [place] Order Number: [id number]     Test Name Result Flag Reference   IRON 48 ug/dL L    Patients treated with metal-binding drugs (ie  Deferoxamine) may have depressed iron values     FERRITIN 167 ng/mL  8-388   TOTAL IRON BINDING CAPACITY 323 ug/dL  250-450   IRON SATURATION 15 %         Plan  Anemia    · 1 Jw Pfeiffer MD, Sheyla ANGULO (Gastroenterology) Co-Management  *  27 yo female, with what  appears to be a chronic, moderate, iron deficiency anemia  Many thanks    Sal Nissen,  DO  Status: Active  Requested for: 60CQC9716  Care Summary provided  : Yes

## 2018-01-23 NOTE — MISCELLANEOUS
Assessment    1  Hospital discharge follow-up (V67 59) (Z09)   2  Cellulitis of left lower leg (682 6) (L03 116)   3  Diabetes mellitus with diabetic neuropathy (250 60,357 2) (E11 40)    Discussion/Summary  Discussion Summary:   Failure of outpt therapy- resending back to hospital       Chief Complaint  Chief Complaint Free Text Note Form: Patient here for BETINA TRUONG discharged from [address] [place] [address] on 12/15/2017 given Augmentin 875-125 cellulitis of the left leg      History of Present Illness  TCM Communication [facility]: The patient is being contacted for follow-up after hospitalization  Hospital records were reviewed  She was hospitalized at [facility]  The date of admission: 12/11/17, date of discharge: 12/15/17  Diagnosis: cellulitis of the leg  She was discharged to home  She scheduled a follow up appointment  leg erythema worsening Counseling was provided to the patient  Communication performed and completed by Doe Manuel (Brief): The patient is being seen for worsening symptoms of cellulitis  Symptoms:  skin pain and skin redness  No associated symptoms are reported  Active Problems    1  Benign essential hypertension (401 1) (I10)   2  Breast cancer screening (V76 10) (Z12 39)   3  Cellulitis of left lower leg (682 6) (L03 116)   4  Diabetes mellitus with diabetic neuropathy (250 60,357 2) (E11 40)   5  Hyperlipidemia (272 4) (E78 5)   6  Need for prophylactic vaccination against Streptococcus pneumoniae (pneumococcus)   (V03 82) (Z23)   7  Need for revaccination (V05 9) (Z23)   8  Peripheral neuropathy (356 9) (G62 9)    Past Medical History    1  History of Cellulitis of lower leg (682 6) (L03 119)   2  History of Cellulitis of thigh (682 6) (L03 119)   3  History of Cellulitis of trunk (682 2) (L03 319)   4  History of Edema of both legs (782 3) (R60 0)   5  History Of 1  Previous Pregnancies (V61 5)   6  History of Nausea (787 02) (R11 0)   7   History of Right leg pain (729 5) (M79 604)   8  History of Right leg swelling (729 81) (M79 89)   9  History of Skin Abscess Of Foot (682 7)   10  History of Superficial phlebitis and thrombophlebitis of right lower extremity (451 0)    (I80 01)    Surgical History    1  History of Tonsillectomy With Adenoidectomy  Surgical History Reviewed: The surgical history was reviewed and updated today  Family History  Mother    1  Family history of Benign Essential Hypertension   2  Family history of Coronary Artery Disease (V17 49)  Father    3  Family history of Lung Cancer (V16 1)  Sister    4  Family history of Diabetes Mellitus (V18 0)  Maternal Grandfather    5  Family history of Diabetes Mellitus (V18 0)  Family History Reviewed: The family history was reviewed and updated today  Social History    · Alcohol Use (History)   · Denied: History of Drug Use   · Never a smoker  Social History Reviewed: The social history was reviewed and updated today  Current Meds   1  Aspirin 325 MG Oral Tablet; Therapy: (Recorded:29Oct2014) to Recorded   2  GlipiZIDE 5 MG Oral Tablet; take 1 tablet by mouth twice a day; Therapy: 87GDR2045 to (Last Rx:82Tja9690)  Requested for: 16Aug2017 Ordered   3  Invokana 100 MG Oral Tablet; Take 1 tablet daily; Therapy: 66UJW4464 to (Evaluate:34Omy4152)  Requested for: 69IFE1510; Last   Rx:16Nov2017 Ordered   4  Lisinopril-Hydrochlorothiazide 10-12 5 MG Oral Tablet; take one tablet by mouth every   day; Therapy: 53CUL0185 to (Last Rx:28Nov2017)  Requested for: 28Nov2017 Ordered   5  MetFORMIN HCl - 1000 MG Oral Tablet; take 1 tablet by mouth twice a day; Therapy: 23VSP3109 to (Last Rx:22Pdc1552)  Requested for: 18Jya8724 Ordered   6  Simvastatin 40 MG Oral Tablet; TAKE 1 TABLET AT BEDTIME; Therapy: 39LQS0690 to (Last 9651 7665)  Requested for: 28Nov2017 Ordered  Medication List Reviewed: The medication list was reviewed and updated today  Allergies    1   Keflex CAPS    Physical Exam    Constitutional   General appearance: No acute distress, well appearing and well nourished  Pulmonary   Respiratory effort: No increased work of breathing or signs of respiratory distress  Auscultation of lungs: Clear to auscultation  Cardiovascular   Auscultation of heart: Normal rate and rhythm, normal S1 and S2, without murmurs  Examination of extremities for edema and/or varicosities: Abnormal   left leg swelling  Abdomen   Abdomen: Non-tender, no masses  Liver and spleen: No hepatomegaly or splenomegaly  Skin   Skin and subcutaneous tissue: Abnormal   erythema increased          Future Appointments    Date/Time Provider Specialty Site   02/19/2018 10:00 AM Malorie Shannon DO Family Medicine [facility]   11/19/2018 08:15 AM Malorie Shannon DO Family Medicine [facility]     Signatures   Electronically signed by : Anne-Marie Lopez DO; Dec 18 2017 11:46AM EST                       (Author)

## 2018-01-24 ENCOUNTER — APPOINTMENT (OUTPATIENT)
Dept: WOUND CARE | Facility: HOSPITAL | Age: 48
End: 2018-01-24
Payer: COMMERCIAL

## 2018-01-24 ENCOUNTER — PATIENT OUTREACH (OUTPATIENT)
Dept: FAMILY MEDICINE CLINIC | Facility: CLINIC | Age: 48
End: 2018-01-24

## 2018-01-24 VITALS
WEIGHT: 206.5 LBS | DIASTOLIC BLOOD PRESSURE: 74 MMHG | TEMPERATURE: 96.9 F | HEART RATE: 84 BPM | SYSTOLIC BLOOD PRESSURE: 130 MMHG | RESPIRATION RATE: 16 BRPM | BODY MASS INDEX: 30.59 KG/M2 | HEIGHT: 69 IN

## 2018-01-24 PROCEDURE — 97597 DBRDMT OPN WND 1ST 20 CM/<: CPT

## 2018-02-01 ENCOUNTER — APPOINTMENT (OUTPATIENT)
Dept: WOUND CARE | Facility: HOSPITAL | Age: 48
End: 2018-02-01
Payer: COMMERCIAL

## 2018-02-01 PROCEDURE — 99213 OFFICE O/P EST LOW 20 MIN: CPT

## 2018-02-14 DIAGNOSIS — E11.40 TYPE 2 DIABETES MELLITUS WITH DIABETIC NEUROPATHY (HCC): ICD-10-CM

## 2018-02-23 ENCOUNTER — APPOINTMENT (OUTPATIENT)
Dept: LAB | Facility: HOSPITAL | Age: 48
End: 2018-02-23
Payer: COMMERCIAL

## 2018-02-23 DIAGNOSIS — D64.9 ANEMIA: ICD-10-CM

## 2018-02-23 DIAGNOSIS — E11.40 TYPE 2 DIABETES MELLITUS WITH DIABETIC NEUROPATHY (HCC): ICD-10-CM

## 2018-02-23 DIAGNOSIS — L03.116 CELLULITIS OF LEFT LOWER EXTREMITY: ICD-10-CM

## 2018-02-23 LAB
BASOPHILS # BLD AUTO: 0.09 THOUSANDS/ΜL (ref 0–0.1)
BASOPHILS NFR BLD AUTO: 1 % (ref 0–1)
EOSINOPHIL # BLD AUTO: 0.13 THOUSAND/ΜL (ref 0–0.61)
EOSINOPHIL NFR BLD AUTO: 1 % (ref 0–6)
ERYTHROCYTE [DISTWIDTH] IN BLOOD BY AUTOMATED COUNT: 15.3 % (ref 11.6–15.1)
EST. AVERAGE GLUCOSE BLD GHB EST-MCNC: 128 MG/DL
HBA1C MFR BLD: 6.1 % (ref 4.2–6.3)
HCT VFR BLD AUTO: 35 % (ref 34.8–46.1)
HGB BLD-MCNC: 11.1 G/DL (ref 11.5–15.4)
LYMPHOCYTES # BLD AUTO: 2.33 THOUSANDS/ΜL (ref 0.6–4.47)
LYMPHOCYTES NFR BLD AUTO: 25 % (ref 14–44)
MCH RBC QN AUTO: 27 PG (ref 26.8–34.3)
MCHC RBC AUTO-ENTMCNC: 31.7 G/DL (ref 31.4–37.4)
MCV RBC AUTO: 85 FL (ref 82–98)
MONOCYTES # BLD AUTO: 0.8 THOUSAND/ΜL (ref 0.17–1.22)
MONOCYTES NFR BLD AUTO: 9 % (ref 4–12)
NEUTROPHILS # BLD AUTO: 6.03 THOUSANDS/ΜL (ref 1.85–7.62)
NEUTS SEG NFR BLD AUTO: 64 % (ref 43–75)
NRBC BLD AUTO-RTO: 0 /100 WBCS
PLATELET # BLD AUTO: 300 THOUSANDS/UL (ref 149–390)
PMV BLD AUTO: 9.2 FL (ref 8.9–12.7)
RBC # BLD AUTO: 4.11 MILLION/UL (ref 3.81–5.12)
WBC # BLD AUTO: 9.4 THOUSAND/UL (ref 4.31–10.16)

## 2018-02-23 PROCEDURE — 83036 HEMOGLOBIN GLYCOSYLATED A1C: CPT

## 2018-02-23 PROCEDURE — 36415 COLL VENOUS BLD VENIPUNCTURE: CPT

## 2018-02-23 PROCEDURE — 85025 COMPLETE CBC W/AUTO DIFF WBC: CPT

## 2018-02-27 ENCOUNTER — OFFICE VISIT (OUTPATIENT)
Dept: FAMILY MEDICINE CLINIC | Facility: CLINIC | Age: 48
End: 2018-02-27
Payer: COMMERCIAL

## 2018-02-27 VITALS
HEART RATE: 84 BPM | SYSTOLIC BLOOD PRESSURE: 130 MMHG | DIASTOLIC BLOOD PRESSURE: 70 MMHG | HEIGHT: 67 IN | RESPIRATION RATE: 16 BRPM | WEIGHT: 221.2 LBS | BODY MASS INDEX: 34.72 KG/M2

## 2018-02-27 DIAGNOSIS — E11.40 TYPE 2 DIABETES MELLITUS WITH DIABETIC NEUROPATHY, WITHOUT LONG-TERM CURRENT USE OF INSULIN (HCC): Primary | Chronic | ICD-10-CM

## 2018-02-27 DIAGNOSIS — I10 BENIGN ESSENTIAL HYPERTENSION: ICD-10-CM

## 2018-02-27 DIAGNOSIS — E78.5 HYPERLIPIDEMIA, UNSPECIFIED HYPERLIPIDEMIA TYPE: Chronic | ICD-10-CM

## 2018-02-27 PROBLEM — L03.116 CELLULITIS OF LEFT LOWER EXTREMITY: Status: RESOLVED | Noted: 2017-12-11 | Resolved: 2018-02-27

## 2018-02-27 PROBLEM — D72.829 LEUKOCYTOSIS: Status: RESOLVED | Noted: 2017-12-19 | Resolved: 2018-02-27

## 2018-02-27 PROBLEM — E87.6 HYPOKALEMIA: Status: RESOLVED | Noted: 2017-12-19 | Resolved: 2018-02-27

## 2018-02-27 PROCEDURE — 3078F DIAST BP <80 MM HG: CPT | Performed by: FAMILY MEDICINE

## 2018-02-27 PROCEDURE — 99214 OFFICE O/P EST MOD 30 MIN: CPT | Performed by: FAMILY MEDICINE

## 2018-02-27 PROCEDURE — 3075F SYST BP GE 130 - 139MM HG: CPT | Performed by: FAMILY MEDICINE

## 2018-02-27 PROCEDURE — 1036F TOBACCO NON-USER: CPT | Performed by: FAMILY MEDICINE

## 2018-02-27 RX ORDER — ASPIRIN 325 MG
TABLET ORAL
COMMUNITY
End: 2019-03-24

## 2018-02-27 NOTE — ASSESSMENT & PLAN NOTE
Much improved  Doing well on current regimine and walking daily  Seen by wound care center- released    Due for eye exam

## 2018-02-27 NOTE — PROGRESS NOTES
Assessment/Plan:           Problem List Items Addressed This Visit     Diabetes mellitus with diabetic neuropathy ([facility] [address] ) - Primary (Chronic)     Much improved  Doing well on current regimine and walking daily  Seen by wound care center- released  Due for eye exam         Relevant Medications    metFORMIN (GLUCOPHAGE) 1000 MG tablet    canagliflozin (INVOKANA) 100 mg    Other Relevant Orders    CBC    Comprehensive metabolic panel    HEMOGLOBIN A1C W/ EAG ESTIMATION    Microalbumin / creatinine urine ratio    TSH, 3rd generation with T4 reflex    Hyperlipidemia (Chronic)     Stable  Will recheck in 4 month  Relevant Orders    Lipid Panel with Direct LDL reflex    Benign essential hypertension     Doing well  Cont current meds                 Subjective:      Patient ID: Esteban Galloway is a 52 y o  female  Here for follow up  Doing well  a1c much improved  Leg with some swelling      Diabetes   She presents for her follow-up diabetic visit  She has type 2 diabetes mellitus  No MedicAlert identification noted  There are no hypoglycemic associated symptoms  Associated symptoms include foot paresthesias  There are no hypoglycemic complications  Symptoms are improving  Diabetic complications include peripheral neuropathy  Risk factors for coronary artery disease include diabetes mellitus and dyslipidemia  Current diabetic treatment includes oral agent (dual therapy)  She is compliant with treatment all of the time  Her weight is stable  She is following a diabetic diet  When asked about meal planning, she reported none  She has not had a previous visit with a dietitian  She participates in exercise daily  There is no change in her home blood glucose trend  An ACE inhibitor/angiotensin II receptor blocker is being taken  She sees a podiatrist Eye exam is current     Hypertension         The following portions of the patient's history were reviewed and updated as appropriate: allergies, current medications, past family history, past medical history, past social history, past surgical history and problem list     Review of Systems   Constitutional: Negative  HENT: Negative  Eyes: Negative  Respiratory: Negative  Cardiovascular: Negative  Gastrointestinal: Negative  Endocrine: Negative  Genitourinary: Negative  Musculoskeletal: Negative  Skin: Negative  Allergic/Immunologic: Negative  Neurological: Negative  Hematological: Negative  Psychiatric/Behavioral: Negative  Objective:      /70 (BP Location: Left arm, Patient Position: Sitting, Cuff Size: Standard)   Pulse 84   Resp 16   Ht 5' 7" (1 702 m)   Wt 100 kg (221 lb 3 2 oz)   BMI 34 64 kg/m²          Physical Exam   Constitutional: She appears well-developed and well-nourished  HENT:   Head: Normocephalic and atraumatic  Eyes: EOM are normal  Pupils are equal, round, and reactive to light  Neck: Normal range of motion  Neck supple  Cardiovascular: Normal rate, regular rhythm, normal heart sounds and intact distal pulses  Pulmonary/Chest: Effort normal and breath sounds normal    Abdominal: Soft  Bowel sounds are normal    Nursing note and vitals reviewed

## 2018-03-07 NOTE — PROGRESS NOTES
History of Present Illness    Revaccination   Vaccine Information: Vaccine(s) Given (names): Pneumovax [id number]  Spoke with patient regarding vaccine out of temperature range and risks and benefits of revaccination  Action(s): Pt will be revaccinated  Appointment scheduled: [id number]  Pt called (attempt 1): [id number] 16:24 JLF  Pt called (attempt 2): [id number] 13:22 JLF  Other Information: Revac Pneumovax  12/21/16 left message to call back  Revaccination Completed: [id number]  Active Problems    1  Breast cancer screening (V76 10) (Z12 39)   2  Need for prophylactic vaccination against Streptococcus pneumoniae (pneumococcus)   (V03 82) (Z23)   3  Need for revaccination (V05 9) (Z23)    Immunizations  Influenza --- Belvia Jena: 15-Oct-2015  (45y)   PPSV --- Belvia Jena: 11-Nov-2015  (45y)     Current Meds   1  Aspirin 325 MG Oral Tablet   2  GlipiZIDE 5 MG Oral Tablet; take 1 tablet by mouth twice a day   3  Lisinopril-Hydrochlorothiazide 10-12 5 MG Oral Tablet; take 1 tablet by mouth daily   4  MetFORMIN HCl - 1000 MG Oral Tablet; take 1 tablet by mouth twice a day   5  Simvastatin 40 MG Oral Tablet; TAKE 1 TABLET AT BEDTIME   6  Victoza 18 MG/3ML Subcutaneous Solution Pen-injector; USE 1 8MG DAILY AS   DIRECTED SUBCUTANEOUSLY     Allergies    1  Keflex CAPS    Plan    1  RVAC-Pneumovax 23 25 MCG/0 5ML Injection Injectable    Education  Education Items with no Session   RVAC-Pneumovax 23 25 MCG/0 5ML Injection Injectable;  Provided: 36GXP9856  02:59PM; Counselor: Kaylie Terrell;      Future Appointments    Date/Time Provider Specialty Site   11/16/2017 09:30 AM Oxana Nevarez DO Family Medicine [facility]   Electronically signed by : Deshawn Marroquin DO; Aug 16 2017  3:54PM EST                       (Author)

## 2018-03-17 DIAGNOSIS — E11.9 CONTROLLED TYPE 2 DIABETES MELLITUS WITHOUT COMPLICATION, WITHOUT LONG-TERM CURRENT USE OF INSULIN (HCC): Primary | ICD-10-CM

## 2018-03-18 RX ORDER — GLIPIZIDE 5 MG/1
TABLET ORAL
Qty: 180 TABLET | Refills: 0 | Status: SHIPPED | OUTPATIENT
Start: 2018-03-18 | End: 2018-07-11 | Stop reason: SDUPTHER

## 2018-06-29 ENCOUNTER — APPOINTMENT (OUTPATIENT)
Dept: LAB | Facility: HOSPITAL | Age: 48
End: 2018-06-29
Payer: COMMERCIAL

## 2018-06-29 DIAGNOSIS — E11.40 TYPE 2 DIABETES MELLITUS WITH DIABETIC NEUROPATHY, WITHOUT LONG-TERM CURRENT USE OF INSULIN (HCC): Chronic | ICD-10-CM

## 2018-06-29 DIAGNOSIS — E78.5 HYPERLIPIDEMIA, UNSPECIFIED HYPERLIPIDEMIA TYPE: Chronic | ICD-10-CM

## 2018-06-29 LAB
ALBUMIN SERPL BCP-MCNC: 3.5 G/DL (ref 3.5–5)
ALP SERPL-CCNC: 96 U/L (ref 46–116)
ALT SERPL W P-5'-P-CCNC: 21 U/L (ref 12–78)
ANION GAP SERPL CALCULATED.3IONS-SCNC: 10 MMOL/L (ref 4–13)
AST SERPL W P-5'-P-CCNC: 19 U/L (ref 5–45)
BILIRUB SERPL-MCNC: 0.44 MG/DL (ref 0.2–1)
BUN SERPL-MCNC: 24 MG/DL (ref 5–25)
CALCIUM SERPL-MCNC: 9 MG/DL (ref 8.3–10.1)
CHLORIDE SERPL-SCNC: 106 MMOL/L (ref 100–108)
CHOLEST SERPL-MCNC: 142 MG/DL (ref 50–200)
CO2 SERPL-SCNC: 22 MMOL/L (ref 21–32)
CREAT SERPL-MCNC: 0.92 MG/DL (ref 0.6–1.3)
CREAT UR-MCNC: 122 MG/DL
ERYTHROCYTE [DISTWIDTH] IN BLOOD BY AUTOMATED COUNT: 13.3 % (ref 11.6–15.1)
EST. AVERAGE GLUCOSE BLD GHB EST-MCNC: 134 MG/DL
GFR SERPL CREATININE-BSD FRML MDRD: 74 ML/MIN/1.73SQ M
GLUCOSE P FAST SERPL-MCNC: 100 MG/DL (ref 65–99)
HBA1C MFR BLD: 6.3 % (ref 4.2–6.3)
HCT VFR BLD AUTO: 36.7 % (ref 34.8–46.1)
HDLC SERPL-MCNC: 49 MG/DL (ref 40–60)
HGB BLD-MCNC: 11.7 G/DL (ref 11.5–15.4)
LDLC SERPL CALC-MCNC: 82 MG/DL (ref 0–100)
MCH RBC QN AUTO: 27.8 PG (ref 26.8–34.3)
MCHC RBC AUTO-ENTMCNC: 31.9 G/DL (ref 31.4–37.4)
MCV RBC AUTO: 87 FL (ref 82–98)
MICROALBUMIN UR-MCNC: 60.1 MG/L (ref 0–20)
MICROALBUMIN/CREAT 24H UR: 49 MG/G CREATININE (ref 0–30)
PLATELET # BLD AUTO: 310 THOUSANDS/UL (ref 149–390)
PMV BLD AUTO: 9.7 FL (ref 8.9–12.7)
POTASSIUM SERPL-SCNC: 3.7 MMOL/L (ref 3.5–5.3)
PROT SERPL-MCNC: 7.2 G/DL (ref 6.4–8.2)
RBC # BLD AUTO: 4.21 MILLION/UL (ref 3.81–5.12)
SODIUM SERPL-SCNC: 138 MMOL/L (ref 136–145)
TRIGL SERPL-MCNC: 57 MG/DL
TSH SERPL DL<=0.05 MIU/L-ACNC: 2.9 UIU/ML (ref 0.36–3.74)
WBC # BLD AUTO: 10.16 THOUSAND/UL (ref 4.31–10.16)

## 2018-06-29 PROCEDURE — 36415 COLL VENOUS BLD VENIPUNCTURE: CPT

## 2018-06-29 PROCEDURE — 84443 ASSAY THYROID STIM HORMONE: CPT

## 2018-06-29 PROCEDURE — 3060F POS MICROALBUMINURIA REV: CPT | Performed by: FAMILY MEDICINE

## 2018-06-29 PROCEDURE — 82043 UR ALBUMIN QUANTITATIVE: CPT

## 2018-06-29 PROCEDURE — 80061 LIPID PANEL: CPT

## 2018-06-29 PROCEDURE — 85027 COMPLETE CBC AUTOMATED: CPT

## 2018-06-29 PROCEDURE — 82570 ASSAY OF URINE CREATININE: CPT

## 2018-06-29 PROCEDURE — 80053 COMPREHEN METABOLIC PANEL: CPT

## 2018-06-29 PROCEDURE — 83036 HEMOGLOBIN GLYCOSYLATED A1C: CPT

## 2018-07-02 ENCOUNTER — OFFICE VISIT (OUTPATIENT)
Dept: FAMILY MEDICINE CLINIC | Facility: CLINIC | Age: 48
End: 2018-07-02
Payer: COMMERCIAL

## 2018-07-02 VITALS
SYSTOLIC BLOOD PRESSURE: 140 MMHG | RESPIRATION RATE: 18 BRPM | HEIGHT: 67 IN | BODY MASS INDEX: 34.4 KG/M2 | DIASTOLIC BLOOD PRESSURE: 60 MMHG | HEART RATE: 76 BPM | WEIGHT: 219.2 LBS

## 2018-07-02 DIAGNOSIS — I10 BENIGN ESSENTIAL HYPERTENSION: ICD-10-CM

## 2018-07-02 DIAGNOSIS — Z12.39 SCREENING FOR BREAST CANCER: ICD-10-CM

## 2018-07-02 DIAGNOSIS — E78.5 HYPERLIPIDEMIA, UNSPECIFIED HYPERLIPIDEMIA TYPE: Chronic | ICD-10-CM

## 2018-07-02 DIAGNOSIS — E11.42 TYPE 2 DIABETES MELLITUS WITH DIABETIC POLYNEUROPATHY, WITHOUT LONG-TERM CURRENT USE OF INSULIN (HCC): Primary | ICD-10-CM

## 2018-07-02 DIAGNOSIS — G62.89 OTHER POLYNEUROPATHY: ICD-10-CM

## 2018-07-02 DIAGNOSIS — E11.40 TYPE 2 DIABETES MELLITUS WITH DIABETIC NEUROPATHY, WITHOUT LONG-TERM CURRENT USE OF INSULIN (HCC): Chronic | ICD-10-CM

## 2018-07-02 PROBLEM — D64.9 ANEMIA: Status: RESOLVED | Noted: 2017-12-19 | Resolved: 2018-07-02

## 2018-07-02 PROCEDURE — 99214 OFFICE O/P EST MOD 30 MIN: CPT | Performed by: FAMILY MEDICINE

## 2018-07-02 NOTE — PROGRESS NOTES
Assessment/Plan:    Problem List Items Addressed This Visit     Diabetes mellitus with diabetic neuropathy ([facility] ) (Chronic)     Lab Results   Component Value Date    HGBA1C 6 3 06/29/2018       No results for input(s): POCGLU in the last 72 hours  Blood Sugar Average: Last 72 hrs:  Stable on invokana           Hyperlipidemia (Chronic)     Stable on simvistatin         Benign essential hypertension     Stable on lisinopril         Peripheral neuropathy     Stable            Other Visit Diagnoses     Type 2 diabetes mellitus with diabetic polyneuropathy, without long-term current use of insulin ([facility] )    -  Primary    Relevant Orders    Diabetic foot exam    Hemoglobin A1C    Screening for breast cancer        Relevant Orders    Mammo screening bilateral w cad          Patient Instructions   10% - bad control"> 10% - bad control,Hemoglobin A1c (HbA1c) greater than 10% indicating poor diabetic control,Haemoglobin A1c greater than 10% indicating poor diabetic control">   Diabetes Mellitus Type 2 in Adults, Ambulatory Care   GENERAL INFORMATION:   Diabetes mellitus type 2  is a disease that affects how your body uses glucose (sugar)  Insulin helps move sugar out of the blood so it can be used for energy  Normally, when the blood sugar level increases, the pancreas makes more insulin  Type 2 diabetes develops because either the body cannot make enough insulin, or it cannot use the insulin correctly  After many years, your pancreas may stop making insulin  Common symptoms include the following:   · More hunger or thirst than usual     · Frequent urination     · Weight loss without trying     · Blurred vision  Seek immediate care for the following symptoms:   · Severe abdominal pain, or pain that spreads to your back  You may also be vomiting      · Trouble staying awake or focusing    · Shaking or sweating    · Blurred or double vision    · Breath has a fruity, sweet smell    · Breathing is deep and labored, or rapid and shallow    · Heartbeat is fast and weak  Treatment for diabetes mellitus type 2  includes keeping your blood sugar at a normal level  You must eat the right foods, and exercise regularly  You may also need medicine if you cannot control your blood sugar level with nutrition and exercise  Manage diabetes mellitus type 2:   · Check your blood sugar level  You will be taught how to check a small drop of blood in a glucose monitor  Ask your healthcare provider when and how often to check during the day  Ask your healthcare provider what your blood sugar levels should be when you check them  · Keep track of carbohydrates (sugar and starchy foods)  Your blood sugar level can get too high if you eat too many carbohydrates  Your dietitian will help you plan meals and snacks that have the right amount of carbohydrates  · Eat low-fat foods  Some examples are skinless chicken and low-fat milk  · Eat less sodium (salt)  Some examples of high-sodium foods to limit are soy sauce, potato chips, and soup  Do not add salt to food you cook  Limit your use of table salt  · Eat high-fiber foods  Foods that are a good source of fiber include vegetables, whole grain bread, and beans  · Limit alcohol  Alcohol affects your blood sugar level and can make it harder to manage your diabetes  Women should limit alcohol to 1 drink a day  Men should limit alcohol to 2 drinks a day  A drink of alcohol is 12 ounces of beer, 5 ounces of wine, or 1½ ounces of liquor  · Get regular exercise  Exercise can help keep your blood sugar level steady, decrease your risk of heart disease, and help you lose weight  Exercise for at least 30 minutes, 5 days a week  Include muscle strengthening activities 2 days each week  Work with your healthcare provider to create an exercise plan  · Check your feet each day  for injuries or open sores  Ask your healthcare provider for activities you can do if you have an open sore      · Quit smoking  If you smoke, it is never too late to quit  Smoking can worsen the problems that may occur with diabetes  Ask your healthcare provider for information about how to stop smoking if you are having trouble quitting  · Ask about your weight:  Ask healthcare providers if you need to lose weight, and how much to lose  Ask them to help you with a weight loss program  Even a 10 to 15 pound weight loss can help you manage your blood sugar level  · Carry medical alert identification  Wear medical alert jewelry or carry a card that says you have diabetes  Ask your healthcare provider where to get these items  · Ask about vaccines  Diabetes puts you at risk of serious illness if you get the flu, pneumonia, or hepatitis  Ask your healthcare provider if you should get a flu, pneumonia, or hepatitis B vaccine, and when to get the vaccine  Follow up with your healthcare provider as directed:  Write down your questions so you remember to ask them during your visits  CARE AGREEMENT:   You have the right to help plan your care  Learn about your health condition and how it may be treated  Discuss treatment options with your caregivers to decide what care you want to receive  You always have the right to refuse treatment  The above information is an  only  It is not intended as medical advice for individual conditions or treatments  Talk to your doctor, nurse or pharmacist before following any medical regimen to see if it is safe and effective for you  © 2014 [address] is for End User's use only and may not be sold, redistributed or otherwise used for commercial purposes  All illustrations and images included in CareNotes® are the copyrighted property of [employer] D A M [employer] Inc  or Yao Jacobs in about 4 months (around 11/2/2018)  Subjective:      Patient ID: Nery Osorio is a 52 y o  female      Chief Complaint   Patient presents with   Jossy Finney Follow-up     Patient here for 4 month follow up on Hypertension, Diabetes, Anemia        Here for follow up  Labs reviewed  Overall feeling well      Hypertension   This is a chronic problem  The current episode started more than 1 year ago  The problem is unchanged  The problem is controlled  There are no associated agents to hypertension  Risk factors for coronary artery disease include diabetes mellitus and dyslipidemia  Past treatments include ACE inhibitors  The current treatment provides significant improvement  There are no compliance problems  Hyperlipidemia   This is a chronic problem  The current episode started more than 1 year ago  The problem is controlled  Recent lipid tests were reviewed and are normal  There are no known factors aggravating her hyperlipidemia  The current treatment provides significant improvement of lipids  There are no compliance problems  Risk factors for coronary artery disease include diabetes mellitus and dyslipidemia  The following portions of the patient's history were reviewed and updated as appropriate: allergies, current medications, past family history, past medical history, past social history, past surgical history and problem list     Review of Systems   Constitutional: Negative  HENT: Negative  Eyes: Negative  Respiratory: Negative  Cardiovascular: Negative  Gastrointestinal: Negative  Endocrine: Negative  Genitourinary: Negative  Musculoskeletal: Negative  Skin: Negative  Allergic/Immunologic: Negative  Neurological: Negative  Hematological: Negative  Psychiatric/Behavioral: Negative            Current Outpatient Prescriptions   Medication Sig Dispense Refill    glipiZIDE (GLUCOTROL) 5 mg tablet TAKE ONE TABLET BY MOUTH 2 TIMES A  tablet 0    lisinopril-hydrochlorothiazide (PRINZIDE,ZESTORETIC) 10-12 5 MG per tablet Take 1 tablet by mouth daily      metFORMIN (GLUCOPHAGE) 1000 MG tablet Take 1 tablet by mouth 2 (two) times a day      simvastatin (ZOCOR) 40 mg tablet Take 40 mg by mouth daily        aspirin 325 mg tablet Take by mouth      canagliflozin (INVOKANA) 100 mg Take 1 tablet (100 mg total) by mouth daily for 90 days 90 tablet 3    polyethylene glycol (MIRALAX) 17 g packet Take 17 g by mouth daily 14 each 0     No current facility-administered medications for this visit  Objective:    /60   Pulse 76   Resp 18   Ht 5' 7" (1 702 m)   Wt 99 4 kg (219 lb 3 2 oz)   BMI 34 33 kg/m²        Physical Exam   Constitutional: She appears well-developed and well-nourished  Eyes: Pupils are equal, round, and reactive to light  Neck: Normal range of motion  Neck supple  Cardiovascular: Normal rate, regular rhythm, normal heart sounds and intact distal pulses  Pulses are no weak pulses  Pulses:       Dorsalis pedis pulses are 2+ on the right side, and 2+ on the left side  Posterior tibial pulses are 2+ on the right side, and 2+ on the left side  Pulmonary/Chest: Effort normal and breath sounds normal    Abdominal: Soft  Bowel sounds are normal    Musculoskeletal: She exhibits edema (left leg)  Feet:   Right Foot:   Skin Integrity: Negative for ulcer, skin breakdown, erythema, warmth, callus or dry skin  Left Foot:   Skin Integrity: Negative for ulcer, skin breakdown, erythema, warmth, callus or dry skin  Neurological: She is alert  Skin: Skin is warm and dry  Psychiatric: She has a normal mood and affect  Her behavior is normal  Judgment and thought content normal    Nursing note and vitals reviewed  Patient's shoes and socks removed  Right Foot/Ankle   Right Foot Inspection  Skin Exam: skin normal and skin intact no dry skin, no warmth, no callus, no erythema, no maceration, no abnormal color, no pre-ulcer, no ulcer and no callus                          Toe Exam: ROM and strength within normal limits  Sensory   Vibration: diminished  Proprioception: diminished Monofilament testing: diminished  Vascular  Capillary refills: < 3 seconds  The right DP pulse is 2+  The right PT pulse is 2+  Left Foot/Ankle  Left Foot Inspection  Skin Exam: skin normal and skin intactno dry skin, no warmth, no erythema, no maceration, normal color, no pre-ulcer, no ulcer and no callus                         Toe Exam: ROM and strength within normal limits                   Sensory   Vibration: diminished  Proprioception: diminished  Monofilament: diminished  Vascular  Capillary refills: < 3 seconds  The left DP pulse is 2+  The left PT pulse is 2+  Assign Risk Category:  No deformity present; Loss of protective sensation;  No weak pulses       Risk: [id number] Arianna Giordano, DO

## 2018-07-02 NOTE — ASSESSMENT & PLAN NOTE
Lab Results   Component Value Date    HGBA1C 6 3 06/29/2018       No results for input(s): POCGLU in the last 72 hours      Blood Sugar Average: Last 72 hrs:  Stable on invokana

## 2018-07-02 NOTE — PATIENT INSTRUCTIONS
10% - bad control"> 10% - bad control,Hemoglobin A1c (HbA1c) greater than 10% indicating poor diabetic control,Haemoglobin A1c greater than 10% indicating poor diabetic control">   Diabetes Mellitus Type 2 in Adults, Ambulatory Care   GENERAL INFORMATION:   Diabetes mellitus type 2  is a disease that affects how your body uses glucose (sugar)  Insulin helps move sugar out of the blood so it can be used for energy  Normally, when the blood sugar level increases, the pancreas makes more insulin  Type 2 diabetes develops because either the body cannot make enough insulin, or it cannot use the insulin correctly  After many years, your pancreas may stop making insulin  Common symptoms include the following:   · More hunger or thirst than usual     · Frequent urination     · Weight loss without trying     · Blurred vision  Seek immediate care for the following symptoms:   · Severe abdominal pain, or pain that spreads to your back  You may also be vomiting  · Trouble staying awake or focusing    · Shaking or sweating    · Blurred or double vision    · Breath has a fruity, sweet smell    · Breathing is deep and labored, or rapid and shallow    · Heartbeat is fast and weak  Treatment for diabetes mellitus type 2  includes keeping your blood sugar at a normal level  You must eat the right foods, and exercise regularly  You may also need medicine if you cannot control your blood sugar level with nutrition and exercise  Manage diabetes mellitus type 2:   · Check your blood sugar level  You will be taught how to check a small drop of blood in a glucose monitor  Ask your healthcare provider when and how often to check during the day  Ask your healthcare provider what your blood sugar levels should be when you check them  · Keep track of carbohydrates (sugar and starchy foods)  Your blood sugar level can get too high if you eat too many carbohydrates   Your dietitian will help you plan meals and snacks that have the right amount of carbohydrates  · Eat low-fat foods  Some examples are skinless chicken and low-fat milk  · Eat less sodium (salt)  Some examples of high-sodium foods to limit are soy sauce, potato chips, and soup  Do not add salt to food you cook  Limit your use of table salt  · Eat high-fiber foods  Foods that are a good source of fiber include vegetables, whole grain bread, and beans  · Limit alcohol  Alcohol affects your blood sugar level and can make it harder to manage your diabetes  Women should limit alcohol to 1 drink a day  Men should limit alcohol to 2 drinks a day  A drink of alcohol is 12 ounces of beer, 5 ounces of wine, or 1½ ounces of liquor  · Get regular exercise  Exercise can help keep your blood sugar level steady, decrease your risk of heart disease, and help you lose weight  Exercise for at least 30 minutes, 5 days a week  Include muscle strengthening activities 2 days each week  Work with your healthcare provider to create an exercise plan  · Check your feet each day  for injuries or open sores  Ask your healthcare provider for activities you can do if you have an open sore  · Quit smoking  If you smoke, it is never too late to quit  Smoking can worsen the problems that may occur with diabetes  Ask your healthcare provider for information about how to stop smoking if you are having trouble quitting  · Ask about your weight:  Ask healthcare providers if you need to lose weight, and how much to lose  Ask them to help you with a weight loss program  Even a 10 to 15 pound weight loss can help you manage your blood sugar level  · Carry medical alert identification  Wear medical alert jewelry or carry a card that says you have diabetes  Ask your healthcare provider where to get these items  · Ask about vaccines  Diabetes puts you at risk of serious illness if you get the flu, pneumonia, or hepatitis   Ask your healthcare provider if you should get a flu, pneumonia, or hepatitis B vaccine, and when to get the vaccine  Follow up with your healthcare provider as directed:  Write down your questions so you remember to ask them during your visits  CARE AGREEMENT:   You have the right to help plan your care  Learn about your health condition and how it may be treated  Discuss treatment options with your caregivers to decide what care you want to receive  You always have the right to refuse treatment  The above information is an  only  It is not intended as medical advice for individual conditions or treatments  Talk to your doctor, nurse or pharmacist before following any medical regimen to see if it is safe and effective for you  © 2014 [address] is for End User's use only and may not be sold, redistributed or otherwise used for commercial purposes  All illustrations and images included in CareNotes® are the copyrighted property of A D A M , Inc  or Yao Cornelius

## 2018-07-11 DIAGNOSIS — E11.9 CONTROLLED TYPE 2 DIABETES MELLITUS WITHOUT COMPLICATION, WITHOUT LONG-TERM CURRENT USE OF INSULIN (HCC): ICD-10-CM

## 2018-07-11 RX ORDER — GLIPIZIDE 5 MG/1
TABLET ORAL
Qty: 180 TABLET | Refills: 3 | Status: SHIPPED | OUTPATIENT
Start: 2018-07-11 | End: 2019-09-13 | Stop reason: SDUPTHER

## 2018-09-04 ENCOUNTER — OFFICE VISIT (OUTPATIENT)
Dept: FAMILY MEDICINE CLINIC | Facility: CLINIC | Age: 48
End: 2018-09-04
Payer: COMMERCIAL

## 2018-09-04 ENCOUNTER — TELEPHONE (OUTPATIENT)
Dept: FAMILY MEDICINE CLINIC | Facility: CLINIC | Age: 48
End: 2018-09-04

## 2018-09-04 VITALS
WEIGHT: 221.8 LBS | HEART RATE: 92 BPM | BODY MASS INDEX: 34.81 KG/M2 | HEIGHT: 67 IN | SYSTOLIC BLOOD PRESSURE: 124 MMHG | DIASTOLIC BLOOD PRESSURE: 64 MMHG | RESPIRATION RATE: 16 BRPM | OXYGEN SATURATION: 98 % | TEMPERATURE: 98.4 F

## 2018-09-04 DIAGNOSIS — E11.40 TYPE 2 DIABETES MELLITUS WITH DIABETIC NEUROPATHY, WITHOUT LONG-TERM CURRENT USE OF INSULIN (HCC): Chronic | ICD-10-CM

## 2018-09-04 DIAGNOSIS — L03.116 CELLULITIS OF LEFT FOOT: Primary | ICD-10-CM

## 2018-09-04 PROCEDURE — 99213 OFFICE O/P EST LOW 20 MIN: CPT | Performed by: FAMILY MEDICINE

## 2018-09-04 RX ORDER — SULFAMETHOXAZOLE AND TRIMETHOPRIM 800; 160 MG/1; MG/1
1 TABLET ORAL EVERY 12 HOURS SCHEDULED
Qty: 20 TABLET | Refills: 0 | Status: SHIPPED | OUTPATIENT
Start: 2018-09-04 | End: 2018-09-12 | Stop reason: SDUPTHER

## 2018-09-04 RX ORDER — OLOPATADINE HYDROCHLORIDE 1 MG/ML
SOLUTION/ DROPS OPHTHALMIC
COMMUNITY
Start: 2018-08-14 | End: 2019-03-24

## 2018-09-04 NOTE — TELEPHONE ENCOUNTER
Patient needed an appointment on Monday for a recheck, when would you like me to fit patient in? Patient said she can come in any time  Please advise

## 2018-09-04 NOTE — LETTER
September 4, 2018     Patient: Emy Huston   YOB: 1970   Date of Visit: 9/4/2018       To Whom It May Concern:    Emy Huston was seen in my office on 9/4/2018  It is my medical opinion that Kishore Khan be excused from work from 9/3/2018 until 9/7/2018  If you have any questions or concerns, please don't hesitate to call           Sincerely,              Jj Gilliam, DO

## 2018-09-04 NOTE — ASSESSMENT & PLAN NOTE
Lab Results   Component Value Date    HGBA1C 6 3 06/29/2018       No results for input(s): POCGLU in the last 72 hours      Blood Sugar Average: Last 72 hrs:  Improvement in sugars but likely cause of cellulitis

## 2018-09-04 NOTE — PROGRESS NOTES
Assessment/Plan:    Problem List Items Addressed This Visit     Diabetes mellitus with diabetic neuropathy ([facility] ) (Chronic)     Lab Results   Component Value Date    HGBA1C 6 3 06/29/2018       No results for input(s): POCGLU in the last 72 hours  Blood Sugar Average: Last 72 hrs:  Improvement in sugars but likely cause of cellulitis           Cellulitis of left foot - Primary     To start bactrim  Off work to allow elevation         Relevant Medications    sulfamethoxazole-trimethoprim (BACTRIM DS) 800-160 mg per tablet          Patient Instructions     Cellulitis   AMBULATORY CARE:   Cellulitis  is a skin infection caused by bacteria  Cellulitis usually appears on the legs and feet, arms and hands, or face  Common signs and symptoms include the following:   · A red, warm, swollen area on your skin    · Pain when the area is touched    · Bumps or blisters (abscess) that may drain pus    · Bumpy, raised skin that feels like an orange peel  Call 911 if:   · You have sudden trouble breathing or chest pain  Seek care immediately if:   · Your wound gets larger and more painful  · You feel a crackling under your skin when you touch it  · You have purple dots or bumps on your skin, or you see bleeding under your skin  · You have new swelling and pain in your legs  · The red, warm, swollen area gets larger  · You see red streaks coming from the infected area  Contact your healthcare provider if:   · You have a fever  · Your fever or pain does not go away or gets worse  · The area does not get smaller after 2 days of antibiotics  · Your skin is flaking or peeling off  · You have questions or concerns about your condition or care  Treatment for cellulitis  may decrease symptoms, stop the infection from spreading, and cure the infection  Treatment depends on how severe your cellulitis is  Cellulitis may go away on its own   You may  instead need antibiotics to help treat the bacterial infection and medicines for pain  Your healthcare provider may draw a [place] around the edges of your cellulitis  If your cellulitis spreads, your healthcare provider will see it outside of the [place]  Manage your symptoms:   · Elevate the area above the level of your heart  as often as you can  This will help decrease swelling and pain  Prop the area on pillows or blankets to keep it elevated comfortably  · Clean the area daily until the wound scabs over  Gently wash the area with soap and water  Pat dry  Use dressings as directed  · Place cool or warm, wet cloths on the area as directed  Use clean cloths and clean water  Leave it on the area until the cloth is room temperature  Pat the area dry with a clean, dry cloth  The cloths may help decrease pain  Prevent cellulitis:   · Do not scratch bug bites or areas of injury  You increase your risk for cellulitis by scratching these areas  · Do not share personal items, such as towels, clothing, and razors  · Clean exercise equipment  with germ-killing  before and after you use it  · Wash your hands often  Use soap and water  Wash your hands after you use the bathroom, change a child's diapers, or sneeze  Wash your hands before you prepare or eat food  Use lotion to prevent dry, cracked skin  · Wear pressure stockings as directed  You may be told to wear the stockings if you have peripheral edema  The stockings improve blood flow and decrease swelling  · Treat athlete's foot  This can help prevent the spread of a bacterial skin infection  Follow up with your healthcare provider within 3 days, or as directed: Your healthcare provider will check if your cellulitis is getting better  You may need different medicine  Write down your questions so you remember to ask them during your visits    © 2017 [employer]ie [employer] Information is for End User's use only and may not be sold, redistributed or otherwise used for commercial purposes  All illustrations and images included in CareNotes® are the copyrighted property of [employer]  or Yao Cornelius  The above information is an  only  It is not intended as medical advice for individual conditions or treatments  Talk to your doctor, nurse or pharmacist before following any medical regimen to see if it is safe and effective for you  No Follow-up on file  Subjective:      Patient ID: Nathan Goins is a 52 y o  female  Chief Complaint   Patient presents with    Leg Pain     Patient here with left leg pain and swelling since Sunday not warm to the touch     Leg Swelling       Started last Friday not feeling well  Worked over weekend  Redness started around foot  Boil on heel  jusing warm compresses  Leg now swollen      Leg Pain    The incident occurred more than 1 week ago  There was no injury mechanism  The pain is present in the left heel and left leg  The quality of the pain is described as aching  The pain is at a severity of 4/10  The pain is mild  The pain has been constant since onset  Pertinent negatives include no loss of motion or loss of sensation  The symptoms are aggravated by weight bearing  She has tried NSAIDs for the symptoms  The treatment provided mild relief  The following portions of the patient's history were reviewed and updated as appropriate:  past social history    Review of Systems   Constitutional: Positive for fatigue  HENT: Negative  Eyes: Negative  Respiratory: Negative  Cardiovascular: Positive for leg swelling  Endocrine: Negative  Genitourinary: Negative  Musculoskeletal: Negative  Skin: Positive for rash and wound  Allergic/Immunologic: Negative  Neurological: Negative  Hematological: Negative  Psychiatric/Behavioral: Negative            Current Outpatient Prescriptions   Medication Sig Dispense Refill    aspirin 325 mg tablet Take by mouth      glipiZIDE (GLUCOTROL) 5 mg tablet TAKE ONE TABLET BY MOUTH 2 TIMES A  tablet 3    lisinopril-hydrochlorothiazide (PRINZIDE,ZESTORETIC) 10-12 5 MG per tablet Take 1 tablet by mouth daily      metFORMIN (GLUCOPHAGE) 1000 MG tablet Take 1 tablet by mouth 2 (two) times a day      polyethylene glycol (MIRALAX) 17 g packet Take 17 g by mouth daily 14 each 0    simvastatin (ZOCOR) 40 mg tablet Take 40 mg by mouth daily        canagliflozin (INVOKANA) 100 mg Take 1 tablet (100 mg total) by mouth daily for 90 days 90 tablet 3    olopatadine (PATANOL) 0 1 % ophthalmic solution       sulfamethoxazole-trimethoprim (BACTRIM DS) 800-160 mg per tablet Take 1 tablet by mouth every 12 (twelve) hours for 10 days 20 tablet 0     No current facility-administered medications for this visit  Objective:    /64   Pulse 92   Temp 98 4 °F (36 9 °C)   Resp 16   Ht 5' 7" (1 702 m)   Wt 101 kg (221 lb 12 8 oz)   SpO2 98%   BMI 34 74 kg/m²        Physical Exam   Constitutional: She appears well-developed and well-nourished  Eyes: Pupils are equal, round, and reactive to light  Neck: Normal range of motion  Neck supple  Cardiovascular: Normal rate, regular rhythm, normal heart sounds and intact distal pulses  Pulmonary/Chest: Effort normal and breath sounds normal    Abdominal: Soft  Bowel sounds are normal    Musculoskeletal: She exhibits edema (left leg swelling, erythema) and tenderness  Neurological: She is alert  Skin: There is erythema  Nursing note and vitals reviewed               Fern Adamson DO

## 2018-09-04 NOTE — PATIENT INSTRUCTIONS
Cellulitis   AMBULATORY CARE:   Cellulitis  is a skin infection caused by bacteria  Cellulitis usually appears on the legs and feet, arms and hands, or face  Common signs and symptoms include the following:   · A red, warm, swollen area on your skin    · Pain when the area is touched    · Bumps or blisters (abscess) that may drain pus    · Bumpy, raised skin that feels like an orange peel  Call 911 if:   · You have sudden trouble breathing or chest pain  Seek care immediately if:   · Your wound gets larger and more painful  · You feel a crackling under your skin when you touch it  · You have purple dots or bumps on your skin, or you see bleeding under your skin  · You have new swelling and pain in your legs  · The red, warm, swollen area gets larger  · You see red streaks coming from the infected area  Contact your healthcare provider if:   · You have a fever  · Your fever or pain does not go away or gets worse  · The area does not get smaller after 2 days of antibiotics  · Your skin is flaking or peeling off  · You have questions or concerns about your condition or care  Treatment for cellulitis  may decrease symptoms, stop the infection from spreading, and cure the infection  Treatment depends on how severe your cellulitis is  Cellulitis may go away on its own  You may  instead need antibiotics to help treat the bacterial infection and medicines for pain  Your healthcare provider may draw a [place] around the edges of your cellulitis  If your cellulitis spreads, your healthcare provider will see it outside of the [place]  Manage your symptoms:   · Elevate the area above the level of your heart  as often as you can  This will help decrease swelling and pain  Prop the area on pillows or blankets to keep it elevated comfortably  · Clean the area daily until the wound scabs over  Gently wash the area with soap and water  Pat dry  Use dressings as directed       · Place cool or warm, wet cloths on the area as directed  Use clean cloths and clean water  Leave it on the area until the cloth is room temperature  Pat the area dry with a clean, dry cloth  The cloths may help decrease pain  Prevent cellulitis:   · Do not scratch bug bites or areas of injury  You increase your risk for cellulitis by scratching these areas  · Do not share personal items, such as towels, clothing, and razors  · Clean exercise equipment  with germ-killing  before and after you use it  · Wash your hands often  Use soap and water  Wash your hands after you use the bathroom, change a child's diapers, or sneeze  Wash your hands before you prepare or eat food  Use lotion to prevent dry, cracked skin  · Wear pressure stockings as directed  You may be told to wear the stockings if you have peripheral edema  The stockings improve blood flow and decrease swelling  · Treat athlete's foot  This can help prevent the spread of a bacterial skin infection  Follow up with your healthcare provider within 3 days, or as directed: Your healthcare provider will check if your cellulitis is getting better  You may need different medicine  Write down your questions so you remember to ask them during your visits  © 2017 [employer]  Information is for End User's use only and may not be sold, redistributed or otherwise used for commercial purposes  All illustrations and images included in CareNotes® are the copyrighted property of [employer]  or [employer]  The above information is an  only  It is not intended as medical advice for individual conditions or treatments  Talk to your doctor, nurse or pharmacist before following any medical regimen to see if it is safe and effective for you

## 2018-09-10 ENCOUNTER — OFFICE VISIT (OUTPATIENT)
Dept: FAMILY MEDICINE CLINIC | Facility: CLINIC | Age: 48
End: 2018-09-10
Payer: COMMERCIAL

## 2018-09-10 VITALS
BODY MASS INDEX: 34.5 KG/M2 | WEIGHT: 219.8 LBS | OXYGEN SATURATION: 98 % | SYSTOLIC BLOOD PRESSURE: 110 MMHG | HEIGHT: 67 IN | TEMPERATURE: 98.4 F | RESPIRATION RATE: 14 BRPM | HEART RATE: 86 BPM | DIASTOLIC BLOOD PRESSURE: 70 MMHG

## 2018-09-10 DIAGNOSIS — L03.116 CELLULITIS OF LEFT FOOT: Primary | ICD-10-CM

## 2018-09-10 PROCEDURE — 99213 OFFICE O/P EST LOW 20 MIN: CPT | Performed by: FAMILY MEDICINE

## 2018-09-10 NOTE — PROGRESS NOTES
Assessment/Plan:    Problem List Items Addressed This Visit     Cellulitis of left foot - Primary     Improving  Cont anitibotic               There are no Patient Instructions on file for this visit  No Follow-up on file  Subjective:      Patient ID: Anthony Park is a 52 y o  female  Chief Complaint   Patient presents with    Follow-up     Patient here for follow up on Cellulitis of the left foot       Here to follow up cellutitis on left foot  Drained purulent from back heel  Swelling improved  Walking better      Rash   This is a chronic problem  The current episode started 1 to 4 weeks ago  The problem has been gradually improving since onset  The affected locations include the left ankle and left foot  The rash is characterized by draining and redness  She was exposed to nothing  Pertinent negatives include no fever  Past treatments include antibiotics  The treatment provided moderate relief  The following portions of the patient's history were reviewed and updated as appropriate: allergies, current medications, past family history, past medical history, past social history, past surgical history and problem list     Review of Systems   Constitutional: Negative for fever  HENT: Negative  Eyes: Negative  Respiratory: Negative  Cardiovascular: Negative  Gastrointestinal: Negative  Endocrine: Negative  Genitourinary: Negative  Skin: Positive for rash  Allergic/Immunologic: Negative  Neurological: Negative  Hematological: Negative  Psychiatric/Behavioral: Negative            Current Outpatient Prescriptions   Medication Sig Dispense Refill    aspirin 325 mg tablet Take by mouth      glipiZIDE (GLUCOTROL) 5 mg tablet TAKE ONE TABLET BY MOUTH 2 TIMES A  tablet 3    lisinopril-hydrochlorothiazide (PRINZIDE,ZESTORETIC) 10-12 5 MG per tablet Take 1 tablet by mouth daily      metFORMIN (GLUCOPHAGE) 1000 MG tablet Take 1 tablet by mouth 2 (two) times a day  olopatadine (PATANOL) 0 1 % ophthalmic solution       polyethylene glycol (MIRALAX) 17 g packet Take 17 g by mouth daily 14 each 0    simvastatin (ZOCOR) 40 mg tablet Take 40 mg by mouth daily        sulfamethoxazole-trimethoprim (BACTRIM DS) 800-160 mg per tablet Take 1 tablet by mouth every 12 (twelve) hours for 10 days 20 tablet 0    canagliflozin (INVOKANA) 100 mg Take 1 tablet (100 mg total) by mouth daily for 90 days 90 tablet 3     No current facility-administered medications for this visit  Objective:    /70   Pulse 86   Temp 98 4 °F (36 9 °C)   Resp 14   Ht 5' 7" (1 702 m)   Wt 99 7 kg (219 lb 12 8 oz)   SpO2 98%   BMI 34 43 kg/m²        Physical Exam   Constitutional: She appears well-developed and well-nourished  Eyes: Pupils are equal, round, and reactive to light  Neck: Normal range of motion  Neck supple  Cardiovascular: Normal rate, regular rhythm, normal heart sounds and intact distal pulses  Pulmonary/Chest: Effort normal and breath sounds normal    Musculoskeletal: She exhibits edema (improved)  Skin: There is erythema (improved on left foot)  Nursing note and vitals reviewed               Ruth Mcdonnell DO

## 2018-09-12 ENCOUNTER — OFFICE VISIT (OUTPATIENT)
Dept: FAMILY MEDICINE CLINIC | Facility: CLINIC | Age: 48
End: 2018-09-12
Payer: COMMERCIAL

## 2018-09-12 VITALS
BODY MASS INDEX: 34.31 KG/M2 | TEMPERATURE: 97.7 F | HEART RATE: 66 BPM | SYSTOLIC BLOOD PRESSURE: 126 MMHG | OXYGEN SATURATION: 100 % | HEIGHT: 67 IN | WEIGHT: 218.6 LBS | DIASTOLIC BLOOD PRESSURE: 70 MMHG | RESPIRATION RATE: 16 BRPM

## 2018-09-12 DIAGNOSIS — S90.415A: ICD-10-CM

## 2018-09-12 DIAGNOSIS — L03.116 CELLULITIS OF LEFT FOOT: Primary | ICD-10-CM

## 2018-09-12 DIAGNOSIS — L08.9: ICD-10-CM

## 2018-09-12 PROCEDURE — 3008F BODY MASS INDEX DOCD: CPT | Performed by: FAMILY MEDICINE

## 2018-09-12 PROCEDURE — 99213 OFFICE O/P EST LOW 20 MIN: CPT | Performed by: FAMILY MEDICINE

## 2018-09-12 RX ORDER — SULFAMETHOXAZOLE AND TRIMETHOPRIM 800; 160 MG/1; MG/1
1 TABLET ORAL EVERY 12 HOURS SCHEDULED
Qty: 8 TABLET | Refills: 0 | Status: SHIPPED | OUTPATIENT
Start: 2018-09-12 | End: 2018-09-22

## 2018-09-12 NOTE — PROGRESS NOTES
Assessment/Plan:    Problem List Items Addressed This Visit     Cellulitis of left foot - Primary     Improving heel and left leg swelling         Relevant Medications    sulfamethoxazole-trimethoprim (BACTRIM DS) 800-160 mg per tablet    Other Relevant Orders    Ambulatory referral to Podiatry    Abrasion of toe with infection, left, initial encounter     To see podiatry  Extend antibiotic for 4 more days         Relevant Orders    Ambulatory referral to Podiatry          There are no Patient Instructions on file for this visit  No Follow-up on file  Subjective:      Patient ID: Danielle Abraham is a 52 y o  female  Chief Complaint   Patient presents with    Leg Swelling     Patient here with left leg still swollen redness 2nd toe red        Here for another toe wound  On bactrim  No oozing          The following portions of the patient's history were reviewed and updated as appropriate: allergies, current medications, past family history, past medical history, past social history, past surgical history and problem list     Review of Systems   Constitutional: Negative  HENT: Negative  Eyes: Negative  Respiratory: Negative  Cardiovascular: Negative  Gastrointestinal: Negative  Endocrine: Negative  Genitourinary: Negative  Musculoskeletal: Negative  Skin: Positive for wound (heel improved, toe second with skin excoriated)  Allergic/Immunologic: Negative  Neurological: Negative  Hematological: Negative  Psychiatric/Behavioral: Negative            Current Outpatient Prescriptions   Medication Sig Dispense Refill    aspirin 325 mg tablet Take by mouth      glipiZIDE (GLUCOTROL) 5 mg tablet TAKE ONE TABLET BY MOUTH 2 TIMES A  tablet 3    lisinopril-hydrochlorothiazide (PRINZIDE,ZESTORETIC) 10-12 5 MG per tablet Take 1 tablet by mouth daily      metFORMIN (GLUCOPHAGE) 1000 MG tablet Take 1 tablet by mouth 2 (two) times a day      olopatadine (PATANOL) 0 1 % ophthalmic solution       polyethylene glycol (MIRALAX) 17 g packet Take 17 g by mouth daily 14 each 0    simvastatin (ZOCOR) 40 mg tablet Take 40 mg by mouth daily        sulfamethoxazole-trimethoprim (BACTRIM DS) 800-160 mg per tablet Take 1 tablet by mouth every 12 (twelve) hours for 10 days 8 tablet 0    canagliflozin (INVOKANA) 100 mg Take 1 tablet (100 mg total) by mouth daily for 90 days 90 tablet 3     No current facility-administered medications for this visit  Objective:    /70   Pulse 66   Temp 97 7 °F (36 5 °C)   Resp 16   Ht 5' 7" (1 702 m)   Wt 99 2 kg (218 lb 9 6 oz)   SpO2 100%   BMI 34 24 kg/m²        Physical Exam   Constitutional: She appears well-developed and well-nourished  Eyes: Pupils are equal, round, and reactive to light  Neck: Normal range of motion  Neck supple  Cardiovascular: Normal rate, regular rhythm, normal heart sounds and intact distal pulses  Pulmonary/Chest: Effort normal and breath sounds normal    Abdominal: Soft  Bowel sounds are normal    Musculoskeletal: Normal range of motion  Neurological: She is alert  Skin: Abrasion noted  There is erythema  Left second toe with excoriation, erythme   Nursing note and vitals reviewed               Nichole Trevino, DO

## 2018-11-05 ENCOUNTER — APPOINTMENT (OUTPATIENT)
Dept: LAB | Facility: HOSPITAL | Age: 48
End: 2018-11-05
Payer: COMMERCIAL

## 2018-11-05 ENCOUNTER — OFFICE VISIT (OUTPATIENT)
Dept: FAMILY MEDICINE CLINIC | Facility: CLINIC | Age: 48
End: 2018-11-05
Payer: COMMERCIAL

## 2018-11-05 VITALS
RESPIRATION RATE: 14 BRPM | HEIGHT: 67 IN | WEIGHT: 225 LBS | TEMPERATURE: 97.3 F | BODY MASS INDEX: 35.31 KG/M2 | DIASTOLIC BLOOD PRESSURE: 80 MMHG | SYSTOLIC BLOOD PRESSURE: 130 MMHG | OXYGEN SATURATION: 100 % | HEART RATE: 92 BPM

## 2018-11-05 DIAGNOSIS — E78.2 MIXED HYPERLIPIDEMIA: Chronic | ICD-10-CM

## 2018-11-05 DIAGNOSIS — E11.40 TYPE 2 DIABETES MELLITUS WITH DIABETIC NEUROPATHY, WITHOUT LONG-TERM CURRENT USE OF INSULIN (HCC): Primary | Chronic | ICD-10-CM

## 2018-11-05 DIAGNOSIS — I10 BENIGN ESSENTIAL HYPERTENSION: ICD-10-CM

## 2018-11-05 DIAGNOSIS — Z23 NEED FOR VACCINATION AGAINST STREPTOCOCCUS PNEUMONIAE: ICD-10-CM

## 2018-11-05 DIAGNOSIS — E11.42 TYPE 2 DIABETES MELLITUS WITH DIABETIC POLYNEUROPATHY, WITHOUT LONG-TERM CURRENT USE OF INSULIN (HCC): ICD-10-CM

## 2018-11-05 DIAGNOSIS — G62.89 OTHER POLYNEUROPATHY: ICD-10-CM

## 2018-11-05 PROBLEM — L03.116 CELLULITIS OF LEFT FOOT: Status: RESOLVED | Noted: 2018-09-04 | Resolved: 2018-11-05

## 2018-11-05 PROBLEM — L08.9: Status: RESOLVED | Noted: 2018-09-12 | Resolved: 2018-11-05

## 2018-11-05 PROBLEM — S90.415A: Status: RESOLVED | Noted: 2018-09-12 | Resolved: 2018-11-05

## 2018-11-05 LAB
EST. AVERAGE GLUCOSE BLD GHB EST-MCNC: 137 MG/DL
HBA1C MFR BLD: 6.4 % (ref 4.2–6.3)

## 2018-11-05 PROCEDURE — 90471 IMMUNIZATION ADMIN: CPT

## 2018-11-05 PROCEDURE — 3079F DIAST BP 80-89 MM HG: CPT | Performed by: FAMILY MEDICINE

## 2018-11-05 PROCEDURE — 90670 PCV13 VACCINE IM: CPT

## 2018-11-05 PROCEDURE — 36415 COLL VENOUS BLD VENIPUNCTURE: CPT

## 2018-11-05 PROCEDURE — 3008F BODY MASS INDEX DOCD: CPT | Performed by: FAMILY MEDICINE

## 2018-11-05 PROCEDURE — 83036 HEMOGLOBIN GLYCOSYLATED A1C: CPT

## 2018-11-05 PROCEDURE — 99214 OFFICE O/P EST MOD 30 MIN: CPT | Performed by: FAMILY MEDICINE

## 2018-11-05 PROCEDURE — 3075F SYST BP GE 130 - 139MM HG: CPT | Performed by: FAMILY MEDICINE

## 2018-11-05 NOTE — PROGRESS NOTES
Assessment/Plan:    Problem List Items Addressed This Visit     Diabetes mellitus with diabetic neuropathy ([address] ) - Primary (Chronic)     Lab Results   Component Value Date    HGBA1C 6 3 06/29/2018       No results for input(s): POCGLU in the last 72 hours  Blood Sugar Average: Last 72 hrs:  Went for labs today  Overall doing well  Sees podiatry           Relevant Orders    CBC    Hemoglobin A1C    Microalbumin / creatinine urine ratio    Hyperlipidemia (Chronic)     On zocor  Doing well tolerating         Relevant Orders    CBC    Comprehensive metabolic panel    Lipid Panel with Direct LDL reflex    TSH, 3rd generation with Free T4 reflex    Benign essential hypertension     Stable on lisinopril         Relevant Orders    CBC    Peripheral neuropathy      Other Visit Diagnoses     Need for vaccination against Streptococcus pneumoniae        Relevant Orders    PNEUMOCOCCAL CONJUGATE VACCINE 13-VALENT GREATER THAN 6 MONTHS          There are no Patient Instructions on file for this visit  No Follow-up on file  Subjective:      Patient ID: Matty Delacruz is a 50 y o  female  Chief Complaint   Patient presents with    Follow-up     Patient here for 3 month follow up on Diabetes, Hyperlipidemia       Here for follow up  Overall doing well  Walking daily      Hyperlipidemia   This is a chronic problem  The current episode started more than 1 year ago  The problem is controlled  Recent lipid tests were reviewed and are normal  There are no known factors aggravating her hyperlipidemia  Current antihyperlipidemic treatment includes statins  The current treatment provides significant improvement of lipids  There are no compliance problems  Diabetes   She presents for her follow-up diabetic visit  She has type 2 diabetes mellitus  Her disease course has been stable  There are no hypoglycemic associated symptoms  There are no diabetic associated symptoms  There are no hypoglycemic complications   Symptoms are stable  There are no diabetic complications  Risk factors for coronary artery disease include diabetes mellitus  Current diabetic treatment includes oral agent (triple therapy)  She is compliant with treatment all of the time  Her weight is stable  She has not had a previous visit with a dietitian  She participates in exercise daily  There is no change in her home blood glucose trend  An ACE inhibitor/angiotensin II receptor blocker is being taken  She does not see a podiatrist Eye exam is current  The following portions of the patient's history were reviewed and updated as appropriate: allergies, current medications, past family history, past medical history, past social history, past surgical history and problem list     Review of Systems   Constitutional: Negative  HENT: Negative  Eyes: Negative  Respiratory: Negative  Cardiovascular: Negative  Gastrointestinal: Negative  Endocrine: Negative  Musculoskeletal: Negative  Skin: Negative  Allergic/Immunologic: Negative  Neurological: Negative  Hematological: Negative  Psychiatric/Behavioral: Negative  Current Outpatient Prescriptions   Medication Sig Dispense Refill    aspirin 325 mg tablet Take by mouth      glipiZIDE (GLUCOTROL) 5 mg tablet TAKE ONE TABLET BY MOUTH 2 TIMES A  tablet 3    lisinopril-hydrochlorothiazide (PRINZIDE,ZESTORETIC) 10-12 5 MG per tablet Take 1 tablet by mouth daily      metFORMIN (GLUCOPHAGE) 1000 MG tablet Take 1 tablet by mouth 2 (two) times a day      olopatadine (PATANOL) 0 1 % ophthalmic solution       polyethylene glycol (MIRALAX) 17 g packet Take 17 g by mouth daily 14 each 0    simvastatin (ZOCOR) 40 mg tablet Take 40 mg by mouth daily        canagliflozin (INVOKANA) 100 mg Take 1 tablet (100 mg total) by mouth daily for 90 days 90 tablet 3     No current facility-administered medications for this visit          Objective:    /80   Pulse 92   Temp (!) 97 3 °F (36 3 °C)   Resp 14   Ht 5' 7" (1 702 m)   Wt 102 kg (225 lb)   SpO2 100%   BMI 35 24 kg/m²        Physical Exam   Constitutional: She appears well-developed and well-nourished  HENT:   Head: Normocephalic and atraumatic  Eyes: Pupils are equal, round, and reactive to light  Neck: Normal range of motion  Neck supple  Cardiovascular: Normal rate, regular rhythm, normal heart sounds and intact distal pulses  Pulmonary/Chest: Effort normal and breath sounds normal    Abdominal: Soft  Bowel sounds are normal    Musculoskeletal: Normal range of motion  Neurological: She is alert  Skin: Skin is warm and dry  Nursing note and vitals reviewed               Deshawn Marroquin DO

## 2018-11-05 NOTE — ASSESSMENT & PLAN NOTE
Lab Results   Component Value Date    HGBA1C 6 3 06/29/2018       No results for input(s): POCGLU in the last 72 hours      Blood Sugar Average: Last 72 hrs:  Went for labs today  Overall doing well  Sees podiatry

## 2018-12-11 LAB
LEFT EYE DIABETIC RETINOPATHY: NORMAL
RIGHT EYE DIABETIC RETINOPATHY: NORMAL

## 2018-12-11 PROCEDURE — 2022F DILAT RTA XM EVC RTNOPTHY: CPT | Performed by: FAMILY MEDICINE

## 2018-12-11 PROCEDURE — 3072F LOW RISK FOR RETINOPATHY: CPT | Performed by: FAMILY MEDICINE

## 2018-12-17 DIAGNOSIS — E78.2 MIXED HYPERLIPIDEMIA: ICD-10-CM

## 2018-12-17 DIAGNOSIS — I10 ESSENTIAL HYPERTENSION: Primary | ICD-10-CM

## 2018-12-17 RX ORDER — SIMVASTATIN 40 MG
TABLET ORAL
Qty: 90 TABLET | Refills: 3 | Status: SHIPPED | OUTPATIENT
Start: 2018-12-17 | End: 2020-01-27

## 2018-12-17 RX ORDER — LISINOPRIL AND HYDROCHLOROTHIAZIDE 12.5; 1 MG/1; MG/1
TABLET ORAL
Qty: 90 TABLET | Refills: 3 | Status: SHIPPED | OUTPATIENT
Start: 2018-12-17 | End: 2020-01-27

## 2019-01-22 LAB
LEFT EYE DIABETIC RETINOPATHY: NORMAL
RIGHT EYE DIABETIC RETINOPATHY: NORMAL

## 2019-01-22 PROCEDURE — 3072F LOW RISK FOR RETINOPATHY: CPT | Performed by: FAMILY MEDICINE

## 2019-02-03 DIAGNOSIS — E11.40 CONTROLLED TYPE 2 DIABETES MELLITUS WITH NEUROPATHY (HCC): Primary | ICD-10-CM

## 2019-03-16 NOTE — PLAN OF CARE
INFECTION - ADULT     Absence or prevention of progression during hospitalization Progressing     Absence of fever/infection during neutropenic period Progressing        METABOLIC, FLUID AND ELECTROLYTES - ADULT     Glucose maintained within target range Progressing        PAIN - ADULT     Verbalizes/displays adequate comfort level or baseline comfort level Progressing        Potential for Falls     Patient will remain free of falls Progressing        SAFETY ADULT     Maintain or return to baseline ADL function Progressing     Maintain or return mobility status to optimal level Progressing Balbina Trevino(Resident)

## 2019-03-24 ENCOUNTER — HOSPITAL ENCOUNTER (EMERGENCY)
Facility: HOSPITAL | Age: 49
Discharge: HOME | End: 2019-03-24
Attending: EMERGENCY MEDICINE
Payer: COMMERCIAL

## 2019-03-24 ENCOUNTER — APPOINTMENT (EMERGENCY)
Dept: RADIOLOGY | Facility: HOSPITAL | Age: 49
End: 2019-03-24
Payer: COMMERCIAL

## 2019-03-24 VITALS
OXYGEN SATURATION: 99 % | HEART RATE: 81 BPM | DIASTOLIC BLOOD PRESSURE: 68 MMHG | HEIGHT: 69 IN | TEMPERATURE: 97.6 F | SYSTOLIC BLOOD PRESSURE: 133 MMHG | WEIGHT: 221.2 LBS | BODY MASS INDEX: 32.76 KG/M2 | RESPIRATION RATE: 18 BRPM

## 2019-03-24 DIAGNOSIS — L97.519 RIGHT FOOT ULCER (HCC): Primary | ICD-10-CM

## 2019-03-24 PROCEDURE — 73630 X-RAY EXAM OF FOOT: CPT

## 2019-03-24 PROCEDURE — 99283 EMERGENCY DEPT VISIT LOW MDM: CPT

## 2019-03-24 RX ORDER — CLINDAMYCIN HYDROCHLORIDE 150 MG/1
450 CAPSULE ORAL EVERY 8 HOURS SCHEDULED
Qty: 63 CAPSULE | Refills: 0 | Status: SHIPPED | OUTPATIENT
Start: 2019-03-24 | End: 2019-03-31

## 2019-03-24 RX ORDER — CLINDAMYCIN HYDROCHLORIDE 150 MG/1
450 CAPSULE ORAL ONCE
Status: COMPLETED | OUTPATIENT
Start: 2019-03-24 | End: 2019-03-24

## 2019-03-24 RX ADMIN — CLINDAMYCIN HYDROCHLORIDE 450 MG: 150 CAPSULE ORAL at 14:43

## 2019-03-24 NOTE — ED PROVIDER NOTES
History  Chief Complaint   Patient presents with    Cellulitis     Cut on ball of right foot for 1 week, states infection in foot seems to be getting worse     Patient is a 51-year-old female presenting for right foot wound  Patient has a history of diabetes with peripheral neuropathy  She states she does not know what she scraped her foot on but she does check her feet for wounds and this is how she discovered the injury  Wound is on the ball of her right foot  Patient has been using doing frequent dressing changes to self treat at home, however she noticed an increase in foul drainage from the wound  She does associate pressure around the area of the wound and feels pain in her anterior foot that is associated with it  She denies increased erythema or erythema tracking up her leg  She is able to bear weight and stand on her foot  She denies fevers or chills, nausea or vomiting, states she has had a general lack of appetite but has been able to eat and drink  Prior to Admission Medications   Prescriptions Last Dose Informant Patient Reported? Taking? canagliflozin (INVOKANA) 100 mg   No Yes   Sig: Take 1 tablet (100 mg total) by mouth daily for 90 days   glipiZIDE (GLUCOTROL) 5 mg tablet   No Yes   Sig: TAKE ONE TABLET BY MOUTH 2 TIMES A DAY   lisinopril-hydrochlorothiazide (PRINZIDE,ZESTORETIC) 10-12 5 MG per tablet   No Yes   Sig: TAKE ONE TABLET BY MOUTH EVERY DAY   metFORMIN (GLUCOPHAGE) 1000 MG tablet   No Yes   Sig: TAKE ONE TABLET BY MOUTH 2 TIMES A DAY   simvastatin (ZOCOR) 40 mg tablet   No Yes   Sig: TAKE 1 TABLET AT BEDTIME        Facility-Administered Medications: None       Past Medical History:   Diagnosis Date    Diabetes mellitus ([address] )     Hyperlipidemia     Hypertension        Past Surgical History:   Procedure Laterality Date    TONSILLECTOMY AND ADENOIDECTOMY         Family History   Problem Relation Age of Onset    Heart disease Mother     Diabetes Mother    [facility] Hypertension Mother         Benign Essential     Coronary artery disease Mother     Cancer Father         lung     Diabetes Sister         mellitus     Diabetes Maternal Grandfather         mellitus      I have reviewed and agree with the history as documented  Social History     Tobacco Use    Smoking status: Never Smoker    Smokeless tobacco: Never Used   Substance Use Topics    Alcohol use: Yes     Comment: weekends    Drug use: No        Review of Systems   Constitutional: Positive for appetite change  Negative for chills and fever  Respiratory: Negative for chest tightness and shortness of breath  Cardiovascular: Negative for chest pain and palpitations  Gastrointestinal: Negative for abdominal pain, nausea and vomiting  Neurological: Negative for light-headedness and headaches  All other systems reviewed and are negative  Physical Exam  ED Triage Vitals [03/24/19 1205]   Temperature Pulse Respirations Blood Pressure SpO2   97 6 °F (36 4 °C) 96 18 137/64 99 %      Temp Source Heart Rate Source Patient Position - Orthostatic VS BP Location FiO2 (%)   Tympanic Monitor Sitting Left arm --      Pain Score       No Pain             Orthostatic Vital Signs  Vitals:    03/24/19 1205 03/24/19 1344   BP: 137/64 133/68   Pulse: 96 81   Patient Position - Orthostatic VS: Sitting Lying       Physical Exam   Constitutional: She is oriented to person, place, and time  She appears well-developed and well-nourished  No distress  HENT:   Head: Normocephalic and atraumatic  Eyes: Conjunctivae and EOM are normal  Right eye exhibits no discharge  Left eye exhibits no discharge  Cardiovascular: Normal rate, regular rhythm and normal heart sounds  No murmur heard  Pulmonary/Chest: Effort normal and breath sounds normal  No respiratory distress  Abdominal: Soft  She exhibits no distension  There is no tenderness  Musculoskeletal: She exhibits no edema or deformity     Neurological: She is alert and oriented to person, place, and time  No cranial nerve deficit  Skin: Skin is warm  She is not diaphoretic  Wound to right ball of foot, measuring about 1cm with granulation tissue centrally  No significant surrounding erythema or edema  Vitals reviewed  ED Medications  Medications   clindamycin (CLEOCIN) capsule 450 mg (450 mg Oral Given 3/24/19 1443)       Diagnostic Studies  Results Reviewed     None                 XR foot 3+ views RIGHT   Final Result by Sloane Beltrán MD (03/25 2586)   Soft tissue defect along the plantar surface at the level of the 1st MTP joint  Surrounding soft tissue swelling  No radiopaque foreign body  No acute osseous abnormality  Workstation performed: [id number]               Procedures  Procedures      Phone Consults  ED Phone Contact    ED Course                               MDM  Number of Diagnoses or Management Options  Right foot ulcer [facility]):   Diagnosis management comments: XR foot shows no deep infection or foreign body  Clinda first dose given in ED and rx sent to pharmacy  Patient has good follow-up with PCP and podiatry  Disposition  Final diagnoses:   Right foot ulcer ([address] )     Time reflects when diagnosis was documented in both MDM as applicable and the Disposition within this note     Time User Action Codes Description Comment    3/24/2019  2:33 PM Marvel Klein Add [[id number]] Right foot ulcer [facility])       ED Disposition     ED Disposition Condition Date/Time Comment    Discharge Stable Sun Mar 24, 2019  2:32 PM Christy Layne discharge to home/self care              Follow-up Information     Follow up With Specialties Details Why Contact Jose Kapoor DO Family Medicine  As needed [address] [place] [address]      Mary Grace Woodruff DPM Podiatry  If symptoms worsen           Discharge Medication List as of 3/24/2019  2:37 PM      START taking these medications    Details   clindamycin (CLEOCIN) 150 mg capsule Take 3 capsules (450 mg total) by mouth every 8 (eight) hours for 7 days, Starting Sun 3/24/2019, Until Sun 3/31/2019, Normal         CONTINUE these medications which have NOT CHANGED    Details   canagliflozin (INVOKANA) 100 mg Take 1 tablet (100 mg total) by mouth daily for 90 days, Starting Tue 2/27/2018, Until Sun 3/24/2019, Normal      glipiZIDE (GLUCOTROL) 5 mg tablet TAKE ONE TABLET BY MOUTH 2 TIMES A DAY, Normal      lisinopril-hydrochlorothiazide (PRINZIDE,ZESTORETIC) 10-12 5 MG per tablet TAKE ONE TABLET BY MOUTH EVERY DAY, Normal      metFORMIN (GLUCOPHAGE) 1000 MG tablet TAKE ONE TABLET BY MOUTH 2 TIMES A DAY, Normal      simvastatin (ZOCOR) 40 mg tablet TAKE 1 TABLET AT BEDTIME , Normal           No discharge procedures on file  ED Provider  Attending physically available and evaluated Patrizia Timothy PAT managed the patient along with the ED Attending      Electronically Signed by         Mushtaq Sellers DO  03/25/19 2042

## 2019-03-24 NOTE — ED ATTENDING ATTESTATION
Elinor Hazle MD, saw and evaluated the patient  I have discussed the patient with the resident/non-physician practitioner and agree with the resident's/non-physician practitioner's findings, Plan of Care, and MDM as documented in the resident's/non-physician practitioner's note, except where noted  All available labs and Radiology studies were reviewed  I was present for key portions of any procedure(s) performed by the resident/non-physician practitioner and I was immediately available to provide assistance  At this point I agree with the current assessment done in the Emergency Department  I have conducted an independent evaluation of this patient a history and physical is as follows:    59-year-old woman with history of diabetes presents with right foot wound  Noted and ulceration to the ball of her foot which started a few days ago, there is a pressure sensation with some accompanying pain with ambulation  Patient does not believe that she stepped on anything and denies foreign body sensation  Has noted some discharge from the wound over the last day or so  Denies fever, chills, nausea, vomiting, or any other symptoms  On exam patient is awake and alert in no acute distress  Heart regular rate and rhythm, no murmurs rubs or gallops  Lungs clear to auscultation bilaterally  There is a 1 cm shallow ulceration to the medial distal plantar foot with small amount of purulent yellow material   There is no surrounding erythema or swelling  There is mild tenderness to the area  No palpable foreign body  X-ray does not show any evidence of radiopaque foreign body or osteomyelitis  Will start patient on antibiotics and instruct primary care follow-up        Critical Care Time  Procedures

## 2019-03-27 LAB
LEFT EYE DIABETIC RETINOPATHY: NORMAL
RIGHT EYE DIABETIC RETINOPATHY: NORMAL

## 2019-04-02 ENCOUNTER — TRANSCRIBE ORDERS (OUTPATIENT)
Dept: LAB | Facility: HOSPITAL | Age: 49
End: 2019-04-02

## 2019-04-02 ENCOUNTER — APPOINTMENT (OUTPATIENT)
Dept: LAB | Facility: HOSPITAL | Age: 49
End: 2019-04-02
Payer: COMMERCIAL

## 2019-04-02 DIAGNOSIS — E78.2 MIXED HYPERLIPIDEMIA: Chronic | ICD-10-CM

## 2019-04-02 DIAGNOSIS — E11.40 TYPE 2 DIABETES MELLITUS WITH DIABETIC NEUROPATHY, WITHOUT LONG-TERM CURRENT USE OF INSULIN (HCC): Chronic | ICD-10-CM

## 2019-04-02 DIAGNOSIS — I10 BENIGN ESSENTIAL HYPERTENSION: ICD-10-CM

## 2019-04-02 LAB
ALBUMIN SERPL BCP-MCNC: 3.5 G/DL (ref 3.5–5)
ALP SERPL-CCNC: 88 U/L (ref 46–116)
ALT SERPL W P-5'-P-CCNC: 20 U/L (ref 12–78)
ANION GAP SERPL CALCULATED.3IONS-SCNC: 5 MMOL/L (ref 4–13)
AST SERPL W P-5'-P-CCNC: 18 U/L (ref 5–45)
BILIRUB SERPL-MCNC: 0.48 MG/DL (ref 0.2–1)
BUN SERPL-MCNC: 16 MG/DL (ref 5–25)
CALCIUM SERPL-MCNC: 8.6 MG/DL (ref 8.3–10.1)
CHLORIDE SERPL-SCNC: 108 MMOL/L (ref 100–108)
CHOLEST SERPL-MCNC: 149 MG/DL (ref 50–200)
CO2 SERPL-SCNC: 25 MMOL/L (ref 21–32)
CREAT SERPL-MCNC: 0.88 MG/DL (ref 0.6–1.3)
CREAT UR-MCNC: 140 MG/DL
ERYTHROCYTE [DISTWIDTH] IN BLOOD BY AUTOMATED COUNT: 13 % (ref 11.6–15.1)
EST. AVERAGE GLUCOSE BLD GHB EST-MCNC: 134 MG/DL
GFR SERPL CREATININE-BSD FRML MDRD: 78 ML/MIN/1.73SQ M
GLUCOSE P FAST SERPL-MCNC: 105 MG/DL (ref 65–99)
HBA1C MFR BLD: 6.3 % (ref 4.2–6.3)
HCT VFR BLD AUTO: 38.9 % (ref 34.8–46.1)
HDLC SERPL-MCNC: 53 MG/DL (ref 40–60)
HGB BLD-MCNC: 12.1 G/DL (ref 11.5–15.4)
LDLC SERPL CALC-MCNC: 85 MG/DL (ref 0–100)
MCH RBC QN AUTO: 27.8 PG (ref 26.8–34.3)
MCHC RBC AUTO-ENTMCNC: 31.1 G/DL (ref 31.4–37.4)
MCV RBC AUTO: 89 FL (ref 82–98)
MICROALBUMIN UR-MCNC: 9.1 MG/L (ref 0–20)
MICROALBUMIN/CREAT 24H UR: 7 MG/G CREATININE (ref 0–30)
PLATELET # BLD AUTO: 325 THOUSANDS/UL (ref 149–390)
PMV BLD AUTO: 9.4 FL (ref 8.9–12.7)
POTASSIUM SERPL-SCNC: 4.2 MMOL/L (ref 3.5–5.3)
PROT SERPL-MCNC: 7.1 G/DL (ref 6.4–8.2)
RBC # BLD AUTO: 4.36 MILLION/UL (ref 3.81–5.12)
SODIUM SERPL-SCNC: 138 MMOL/L (ref 136–145)
TRIGL SERPL-MCNC: 54 MG/DL
TSH SERPL DL<=0.05 MIU/L-ACNC: 1.26 UIU/ML (ref 0.36–3.74)
WBC # BLD AUTO: 8.3 THOUSAND/UL (ref 4.31–10.16)

## 2019-04-02 PROCEDURE — 84443 ASSAY THYROID STIM HORMONE: CPT

## 2019-04-02 PROCEDURE — 80061 LIPID PANEL: CPT

## 2019-04-02 PROCEDURE — 85027 COMPLETE CBC AUTOMATED: CPT

## 2019-04-02 PROCEDURE — 82570 ASSAY OF URINE CREATININE: CPT

## 2019-04-02 PROCEDURE — 82043 UR ALBUMIN QUANTITATIVE: CPT

## 2019-04-02 PROCEDURE — 80053 COMPREHEN METABOLIC PANEL: CPT

## 2019-04-02 PROCEDURE — 36415 COLL VENOUS BLD VENIPUNCTURE: CPT

## 2019-04-02 PROCEDURE — 3061F NEG MICROALBUMINURIA REV: CPT | Performed by: FAMILY MEDICINE

## 2019-04-02 PROCEDURE — 83036 HEMOGLOBIN GLYCOSYLATED A1C: CPT

## 2019-04-02 RX ORDER — CANAGLIFLOZIN 100 MG/1
TABLET, FILM COATED ORAL
Qty: 90 TABLET | Refills: 3 | Status: SHIPPED | OUTPATIENT
Start: 2019-04-02 | End: 2020-05-04

## 2019-04-04 ENCOUNTER — OFFICE VISIT (OUTPATIENT)
Dept: FAMILY MEDICINE CLINIC | Facility: CLINIC | Age: 49
End: 2019-04-04
Payer: COMMERCIAL

## 2019-04-04 VITALS
DIASTOLIC BLOOD PRESSURE: 64 MMHG | HEIGHT: 69 IN | WEIGHT: 224.6 LBS | SYSTOLIC BLOOD PRESSURE: 120 MMHG | OXYGEN SATURATION: 99 % | HEART RATE: 93 BPM | TEMPERATURE: 97.8 F | RESPIRATION RATE: 16 BRPM | BODY MASS INDEX: 33.27 KG/M2

## 2019-04-04 DIAGNOSIS — E78.2 MIXED HYPERLIPIDEMIA: Chronic | ICD-10-CM

## 2019-04-04 DIAGNOSIS — E11.40 TYPE 2 DIABETES MELLITUS WITH DIABETIC NEUROPATHY, WITHOUT LONG-TERM CURRENT USE OF INSULIN (HCC): Primary | Chronic | ICD-10-CM

## 2019-04-04 DIAGNOSIS — G62.89 OTHER POLYNEUROPATHY: ICD-10-CM

## 2019-04-04 DIAGNOSIS — I10 BENIGN ESSENTIAL HYPERTENSION: ICD-10-CM

## 2019-04-04 PROCEDURE — 1036F TOBACCO NON-USER: CPT | Performed by: FAMILY MEDICINE

## 2019-04-04 PROCEDURE — 3078F DIAST BP <80 MM HG: CPT | Performed by: FAMILY MEDICINE

## 2019-04-04 PROCEDURE — 3008F BODY MASS INDEX DOCD: CPT | Performed by: FAMILY MEDICINE

## 2019-04-04 PROCEDURE — 3074F SYST BP LT 130 MM HG: CPT | Performed by: FAMILY MEDICINE

## 2019-04-04 PROCEDURE — 99214 OFFICE O/P EST MOD 30 MIN: CPT | Performed by: FAMILY MEDICINE

## 2019-05-07 ENCOUNTER — PROCEDURE VISIT (OUTPATIENT)
Dept: PODIATRY | Facility: CLINIC | Age: 49
End: 2019-05-07
Payer: COMMERCIAL

## 2019-05-07 VITALS
HEIGHT: 65 IN | SYSTOLIC BLOOD PRESSURE: 121 MMHG | WEIGHT: 224 LBS | DIASTOLIC BLOOD PRESSURE: 67 MMHG | BODY MASS INDEX: 37.32 KG/M2

## 2019-05-07 DIAGNOSIS — E11.40 TYPE 2 DIABETES MELLITUS WITH DIABETIC NEUROPATHY, WITHOUT LONG-TERM CURRENT USE OF INSULIN (HCC): Chronic | ICD-10-CM

## 2019-05-07 DIAGNOSIS — E11.40 TYPE 2 DIABETES MELLITUS WITH DIABETIC NEUROPATHY, UNSPECIFIED WHETHER LONG TERM INSULIN USE (HCC): ICD-10-CM

## 2019-05-07 DIAGNOSIS — L97.519 FOOT ULCER, RIGHT, WITH UNSPECIFIED SEVERITY (HCC): Primary | ICD-10-CM

## 2019-05-07 PROCEDURE — 11042 DBRDMT SUBQ TIS 1ST 20SQCM/<: CPT | Performed by: PODIATRIST

## 2019-05-16 ENCOUNTER — OFFICE VISIT (OUTPATIENT)
Dept: PODIATRY | Facility: CLINIC | Age: 49
End: 2019-05-16
Payer: COMMERCIAL

## 2019-05-16 VITALS
SYSTOLIC BLOOD PRESSURE: 121 MMHG | DIASTOLIC BLOOD PRESSURE: 67 MMHG | HEIGHT: 68 IN | BODY MASS INDEX: 33.8 KG/M2 | WEIGHT: 223 LBS

## 2019-05-16 DIAGNOSIS — L97.519 FOOT ULCER, RIGHT, WITH UNSPECIFIED SEVERITY (HCC): Primary | ICD-10-CM

## 2019-05-16 DIAGNOSIS — E11.40 TYPE 2 DIABETES MELLITUS WITH DIABETIC NEUROPATHY, WITHOUT LONG-TERM CURRENT USE OF INSULIN (HCC): Chronic | ICD-10-CM

## 2019-05-16 DIAGNOSIS — E11.40 TYPE 2 DIABETES MELLITUS WITH DIABETIC NEUROPATHY, UNSPECIFIED WHETHER LONG TERM INSULIN USE (HCC): ICD-10-CM

## 2019-05-16 PROCEDURE — 11042 DBRDMT SUBQ TIS 1ST 20SQCM/<: CPT | Performed by: PODIATRIST

## 2019-05-27 ENCOUNTER — HOSPITAL ENCOUNTER (EMERGENCY)
Facility: HOSPITAL | Age: 49
Discharge: HOME | End: 2019-05-27
Attending: EMERGENCY MEDICINE | Admitting: EMERGENCY MEDICINE
Payer: OTHER MISCELLANEOUS

## 2019-05-27 VITALS
TEMPERATURE: 99.3 F | OXYGEN SATURATION: 98 % | HEART RATE: 90 BPM | SYSTOLIC BLOOD PRESSURE: 153 MMHG | DIASTOLIC BLOOD PRESSURE: 82 MMHG | WEIGHT: 222.66 LBS | BODY MASS INDEX: 33.86 KG/M2 | RESPIRATION RATE: 18 BRPM

## 2019-05-27 DIAGNOSIS — Z77.21 EXPOSURE TO BLOOD OR BODY FLUID: Primary | ICD-10-CM

## 2019-05-27 LAB — ALT SERPL W P-5'-P-CCNC: 22 U/L (ref 12–78)

## 2019-05-27 PROCEDURE — 90471 IMMUNIZATION ADMIN: CPT

## 2019-05-27 PROCEDURE — 99283 EMERGENCY DEPT VISIT LOW MDM: CPT

## 2019-05-27 PROCEDURE — 86803 HEPATITIS C AB TEST: CPT | Performed by: EMERGENCY MEDICINE

## 2019-05-27 PROCEDURE — 99283 EMERGENCY DEPT VISIT LOW MDM: CPT | Performed by: EMERGENCY MEDICINE

## 2019-05-27 PROCEDURE — 84460 ALANINE AMINO (ALT) (SGPT): CPT | Performed by: EMERGENCY MEDICINE

## 2019-05-27 PROCEDURE — 87340 HEPATITIS B SURFACE AG IA: CPT | Performed by: EMERGENCY MEDICINE

## 2019-05-27 PROCEDURE — 36415 COLL VENOUS BLD VENIPUNCTURE: CPT | Performed by: EMERGENCY MEDICINE

## 2019-05-27 PROCEDURE — 90715 TDAP VACCINE 7 YRS/> IM: CPT | Performed by: EMERGENCY MEDICINE

## 2019-05-27 PROCEDURE — 86706 HEP B SURFACE ANTIBODY: CPT | Performed by: EMERGENCY MEDICINE

## 2019-05-27 PROCEDURE — 87389 HIV-1 AG W/HIV-1&-2 AB AG IA: CPT | Performed by: EMERGENCY MEDICINE

## 2019-05-27 RX ADMIN — TETANUS TOXOID, REDUCED DIPHTHERIA TOXOID AND ACELLULAR PERTUSSIS VACCINE, ADSORBED 0.5 ML: 5; 2.5; 8; 8; 2.5 SUSPENSION INTRAMUSCULAR at 08:14

## 2019-05-28 ENCOUNTER — OFFICE VISIT (OUTPATIENT)
Dept: PODIATRY | Facility: CLINIC | Age: 49
End: 2019-05-28
Payer: COMMERCIAL

## 2019-05-28 ENCOUNTER — DOCUMENTATION (OUTPATIENT)
Dept: PODIATRY | Facility: CLINIC | Age: 49
End: 2019-05-28

## 2019-05-28 VITALS
DIASTOLIC BLOOD PRESSURE: 72 MMHG | HEIGHT: 68 IN | BODY MASS INDEX: 32.28 KG/M2 | SYSTOLIC BLOOD PRESSURE: 139 MMHG | WEIGHT: 213 LBS

## 2019-05-28 DIAGNOSIS — E11.40 TYPE 2 DIABETES MELLITUS WITH DIABETIC NEUROPATHY, UNSPECIFIED WHETHER LONG TERM INSULIN USE (HCC): ICD-10-CM

## 2019-05-28 DIAGNOSIS — L97.519 FOOT ULCER, RIGHT, WITH UNSPECIFIED SEVERITY (HCC): Primary | ICD-10-CM

## 2019-05-28 LAB
HBV SURFACE AB SER-ACNC: 46.4 MIU/ML
HBV SURFACE AG SER QL: NORMAL
HCV AB SER QL: NORMAL
HIV 1+2 AB+HIV1 P24 AG SERPL QL IA: NORMAL

## 2019-05-28 PROCEDURE — 11042 DBRDMT SUBQ TIS 1ST 20SQCM/<: CPT | Performed by: PODIATRIST

## 2019-05-28 PROCEDURE — 29445 APPL RIGID TOT CNTC LEG CAST: CPT | Performed by: PODIATRIST

## 2019-06-04 ENCOUNTER — OFFICE VISIT (OUTPATIENT)
Dept: PODIATRY | Facility: CLINIC | Age: 49
End: 2019-06-04
Payer: COMMERCIAL

## 2019-06-04 VITALS
DIASTOLIC BLOOD PRESSURE: 72 MMHG | HEIGHT: 68 IN | HEART RATE: 90 BPM | SYSTOLIC BLOOD PRESSURE: 139 MMHG | WEIGHT: 213 LBS | BODY MASS INDEX: 32.28 KG/M2

## 2019-06-04 DIAGNOSIS — E11.40 TYPE 2 DIABETES MELLITUS WITH DIABETIC NEUROPATHY, UNSPECIFIED WHETHER LONG TERM INSULIN USE (HCC): ICD-10-CM

## 2019-06-04 DIAGNOSIS — L97.519 FOOT ULCER, RIGHT, WITH UNSPECIFIED SEVERITY (HCC): Primary | ICD-10-CM

## 2019-06-04 PROCEDURE — 29445 APPL RIGID TOT CNTC LEG CAST: CPT | Performed by: PODIATRIST

## 2019-06-11 ENCOUNTER — OFFICE VISIT (OUTPATIENT)
Dept: PODIATRY | Facility: CLINIC | Age: 49
End: 2019-06-11
Payer: COMMERCIAL

## 2019-06-11 VITALS
DIASTOLIC BLOOD PRESSURE: 82 MMHG | SYSTOLIC BLOOD PRESSURE: 142 MMHG | HEIGHT: 68 IN | WEIGHT: 220 LBS | BODY MASS INDEX: 33.34 KG/M2

## 2019-06-11 DIAGNOSIS — L97.512 RIGHT FOOT ULCER, WITH FAT LAYER EXPOSED (HCC): Primary | ICD-10-CM

## 2019-06-11 DIAGNOSIS — E11.40 TYPE 2 DIABETES MELLITUS WITH DIABETIC NEUROPATHY, WITHOUT LONG-TERM CURRENT USE OF INSULIN (HCC): Chronic | ICD-10-CM

## 2019-06-11 PROCEDURE — 99213 OFFICE O/P EST LOW 20 MIN: CPT | Performed by: PODIATRIST

## 2019-06-12 PROBLEM — L97.512 RIGHT FOOT ULCER, WITH FAT LAYER EXPOSED (HCC): Status: ACTIVE | Noted: 2019-06-12

## 2019-06-18 ENCOUNTER — OFFICE VISIT (OUTPATIENT)
Dept: PODIATRY | Facility: CLINIC | Age: 49
End: 2019-06-18
Payer: COMMERCIAL

## 2019-06-18 VITALS
DIASTOLIC BLOOD PRESSURE: 83 MMHG | BODY MASS INDEX: 33.34 KG/M2 | HEIGHT: 68 IN | SYSTOLIC BLOOD PRESSURE: 138 MMHG | WEIGHT: 220 LBS

## 2019-06-18 DIAGNOSIS — E11.40 TYPE 2 DIABETES MELLITUS WITH DIABETIC NEUROPATHY, WITHOUT LONG-TERM CURRENT USE OF INSULIN (HCC): ICD-10-CM

## 2019-06-18 DIAGNOSIS — L97.512 RIGHT FOOT ULCER, WITH FAT LAYER EXPOSED (HCC): Primary | ICD-10-CM

## 2019-06-18 PROCEDURE — 99213 OFFICE O/P EST LOW 20 MIN: CPT | Performed by: PODIATRIST

## 2019-06-25 ENCOUNTER — OFFICE VISIT (OUTPATIENT)
Dept: PODIATRY | Facility: CLINIC | Age: 49
End: 2019-06-25
Payer: COMMERCIAL

## 2019-06-25 VITALS
HEIGHT: 68 IN | DIASTOLIC BLOOD PRESSURE: 75 MMHG | SYSTOLIC BLOOD PRESSURE: 137 MMHG | WEIGHT: 220 LBS | BODY MASS INDEX: 33.34 KG/M2 | HEART RATE: 85 BPM

## 2019-06-25 DIAGNOSIS — L97.519 FOOT ULCER, RIGHT, WITH UNSPECIFIED SEVERITY (HCC): ICD-10-CM

## 2019-06-25 DIAGNOSIS — E11.40 TYPE 2 DIABETES MELLITUS WITH DIABETIC NEUROPATHY, WITHOUT LONG-TERM CURRENT USE OF INSULIN (HCC): Primary | ICD-10-CM

## 2019-06-25 PROCEDURE — 99213 OFFICE O/P EST LOW 20 MIN: CPT | Performed by: PODIATRIST

## 2019-07-05 DIAGNOSIS — E11.40 CONTROLLED TYPE 2 DIABETES MELLITUS WITH NEUROPATHY (HCC): ICD-10-CM

## 2019-07-16 ENCOUNTER — PROCEDURE VISIT (OUTPATIENT)
Dept: PODIATRY | Facility: CLINIC | Age: 49
End: 2019-07-16
Payer: COMMERCIAL

## 2019-07-16 VITALS
DIASTOLIC BLOOD PRESSURE: 71 MMHG | HEART RATE: 90 BPM | SYSTOLIC BLOOD PRESSURE: 140 MMHG | WEIGHT: 228 LBS | HEIGHT: 68 IN | BODY MASS INDEX: 34.56 KG/M2

## 2019-07-16 DIAGNOSIS — E11.40 TYPE 2 DIABETES MELLITUS WITH DIABETIC NEUROPATHY, WITHOUT LONG-TERM CURRENT USE OF INSULIN (HCC): ICD-10-CM

## 2019-07-16 DIAGNOSIS — L97.519 FOOT ULCER, RIGHT, WITH UNSPECIFIED SEVERITY (HCC): Primary | ICD-10-CM

## 2019-07-16 PROCEDURE — L3000 FT INSERT UCB BERKELEY SHELL: HCPCS | Performed by: PODIATRIST

## 2019-07-16 PROCEDURE — S0395 IMPRESSION CASTING FT: HCPCS | Performed by: PODIATRIST

## 2019-07-16 NOTE — PROGRESS NOTES
Sunny Pearson returned for orthotic casting  She has history of right submet 1 ucer with DPN  She has anterior cavus  Mild dry blood right submet 1 without ulcer  No redness or edema  Biomechanical exam was done and she was casted for orthotics  Continue CAM walker  RA in 3 weeks for recheck her foot

## 2019-08-02 ENCOUNTER — APPOINTMENT (OUTPATIENT)
Dept: LAB | Facility: HOSPITAL | Age: 49
End: 2019-08-02
Payer: COMMERCIAL

## 2019-08-02 DIAGNOSIS — E11.40 TYPE 2 DIABETES MELLITUS WITH DIABETIC NEUROPATHY, WITHOUT LONG-TERM CURRENT USE OF INSULIN (HCC): Chronic | ICD-10-CM

## 2019-08-02 LAB
EST. AVERAGE GLUCOSE BLD GHB EST-MCNC: 137 MG/DL
HBA1C MFR BLD: 6.4 % (ref 4.2–6.3)

## 2019-08-02 PROCEDURE — 83036 HEMOGLOBIN GLYCOSYLATED A1C: CPT

## 2019-08-02 PROCEDURE — 36415 COLL VENOUS BLD VENIPUNCTURE: CPT

## 2019-08-05 ENCOUNTER — OFFICE VISIT (OUTPATIENT)
Dept: FAMILY MEDICINE CLINIC | Facility: CLINIC | Age: 49
End: 2019-08-05
Payer: COMMERCIAL

## 2019-08-05 VITALS
OXYGEN SATURATION: 98 % | RESPIRATION RATE: 14 BRPM | BODY MASS INDEX: 34.04 KG/M2 | DIASTOLIC BLOOD PRESSURE: 70 MMHG | TEMPERATURE: 97.8 F | WEIGHT: 224.6 LBS | HEART RATE: 77 BPM | HEIGHT: 68 IN | SYSTOLIC BLOOD PRESSURE: 124 MMHG

## 2019-08-05 DIAGNOSIS — Z12.39 SCREENING FOR BREAST CANCER: Primary | ICD-10-CM

## 2019-08-05 DIAGNOSIS — E78.2 MIXED HYPERLIPIDEMIA: Chronic | ICD-10-CM

## 2019-08-05 DIAGNOSIS — I10 BENIGN ESSENTIAL HYPERTENSION: ICD-10-CM

## 2019-08-05 DIAGNOSIS — E11.40 TYPE 2 DIABETES MELLITUS WITH DIABETIC NEUROPATHY, WITHOUT LONG-TERM CURRENT USE OF INSULIN (HCC): Chronic | ICD-10-CM

## 2019-08-05 PROCEDURE — 3074F SYST BP LT 130 MM HG: CPT | Performed by: FAMILY MEDICINE

## 2019-08-05 PROCEDURE — 99214 OFFICE O/P EST MOD 30 MIN: CPT | Performed by: FAMILY MEDICINE

## 2019-08-05 NOTE — PROGRESS NOTES
Assessment/Plan:    Problem List Items Addressed This Visit        Endocrine    Diabetes mellitus with diabetic neuropathy (HCC) (Chronic)    Relevant Orders    Hemoglobin A1C    TSH, 3rd generation with Free T4 reflex       Cardiovascular and Mediastinum    Benign essential hypertension    Relevant Orders    Comprehensive metabolic panel       Other    Hyperlipidemia (Chronic)    Relevant Orders    Lipid Panel with Direct LDL reflex      Other Visit Diagnoses     Screening for breast cancer    -  Primary    Relevant Orders    Mammo screening bilateral w cad          BMI Counseling: Body mass index is 34 15 kg/m²  Discussed the patient's BMI with her  The BMI is above average  BMI counseling and education was provided to the patient  Nutrition recommendations include reducing portion sizes and 3-5 servings of fruits/vegetables daily  Exercise recommendations include exercising 3-5 times per week  There are no Patient Instructions on file for this visit  No follow-ups on file  Subjective:      Patient ID: Nathan Goins is a 50 y o  female  Chief Complaint   Patient presents with    Follow-up     Patient here for 4 month follow up on Type II Diabetes        Here for follow up  Seeing podiatry- ulcer on foot  Was going to gym but stopped due to wound- was casting    Hypertension   This is a chronic problem  The current episode started more than 1 year ago  The problem is unchanged  The problem is controlled  There are no associated agents to hypertension  Past treatments include ACE inhibitors  The current treatment provides significant improvement  There are no compliance problems  Diabetes   She presents for her follow-up diabetic visit  She has type 2 diabetes mellitus  No MedicAlert identification noted  Her disease course has been stable  There are no hypoglycemic associated symptoms  There are no diabetic associated symptoms  There are no hypoglycemic complications  Symptoms are stable   There are no diabetic complications  Risk factors for coronary artery disease include diabetes mellitus and dyslipidemia  Current diabetic treatment includes oral agent (dual therapy)  She is compliant with treatment all of the time  Her weight is stable  She has not had a previous visit with a dietitian  She participates in exercise intermittently  There is no change in her home blood glucose trend  An ACE inhibitor/angiotensin II receptor blocker is being taken  She does not see a podiatrist Eye exam is current  Hyperlipidemia   This is a chronic problem  The current episode started more than 1 year ago  The problem is controlled  Recent lipid tests were reviewed and are normal  There are no known factors aggravating her hyperlipidemia  Current antihyperlipidemic treatment includes statins  The current treatment provides significant improvement of lipids  There are no compliance problems  Risk factors for coronary artery disease include diabetes mellitus  The following portions of the patient's history were reviewed and updated as appropriate: allergies, current medications, past family history, past medical history, past social history, past surgical history and problem list     Review of Systems   Constitutional: Negative  HENT: Negative  Eyes: Negative  Respiratory: Negative  Cardiovascular: Negative  Gastrointestinal: Negative  Endocrine: Negative  Genitourinary: Negative  Musculoskeletal: Negative  Skin: Positive for wound  Allergic/Immunologic: Negative  Neurological: Negative  Hematological: Negative  Psychiatric/Behavioral: Negative            Current Outpatient Medications   Medication Sig Dispense Refill    glipiZIDE (GLUCOTROL) 5 mg tablet TAKE ONE TABLET BY MOUTH 2 TIMES A  tablet 3    INVOKANA 100 MG TAKE ONE TABLET BY MOUTH EVERY DAY 90 tablet 3    lisinopril-hydrochlorothiazide (PRINZIDE,ZESTORETIC) 10-12 5 MG per tablet TAKE ONE TABLET BY MOUTH EVERY DAY 90 tablet 3    metFORMIN (GLUCOPHAGE) 1000 MG tablet TAKE ONE TABLET BY MOUTH 2 TIMES A  tablet 3    simvastatin (ZOCOR) 40 mg tablet TAKE 1 TABLET AT BEDTIME  90 tablet 3     No current facility-administered medications for this visit  Objective:    /70   Pulse 77   Temp 97 8 °F (36 6 °C)   Resp 14   Ht 5' 8" (1 727 m)   Wt 102 kg (224 lb 9 6 oz)   SpO2 98%   BMI 34 15 kg/m²        Physical Exam   Constitutional: She appears well-developed and well-nourished  HENT:   Head: Normocephalic and atraumatic  Eyes: Pupils are equal, round, and reactive to light  Neck: Normal range of motion  Neck supple  Cardiovascular: Normal rate, regular rhythm, normal heart sounds and intact distal pulses  Pulses are no weak pulses  Pulses:       Dorsalis pedis pulses are 2+ on the right side, and 2+ on the left side  Posterior tibial pulses are 2+ on the right side, and 2+ on the left side  Pulmonary/Chest: Effort normal and breath sounds normal    Abdominal: Soft  Bowel sounds are normal    Musculoskeletal: Normal range of motion  Feet:   Right Foot:   Skin Integrity: Positive for ulcer (small- healed)  Left Foot:   Skin Integrity: Negative for ulcer, skin breakdown, erythema, warmth, callus or dry skin  Neurological: She is alert  A sensory deficit (feet b/l) is present  Skin: Skin is warm  Capillary refill takes less than 2 seconds  Psychiatric: She has a normal mood and affect  Nursing note and vitals reviewed  Patient's shoes and socks removed  Right Foot/Ankle   Right Foot Inspection  Skin Exam: ulcer (small- healed)                          Toe Exam: ROM and strength within normal limits  Sensory     Proprioception: absent   Monofilament testing: absent  Vascular  Capillary refills: < 3 seconds  The right DP pulse is 2+  The right PT pulse is 2+       Left Foot/Ankle  Left Foot Inspection  Skin Exam: skin normal and skin intactno dry skin, no warmth, no erythema, no maceration, normal color, no pre-ulcer, no ulcer and no callus                         Toe Exam: ROM and strength within normal limits                   Sensory     Proprioception: diminished  Monofilament: diminished  Vascular  Capillary refills: < 3 seconds  The left DP pulse is 2+  The left PT pulse is 2+  Assign Risk Category:  No deformity present; Loss of protective sensation;  No weak pulses       Risk: 2       Betty Mandaen, DO

## 2019-08-06 ENCOUNTER — OFFICE VISIT (OUTPATIENT)
Dept: PODIATRY | Facility: CLINIC | Age: 49
End: 2019-08-06
Payer: COMMERCIAL

## 2019-08-06 VITALS
SYSTOLIC BLOOD PRESSURE: 135 MMHG | BODY MASS INDEX: 34.1 KG/M2 | HEIGHT: 68 IN | WEIGHT: 225 LBS | DIASTOLIC BLOOD PRESSURE: 80 MMHG | HEART RATE: 95 BPM

## 2019-08-06 DIAGNOSIS — E11.40 TYPE 2 DIABETES MELLITUS WITH DIABETIC NEUROPATHY, WITHOUT LONG-TERM CURRENT USE OF INSULIN (HCC): ICD-10-CM

## 2019-08-06 DIAGNOSIS — L97.519 FOOT ULCER, RIGHT, WITH UNSPECIFIED SEVERITY (HCC): Primary | ICD-10-CM

## 2019-08-06 PROCEDURE — 99213 OFFICE O/P EST LOW 20 MIN: CPT | Performed by: PODIATRIST

## 2019-08-06 NOTE — PROGRESS NOTES
PATIENT:  Ras Nari  1970     Assessment     1  Foot ulcer, right, with unspecified severity ([facility] )     2  Type 2 diabetes mellitus with diabetic neuropathy, without long-term current use of insulin ([facility])             Plan:  Wound remains healed  Callus was removed  Instructed to continue daily skin care  Instructed her to use CAM walker at work for protection of right foot  Awaits orthotics  Instructed identifying signs of infection and worsening of the condition  RA in 3 week  Subjective: The patient presents for right foot evaluation  She presents with CAM walker  She tolerates CAM walker well  No recurring ulcer, she notices some dry blood on right plantar foot  No redness or edema  BS is under control  No new complaint  The following portions of the patient's history were reviewed and updated as appropriate: allergies, current medications, past family history, past medical history, past social history, past surgical history and problem list   All pertinent labs and images were reviewed  Past Medical History  Past Medical History:   Diagnosis Date    Diabetes mellitus ([facility] )     Hyperlipidemia     Hypertension        Past Surgical History  Past Surgical History:   Procedure Laterality Date    TONSILLECTOMY AND ADENOIDECTOMY          Allergies:  Cephalexin    Medications:  Current Outpatient Medications   Medication Sig Dispense Refill    glipiZIDE (GLUCOTROL) 5 mg tablet TAKE ONE TABLET BY MOUTH 2 TIMES A  tablet 3    INVOKANA 100 MG TAKE ONE TABLET BY MOUTH EVERY DAY 90 tablet 3    lisinopril-hydrochlorothiazide (PRINZIDE,ZESTORETIC) 10-12 5 MG per tablet TAKE ONE TABLET BY MOUTH EVERY DAY 90 tablet 3    metFORMIN (GLUCOPHAGE) 1000 MG tablet TAKE ONE TABLET BY MOUTH 2 TIMES A  tablet 3    simvastatin (ZOCOR) 40 mg tablet TAKE 1 TABLET AT BEDTIME  90 tablet 3     No current facility-administered medications for this visit  Social History:  Social History     Socioeconomic History    Marital status: /Civil Union     Spouse name: None    Number of children: None    Years of education: None    Highest education level: None   Occupational History    None   Social Needs    Financial resource strain: None    Food insecurity:     Worry: None     Inability: None    Transportation needs:     Medical: None     Non-medical: None   Tobacco Use    Smoking status: Never Smoker    Smokeless tobacco: Never Used   Substance and Sexual Activity    Alcohol use: Yes     Comment: weekends    Drug use: No    Sexual activity: None   Lifestyle    Physical activity:     Days per week: None     Minutes per session: None    Stress: None   Relationships    Social connections:     Talks on phone: None     Gets together: None     Attends Advent service: None     Active member of club or organization: None     Attends meetings of clubs or organizations: None     Relationship status: None    Intimate partner violence:     Fear of current or ex partner: None     Emotionally abused: None     Physically abused: None     Forced sexual activity: None   Other Topics Concern    None   Social History Narrative    None          Review of Systems   Constitutional: Negative for chills, fatigue and fever  Respiratory: Negative for cough and shortness of breath  Cardiovascular: Negative for chest pain and leg swelling  Neurological: Positive for numbness  Objective:      /80   Pulse 95   Ht 5' 8" (1 727 m) Comment: verbal  Wt 102 kg (225 lb)   BMI 34 21 kg/m²          Physical Exam   Constitutional: She is oriented to person, place, and time  She appears well-developed and well-nourished  No distress  Cardiovascular: Normal rate, regular rhythm and intact distal pulses  Pulmonary/Chest: Effort normal and breath sounds normal  No respiratory distress  Musculoskeletal: She exhibits no edema or tenderness  Neurological: She is alert and oriented to person, place, and time  A sensory deficit is present  She exhibits normal muscle tone  Skin: Capillary refill takes less than 2 seconds  She is not diaphoretic  Wound remains healed  Mild dry blood and callus without ulcer after trimming  No redness or edema  No drainage  Vitals reviewed

## 2019-08-27 ENCOUNTER — OFFICE VISIT (OUTPATIENT)
Dept: PODIATRY | Facility: CLINIC | Age: 49
End: 2019-08-27
Payer: COMMERCIAL

## 2019-08-27 VITALS
WEIGHT: 225 LBS | BODY MASS INDEX: 34.1 KG/M2 | HEART RATE: 95 BPM | SYSTOLIC BLOOD PRESSURE: 135 MMHG | HEIGHT: 68 IN | DIASTOLIC BLOOD PRESSURE: 80 MMHG

## 2019-08-27 DIAGNOSIS — L97.519 FOOT ULCER, RIGHT, WITH UNSPECIFIED SEVERITY (HCC): Primary | ICD-10-CM

## 2019-08-27 DIAGNOSIS — E11.40 TYPE 2 DIABETES MELLITUS WITH DIABETIC NEUROPATHY, WITHOUT LONG-TERM CURRENT USE OF INSULIN (HCC): ICD-10-CM

## 2019-08-27 PROCEDURE — 99213 OFFICE O/P EST LOW 20 MIN: CPT | Performed by: PODIATRIST

## 2019-08-27 PROCEDURE — L3000 FT INSERT UCB BERKELEY SHELL: HCPCS | Performed by: PODIATRIST

## 2019-08-27 NOTE — PROGRESS NOTES
PATIENT:  Ramos Singletary  1970     Assessment     1  Foot ulcer, right, with unspecified severity ([facility] )     2  Type 2 diabetes mellitus with diabetic neuropathy, without long-term current use of insulin ([facility])             Plan:  Callus was removed  Wound remains healed  Orthotics were dispensed and instruction was given  Advance to sneakers with orthotics  Educated disease prevention and risks related to diabetes  Educated proper daily foot care and exam   Instructed proper skin care / protection and footwear  Instructed to identify any signs of infection and related foot problem  Discussed possible sesamoidectomy/ TAVO if ulcer re-occurs  RA in 6 weeks  Subjective: The patient presents for right foot evaluation  She presents with CAM walker  She tolerates CAM walker well  She notices some dry blood on right plantar foot  No redness or edema  No drainage  BS is under control  No new complaint  The following portions of the patient's history were reviewed and updated as appropriate: allergies, current medications, past family history, past medical history, past social history, past surgical history and problem list   All pertinent labs and images were reviewed        Past Medical History  Past Medical History:   Diagnosis Date    Diabetes mellitus ([facility] )     Hyperlipidemia     Hypertension        Past Surgical History  Past Surgical History:   Procedure Laterality Date    TONSILLECTOMY AND ADENOIDECTOMY          Allergies:  Cephalexin    Medications:  Current Outpatient Medications   Medication Sig Dispense Refill    glipiZIDE (GLUCOTROL) 5 mg tablet TAKE ONE TABLET BY MOUTH 2 TIMES A  tablet 3    INVOKANA 100 MG TAKE ONE TABLET BY MOUTH EVERY DAY 90 tablet 3    lisinopril-hydrochlorothiazide (PRINZIDE,ZESTORETIC) 10-12 5 MG per tablet TAKE ONE TABLET BY MOUTH EVERY DAY 90 tablet 3    metFORMIN (GLUCOPHAGE) 1000 MG tablet TAKE ONE TABLET BY MOUTH 2 TIMES A  tablet 3    simvastatin (ZOCOR) 40 mg tablet TAKE 1 TABLET AT BEDTIME  90 tablet 3     No current facility-administered medications for this visit  Social History:  Social History     Socioeconomic History    Marital status: /Civil Union     Spouse name: None    Number of children: None    Years of education: None    Highest education level: None   Occupational History    None   Social Needs    Financial resource strain: None    Food insecurity:     Worry: None     Inability: None    Transportation needs:     Medical: None     Non-medical: None   Tobacco Use    Smoking status: Never Smoker    Smokeless tobacco: Never Used   Substance and Sexual Activity    Alcohol use: Yes     Comment: weekends    Drug use: No    Sexual activity: None   Lifestyle    Physical activity:     Days per week: None     Minutes per session: None    Stress: None   Relationships    Social connections:     Talks on phone: None     Gets together: None     Attends Mu-ism service: None     Active member of club or organization: None     Attends meetings of clubs or organizations: None     Relationship status: None    Intimate partner violence:     Fear of current or ex partner: None     Emotionally abused: None     Physically abused: None     Forced sexual activity: None   Other Topics Concern    None   Social History Narrative    None          Review of Systems   Constitutional: Negative for chills, fatigue and fever  Respiratory: Negative for cough and shortness of breath  Cardiovascular: Negative for chest pain and leg swelling  Neurological: Positive for numbness  Objective:      /80 Comment: cuff   Pulse 95   Ht 5' 8" (1 727 m)   Wt 102 kg (225 lb) Comment: verbal, pt in boot,  BMI 34 21 kg/m²          Physical Exam   Constitutional: She is oriented to person, place, and time  She appears well-developed and well-nourished  No distress     Cardiovascular: Normal rate, regular rhythm and intact distal pulses  Pulmonary/Chest: Effort normal and breath sounds normal  No respiratory distress  Musculoskeletal: She exhibits no edema or tenderness  Tight achilles tendon with equinus  Neurological: She is alert and oriented to person, place, and time  A sensory deficit is present  She exhibits normal muscle tone  Skin: Capillary refill takes less than 2 seconds  She is not diaphoretic  Mild dry blood and callus right submet 1 without ulcer after trimming  No redness or edema  No drainage  Vitals reviewed

## 2019-09-08 ENCOUNTER — APPOINTMENT (EMERGENCY)
Dept: RADIOLOGY | Facility: HOSPITAL | Age: 49
End: 2019-09-08
Payer: COMMERCIAL

## 2019-09-08 ENCOUNTER — HOSPITAL ENCOUNTER (EMERGENCY)
Facility: HOSPITAL | Age: 49
Discharge: HOME | End: 2019-09-08
Attending: EMERGENCY MEDICINE | Admitting: EMERGENCY MEDICINE
Payer: COMMERCIAL

## 2019-09-08 VITALS
BODY MASS INDEX: 33.34 KG/M2 | WEIGHT: 220 LBS | RESPIRATION RATE: 16 BRPM | HEIGHT: 68 IN | TEMPERATURE: 97.7 F | HEART RATE: 68 BPM | DIASTOLIC BLOOD PRESSURE: 68 MMHG | OXYGEN SATURATION: 100 % | SYSTOLIC BLOOD PRESSURE: 132 MMHG

## 2019-09-08 DIAGNOSIS — R10.9 LEFT FLANK PAIN: ICD-10-CM

## 2019-09-08 DIAGNOSIS — N39.0 URINARY TRACT INFECTION: Primary | ICD-10-CM

## 2019-09-08 LAB
ALBUMIN SERPL BCP-MCNC: 3.1 G/DL (ref 3.5–5)
ALP SERPL-CCNC: 98 U/L (ref 46–116)
ALT SERPL W P-5'-P-CCNC: 19 U/L (ref 12–78)
ANION GAP SERPL CALCULATED.3IONS-SCNC: 8 MMOL/L (ref 4–13)
AST SERPL W P-5'-P-CCNC: 18 U/L (ref 5–45)
BACTERIA UR QL AUTO: ABNORMAL /HPF
BASOPHILS # BLD AUTO: 0.12 THOUSANDS/ΜL (ref 0–0.1)
BASOPHILS NFR BLD AUTO: 1 % (ref 0–1)
BILIRUB SERPL-MCNC: 0.24 MG/DL (ref 0.2–1)
BILIRUB UR QL STRIP: NEGATIVE
BUN SERPL-MCNC: 19 MG/DL (ref 5–25)
CALCIUM SERPL-MCNC: 9 MG/DL (ref 8.3–10.1)
CHLORIDE SERPL-SCNC: 105 MMOL/L (ref 100–108)
CLARITY UR: CLEAR
CO2 SERPL-SCNC: 20 MMOL/L (ref 21–32)
COLOR UR: YELLOW
CREAT SERPL-MCNC: 1.06 MG/DL (ref 0.6–1.3)
EOSINOPHIL # BLD AUTO: 0.14 THOUSAND/ΜL (ref 0–0.61)
EOSINOPHIL NFR BLD AUTO: 1 % (ref 0–6)
ERYTHROCYTE [DISTWIDTH] IN BLOOD BY AUTOMATED COUNT: 13.3 % (ref 11.6–15.1)
GFR SERPL CREATININE-BSD FRML MDRD: 62 ML/MIN/1.73SQ M
GLUCOSE SERPL-MCNC: 184 MG/DL (ref 65–140)
GLUCOSE UR STRIP-MCNC: ABNORMAL MG/DL
HCT VFR BLD AUTO: 37.3 % (ref 34.8–46.1)
HGB BLD-MCNC: 11.7 G/DL (ref 11.5–15.4)
HGB UR QL STRIP.AUTO: ABNORMAL
HYALINE CASTS #/AREA URNS LPF: ABNORMAL /LPF
IMM GRANULOCYTES # BLD AUTO: 0.03 THOUSAND/UL (ref 0–0.2)
IMM GRANULOCYTES NFR BLD AUTO: 0 % (ref 0–2)
KETONES UR STRIP-MCNC: NEGATIVE MG/DL
LEUKOCYTE ESTERASE UR QL STRIP: ABNORMAL
LIPASE SERPL-CCNC: 107 U/L (ref 73–393)
LYMPHOCYTES # BLD AUTO: 2.49 THOUSANDS/ΜL (ref 0.6–4.47)
LYMPHOCYTES NFR BLD AUTO: 19 % (ref 14–44)
MCH RBC QN AUTO: 27.9 PG (ref 26.8–34.3)
MCHC RBC AUTO-ENTMCNC: 31.4 G/DL (ref 31.4–37.4)
MCV RBC AUTO: 89 FL (ref 82–98)
MONOCYTES # BLD AUTO: 1.09 THOUSAND/ΜL (ref 0.17–1.22)
MONOCYTES NFR BLD AUTO: 8 % (ref 4–12)
NEUTROPHILS # BLD AUTO: 9.05 THOUSANDS/ΜL (ref 1.85–7.62)
NEUTS SEG NFR BLD AUTO: 71 % (ref 43–75)
NITRITE UR QL STRIP: NEGATIVE
NON-SQ EPI CELLS URNS QL MICRO: ABNORMAL /HPF
NRBC BLD AUTO-RTO: 0 /100 WBCS
PH UR STRIP.AUTO: 5 [PH] (ref 4.5–8)
PLATELET # BLD AUTO: 315 THOUSANDS/UL (ref 149–390)
PMV BLD AUTO: 9.5 FL (ref 8.9–12.7)
POTASSIUM SERPL-SCNC: 3.9 MMOL/L (ref 3.5–5.3)
PROT SERPL-MCNC: 7.1 G/DL (ref 6.4–8.2)
PROT UR STRIP-MCNC: ABNORMAL MG/DL
RBC # BLD AUTO: 4.2 MILLION/UL (ref 3.81–5.12)
RBC #/AREA URNS AUTO: ABNORMAL /HPF
SODIUM SERPL-SCNC: 133 MMOL/L (ref 136–145)
SP GR UR STRIP.AUTO: 1.01 (ref 1–1.03)
UROBILINOGEN UR QL STRIP.AUTO: 0.2 E.U./DL
WBC # BLD AUTO: 12.92 THOUSAND/UL (ref 4.31–10.16)
WBC #/AREA URNS AUTO: ABNORMAL /HPF

## 2019-09-08 PROCEDURE — 74177 CT ABD & PELVIS W/CONTRAST: CPT

## 2019-09-08 PROCEDURE — 83690 ASSAY OF LIPASE: CPT | Performed by: EMERGENCY MEDICINE

## 2019-09-08 PROCEDURE — 99284 EMERGENCY DEPT VISIT MOD MDM: CPT | Performed by: EMERGENCY MEDICINE

## 2019-09-08 PROCEDURE — 96374 THER/PROPH/DIAG INJ IV PUSH: CPT

## 2019-09-08 PROCEDURE — 36415 COLL VENOUS BLD VENIPUNCTURE: CPT | Performed by: EMERGENCY MEDICINE

## 2019-09-08 PROCEDURE — 85025 COMPLETE CBC W/AUTO DIFF WBC: CPT | Performed by: EMERGENCY MEDICINE

## 2019-09-08 PROCEDURE — 80053 COMPREHEN METABOLIC PANEL: CPT | Performed by: EMERGENCY MEDICINE

## 2019-09-08 PROCEDURE — 87086 URINE CULTURE/COLONY COUNT: CPT

## 2019-09-08 PROCEDURE — 99284 EMERGENCY DEPT VISIT MOD MDM: CPT

## 2019-09-08 PROCEDURE — 81001 URINALYSIS AUTO W/SCOPE: CPT

## 2019-09-08 RX ORDER — LEVOFLOXACIN 750 MG/1
750 TABLET ORAL ONCE
Status: DISCONTINUED | OUTPATIENT
Start: 2019-09-08 | End: 2019-09-08

## 2019-09-08 RX ORDER — CEPHALEXIN 500 MG/1
500 CAPSULE ORAL EVERY 12 HOURS SCHEDULED
Qty: 28 CAPSULE | Refills: 0 | Status: SHIPPED | OUTPATIENT
Start: 2019-09-08 | End: 2019-09-22

## 2019-09-08 RX ORDER — CEPHALEXIN 250 MG/1
500 CAPSULE ORAL ONCE
Status: COMPLETED | OUTPATIENT
Start: 2019-09-08 | End: 2019-09-08

## 2019-09-08 RX ORDER — LEVOFLOXACIN 750 MG/1
750 TABLET ORAL EVERY 24 HOURS
Qty: 6 TABLET | Refills: 0 | Status: SHIPPED | OUTPATIENT
Start: 2019-09-08 | End: 2019-09-08 | Stop reason: ALTCHOICE

## 2019-09-08 RX ORDER — KETOROLAC TROMETHAMINE 30 MG/ML
15 INJECTION, SOLUTION INTRAMUSCULAR; INTRAVENOUS ONCE
Status: COMPLETED | OUTPATIENT
Start: 2019-09-08 | End: 2019-09-08

## 2019-09-08 RX ADMIN — KETOROLAC TROMETHAMINE 15 MG: 30 INJECTION, SOLUTION INTRAMUSCULAR at 17:21

## 2019-09-08 RX ADMIN — CEPHALEXIN 500 MG: 250 CAPSULE ORAL at 17:59

## 2019-09-08 RX ADMIN — IOHEXOL 100 ML: 350 INJECTION, SOLUTION INTRAVENOUS at 16:49

## 2019-09-08 NOTE — ED ATTENDING ATTESTATION
I,Speedy Dove MD, saw and evaluated the patient  I have discussed the patient with the resident/non-physician practitioner and agree with the resident's/non-physician practitioner's findings, Plan of Care, and MDM as documented in the resident's/non-physician practitioner's note, except where noted  All available labs and Radiology studies were reviewed  I was present for key portions of any procedure(s) performed by the resident/non-physician practitioner and I was immediately available to provide assistance  At this point I agree with the current assessment done in the Emergency Department  I have conducted an independent evaluation of this patient including a focused history and a physical exam       51-year-old female presenting to the emergency department for evaluation of left flank pain today  Patient states that she had the pain on wakening this morning, but is unsure whether the pain awoke her  Patient states that she has a history of nephrolithiasis, however she states that the pain is different than her typical nephrolithiasis  She states that is less sharp than typical nephrolithiasis  Additionally the patient endorses left lower quadrant abdominal pain  Ten systems reviewed negative except as noted  The patient is resting comfortably on a stretcher in no acute respiratory distress  The patient appears nontoxic  HEENT reveals moist mucous membranes  Head is normocephalic and atraumatic  Conjunctiva and sclera are normal   Neck is supple without limitations to range of motion  Lungs are clear to auscultation bilaterally without any wheezes, rales or rhonchi  Heart is regular rate and rhythm without any murmurs, rubs or gallops  Abdomen is soft with left lower quadrant abdominal tenderness without any rebound or guarding  The patient has left CVA tenderness and left paraspinal tenderness extending down into the left lower lumbar spine    Extremities appear grossly normal without any significant arthropathy  Patient is awake, alert, and oriented x3  The patient has normal interaction  Motor is 5 out of 5 bilateral lower extremities  Assessment and plan:  Evaluate for urinary tract infection, diverticulitis, renal stone disease  Labs Reviewed   URINE MICROSCOPIC - Abnormal       Result Value Ref Range Status    RBC, UA 2-4 (*) None Seen, 0-5 /hpf Final    WBC, UA Innumerable (*) None Seen, 0-5, 5-55, 5-65 /hpf Final    Epithelial Cells None Seen  None Seen, Occasional /hpf Final    Bacteria, UA None Seen  None Seen, Occasional /hpf Final    Hyaline Casts, UA None Seen  None Seen /lpf Final   CBC AND DIFFERENTIAL - Abnormal    WBC 12 92 (*) 4 31 - 10 16 Thousand/uL Final    RBC 4 20  3 81 - 5 12 Million/uL Final    Hemoglobin 11 7  11 5 - 15 4 g/dL Final    Hematocrit 37 3  34 8 - 46 1 % Final    MCV 89  82 - 98 fL Final    MCH 27 9  26 8 - 34 3 pg Final    MCHC 31 4  31 4 - 37 4 g/dL Final    RDW 13 3  11 6 - 15 1 % Final    MPV 9 5  8 9 - 12 7 fL Final    Platelets 510  388 - 390 Thousands/uL Final    nRBC 0  /100 WBCs Final    Neutrophils Relative 71  43 - 75 % Final    Immat GRANS % 0  0 - 2 % Final    Lymphocytes Relative 19  14 - 44 % Final    Monocytes Relative 8  4 - 12 % Final    Eosinophils Relative 1  0 - 6 % Final    Basophils Relative 1  0 - 1 % Final    Neutrophils Absolute 9 05 (*) 1 85 - 7 62 Thousands/µL Final    Immature Grans Absolute 0 03  0 00 - 0 20 Thousand/uL Final    Lymphocytes Absolute 2 49  0 60 - 4 47 Thousands/µL Final    Monocytes Absolute 1 09  0 17 - 1 22 Thousand/µL Final    Eosinophils Absolute 0 14  0 00 - 0 61 Thousand/µL Final    Basophils Absolute 0 12 (*) 0 00 - 0 10 Thousands/µL Final   COMPREHENSIVE METABOLIC PANEL - Abnormal    Sodium 133 (*) 136 - 145 mmol/L Final    Potassium 3 9  3 5 - 5 3 mmol/L Final    Comment: Slightly Hemolyzed;  Results May be Affected    Chloride 105  100 - 108 mmol/L Final    CO2 20 (*) 21 - 32 mmol/L Final    ANION GAP 8 4 - 13 mmol/L Final    BUN 19  5 - 25 mg/dL Final    Creatinine 1 06  0 60 - 1 30 mg/dL Final    Comment: Standardized to IDMS reference method    Glucose 184 (*) 65 - 140 mg/dL Final    Comment:   If the patient is fasting, the ADA then defines impaired fasting glucose as > 100 mg/dL and diabetes as > or equal to 123 mg/dL  Specimen collection should occur prior to Sulfasalazine administration due to the potential for falsely depressed results  Specimen collection should occur prior to Sulfapyridine administration due to the potential for falsely elevated results  Calcium 9 0  8 3 - 10 1 mg/dL Final    AST 18  5 - 45 U/L Final    Comment: Slightly Hemolyzed; Results May be Affected  Specimen collection should occur prior to Sulfasalazine administration due to the potential for falsely depressed results  ALT 19  12 - 78 U/L Final    Comment:   Specimen collection should occur prior to Sulfasalazine and/or Sulfapyridine administration due to the potential for falsely depressed results       Alkaline Phosphatase 98  46 - 116 U/L Final    Total Protein 7 1  6 4 - 8 2 g/dL Final    Albumin 3 1 (*) 3 5 - 5 0 g/dL Final    Total Bilirubin 0 24  0 20 - 1 00 mg/dL Final    eGFR 62  ml/min/1 73sq m Final    Narrative:     [place] guidelines for Chronic Kidney Disease (CKD):     Stage 1 with normal or high GFR (GFR > 90 mL/min/1 73 square meters)    Stage 2 Mild CKD (GFR = 60-89 mL/min/1 73 square meters)    Stage 3A Moderate CKD (GFR = 45-59 mL/min/1 73 square meters)    Stage 3B Moderate CKD (GFR = 30-44 mL/min/1 73 square meters)    Stage 4 Severe CKD (GFR = 15-29 mL/min/1 73 square meters)    Stage 5 End Stage CKD (GFR <15 mL/min/1 73 square meters)  Note: GFR calculation is accurate only with a steady state creatinine   ED URINE MACROSCOPIC - Abnormal    Color, UA Yellow   Final    Clarity, UA Clear   Final    pH, UA 5 0  4 5 - 8 0 Final    Leukocytes, UA Small (*) Negative Final Nitrite, UA Negative  Negative Final    Protein,  (2+) (*) Negative mg/dl Final    Glucose,  (1/2%) (*) Negative mg/dl Final    Ketones, UA Negative  Negative mg/dl Final    Urobilinogen, UA 0 2  0 2, 1 0 E U /dl E U /dl Final    Bilirubin, UA Negative  Negative Final    Blood, UA Trace (*) Negative Final    Specific [place], UA 1 010  1 003 - 1 030 Final    Narrative:     CLINITEK RESULT   LIPASE - Normal    Lipase 107  73 - 393 u/L Final   URINE CULTURE   POCT PREGNANCY, URINE       CT abdomen pelvis with contrast   Final Result      Left-sided urothelial enhancement and periureteral stranding without obstruction, likely ascending urinary tract infection  Pyelitis is not excluded though there is no stranding in the renal sinus and there is no CT evidence of pyelonephritis  The study was marked in [facility] for immediate notification              Workstation performed: [id number]

## 2019-09-08 NOTE — ED PROVIDER NOTES
History  Chief Complaint   Patient presents with    Flank Pain     Pt "So I noticed my urine was a little concentrated last night and I started to increase my fluids  Then today I woke up with some abd pain and now its into my back/flank area " Pt c/o some nausea     HPI   43-year-old woman presents to the emergency department for left flank pain  Yesterday thought that her urine was dark and increased p o  fluid intake  She woke up with sharp pain left flank  Pain feels like it radiates between the left lower quadrant of the abdomen and left flank/low back  She has not been febrile  She has not been nauseated or vomiting  No constipation or diarrhea  No dysuria or hematuria  No vaginal bleeding or discharge  Last menstrual period was week of 8/28  No recent heavy lifting or trauma to back  No chest pain or shortness of breath  She does have medication controlled hypertension  No history of smoking  She does have a history of nephrolithiasis, but this pain does not feel like the pain she has experienced in the past with this  Prior to Admission Medications   Prescriptions Last Dose Informant Patient Reported? Taking? INVOKANA 100 MG 9/8/2019 at Unknown time  No Yes   Sig: TAKE ONE TABLET BY MOUTH EVERY DAY   glipiZIDE (GLUCOTROL) 5 mg tablet 9/8/2019 at Unknown time  No Yes   Sig: TAKE ONE TABLET BY MOUTH 2 TIMES A DAY   lisinopril-hydrochlorothiazide (PRINZIDE,ZESTORETIC) 10-12 5 MG per tablet 9/8/2019 at Unknown time  No Yes   Sig: TAKE ONE TABLET BY MOUTH EVERY DAY   metFORMIN (GLUCOPHAGE) 1000 MG tablet 9/8/2019 at Unknown time  No Yes   Sig: TAKE ONE TABLET BY MOUTH 2 TIMES A DAY   simvastatin (ZOCOR) 40 mg tablet 9/7/2019 at Unknown time  No Yes   Sig: TAKE 1 TABLET AT BEDTIME        Facility-Administered Medications: None       Past Medical History:   Diagnosis Date    Diabetes mellitus ([address] )     Hyperlipidemia     Hypertension        Past Surgical History:   Procedure Laterality Date  TONSILLECTOMY AND ADENOIDECTOMY         Family History   Problem Relation Age of Onset    Heart disease Mother     Diabetes Mother     Hypertension Mother         Benign Essential     Coronary artery disease Mother     Cancer Father         lung     Diabetes Sister         mellitus     Diabetes Maternal Grandfather         mellitus      I have reviewed and agree with the history as documented  Social History     Tobacco Use    Smoking status: Never Smoker    Smokeless tobacco: Never Used   Substance Use Topics    Alcohol use: Yes     Comment: weekends    Drug use: No        Review of Systems   Constitutional: Negative for chills and fever  Respiratory: Negative for cough and shortness of breath  Cardiovascular: Negative for chest pain  Gastrointestinal: Negative for abdominal pain, constipation, diarrhea, nausea and vomiting  Genitourinary: Positive for flank pain  Negative for dysuria, hematuria, pelvic pain, vaginal bleeding and vaginal discharge  All other systems reviewed and are negative  Physical Exam  ED Triage Vitals   Temperature Pulse Respirations Blood Pressure SpO2   09/08/19 1429 09/08/19 1429 09/08/19 1429 09/08/19 1429 09/08/19 1429   98 4 °F (36 9 °C) 95 16 137/76 97 %      Temp Source Heart Rate Source Patient Position - Orthostatic VS BP Location FiO2 (%)   09/08/19 1429 09/08/19 1429 09/08/19 1429 09/08/19 1444 --   Oral Monitor Sitting Left arm       Pain Score       09/08/19 1429       8             Orthostatic Vital Signs  Vitals:    09/08/19 1429 09/08/19 1444 09/08/19 1706   BP: 137/76 139/65 132/68   Pulse: 95 99 68   Patient Position - Orthostatic VS: Sitting Standing Lying       Physical Exam   Constitutional: She is oriented to person, place, and time  She appears well-developed and well-nourished  No distress  HENT:   Head: Normocephalic and atraumatic  Eyes: Pupils are equal, round, and reactive to light   Conjunctivae and EOM are normal  No scleral icterus  Neck: Normal range of motion  Neck supple  Cardiovascular: Normal rate and regular rhythm  Exam reveals no gallop and no friction rub  No murmur heard  Pulmonary/Chest: Breath sounds normal  She has no wheezes  She has no rales  Abdominal: Soft  She exhibits no distension  There is no tenderness  There is no rebound and no guarding  Mild tenderness to palpation over the left lower quadrant  Musculoskeletal: Normal range of motion  She exhibits no edema or tenderness  Tenderness to palpation predominately L paraspinal     Lymphadenopathy:     She has no cervical adenopathy  Neurological: She is alert and oriented to person, place, and time  No cranial nerve deficit or sensory deficit  She exhibits normal muscle tone  Skin: Skin is warm and dry  She is not diaphoretic  No erythema  No pallor  Psychiatric: She has a normal mood and affect  Her behavior is normal    Nursing note and vitals reviewed        ED Medications  Medications   iohexol (OMNIPAQUE) 350 MG/ML injection (MULTI-DOSE) 100 mL (100 mL Intravenous Given 9/8/19 1649)   ketorolac (TORADOL) injection 15 mg (15 mg Intravenous Given 9/8/19 1721)   cephalexin (KEFLEX) capsule 500 mg (500 mg Oral Given 9/8/19 1759)       Diagnostic Studies  Results Reviewed     Procedure Component Value Units Date/Time    Lipase [[id number]]  (Normal) Collected:  09/08/19 1511    Lab Status:  Final result Specimen:  Blood from Arm, Right Updated:  09/08/19 1705     Lipase 107 u/L     Comprehensive metabolic panel [[id number]]  (Abnormal) Collected:  09/08/19 1511    Lab Status:  Final result Specimen:  Blood from Arm, Right Updated:  09/08/19 1546     Sodium 133 mmol/L      Potassium 3 9 mmol/L      Chloride 105 mmol/L      CO2 20 mmol/L      ANION GAP 8 mmol/L      BUN 19 mg/dL      Creatinine 1 06 mg/dL      Glucose 184 mg/dL      Calcium 9 0 mg/dL      AST 18 U/L      ALT 19 U/L      Alkaline Phosphatase 98 U/L      Total Protein 7 1 g/dL Albumin 3 1 g/dL      Total Bilirubin 0 24 mg/dL      eGFR 62 ml/min/1 73sq m     Narrative:       National Kidney Disease Foundation guidelines for Chronic Kidney Disease (CKD):     Stage 1 with normal or high GFR (GFR > 90 mL/min/1 73 square meters)    Stage 2 Mild CKD (GFR = 60-89 mL/min/1 73 square meters)    Stage 3A Moderate CKD (GFR = 45-59 mL/min/1 73 square meters)    Stage 3B Moderate CKD (GFR = 30-44 mL/min/1 73 square meters)    Stage 4 Severe CKD (GFR = 15-29 mL/min/1 73 square meters)    Stage 5 End Stage CKD (GFR <15 mL/min/1 73 square meters)  Note: GFR calculation is accurate only with a steady state creatinine    POCT pregnancy, urine [[id number]]     Lab Status:  No result     CBC and differential [[id number]]  (Abnormal) Collected:  09/08/19 1511    Lab Status:  Final result Specimen:  Blood from Arm, Right Updated:  09/08/19 1528     WBC 12 92 Thousand/uL      RBC 4 20 Million/uL      Hemoglobin 11 7 g/dL      Hematocrit 37 3 %      MCV 89 fL      MCH 27 9 pg      MCHC 31 4 g/dL      RDW 13 3 %      MPV 9 5 fL      Platelets 466 Thousands/uL      nRBC 0 /100 WBCs      Neutrophils Relative 71 %      Immat GRANS % 0 %      Lymphocytes Relative 19 %      Monocytes Relative 8 %      Eosinophils Relative 1 %      Basophils Relative 1 %      Neutrophils Absolute 9 05 Thousands/µL      Immature Grans Absolute 0 03 Thousand/uL      Lymphocytes Absolute 2 49 Thousands/µL      Monocytes Absolute 1 09 Thousand/µL      Eosinophils Absolute 0 14 Thousand/µL      Basophils Absolute 0 12 Thousands/µL     Urine Microscopic [[id number]]  (Abnormal) Collected:  09/08/19 1456    Lab Status:  Final result Specimen:  Urine, Clean Catch Updated:  09/08/19 1524     RBC, UA 2-4 /hpf      WBC, UA Innumerable /hpf      Epithelial Cells None Seen /hpf      Bacteria, UA None Seen /hpf      Hyaline Casts, UA None Seen /lpf     Urine culture [[id number]] Collected:  09/08/19 1456    Lab Status:   In process Specimen: Urine, Clean Catch Updated:  09/08/19 1524    ED Urine Macroscopic [[id number]]  (Abnormal) Collected:  09/08/19 1456    Lab Status:  Final result Specimen:  Urine Updated:  09/08/19 1455     Color, UA Yellow     Clarity, UA Clear     pH, UA 5 0     Leukocytes, UA Small     Nitrite, UA Negative     Protein,  (2+) mg/dl      Glucose,  (1/2%) mg/dl      Ketones, UA Negative mg/dl      Urobilinogen, UA 0 2 E U /dl      Bilirubin, UA Negative     Blood, UA Trace     Specific [place], UA 1 010    Narrative:       [facility] RESULT                 CT abdomen pelvis with contrast   Final Result by Genesis Mayen MD (09/08 1724)      Left-sided urothelial enhancement and periureteral stranding without obstruction, likely ascending urinary tract infection  Pyelitis is not excluded though there is no stranding in the renal sinus and there is no CT evidence of pyelonephritis  The study was marked in [facility] for immediate notification  Workstation performed: [id number]               Procedures  Procedures        ED Course  ED Course as of Sep 08 1808   Roberta Ort Sep 08, 2019   1607 Sterile pyuria  WBC, UA(!): Innumerable         MDM  Number of Diagnoses or Management Options  Left flank pain: new and requires workup  Urinary tract infection: new and requires workup     Amount and/or Complexity of Data Reviewed  Clinical lab tests: ordered and reviewed  Tests in the radiology section of CPT®: ordered and reviewed  Tests in the medicine section of CPT®: ordered and reviewed  Decide to obtain previous medical records or to obtain history from someone other than the patient: yes  Review and summarize past medical records: yes  Independent visualization of images, tracings, or specimens: yes    Patient Progress  Patient progress: improved     40-year-old presenting with left flank pain  She is afebrile on initial evaluation, borderline tachycardic but improving with IV Toradol    There is tenderness to palpation over the left flank  Patient's urine contains numerous WBCs but no bacteria visualized  Labs are remarkable for a mild leukocytosis  Creatinine is normal   CT abdomen/pelvis shows enhancement of the left ureter consistent with ascending urinary tract infection  No CT evidence of pyelonephritis  On reassessment patient says her pain is improved and she continues to be well-appearing  Will treat with 2 week course of Keflex  Patient does have an allergy to this antibiotic documented in her chart but tells me she has had it since allergy was diagnosed and did not have any issues  First dose given in the emergency department  Return precautions given  PCP follow-up  Disposition  Final diagnoses:   Left flank pain   Urinary tract infection     Time reflects when diagnosis was documented in both MDM as applicable and the Disposition within this note     Time User Action Codes Description Comment    9/8/2019  5:39 PM Rony Kayser Add [R10 9] Left flank pain     9/8/2019  5:41 PM Rony Kayser Add [N39 0] Urinary tract infection     9/8/2019  5:41 PM Rony Kayser Modify [R10 9] Left flank pain     9/8/2019  5:41 PM Rony Kayser Modify [N39 0] Urinary tract infection       ED Disposition     ED Disposition Condition Date/Time Comment    Discharge Good Sun Sep 8, 2019  5:39 PM Dylan Morrison discharge to home/self care              Follow-up Information     Follow up With Specialties Details Why Contact Info Additional Information    Emma Huitron DO Family Medicine Schedule an appointment as soon as possible for a visit  As needed [address]  119 [facility] [phone]       [address] [place] [address] Emergency Department Emergency Medicine Go to  If symptoms worsen [address]  [phone]  ED, [address], [address]          Discharge Medication List as of 9/8/2019  5:57 PM      START taking these medications    Details   cephalexin (KEFLEX) 500 mg capsule Take 1 capsule (500 mg total) by mouth every 12 (twelve) hours for 14 days, Starting Sun 9/8/2019, Until Sun 9/22/2019, Print         CONTINUE these medications which have NOT CHANGED    Details   glipiZIDE (GLUCOTROL) 5 mg tablet TAKE ONE TABLET BY MOUTH 2 TIMES A DAY, Normal      INVOKANA 100 MG TAKE ONE TABLET BY MOUTH EVERY DAY, Normal      lisinopril-hydrochlorothiazide (PRINZIDE,ZESTORETIC) 10-12 5 MG per tablet TAKE ONE TABLET BY MOUTH EVERY DAY, Normal      metFORMIN (GLUCOPHAGE) 1000 MG tablet TAKE ONE TABLET BY MOUTH 2 TIMES A DAY, Normal      simvastatin (ZOCOR) 40 mg tablet TAKE 1 TABLET AT BEDTIME , Normal           No discharge procedures on file  ED Provider  Attending physically available and evaluated Iman Reinoso I managed the patient along with the ED Attending      Electronically Signed by         Yon Matthews MD  09/08/19 9485

## 2019-09-09 LAB — BACTERIA UR CULT: NORMAL

## 2019-09-13 DIAGNOSIS — E11.9 CONTROLLED TYPE 2 DIABETES MELLITUS WITHOUT COMPLICATION, WITHOUT LONG-TERM CURRENT USE OF INSULIN (HCC): ICD-10-CM

## 2019-09-13 RX ORDER — GLIPIZIDE 5 MG/1
TABLET ORAL
Qty: 180 TABLET | Refills: 0 | Status: SHIPPED | OUTPATIENT
Start: 2019-09-13 | End: 2020-02-20 | Stop reason: SDUPTHER

## 2019-10-22 ENCOUNTER — OFFICE VISIT (OUTPATIENT)
Dept: PODIATRY | Facility: CLINIC | Age: 49
End: 2019-10-22
Payer: COMMERCIAL

## 2019-10-22 VITALS
DIASTOLIC BLOOD PRESSURE: 89 MMHG | HEIGHT: 68 IN | HEART RATE: 76 BPM | SYSTOLIC BLOOD PRESSURE: 137 MMHG | WEIGHT: 235 LBS | BODY MASS INDEX: 35.61 KG/M2

## 2019-10-22 DIAGNOSIS — E11.40 TYPE 2 DIABETES MELLITUS WITH DIABETIC NEUROPATHY, WITHOUT LONG-TERM CURRENT USE OF INSULIN (HCC): ICD-10-CM

## 2019-10-22 DIAGNOSIS — M20.41 HAMMER TOE OF RIGHT FOOT: ICD-10-CM

## 2019-10-22 DIAGNOSIS — L97.519 FOOT ULCER, RIGHT, WITH UNSPECIFIED SEVERITY (HCC): Primary | ICD-10-CM

## 2019-10-22 PROBLEM — L97.512 RIGHT FOOT ULCER, WITH FAT LAYER EXPOSED (HCC): Status: RESOLVED | Noted: 2019-06-12 | Resolved: 2019-10-22

## 2019-10-22 PROCEDURE — 99214 OFFICE O/P EST MOD 30 MIN: CPT | Performed by: PODIATRIST

## 2019-10-22 NOTE — PROGRESS NOTES
PATIENT:  Flory Jorge  1970     Assessment     1  Foot ulcer, right, with unspecified severity ([address] )     2  Type 2 diabetes mellitus with diabetic neuropathy, without long-term current use of insulin ([facility])     3  Hammer toe of right foot             Plan:  Callus was removed  No active wound noted  Dry blood seems to be in the dermal layer of skin  Instructed daily local care on her ulcer  Discussed proper footwear to accommodate any foot deformity and reduce any pressures  Educated disease prevention and risks related to diabetes and neuropathy  Educated proper daily foot care and exam   Instructed proper skin care / protection  Instructed to identify any signs of infection and related foot problem  The recent blood work was reviewed and the last HbA1c was 6 4  Discussed proper blood glucose control with diet and exercise  The patient will return in 2 weeks for wound check  Subjective: The patient presents for right foot evaluation  She noticed recurring dry blood on right plantar foot  She also developed an ulcer on right 3rd toe  She related this to ill fitted shoe that she was wearing on Saturday  She wears orthotics and sneakers at work, but presents with flats today  She was using band aid over the toe ulcer  No foot pain  No redness or edema  BS is under control  The following portions of the patient's history were reviewed and updated as appropriate: allergies, current medications, past family history, past medical history, past social history, past surgical history and problem list   All pertinent labs and images were reviewed        Past Medical History  Past Medical History:   Diagnosis Date    [facility] mellitus [facility] )     Hyperlipidemia     Hypertension        Past Surgical History  Past Surgical History:   Procedure Laterality Date    TONSILLECTOMY AND ADENOIDECTOMY          Allergies:  Cephalexin    Medications:  Current Outpatient Medications Medication Sig Dispense Refill    glipiZIDE (GLUCOTROL) 5 mg tablet TAKE ONE TABLET BY MOUTH 2 TIMES A  tablet 0    INVOKANA 100 MG TAKE ONE TABLET BY MOUTH EVERY DAY 90 tablet 3    lisinopril-hydrochlorothiazide (PRINZIDE,ZESTORETIC) 10-12 5 MG per tablet TAKE ONE TABLET BY MOUTH EVERY DAY 90 tablet 3    metFORMIN (GLUCOPHAGE) 1000 MG tablet TAKE ONE TABLET BY MOUTH 2 TIMES A  tablet 3    simvastatin (ZOCOR) 40 mg tablet TAKE 1 TABLET AT BEDTIME  90 tablet 3     No current facility-administered medications for this visit  Social History:  Social History     Socioeconomic History    Marital status: /Civil Union     Spouse name: None    Number of children: None    Years of education: None    Highest education level: None   Occupational History    None   Social Needs    Financial resource strain: None    Food insecurity:     Worry: None     Inability: None    Transportation needs:     Medical: None     Non-medical: None   Tobacco Use    Smoking status: Never Smoker    Smokeless tobacco: Never Used   Substance and Sexual Activity    Alcohol use: Yes     Comment: weekends    Drug use: No    Sexual activity: None   Lifestyle    Physical activity:     Days per week: None     Minutes per session: None    Stress: None   Relationships    Social connections:     Talks on phone: None     Gets together: None     Attends Congregational service: None     Active member of club or organization: None     Attends meetings of clubs or organizations: None     Relationship status: None    Intimate partner violence:     Fear of current or ex partner: None     Emotionally abused: None     Physically abused: None     Forced sexual activity: None   Other Topics Concern    None   Social History Narrative    None          Review of Systems   Constitutional: Negative for chills, fatigue and fever  Respiratory: Negative for cough and shortness of breath      Cardiovascular: Negative for chest pain and leg swelling  Gastrointestinal: Negative for nausea and vomiting  Skin: Positive for wound  Neurological: Positive for numbness  Objective:      /89   Pulse 76   Ht 5' 8" (1 727 m)   Wt 107 kg (235 lb)   BMI 35 73 kg/m²          Physical Exam   Constitutional: She is oriented to person, place, and time  She appears well-developed and well-nourished  No distress  Cardiovascular: Normal rate, regular rhythm and intact distal pulses  Chronic LE edema with mild venous stasis  Pulmonary/Chest: Effort normal and breath sounds normal  No respiratory distress  Musculoskeletal: She exhibits no edema or tenderness  Tight achilles tendon with equinus  Hammertoe deformity noted, right worse than left  Neurological: She is alert and oriented to person, place, and time  A sensory deficit is present  She exhibits normal muscle tone  Coordination normal    Skin: Capillary refill takes less than 2 seconds  She is not diaphoretic  Partial thick ulcer on dorsal right 3rd toe  Dry blood and mild callus right submet 1 without active ulcer  No redness or edema  No cellulitis  Psychiatric: She has a normal mood and affect  Her behavior is normal    Vitals reviewed

## 2019-11-05 ENCOUNTER — OFFICE VISIT (OUTPATIENT)
Dept: PODIATRY | Facility: CLINIC | Age: 49
End: 2019-11-05
Payer: COMMERCIAL

## 2019-11-05 VITALS
WEIGHT: 235.8 LBS | HEART RATE: 80 BPM | BODY MASS INDEX: 35.74 KG/M2 | HEIGHT: 68 IN | DIASTOLIC BLOOD PRESSURE: 92 MMHG | SYSTOLIC BLOOD PRESSURE: 148 MMHG

## 2019-11-05 DIAGNOSIS — L97.519 FOOT ULCER, RIGHT, WITH UNSPECIFIED SEVERITY (HCC): Primary | ICD-10-CM

## 2019-11-05 DIAGNOSIS — E11.40 TYPE 2 DIABETES MELLITUS WITH DIABETIC NEUROPATHY, WITHOUT LONG-TERM CURRENT USE OF INSULIN (HCC): ICD-10-CM

## 2019-11-05 PROCEDURE — 99213 OFFICE O/P EST LOW 20 MIN: CPT | Performed by: PODIATRIST

## 2019-11-05 NOTE — PROGRESS NOTES
PATIENT:  Ruy George  1970     Assessment     1  Foot ulcer, right, with unspecified severity ([facility] )     2  Type 2 diabetes mellitus with diabetic neuropathy, without long-term current use of insulin ([facility])             Plan:  Wound looks healed  No new pedal issue  Discussed proper footwear to accommodate any foot deformity and reduce any pressures  Educated disease prevention and risks related to diabetes and neuropathy  Educated proper daily foot care and exam   Instructed proper skin care / protection  Instructed to identify any signs of infection and related foot problem  The patient will return in 2 months for diabetic foot exam        Subjective: The patient presents for foot evaluation  Wound looks healed with no bleeding or drainage  No foot pain  No redness or edema  BS is under control  She presents with diabetic inserts  The following portions of the patient's history were reviewed and updated as appropriate: allergies, current medications, past family history, past medical history, past social history, past surgical history and problem list   All pertinent labs and images were reviewed        Past Medical History  Past Medical History:   Diagnosis Date    Diabetes mellitus ([facility] )     Hyperlipidemia     Hypertension        Past Surgical History  Past Surgical History:   Procedure Laterality Date    TONSILLECTOMY AND ADENOIDECTOMY          Allergies:  Cephalexin    Medications:  Current Outpatient Medications   Medication Sig Dispense Refill    glipiZIDE (GLUCOTROL) 5 mg tablet TAKE ONE TABLET BY MOUTH 2 TIMES A  tablet 0    INVOKANA 100 MG TAKE ONE TABLET BY MOUTH EVERY DAY 90 tablet 3    lisinopril-hydrochlorothiazide (PRINZIDE,ZESTORETIC) 10-12 5 MG per tablet TAKE ONE TABLET BY MOUTH EVERY DAY 90 tablet 3    metFORMIN (GLUCOPHAGE) 1000 MG tablet TAKE ONE TABLET BY MOUTH 2 TIMES A  tablet 3    simvastatin (ZOCOR) 40 mg tablet TAKE 1 TABLET AT BEDTIME  90 tablet 3     No current facility-administered medications for this visit  Social History:  Social History     Socioeconomic History    Marital status: /Civil Union     Spouse name: None    Number of children: None    Years of education: None    Highest education level: None   Occupational History    None   Social Needs    Financial resource strain: None    Food insecurity:     Worry: None     Inability: None    Transportation needs:     Medical: None     Non-medical: None   Tobacco Use    Smoking status: Never Smoker    Smokeless tobacco: Never Used   Substance and Sexual Activity    Alcohol use: Yes     Comment: weekends    Drug use: No    Sexual activity: None   Lifestyle    Physical activity:     Days per week: None     Minutes per session: None    Stress: None   Relationships    Social connections:     Talks on phone: None     Gets together: None     Attends Spiritism service: None     Active member of club or organization: None     Attends meetings of clubs or organizations: None     Relationship status: None    Intimate partner violence:     Fear of current or ex partner: None     Emotionally abused: None     Physically abused: None     Forced sexual activity: None   Other Topics Concern    None   Social History Narrative    None          Review of Systems   Constitutional: Negative for fever  Respiratory: Negative for cough and shortness of breath  Cardiovascular: Negative for chest pain and leg swelling  Gastrointestinal: Negative for nausea and vomiting  Neurological: Positive for numbness  Objective:      /92   Pulse 80   Ht 5' 8" (1 727 m)   Wt 107 kg (235 lb 12 8 oz)   BMI 35 85 kg/m²          Physical Exam   Constitutional: She is oriented to person, place, and time  She appears well-developed and well-nourished  No distress  Cardiovascular: Normal rate, regular rhythm and intact distal pulses     Chronic LE edema with mild venous stasis  Pulmonary/Chest: Effort normal and breath sounds normal  No respiratory distress  Musculoskeletal: She exhibits no edema or tenderness  Tight achilles tendon with equinus  Hammertoe deformity noted, right worse than left  Neurological: She is alert and oriented to person, place, and time  A sensory deficit is present  She exhibits normal muscle tone  Coordination normal    Skin: Capillary refill takes less than 2 seconds  She is not diaphoretic  Wound healed on dorsal right 3rd toe  Dry blood noted right submet 1 without active ulcer  Minimal callus  No redness or edema  No cellulitis  Vitals reviewed

## 2019-12-05 PROBLEM — E11.621 TYPE 2 DIABETES MELLITUS WITH FOOT ULCER (HCC): Status: ACTIVE | Noted: 2019-12-05

## 2019-12-05 PROBLEM — L97.509 TYPE 2 DIABETES MELLITUS WITH FOOT ULCER (HCC): Status: ACTIVE | Noted: 2019-12-05

## 2019-12-27 ENCOUNTER — OFFICE VISIT (OUTPATIENT)
Dept: PODIATRY | Facility: CLINIC | Age: 49
End: 2019-12-27
Payer: COMMERCIAL

## 2019-12-27 VITALS
BODY MASS INDEX: 33.34 KG/M2 | HEART RATE: 80 BPM | SYSTOLIC BLOOD PRESSURE: 126 MMHG | WEIGHT: 220 LBS | DIASTOLIC BLOOD PRESSURE: 80 MMHG | HEIGHT: 68 IN

## 2019-12-27 DIAGNOSIS — L97.519 FOOT ULCER, RIGHT, WITH UNSPECIFIED SEVERITY (HCC): Primary | ICD-10-CM

## 2019-12-27 DIAGNOSIS — E11.40 TYPE 2 DIABETES MELLITUS WITH DIABETIC NEUROPATHY, WITHOUT LONG-TERM CURRENT USE OF INSULIN (HCC): ICD-10-CM

## 2019-12-27 PROCEDURE — 99213 OFFICE O/P EST LOW 20 MIN: CPT | Performed by: PODIATRIST

## 2019-12-27 NOTE — PROGRESS NOTES
PATIENT:  Mustapha Pena  1970     Assessment     1  Foot ulcer, right, with unspecified severity ([facility] )     2  Type 2 diabetes mellitus with diabetic neuropathy, without long-term current use of insulin ([facility] )             Plan:  Patient was counseled on the condition and diagnosis  She has DFU without clinical signs of infection  Instructed daily local care  Return to use CAM walker  Discussed proper off-loading and staying off of her feet  Discussed further images depending on the progress  Educated disease prevention and risks related to diabetes  Educated proper daily foot care and exam   The patient will return in 1 week for re-evaluation  Subjective: The patient presents with chief complaint of discoloration of right foot  She has history of DFU right submet 1  She noticed increased dry blood in the area 2 weeks ago  No redness or edema  No warmth  Minimal pain  No active bleeding  She presents with slip on shoes today  She feels well with no systemic illness  The following portions of the patient's history were reviewed and updated as appropriate: allergies, current medications, past family history, past medical history, past social history, past surgical history and problem list   All pertinent labs and images were reviewed        Past Medical History  Past Medical History:   Diagnosis Date    Diabetes mellitus ([facility] )     Hyperlipidemia     Hypertension        Past Surgical History  Past Surgical History:   Procedure Laterality Date    TONSILLECTOMY AND ADENOIDECTOMY          Allergies:  Cephalexin    Medications:  Current Outpatient Medications   Medication Sig Dispense Refill    glipiZIDE (GLUCOTROL) 5 mg tablet TAKE ONE TABLET BY MOUTH 2 TIMES A  tablet 0    INVOKANA 100 MG TAKE ONE TABLET BY MOUTH EVERY DAY 90 tablet 3    lisinopril-hydrochlorothiazide (PRINZIDE,ZESTORETIC) 10-12 5 MG per tablet TAKE ONE TABLET BY MOUTH EVERY DAY 90 tablet 3    metFORMIN (GLUCOPHAGE) 1000 MG tablet TAKE ONE TABLET BY MOUTH 2 TIMES A  tablet 3    simvastatin (ZOCOR) 40 mg tablet TAKE 1 TABLET AT BEDTIME  90 tablet 3     No current facility-administered medications for this visit  Social History:  Social History     Socioeconomic History    Marital status: /Civil Union     Spouse name: None    Number of children: None    Years of education: None    Highest education level: None   Occupational History    None   Social Needs    Financial resource strain: None    Food insecurity:     Worry: None     Inability: None    Transportation needs:     Medical: None     Non-medical: None   Tobacco Use    Smoking status: Never Smoker    Smokeless tobacco: Never Used   Substance and Sexual Activity    Alcohol use: Yes     Comment: weekends    Drug use: No    Sexual activity: None   Lifestyle    Physical activity:     Days per week: None     Minutes per session: None    Stress: None   Relationships    Social connections:     Talks on phone: None     Gets together: None     Attends [employer] service: None     Active member of club or organization: None     Attends meetings of clubs or organizations: None     Relationship status: None    Intimate partner violence:     Fear of current or ex partner: None     Emotionally abused: None     Physically abused: None     Forced sexual activity: None   Other Topics Concern    None   Social History Narrative    None          Review of Systems   Constitutional: Negative for chills, fatigue and fever  Respiratory: Negative for cough and shortness of breath  Cardiovascular: Negative for chest pain and leg swelling  Neurological: Positive for numbness  Objective:      /80   Pulse 80   Ht 5' 8" (1 727 m)   Wt 99 8 kg (220 lb)   BMI 33 45 kg/m²          Physical Exam   Constitutional: She is oriented to person, place, and time  She appears well-developed and well-nourished  No distress  Cardiovascular: Normal rate, regular rhythm and intact distal pulses  Pulmonary/Chest: Effort normal and breath sounds normal  No respiratory distress  Musculoskeletal: She exhibits no edema or tenderness  Neurological: She is alert and oriented to person, place, and time  A sensory deficit is present  She exhibits normal muscle tone  Skin: Capillary refill takes less than 2 seconds  She is not diaphoretic  Wound is fully healed  Very small area of skin peeling from patient's clipping the edge of dry skin  No redness or edema  No drainage  Vitals reviewed

## 2020-01-24 ENCOUNTER — APPOINTMENT (OUTPATIENT)
Dept: LAB | Facility: HOSPITAL | Age: 50
End: 2020-01-24
Payer: COMMERCIAL

## 2020-01-24 DIAGNOSIS — I10 BENIGN ESSENTIAL HYPERTENSION: ICD-10-CM

## 2020-01-24 DIAGNOSIS — E78.2 MIXED HYPERLIPIDEMIA: Chronic | ICD-10-CM

## 2020-01-24 DIAGNOSIS — E11.40 TYPE 2 DIABETES MELLITUS WITH DIABETIC NEUROPATHY, WITHOUT LONG-TERM CURRENT USE OF INSULIN (HCC): Chronic | ICD-10-CM

## 2020-01-24 LAB
ALBUMIN SERPL BCP-MCNC: 3.5 G/DL (ref 3.5–5)
ALP SERPL-CCNC: 95 U/L (ref 46–116)
ALT SERPL W P-5'-P-CCNC: 25 U/L (ref 12–78)
ANION GAP SERPL CALCULATED.3IONS-SCNC: 7 MMOL/L (ref 4–13)
AST SERPL W P-5'-P-CCNC: 18 U/L (ref 5–45)
BILIRUB SERPL-MCNC: 0.51 MG/DL (ref 0.2–1)
BUN SERPL-MCNC: 18 MG/DL (ref 5–25)
CALCIUM SERPL-MCNC: 8.9 MG/DL (ref 8.3–10.1)
CHLORIDE SERPL-SCNC: 109 MMOL/L (ref 100–108)
CHOLEST SERPL-MCNC: 164 MG/DL (ref 50–200)
CO2 SERPL-SCNC: 22 MMOL/L (ref 21–32)
CREAT SERPL-MCNC: 0.98 MG/DL (ref 0.6–1.3)
EST. AVERAGE GLUCOSE BLD GHB EST-MCNC: 154 MG/DL
GFR SERPL CREATININE-BSD FRML MDRD: 68 ML/MIN/1.73SQ M
GLUCOSE P FAST SERPL-MCNC: 65 MG/DL (ref 65–99)
HBA1C MFR BLD: 7 % (ref 4.2–6.3)
HDLC SERPL-MCNC: 56 MG/DL
LDLC SERPL CALC-MCNC: 97 MG/DL (ref 0–100)
POTASSIUM SERPL-SCNC: 3.7 MMOL/L (ref 3.5–5.3)
PROT SERPL-MCNC: 7 G/DL (ref 6.4–8.2)
SODIUM SERPL-SCNC: 138 MMOL/L (ref 136–145)
TRIGL SERPL-MCNC: 57 MG/DL
TSH SERPL DL<=0.05 MIU/L-ACNC: 1.28 UIU/ML (ref 0.36–3.74)

## 2020-01-24 PROCEDURE — 83036 HEMOGLOBIN GLYCOSYLATED A1C: CPT

## 2020-01-24 PROCEDURE — 84443 ASSAY THYROID STIM HORMONE: CPT

## 2020-01-24 PROCEDURE — 80053 COMPREHEN METABOLIC PANEL: CPT

## 2020-01-24 PROCEDURE — 80061 LIPID PANEL: CPT

## 2020-01-24 PROCEDURE — 36415 COLL VENOUS BLD VENIPUNCTURE: CPT

## 2020-01-27 ENCOUNTER — OFFICE VISIT (OUTPATIENT)
Dept: FAMILY MEDICINE CLINIC | Facility: CLINIC | Age: 50
End: 2020-01-27
Payer: COMMERCIAL

## 2020-01-27 VITALS
RESPIRATION RATE: 16 BRPM | TEMPERATURE: 97.6 F | WEIGHT: 232.4 LBS | OXYGEN SATURATION: 99 % | HEART RATE: 83 BPM | SYSTOLIC BLOOD PRESSURE: 140 MMHG | HEIGHT: 68 IN | BODY MASS INDEX: 35.22 KG/M2 | DIASTOLIC BLOOD PRESSURE: 74 MMHG

## 2020-01-27 DIAGNOSIS — E78.2 MIXED HYPERLIPIDEMIA: ICD-10-CM

## 2020-01-27 DIAGNOSIS — E11.621 TYPE 2 DIABETES MELLITUS WITH FOOT ULCER, WITHOUT LONG-TERM CURRENT USE OF INSULIN (HCC): Primary | ICD-10-CM

## 2020-01-27 DIAGNOSIS — G62.89 OTHER POLYNEUROPATHY: ICD-10-CM

## 2020-01-27 DIAGNOSIS — E11.40 TYPE 2 DIABETES MELLITUS WITH DIABETIC NEUROPATHY, WITHOUT LONG-TERM CURRENT USE OF INSULIN (HCC): Chronic | ICD-10-CM

## 2020-01-27 DIAGNOSIS — I10 BENIGN ESSENTIAL HYPERTENSION: ICD-10-CM

## 2020-01-27 DIAGNOSIS — E78.2 MIXED HYPERLIPIDEMIA: Chronic | ICD-10-CM

## 2020-01-27 DIAGNOSIS — I10 ESSENTIAL HYPERTENSION: ICD-10-CM

## 2020-01-27 DIAGNOSIS — L97.509 TYPE 2 DIABETES MELLITUS WITH FOOT ULCER, WITHOUT LONG-TERM CURRENT USE OF INSULIN (HCC): Primary | ICD-10-CM

## 2020-01-27 PROCEDURE — 99214 OFFICE O/P EST MOD 30 MIN: CPT | Performed by: FAMILY MEDICINE

## 2020-01-27 RX ORDER — SIMVASTATIN 40 MG
TABLET ORAL
Qty: 90 TABLET | Refills: 3 | Status: SHIPPED | OUTPATIENT
Start: 2020-01-27 | End: 2022-02-23 | Stop reason: SDUPTHER

## 2020-01-27 RX ORDER — LISINOPRIL AND HYDROCHLOROTHIAZIDE 12.5; 1 MG/1; MG/1
TABLET ORAL
Qty: 90 TABLET | Refills: 3 | Status: SHIPPED | OUTPATIENT
Start: 2020-01-27 | End: 2020-10-17 | Stop reason: HOSPADM

## 2020-01-27 NOTE — PROGRESS NOTES
Assessment/Plan:    1  Type 2 diabetes mellitus with foot ulcer, without long-term current use of insulin (HCC)  Assessment & Plan:  Stable on metformin  Lab Results   Component Value Date    HGBA1C 7 0 (H) 01/24/2020       Orders:  -     Hemoglobin A1C; Future; Expected date: 05/25/2020  -     Microalbumin / creatinine urine ratio; Future; Expected date: 05/25/2020    2  Type 2 diabetes mellitus with diabetic neuropathy, without long-term current use of insulin (HCC)  Assessment & Plan:  Stable on metformin  Lab Results   Component Value Date    HGBA1C 7 0 (H) 01/24/2020         3  Benign essential hypertension  Assessment & Plan:  Stable on current lisinopril    Orders:  -     CBC; Future; Expected date: 05/25/2020  -     Comprehensive metabolic panel; Future; Expected date: 05/25/2020  -     TSH, 3rd generation with Free T4 reflex; Future; Expected date: 05/25/2020    4  Other polyneuropathy  Assessment & Plan:  Stable  No worsening pain      5  Mixed hyperlipidemia  Assessment & Plan:  zocor    Orders:  -     Lipid Panel with Direct LDL reflex; Future; Expected date: 05/25/2020    BMI Counseling: Body mass index is 35 23 kg/m²  The BMI is above normal  Nutrition recommendations include encouraging healthy choices of fruits and vegetables  Exercise recommendations include exercising 3-5 times per week  No pharmacotherapy was ordered  There are no Patient Instructions on file for this visit  No follow-ups on file  Subjective:      Patient ID: Mame Covert is a 52 y o  female  Chief Complaint   Patient presents with    Follow-up     Patient here for follow up on Type II Diabetes        Here for follow up  Jh Mohamud yesterday on icy steps  Overall doing well  Seeing Dr Halina schrader with foot  Right foot with ulcer      Diabetes   She presents for her follow-up diabetic visit  She has type 2 diabetes mellitus  Her disease course has been stable  There are no hypoglycemic associated symptoms  There are no diabetic associated symptoms  There are no hypoglycemic complications  Symptoms are stable  There are no diabetic complications  Risk factors for coronary artery disease include diabetes mellitus, dyslipidemia and hypertension  She is compliant with treatment all of the time  Her weight is stable  She has not had a previous visit with a dietitian  She participates in exercise every other day  There is no change in her home blood glucose trend  An ACE inhibitor/angiotensin II receptor blocker is being taken  She sees a podiatrist Eye exam is current  The following portions of the patient's history were reviewed and updated as appropriate: allergies, current medications, past family history, past medical history, past social history, past surgical history and problem list     Review of Systems   Constitutional: Negative  HENT: Negative  Eyes: Negative  Respiratory: Negative  Cardiovascular: Negative  Gastrointestinal: Negative  Endocrine: Negative  Genitourinary: Negative  Musculoskeletal: Negative  Skin: Positive for wound  Allergic/Immunologic: Negative  Neurological: Negative  Hematological: Negative  Psychiatric/Behavioral: Negative  Current Outpatient Medications   Medication Sig Dispense Refill    glipiZIDE (GLUCOTROL) 5 mg tablet TAKE ONE TABLET BY MOUTH 2 TIMES A  tablet 0    INVOKANA 100 MG TAKE ONE TABLET BY MOUTH EVERY DAY 90 tablet 3    lisinopril-hydrochlorothiazide (PRINZIDE,ZESTORETIC) 10-12 5 MG per tablet TAKE ONE TABLET BY MOUTH EVERY DAY 90 tablet 3    metFORMIN (GLUCOPHAGE) 1000 MG tablet TAKE ONE TABLET BY MOUTH 2 TIMES A  tablet 3    simvastatin (ZOCOR) 40 mg tablet TAKE 1 TABLET AT BEDTIME  90 tablet 3     No current facility-administered medications for this visit          Objective:    /74   Pulse 83   Temp 97 6 °F (36 4 °C)   Resp 16   Ht 5' 8 1" (1 73 m)   Wt 105 kg (232 lb 6 4 oz)   SpO2 99% BMI 35 23 kg/m²        Physical Exam   Constitutional: She appears well-developed and well-nourished  Eyes: Pupils are equal, round, and reactive to light  EOM are normal    Neck: Normal range of motion  Neck supple  Cardiovascular: Normal rate, regular rhythm, normal heart sounds and intact distal pulses  Pulmonary/Chest: Effort normal and breath sounds normal    Abdominal: Soft  Bowel sounds are normal    Musculoskeletal: Normal range of motion  Neurological: She is alert  Skin: Skin is warm  Capillary refill takes less than 2 seconds  Psychiatric: She has a normal mood and affect  Her behavior is normal  Judgment and thought content normal    Nursing note and vitals reviewed               Daniel Echavarria DO

## 2020-02-20 DIAGNOSIS — E11.9 CONTROLLED TYPE 2 DIABETES MELLITUS WITHOUT COMPLICATION, WITHOUT LONG-TERM CURRENT USE OF INSULIN (HCC): ICD-10-CM

## 2020-02-20 RX ORDER — GLIPIZIDE 5 MG/1
5 TABLET ORAL 2 TIMES DAILY
Qty: 180 TABLET | Refills: 3 | Status: SHIPPED | OUTPATIENT
Start: 2020-02-20 | End: 2020-10-17 | Stop reason: HOSPADM

## 2020-03-24 ENCOUNTER — OFFICE VISIT (OUTPATIENT)
Dept: PODIATRY | Facility: CLINIC | Age: 50
End: 2020-03-24

## 2020-03-24 VITALS
WEIGHT: 229.4 LBS | HEIGHT: 68 IN | HEART RATE: 74 BPM | DIASTOLIC BLOOD PRESSURE: 75 MMHG | SYSTOLIC BLOOD PRESSURE: 132 MMHG | BODY MASS INDEX: 34.77 KG/M2

## 2020-03-24 DIAGNOSIS — E11.40 TYPE 2 DIABETES MELLITUS WITH DIABETIC NEUROPATHY, WITHOUT LONG-TERM CURRENT USE OF INSULIN (HCC): ICD-10-CM

## 2020-03-24 DIAGNOSIS — B35.1 ONYCHOMYCOSIS: ICD-10-CM

## 2020-03-24 DIAGNOSIS — L97.519 FOOT ULCER, RIGHT, WITH UNSPECIFIED SEVERITY (HCC): Primary | ICD-10-CM

## 2020-03-24 DIAGNOSIS — M67.00 SHORT ACHILLES TENDON, UNSPECIFIED LATERALITY: ICD-10-CM

## 2020-03-24 PROCEDURE — 3008F BODY MASS INDEX DOCD: CPT | Performed by: PODIATRIST

## 2020-03-24 PROCEDURE — 3078F DIAST BP <80 MM HG: CPT | Performed by: PODIATRIST

## 2020-03-24 PROCEDURE — 11721 DEBRIDE NAIL 6 OR MORE: CPT | Performed by: PODIATRIST

## 2020-03-24 PROCEDURE — 1036F TOBACCO NON-USER: CPT | Performed by: PODIATRIST

## 2020-03-24 PROCEDURE — 3051F HG A1C>EQUAL 7.0%<8.0%: CPT | Performed by: PODIATRIST

## 2020-03-24 PROCEDURE — 99213 OFFICE O/P EST LOW 20 MIN: CPT | Performed by: PODIATRIST

## 2020-03-24 PROCEDURE — 3075F SYST BP GE 130 - 139MM HG: CPT | Performed by: PODIATRIST

## 2020-03-24 NOTE — PROGRESS NOTES
PATIENT:  Nevaeh Delacruz  1970     Assessment     1  Foot ulcer, right, with unspecified severity ([facility] )     2  Type 2 diabetes mellitus with diabetic neuropathy, without long-term current use of insulin ([facility] )     3  Onychomycosis     4  Short Achilles tendon, unspecified laterality             Plan:  Patient was counseled on the condition and diagnosis  She has stable DFU without clinical signs of infection  Instructed daily local care with dermagren gauze  Return to use CAM walker  Discussed proper off-loading and staying off of her feet  Discussed further images depending on the progress  Educated disease prevention and risks related to diabetes  Educated proper daily foot care and exam   The patient will return in 2 weeks for re-evaluation  Procedure: All mycotic toenails were reduced and debrided in length, width, and girth using a nail nipper  Patient tolerated procedure(s) well without complications  Subjective: The patient presents with chief complaint of recurring wound right foot  She had it for about 1 week  No redness or edema  No purulence  Minimal drainage  No foot pain  She feels well with no systemic illness  BS is under control  She has thick nails with trouble cutting  The following portions of the patient's history were reviewed and updated as appropriate: allergies, current medications, past family history, past medical history, past social history, past surgical history and problem list   All pertinent labs and images were reviewed        Past Medical History  Past Medical History:   Diagnosis Date    [facility]     Hyperlipidemia     Hypertension        Past Surgical History  Past Surgical History:   Procedure Laterality Date    TONSILLECTOMY AND ADENOIDECTOMY          Allergies:  Cephalexin    Medications:  Current Outpatient Medications   Medication Sig Dispense Refill    glipiZIDE (GLUCOTROL) 5 mg tablet Take 1 tablet (5 mg total) by mouth 2 (two) times a day 180 tablet 3    INVOKANA 100 MG TAKE ONE TABLET BY MOUTH EVERY DAY 90 tablet 3    lisinopril-hydrochlorothiazide (PRINZIDE,ZESTORETIC) 10-12 5 MG per tablet TAKE ONE TABLET BY MOUTH EVERY DAY 90 tablet 3    metFORMIN (GLUCOPHAGE) 1000 MG tablet TAKE ONE TABLET BY MOUTH 2 TIMES A  tablet 3    simvastatin (ZOCOR) 40 mg tablet TAKE 1 TABLET AT BEDTIME  90 tablet 3     No current facility-administered medications for this visit  Social History:  Social History     Socioeconomic History    Marital status: /Civil Union     Spouse name: None    Number of children: None    Years of education: None    Highest education level: None   Occupational History    None   Social Needs    Financial resource strain: None    Food insecurity:     Worry: None     Inability: None    Transportation needs:     Medical: None     Non-medical: None   Tobacco Use    Smoking status: Never Smoker    Smokeless tobacco: Never Used   Substance and Sexual Activity    Alcohol use: Yes     Comment: weekends    Drug use: No    Sexual activity: None   Lifestyle    Physical activity:     Days per week: None     Minutes per session: None    Stress: None   Relationships    Social connections:     Talks on phone: None     Gets together: None     Attends Anabaptism service: None     Active member of club or organization: None     Attends meetings of clubs or organizations: None     Relationship status: None    Intimate partner violence:     Fear of current or ex partner: None     Emotionally abused: None     Physically abused: None     Forced sexual activity: None   Other Topics Concern    None   Social History Narrative    None          Review of Systems   Constitutional: Negative for chills, fatigue and fever  Respiratory: Negative for cough and shortness of breath  Cardiovascular: Negative for chest pain and leg swelling  Neurological: Positive for numbness  Objective:    /75   Pulse 74   Ht 5' 8" (1 727 m)   Wt 104 kg (229 lb 6 4 oz)   BMI 34 88 kg/m²       Physical Exam   Constitutional: She is oriented to person, place, and time  She appears well-developed and well-nourished  No distress  Cardiovascular: Normal rate, regular rhythm and intact distal pulses  Pulmonary/Chest: Effort normal and breath sounds normal  No respiratory distress  Musculoskeletal: She exhibits no edema or tenderness  Tight achilles tendon with equinus bilaterally  Neurological: She is alert and oriented to person, place, and time  A sensory deficit is present  She exhibits normal muscle tone  Skin: Skin is dry  Capillary refill takes less than 2 seconds  She is not diaphoretic  No erythema  Wound presents right submet 1  Wound is deep to dermal tissues  Wound bed is granular  It is 1 1 X 0 3 cm  No deep probing  No crepitus or fluctuation  no signs of abscess  No redness or warmth  Mycotic nails X 6 with thickening and discoloration  Psychiatric: Her behavior is normal    Vitals reviewed

## 2020-04-15 ENCOUNTER — TELEMEDICINE (OUTPATIENT)
Dept: FAMILY MEDICINE CLINIC | Facility: CLINIC | Age: 50
End: 2020-04-15
Payer: COMMERCIAL

## 2020-04-15 VITALS — TEMPERATURE: 100.4 F

## 2020-04-15 DIAGNOSIS — R50.9 FEVER, UNSPECIFIED FEVER CAUSE: Primary | ICD-10-CM

## 2020-04-15 DIAGNOSIS — L97.509 TYPE 2 DIABETES MELLITUS WITH FOOT ULCER, WITHOUT LONG-TERM CURRENT USE OF INSULIN (HCC): ICD-10-CM

## 2020-04-15 DIAGNOSIS — Z20.828 EXPOSURE TO SARS-ASSOCIATED CORONAVIRUS: ICD-10-CM

## 2020-04-15 DIAGNOSIS — L03.116 CELLULITIS OF LEFT LOWER EXTREMITY: ICD-10-CM

## 2020-04-15 DIAGNOSIS — E11.621 TYPE 2 DIABETES MELLITUS WITH FOOT ULCER, WITHOUT LONG-TERM CURRENT USE OF INSULIN (HCC): ICD-10-CM

## 2020-04-15 PROCEDURE — 87635 SARS-COV-2 COVID-19 AMP PRB: CPT

## 2020-04-15 PROCEDURE — 99214 OFFICE O/P EST MOD 30 MIN: CPT | Performed by: FAMILY MEDICINE

## 2020-04-15 RX ORDER — SULFAMETHOXAZOLE AND TRIMETHOPRIM 800; 160 MG/1; MG/1
1 TABLET ORAL EVERY 12 HOURS SCHEDULED
Qty: 20 TABLET | Refills: 0 | Status: SHIPPED | OUTPATIENT
Start: 2020-04-15 | End: 2020-04-25

## 2020-04-16 LAB
INPATIENT: NORMAL
SARS-COV-2 RNA SPEC QL NAA+PROBE: NOT DETECTED

## 2020-05-04 DIAGNOSIS — E11.40 TYPE 2 DIABETES MELLITUS WITH DIABETIC NEUROPATHY, WITHOUT LONG-TERM CURRENT USE OF INSULIN (HCC): Chronic | ICD-10-CM

## 2020-05-04 RX ORDER — CANAGLIFLOZIN 100 MG/1
TABLET, FILM COATED ORAL
Qty: 90 TABLET | Refills: 3 | Status: SHIPPED | OUTPATIENT
Start: 2020-05-04 | End: 2021-08-02

## 2020-06-15 ENCOUNTER — LAB (OUTPATIENT)
Dept: LAB | Facility: HOSPITAL | Age: 50
End: 2020-06-15
Payer: COMMERCIAL

## 2020-06-15 DIAGNOSIS — L97.509 TYPE 2 DIABETES MELLITUS WITH FOOT ULCER, WITHOUT LONG-TERM CURRENT USE OF INSULIN (HCC): ICD-10-CM

## 2020-06-15 DIAGNOSIS — E78.2 MIXED HYPERLIPIDEMIA: Chronic | ICD-10-CM

## 2020-06-15 DIAGNOSIS — I10 BENIGN ESSENTIAL HYPERTENSION: ICD-10-CM

## 2020-06-15 DIAGNOSIS — E11.621 TYPE 2 DIABETES MELLITUS WITH FOOT ULCER, WITHOUT LONG-TERM CURRENT USE OF INSULIN (HCC): ICD-10-CM

## 2020-06-15 LAB
ALBUMIN SERPL BCP-MCNC: 3.4 G/DL (ref 3.5–5)
ALP SERPL-CCNC: 112 U/L (ref 46–116)
ALT SERPL W P-5'-P-CCNC: 19 U/L (ref 12–78)
ANION GAP SERPL CALCULATED.3IONS-SCNC: 6 MMOL/L (ref 4–13)
AST SERPL W P-5'-P-CCNC: 17 U/L (ref 5–45)
BILIRUB SERPL-MCNC: 0.43 MG/DL (ref 0.2–1)
BUN SERPL-MCNC: 20 MG/DL (ref 5–25)
CALCIUM SERPL-MCNC: 9 MG/DL (ref 8.3–10.1)
CHLORIDE SERPL-SCNC: 107 MMOL/L (ref 100–108)
CHOLEST SERPL-MCNC: 168 MG/DL (ref 50–200)
CO2 SERPL-SCNC: 25 MMOL/L (ref 21–32)
CREAT SERPL-MCNC: 0.87 MG/DL (ref 0.6–1.3)
CREAT UR-MCNC: 71 MG/DL
ERYTHROCYTE [DISTWIDTH] IN BLOOD BY AUTOMATED COUNT: 13.3 % (ref 11.6–15.1)
EST. AVERAGE GLUCOSE BLD GHB EST-MCNC: 148 MG/DL
GFR SERPL CREATININE-BSD FRML MDRD: 78 ML/MIN/1.73SQ M
GLUCOSE P FAST SERPL-MCNC: 93 MG/DL (ref 65–99)
HBA1C MFR BLD: 6.8 %
HCT VFR BLD AUTO: 40.3 % (ref 34.8–46.1)
HDLC SERPL-MCNC: 54 MG/DL
HGB BLD-MCNC: 12.6 G/DL (ref 11.5–15.4)
LDLC SERPL CALC-MCNC: 90 MG/DL (ref 0–100)
MCH RBC QN AUTO: 27.8 PG (ref 26.8–34.3)
MCHC RBC AUTO-ENTMCNC: 31.3 G/DL (ref 31.4–37.4)
MCV RBC AUTO: 89 FL (ref 82–98)
MICROALBUMIN UR-MCNC: 11.7 MG/L (ref 0–20)
MICROALBUMIN/CREAT 24H UR: 16 MG/G CREATININE (ref 0–30)
PLATELET # BLD AUTO: 330 THOUSANDS/UL (ref 149–390)
PMV BLD AUTO: 9.8 FL (ref 8.9–12.7)
POTASSIUM SERPL-SCNC: 4.1 MMOL/L (ref 3.5–5.3)
PROT SERPL-MCNC: 7.5 G/DL (ref 6.4–8.2)
RBC # BLD AUTO: 4.54 MILLION/UL (ref 3.81–5.12)
SODIUM SERPL-SCNC: 138 MMOL/L (ref 136–145)
TRIGL SERPL-MCNC: 119 MG/DL
TSH SERPL DL<=0.05 MIU/L-ACNC: 1.59 UIU/ML (ref 0.36–3.74)
WBC # BLD AUTO: 9.76 THOUSAND/UL (ref 4.31–10.16)

## 2020-06-15 PROCEDURE — 85027 COMPLETE CBC AUTOMATED: CPT

## 2020-06-15 PROCEDURE — 36415 COLL VENOUS BLD VENIPUNCTURE: CPT

## 2020-06-15 PROCEDURE — 82570 ASSAY OF URINE CREATININE: CPT

## 2020-06-15 PROCEDURE — 80061 LIPID PANEL: CPT

## 2020-06-15 PROCEDURE — 82043 UR ALBUMIN QUANTITATIVE: CPT

## 2020-06-15 PROCEDURE — 80053 COMPREHEN METABOLIC PANEL: CPT

## 2020-06-15 PROCEDURE — 83036 HEMOGLOBIN GLYCOSYLATED A1C: CPT

## 2020-06-15 PROCEDURE — 84443 ASSAY THYROID STIM HORMONE: CPT

## 2020-06-16 ENCOUNTER — OFFICE VISIT (OUTPATIENT)
Dept: FAMILY MEDICINE CLINIC | Facility: CLINIC | Age: 50
End: 2020-06-16
Payer: COMMERCIAL

## 2020-06-16 VITALS
RESPIRATION RATE: 13 BRPM | WEIGHT: 228.4 LBS | OXYGEN SATURATION: 98 % | HEIGHT: 68 IN | TEMPERATURE: 97 F | DIASTOLIC BLOOD PRESSURE: 60 MMHG | HEART RATE: 95 BPM | SYSTOLIC BLOOD PRESSURE: 130 MMHG | BODY MASS INDEX: 34.62 KG/M2

## 2020-06-16 DIAGNOSIS — L97.509 TYPE 2 DIABETES MELLITUS WITH FOOT ULCER, WITHOUT LONG-TERM CURRENT USE OF INSULIN (HCC): ICD-10-CM

## 2020-06-16 DIAGNOSIS — I10 BENIGN ESSENTIAL HYPERTENSION: ICD-10-CM

## 2020-06-16 DIAGNOSIS — E11.40 TYPE 2 DIABETES MELLITUS WITH DIABETIC NEUROPATHY, WITHOUT LONG-TERM CURRENT USE OF INSULIN (HCC): Primary | ICD-10-CM

## 2020-06-16 DIAGNOSIS — G62.89 OTHER POLYNEUROPATHY: ICD-10-CM

## 2020-06-16 DIAGNOSIS — Z00.00 ANNUAL PHYSICAL EXAM: ICD-10-CM

## 2020-06-16 DIAGNOSIS — E11.621 TYPE 2 DIABETES MELLITUS WITH FOOT ULCER, WITHOUT LONG-TERM CURRENT USE OF INSULIN (HCC): ICD-10-CM

## 2020-06-16 DIAGNOSIS — E78.2 MIXED HYPERLIPIDEMIA: Chronic | ICD-10-CM

## 2020-06-16 PROBLEM — R50.9 FEVER: Status: RESOLVED | Noted: 2020-04-15 | Resolved: 2020-06-16

## 2020-06-16 PROBLEM — L03.116 CELLULITIS OF LEFT LOWER EXTREMITY: Status: RESOLVED | Noted: 2020-04-15 | Resolved: 2020-06-16

## 2020-06-16 PROBLEM — Z20.828 EXPOSURE TO SARS-ASSOCIATED CORONAVIRUS: Status: RESOLVED | Noted: 2020-04-15 | Resolved: 2020-06-16

## 2020-06-16 PROCEDURE — 3044F HG A1C LEVEL LT 7.0%: CPT | Performed by: FAMILY MEDICINE

## 2020-06-16 PROCEDURE — 99396 PREV VISIT EST AGE 40-64: CPT | Performed by: FAMILY MEDICINE

## 2020-07-02 ENCOUNTER — TRANSCRIBE ORDERS (OUTPATIENT)
Dept: RADIOLOGY | Facility: HOSPITAL | Age: 50
End: 2020-07-02

## 2020-07-02 ENCOUNTER — HOSPITAL ENCOUNTER (OUTPATIENT)
Dept: RADIOLOGY | Facility: HOSPITAL | Age: 50
Discharge: HOME | End: 2020-07-02
Attending: PODIATRIST
Payer: COMMERCIAL

## 2020-07-02 DIAGNOSIS — E11.40 TYPE 2 DIABETES MELLITUS WITH DIABETIC NEUROPATHY, WITHOUT LONG-TERM CURRENT USE OF INSULIN (HCC): ICD-10-CM

## 2020-07-02 DIAGNOSIS — L97.519 FOOT ULCER, RIGHT, WITH UNSPECIFIED SEVERITY (HCC): ICD-10-CM

## 2020-07-02 DIAGNOSIS — L97.519 FOOT ULCER, RIGHT, WITH UNSPECIFIED SEVERITY (HCC): Primary | ICD-10-CM

## 2020-07-02 PROCEDURE — 87070 CULTURE OTHR SPECIMN AEROBIC: CPT | Performed by: STUDENT IN AN ORGANIZED HEALTH CARE EDUCATION/TRAINING PROGRAM

## 2020-07-02 PROCEDURE — 87205 SMEAR GRAM STAIN: CPT | Performed by: STUDENT IN AN ORGANIZED HEALTH CARE EDUCATION/TRAINING PROGRAM

## 2020-07-02 PROCEDURE — 87186 SC STD MICRODIL/AGAR DIL: CPT | Performed by: STUDENT IN AN ORGANIZED HEALTH CARE EDUCATION/TRAINING PROGRAM

## 2020-07-02 PROCEDURE — 87147 CULTURE TYPE IMMUNOLOGIC: CPT | Performed by: STUDENT IN AN ORGANIZED HEALTH CARE EDUCATION/TRAINING PROGRAM

## 2020-07-02 PROCEDURE — 73630 X-RAY EXAM OF FOOT: CPT

## 2020-07-02 PROCEDURE — 87077 CULTURE AEROBIC IDENTIFY: CPT | Performed by: STUDENT IN AN ORGANIZED HEALTH CARE EDUCATION/TRAINING PROGRAM

## 2020-07-02 RX ORDER — SULFAMETHOXAZOLE AND TRIMETHOPRIM 800; 160 MG/1; MG/1
1 TABLET ORAL EVERY 12 HOURS SCHEDULED
Qty: 14 TABLET | Refills: 0 | Status: SHIPPED | OUTPATIENT
Start: 2020-07-02 | End: 2020-07-09

## 2020-07-02 RX ORDER — SODIUM HYPOCHLORITE 2.5 MG/ML
1 SOLUTION TOPICAL ONCE
Qty: 473 ML | Refills: 2 | Status: SHIPPED | OUTPATIENT
Start: 2020-07-02 | End: 2020-07-02

## 2020-07-02 NOTE — PROGRESS NOTES
Spoke with the patient about her DFU today  She was seen by my resident at the hospital   Will send her for X-ray, blood work  Start her on Bactrim DS  Check wound culture  Rx knee scooter for off-loading  Start her on [place] solution daily  Pt to follow-up with me on Tuesday next week  Instructed her to go to ED if there is any worsening of the condition 
Detail Level: Zone

## 2020-07-03 ENCOUNTER — APPOINTMENT (OUTPATIENT)
Dept: LAB | Facility: HOSPITAL | Age: 50
End: 2020-07-03
Attending: PODIATRIST
Payer: COMMERCIAL

## 2020-07-03 DIAGNOSIS — L97.519 FOOT ULCER, RIGHT, WITH UNSPECIFIED SEVERITY (HCC): ICD-10-CM

## 2020-07-03 DIAGNOSIS — E11.40 TYPE 2 DIABETES MELLITUS WITH DIABETIC NEUROPATHY, WITHOUT LONG-TERM CURRENT USE OF INSULIN (HCC): ICD-10-CM

## 2020-07-03 LAB
BASOPHILS # BLD AUTO: 0.1 THOUSANDS/ΜL (ref 0–0.1)
BASOPHILS NFR BLD AUTO: 1 % (ref 0–1)
CRP SERPL QL: 29.8 MG/L
EOSINOPHIL # BLD AUTO: 0.19 THOUSAND/ΜL (ref 0–0.61)
EOSINOPHIL NFR BLD AUTO: 2 % (ref 0–6)
ERYTHROCYTE [DISTWIDTH] IN BLOOD BY AUTOMATED COUNT: 13.4 % (ref 11.6–15.1)
ERYTHROCYTE [SEDIMENTATION RATE] IN BLOOD: 29 MM/HOUR (ref 0–20)
HCT VFR BLD AUTO: 37.6 % (ref 34.8–46.1)
HGB BLD-MCNC: 11.8 G/DL (ref 11.5–15.4)
IMM GRANULOCYTES # BLD AUTO: 0.03 THOUSAND/UL (ref 0–0.2)
IMM GRANULOCYTES NFR BLD AUTO: 0 % (ref 0–2)
LYMPHOCYTES # BLD AUTO: 1.83 THOUSANDS/ΜL (ref 0.6–4.47)
LYMPHOCYTES NFR BLD AUTO: 20 % (ref 14–44)
MCH RBC QN AUTO: 27.8 PG (ref 26.8–34.3)
MCHC RBC AUTO-ENTMCNC: 31.4 G/DL (ref 31.4–37.4)
MCV RBC AUTO: 89 FL (ref 82–98)
MONOCYTES # BLD AUTO: 0.86 THOUSAND/ΜL (ref 0.17–1.22)
MONOCYTES NFR BLD AUTO: 9 % (ref 4–12)
NEUTROPHILS # BLD AUTO: 6.19 THOUSANDS/ΜL (ref 1.85–7.62)
NEUTS SEG NFR BLD AUTO: 68 % (ref 43–75)
NRBC BLD AUTO-RTO: 0 /100 WBCS
PLATELET # BLD AUTO: 298 THOUSANDS/UL (ref 149–390)
PMV BLD AUTO: 9.9 FL (ref 8.9–12.7)
RBC # BLD AUTO: 4.25 MILLION/UL (ref 3.81–5.12)
WBC # BLD AUTO: 9.2 THOUSAND/UL (ref 4.31–10.16)

## 2020-07-03 PROCEDURE — 86140 C-REACTIVE PROTEIN: CPT

## 2020-07-03 PROCEDURE — 85025 COMPLETE CBC W/AUTO DIFF WBC: CPT

## 2020-07-03 PROCEDURE — 85652 RBC SED RATE AUTOMATED: CPT

## 2020-07-03 PROCEDURE — 36415 COLL VENOUS BLD VENIPUNCTURE: CPT

## 2020-07-04 LAB
BACTERIA WND AEROBE CULT: ABNORMAL
GRAM STN SPEC: ABNORMAL
GRAM STN SPEC: ABNORMAL

## 2020-07-07 ENCOUNTER — OFFICE VISIT (OUTPATIENT)
Dept: PODIATRY | Facility: CLINIC | Age: 50
End: 2020-07-07
Payer: COMMERCIAL

## 2020-07-07 VITALS
HEART RATE: 81 BPM | DIASTOLIC BLOOD PRESSURE: 77 MMHG | HEIGHT: 68 IN | SYSTOLIC BLOOD PRESSURE: 130 MMHG | BODY MASS INDEX: 34.73 KG/M2 | TEMPERATURE: 96.9 F

## 2020-07-07 DIAGNOSIS — L97.519 FOOT ULCER, RIGHT, WITH UNSPECIFIED SEVERITY (HCC): Primary | ICD-10-CM

## 2020-07-07 DIAGNOSIS — E11.40 TYPE 2 DIABETES MELLITUS WITH DIABETIC NEUROPATHY, WITHOUT LONG-TERM CURRENT USE OF INSULIN (HCC): ICD-10-CM

## 2020-07-07 DIAGNOSIS — M67.00 SHORT ACHILLES TENDON, UNSPECIFIED LATERALITY: ICD-10-CM

## 2020-07-07 PROCEDURE — 3078F DIAST BP <80 MM HG: CPT | Performed by: PODIATRIST

## 2020-07-07 PROCEDURE — 3075F SYST BP GE 130 - 139MM HG: CPT | Performed by: PODIATRIST

## 2020-07-07 PROCEDURE — 3044F HG A1C LEVEL LT 7.0%: CPT | Performed by: PODIATRIST

## 2020-07-07 PROCEDURE — 1036F TOBACCO NON-USER: CPT | Performed by: PODIATRIST

## 2020-07-07 PROCEDURE — 99214 OFFICE O/P EST MOD 30 MIN: CPT | Performed by: PODIATRIST

## 2020-07-07 RX ORDER — SODIUM HYPOCHLORITE 2.5 MG/ML
SOLUTION TOPICAL
Status: ON HOLD | COMMUNITY
Start: 2020-07-02 | End: 2020-10-14 | Stop reason: CLARIF

## 2020-07-07 NOTE — PROGRESS NOTES
PATIENT:  Cassie Schofield  1970     Assessment     1  Foot ulcer, right, with unspecified severity ([facility] )     2  Type 2 diabetes mellitus with diabetic neuropathy, without long-term current use of insulin ([facility] )     3  Short Achilles tendon, unspecified laterality             Plan:  1  Patient was counseled on the condition and diagnosis  2  The diagnosis, treatment options and prognosis were discussed with the patient  3  Labs, culture, and X-ray were reviewed and discussed  4  D/C [place] solution  Start silver alginate  CAM walker for off-loading  5  Discussed proper off-loading and staying off of her feet  6  Finish Bactrim DS as prescribed  7  Instructed stretching exercise for tight calf/ equinus  8  She may need tibial sesamoidectomy once wound is healed and it was discussed with her  9  The patient will return in 1 week for re-evaluation  Subjective: The patient presents for right foot ulcer  She called me on last Thursday for worsening of right foot ulcer with redness and pain  She was put on Bactrim DS  Wound culture and blood work were obtained  X-ray was also obtained  Tolerates abx with no side effects  She presents with CAM walker today, but was not off of her work  No redness or edema right foot at this time  Her BS is under control, but she does not check her sugar  She has diabetic neuropathy  The following portions of the patient's history were reviewed and updated as appropriate: allergies, current medications, past family history, past medical history, past social history, past surgical history and problem list   All pertinent labs and images were reviewed        Past Medical History  Past Medical History:   Diagnosis Date    Cellulitis of left lower extremity 4/15/2020    Diabetes mellitus ([facility] )     Exposure to SARS-associated coronavirus 4/15/2020    Fever 4/15/2020    Hyperlipidemia     Hypertension        Past Surgical History  Past Surgical History:   Procedure Laterality Date    TONSILLECTOMY AND ADENOIDECTOMY          Allergies:  Cephalexin    Medications:  Current Outpatient Medications   Medication Sig Dispense Refill    glipiZIDE (GLUCOTROL) 5 mg tablet Take 1 tablet (5 mg total) by mouth 2 (two) times a day 180 tablet 3    INVOKANA 100 MG TAKE ONE TABLET BY MOUTH EVERY DAY 90 tablet 3    lisinopril-hydrochlorothiazide (PRINZIDE,ZESTORETIC) 10-12 5 MG per tablet TAKE ONE TABLET BY MOUTH EVERY DAY 90 tablet 3    metFORMIN (GLUCOPHAGE) 1000 MG tablet TAKE ONE TABLET BY MOUTH 2 TIMES A  tablet 3    simvastatin (ZOCOR) 40 mg tablet TAKE 1 TABLET AT BEDTIME  90 tablet 3    sodium hypochlorite (DAKIN'S HALF-STRENGTH) external solution       sulfamethoxazole-trimethoprim (BACTRIM DS) 800-160 mg per tablet Take 1 tablet by mouth every 12 (twelve) hours for 7 days 14 tablet 0     No current facility-administered medications for this visit          Social History:  Social History     Socioeconomic History    Marital status: /Civil Union     Spouse name: None    Number of children: None    Years of education: None    Highest education level: None   Occupational History    None   Social Needs    Financial resource strain: None    Food insecurity:     Worry: None     Inability: None    Transportation needs:     Medical: None     Non-medical: None   Tobacco Use    Smoking status: Never Smoker    Smokeless tobacco: Never Used   Substance and Sexual Activity    Alcohol use: Yes     Comment: weekends    Drug use: No    Sexual activity: None   Lifestyle    Physical activity:     Days per week: None     Minutes per session: None    Stress: None   Relationships    Social connections:     Talks on phone: None     Gets together: None     Attends Spiritism service: None     Active member of club or organization: None     Attends meetings of clubs or organizations: None     Relationship status: None    Intimate partner violence:     Fear of current or ex partner: None     Emotionally abused: None     Physically abused: None     Forced sexual activity: None   Other Topics Concern    None   Social History Narrative    None          Review of Systems   Constitutional: Negative for chills, fatigue and fever  HENT: Negative for sore throat  Respiratory: Negative for cough and shortness of breath  Cardiovascular: Negative for chest pain and leg swelling  Gastrointestinal: Negative for diarrhea, nausea and vomiting  Skin: Positive for wound  Neurological: Positive for numbness  Objective:    /77   Pulse 81   Temp (!) 96 9 °F (36 1 °C)   Ht 5' 8" (1 727 m)   BMI 34 73 kg/m²       Physical Exam   Constitutional: She is oriented to person, place, and time  She appears well-developed and well-nourished  No distress  HENT:   Head: Normocephalic and atraumatic  Neck: Normal range of motion  Neck supple  Cardiovascular: Normal rate, regular rhythm and intact distal pulses  Pulmonary/Chest: Effort normal and breath sounds normal  No respiratory distress  Musculoskeletal: She exhibits no edema or tenderness  Tight achilles tendon with equinus bilaterally  Neurological: She is alert and oriented to person, place, and time  A sensory deficit is present  She exhibits normal muscle tone  Skin: Skin is dry  Capillary refill takes less than 2 seconds  She is not diaphoretic  No erythema  Wound presents right submet 1  Wound is deep to subcutaneous tissues  Wound bed is mostly granular  It is 1 3 X 1 0 X 0 1 cm  No deep probing  No crepitus or fluctuation  No signs of abscess  No redness or warmth  No purulence  No sinus tract or undermining  Psychiatric: Her behavior is normal    Vitals reviewed

## 2020-07-14 ENCOUNTER — PROCEDURE VISIT (OUTPATIENT)
Dept: PODIATRY | Facility: CLINIC | Age: 50
End: 2020-07-14
Payer: COMMERCIAL

## 2020-07-14 VITALS
HEIGHT: 68 IN | HEART RATE: 90 BPM | DIASTOLIC BLOOD PRESSURE: 72 MMHG | SYSTOLIC BLOOD PRESSURE: 118 MMHG | BODY MASS INDEX: 34.73 KG/M2

## 2020-07-14 DIAGNOSIS — M67.00 SHORT ACHILLES TENDON, UNSPECIFIED LATERALITY: ICD-10-CM

## 2020-07-14 DIAGNOSIS — E11.40 TYPE 2 DIABETES MELLITUS WITH DIABETIC NEUROPATHY, WITHOUT LONG-TERM CURRENT USE OF INSULIN (HCC): ICD-10-CM

## 2020-07-14 DIAGNOSIS — L97.519 FOOT ULCER, RIGHT, WITH UNSPECIFIED SEVERITY (HCC): Primary | ICD-10-CM

## 2020-07-14 PROCEDURE — 11042 DBRDMT SUBQ TIS 1ST 20SQCM/<: CPT | Performed by: PODIATRIST

## 2020-07-14 NOTE — PROGRESS NOTES
PATIENT:  Desean Briseno  1970     Assessment     1  Foot ulcer, right, with unspecified severity ([facility] [address] )  Debridement   2  Type 2 diabetes mellitus with diabetic neuropathy, without long-term current use of insulin ([facility] )     3  Short Achilles tendon, unspecified laterality             Plan:  1  Patient was counseled on the condition and diagnosis  2  The diagnosis, treatment options and prognosis were discussed with the patient  3  Excisional debridement was performed as in the procedure  4  Will return to wet to dry with Dakin solution daily  5  I am to be called if any signs of infection develop such as erythema, increased wound size or significant change in appearance of wound, odor, pain, increased or change in color of drainage, swelling, fever, chills, nightsweats  I reviewed these signs with the patient  I recommended limiting WB to bathroom priveleges and meal table in an effort to allow proper rest and healing of the wounded area  6  Discussed possible TCC depending on the progress  RA in 1 week  Procedure:  Under aseptic technique, I performed sharp excisional debridement of epidermal, dermal, and subcutaneous tissues of the wound down to healthy bleeding tissue <20 sq cm using #15 scalpel  I thoroughly explored the wound for deep infection or exposure of deep tissues  Sterile dressing was applied to the wound and instructed daily dressing change  Subjective: The patient presents for right foot ulcer  She presents with CAM walker today  She has been going to work  No new complaint  No redness right foot  No edema  No significant pain  The following portions of the patient's history were reviewed and updated as appropriate: allergies, current medications, past family history, past medical history, past social history, past surgical history and problem list   All pertinent labs and images were reviewed        Past Medical History  Past Medical History: Diagnosis Date    Cellulitis of left lower extremity 4/15/2020    Diabetes mellitus ([address] )     Exposure to SARS-associated coronavirus 4/15/2020    Fever 4/15/2020    Hyperlipidemia     Hypertension        Past Surgical History  Past Surgical History:   Procedure Laterality Date    TONSILLECTOMY AND ADENOIDECTOMY          Allergies:  Cephalexin    Medications:  Current Outpatient Medications   Medication Sig Dispense Refill    glipiZIDE (GLUCOTROL) 5 mg tablet Take 1 tablet (5 mg total) by mouth 2 (two) times a day 180 tablet 3    INVOKANA 100 MG TAKE ONE TABLET BY MOUTH EVERY DAY 90 tablet 3    lisinopril-hydrochlorothiazide (PRINZIDE,ZESTORETIC) 10-12 5 MG per tablet TAKE ONE TABLET BY MOUTH EVERY DAY 90 tablet 3    metFORMIN (GLUCOPHAGE) 1000 MG tablet TAKE ONE TABLET BY MOUTH 2 TIMES A  tablet 3    simvastatin (ZOCOR) 40 mg tablet TAKE 1 TABLET AT BEDTIME  90 tablet 3    sodium hypochlorite (DAKIN'S HALF-STRENGTH) external solution        No current facility-administered medications for this visit          Social History:  Social History     Socioeconomic History    Marital status: /Civil Union     Spouse name: None    Number of children: None    Years of education: None    Highest education level: None   Occupational History    None   Social Needs    Financial resource strain: None    Food insecurity:     Worry: None     Inability: None    Transportation needs:     Medical: None     Non-medical: None   Tobacco Use    Smoking status: Never Smoker    Smokeless tobacco: Never Used   Substance and Sexual Activity    Alcohol use: Yes     Comment: weekends    Drug use: No    Sexual activity: None   Lifestyle    Physical activity:     Days per week: None     Minutes per session: None    Stress: None   Relationships    Social connections:     Talks on phone: None     Gets together: None     Attends [employer] service: None     Active member of club or organization: None Attends meetings of clubs or organizations: None     Relationship status: None    Intimate partner violence:     Fear of current or ex partner: None     Emotionally abused: None     Physically abused: None     Forced sexual activity: None   Other Topics Concern    None   Social History Narrative    None          Review of Systems   Constitutional: Negative for chills, fatigue and fever  HENT: Negative for sore throat  Respiratory: Negative for cough and shortness of breath  Cardiovascular: Negative for chest pain and leg swelling  Gastrointestinal: Negative for diarrhea, nausea and vomiting  Skin: Positive for wound  Neurological: Positive for numbness  Objective:    /72   Pulse 90   Ht 5' 8" (1 727 m)   BMI 34 73 kg/m²       Physical Exam   Constitutional: She is oriented to person, place, and time  She appears well-developed and well-nourished  No distress  Cardiovascular: Normal rate, regular rhythm and intact distal pulses  Pulmonary/Chest: Effort normal and breath sounds normal  No respiratory distress  Musculoskeletal: She exhibits no edema or tenderness  Tight achilles tendon with equinus bilaterally  Neurological: She is alert and oriented to person, place, and time  A sensory deficit is present  She exhibits normal muscle tone  Skin: Skin is dry  Capillary refill takes less than 2 seconds  She is not diaphoretic  No erythema  Wound presents right submet 1  Wound is deep to subcutaneous tissues  Wound bed is 70% granular  It is 1 2 X 0 8 X 0 2 cm  No deep probing  No crepitus or fluctuation  No signs of abscess  No redness or warmth  No purulence  No sinus tract or undermining  Psychiatric: Her behavior is normal    Vitals reviewed

## 2020-07-17 ENCOUNTER — APPOINTMENT (EMERGENCY)
Dept: RADIOLOGY | Facility: HOSPITAL | Age: 50
End: 2020-07-17
Payer: COMMERCIAL

## 2020-07-17 ENCOUNTER — HOSPITAL ENCOUNTER (EMERGENCY)
Facility: HOSPITAL | Age: 50
Discharge: HOME | End: 2020-07-17
Attending: EMERGENCY MEDICINE | Admitting: EMERGENCY MEDICINE
Payer: COMMERCIAL

## 2020-07-17 VITALS
BODY MASS INDEX: 34.1 KG/M2 | SYSTOLIC BLOOD PRESSURE: 148 MMHG | DIASTOLIC BLOOD PRESSURE: 80 MMHG | TEMPERATURE: 98.2 F | HEIGHT: 68 IN | HEART RATE: 100 BPM | WEIGHT: 225 LBS | OXYGEN SATURATION: 98 % | RESPIRATION RATE: 20 BRPM

## 2020-07-17 DIAGNOSIS — L97.509 TYPE 2 DIABETES MELLITUS WITH FOOT ULCER (HCC): ICD-10-CM

## 2020-07-17 DIAGNOSIS — L97.509 TYPE 2 DIABETES MELLITUS WITH FOOT ULCER, WITHOUT LONG-TERM CURRENT USE OF INSULIN (HCC): Primary | ICD-10-CM

## 2020-07-17 DIAGNOSIS — E11.621 TYPE 2 DIABETES MELLITUS WITH FOOT ULCER (HCC): ICD-10-CM

## 2020-07-17 DIAGNOSIS — E11.621 TYPE 2 DIABETES MELLITUS WITH FOOT ULCER, WITHOUT LONG-TERM CURRENT USE OF INSULIN (HCC): Primary | ICD-10-CM

## 2020-07-17 LAB
ALBUMIN SERPL BCP-MCNC: 3.3 G/DL (ref 3.5–5)
ALP SERPL-CCNC: 105 U/L (ref 46–116)
ALT SERPL W P-5'-P-CCNC: 21 U/L (ref 12–78)
ANION GAP SERPL CALCULATED.3IONS-SCNC: 8 MMOL/L (ref 4–13)
AST SERPL W P-5'-P-CCNC: 14 U/L (ref 5–45)
BASOPHILS # BLD AUTO: 0.09 THOUSANDS/ΜL (ref 0–0.1)
BASOPHILS NFR BLD AUTO: 1 % (ref 0–1)
BILIRUB SERPL-MCNC: 0.75 MG/DL (ref 0.2–1)
BUN SERPL-MCNC: 21 MG/DL (ref 5–25)
CALCIUM SERPL-MCNC: 9 MG/DL (ref 8.3–10.1)
CHLORIDE SERPL-SCNC: 104 MMOL/L (ref 100–108)
CO2 SERPL-SCNC: 22 MMOL/L (ref 21–32)
CREAT SERPL-MCNC: 0.76 MG/DL (ref 0.6–1.3)
CRP SERPL QL: 65.1 MG/L
EOSINOPHIL # BLD AUTO: 0.08 THOUSAND/ΜL (ref 0–0.61)
EOSINOPHIL NFR BLD AUTO: 1 % (ref 0–6)
ERYTHROCYTE [DISTWIDTH] IN BLOOD BY AUTOMATED COUNT: 13.2 % (ref 11.6–15.1)
GFR SERPL CREATININE-BSD FRML MDRD: 92 ML/MIN/1.73SQ M
GLUCOSE SERPL-MCNC: 146 MG/DL (ref 65–140)
HCT VFR BLD AUTO: 39.9 % (ref 34.8–46.1)
HGB BLD-MCNC: 12.8 G/DL (ref 11.5–15.4)
IMM GRANULOCYTES # BLD AUTO: 0.04 THOUSAND/UL (ref 0–0.2)
IMM GRANULOCYTES NFR BLD AUTO: 0 % (ref 0–2)
LYMPHOCYTES # BLD AUTO: 1.62 THOUSANDS/ΜL (ref 0.6–4.47)
LYMPHOCYTES NFR BLD AUTO: 14 % (ref 14–44)
MCH RBC QN AUTO: 27.8 PG (ref 26.8–34.3)
MCHC RBC AUTO-ENTMCNC: 32.1 G/DL (ref 31.4–37.4)
MCV RBC AUTO: 87 FL (ref 82–98)
MONOCYTES # BLD AUTO: 1.01 THOUSAND/ΜL (ref 0.17–1.22)
MONOCYTES NFR BLD AUTO: 9 % (ref 4–12)
NEUTROPHILS # BLD AUTO: 8.73 THOUSANDS/ΜL (ref 1.85–7.62)
NEUTS SEG NFR BLD AUTO: 75 % (ref 43–75)
NRBC BLD AUTO-RTO: 0 /100 WBCS
PLATELET # BLD AUTO: 292 THOUSANDS/UL (ref 149–390)
PMV BLD AUTO: 9.3 FL (ref 8.9–12.7)
POTASSIUM SERPL-SCNC: 3.7 MMOL/L (ref 3.5–5.3)
PROT SERPL-MCNC: 7.4 G/DL (ref 6.4–8.2)
RBC # BLD AUTO: 4.6 MILLION/UL (ref 3.81–5.12)
SODIUM SERPL-SCNC: 134 MMOL/L (ref 136–145)
WBC # BLD AUTO: 11.57 THOUSAND/UL (ref 4.31–10.16)

## 2020-07-17 PROCEDURE — NC001 PR NO CHARGE: Performed by: PODIATRIST

## 2020-07-17 PROCEDURE — 36415 COLL VENOUS BLD VENIPUNCTURE: CPT | Performed by: PHYSICIAN ASSISTANT

## 2020-07-17 PROCEDURE — 99285 EMERGENCY DEPT VISIT HI MDM: CPT | Performed by: EMERGENCY MEDICINE

## 2020-07-17 PROCEDURE — 73630 X-RAY EXAM OF FOOT: CPT

## 2020-07-17 PROCEDURE — 86140 C-REACTIVE PROTEIN: CPT | Performed by: PHYSICIAN ASSISTANT

## 2020-07-17 PROCEDURE — 99283 EMERGENCY DEPT VISIT LOW MDM: CPT

## 2020-07-17 PROCEDURE — 85025 COMPLETE CBC W/AUTO DIFF WBC: CPT | Performed by: PHYSICIAN ASSISTANT

## 2020-07-17 PROCEDURE — 80053 COMPREHEN METABOLIC PANEL: CPT | Performed by: PHYSICIAN ASSISTANT

## 2020-07-17 RX ORDER — CLINDAMYCIN HYDROCHLORIDE 300 MG/1
300 CAPSULE ORAL 4 TIMES DAILY
Qty: 20 CAPSULE | Refills: 0 | Status: SHIPPED | OUTPATIENT
Start: 2020-07-17 | End: 2020-07-27 | Stop reason: HOSPADM

## 2020-07-17 RX ORDER — CLINDAMYCIN HYDROCHLORIDE 150 MG/1
300 CAPSULE ORAL EVERY 6 HOURS SCHEDULED
Status: DISCONTINUED | OUTPATIENT
Start: 2020-07-17 | End: 2020-07-17

## 2020-07-17 NOTE — CONSULTS
Podiatry - Consultation    Patient Information:   Kimberley River 52 y o  female MRN: [id number]  Unit/Bed#: [facility] Encounter: [id number]  PCP: Tressa Mora DO  Date of Admission:  7/17/2020  Date of Consultation: 07/17/20  Requesting Physician: Renny Kussmaul, DO      ASSESSMENT:    Kimberley River is a 52 y o  female with:    1  Right planta foot ulceration - [place] 1 - POA  2  DM2 with neuropathy- Patient does not check blood sugar on a daily basis    PLAN:    · Plan discussed with Dr Patricia Ferreira  No signs of acute local infection to the plantar right foot  Patient was sent home with a 5 day course of Clindamycin based off of previous sensitivities  · Patient was instructed to follow-up with Dr Shruti Hogan on Thursday (7/23/2020)  · Patient instructed to return to ED if any purulent drainage is noted or if any signs of infection present  · Labs reviewed; CRP 65 1, WBC 11 57    · X-ray reviewed by myself - no signs of osteomyelitis or soft-tissue emphysema   · Weightbearing as tolerated in a CAM boot  · Will discuss this plan with my attending and update as needed  SUBJECTIVE:    History of Present Illness:    Kimberley River is a 52 y o  female who is originally admitted 7/17/2020 due to an ulceration on the bottom of her right foot  Patient has a past medical history of DM2, neuropathy, and hypertension  We are consulted for possible infection of the right plantar foot wound  Patient says that she last saw Dr Shruti Hogan on Tuesday 7/7/2020 where she was told to use silver alginate and DSD for a dressing  Patient says she was prescribed a 10-day course of Bactrim on 7/2/2020, which she completed 7/12/2020  She denies any purulent drainage since her last visit with Dr Shruti Hogan  Patient denies nausea, vomiting, chest pain, shortness of breath, chills, fever  Review of Systems:    Constitutional: Negative  HENT: Negative  Eyes: Negative  Respiratory: Negative  Cardiovascular: Negative  Gastrointestinal: Negative  Musculoskeletal: Pain on palpation of right foot   Skin:Ulceration   Neurological: Neuropathy   Psych: Negative  Past Medical and Surgical History:     Past Medical History:   Diagnosis Date    Cellulitis of left lower extremity 4/15/2020    Diabetes mellitus ([address] )     Exposure to SARS-associated coronavirus 4/15/2020    Fever 4/15/2020    Hyperlipidemia     Hypertension        Past Surgical History:   Procedure Laterality Date    TONSILLECTOMY AND ADENOIDECTOMY         Meds/Allergies:      (Not in a hospital admission)    Allergies   Allergen Reactions    Cephalexin Rash       Social History:     Marital Status: /Civil Union    Substance Use History:   Social History     Substance and Sexual Activity   Alcohol Use Yes    Frequency: 2-4 times a month    Comment: weekends     Social History     Tobacco Use   Smoking Status Never Smoker   Smokeless Tobacco Never Used     Social History     Substance and Sexual Activity   Drug Use No       Family History:    Family History   Problem Relation Age of Onset    Heart disease Mother     Diabetes Mother     Hypertension Mother         Benign Essential     Coronary artery disease Mother     Cancer Father         lung     Diabetes Sister         mellitus     Diabetes Maternal Grandfather         mellitus          OBJECTIVE:    Vitals:   Blood Pressure: 148/80 (07/17/20 1344)  Pulse: 100 (07/17/20 1344)  Temperature: 98 2 °F (36 8 °C) (07/17/20 1344)  Temp Source: Oral (07/17/20 1344)  Respirations: 20 (07/17/20 1344)  Height: 5' 8" (172 7 cm) (07/17/20 1344)  Weight - Scale: 102 kg (225 lb) (07/17/20 1344)  SpO2: 98 % (07/17/20 1344)    Physical Exam:    General Appearance: Alert, cooperative, no distress  HEENT: Head normocephalic, atraumatic, without obvious abnormality  Heart: Normal rate and rhythm  Lungs: Non-labored breathing  No respiratory distress  Abdomen: Without distension    Psychiatric: AAOx3  Lower Extremity:  Vascular:   Right DP and PT pulses are present  Left DP and PT pulses are present  CRT < 3 seconds at the digits  +0/4 edema noted at bilateral lower extremities  Pedal hair is present  Skin temperature is WNL bilaterally  Musculoskeletal:  MMT is 5/5 in all muscle compartments bilaterally  ROM at the 1st MPJ and ankle joint are decreased bilaterally with the leg extended  Mild tenderness on palpation of the susan-wound skin submet right 1st metatarsal      Dermatological:  Lower extremity wound(s) as noted as below:     Wound #: 1  Location: right plantar foot  Length 0 9cm: Width 0 7cm: Depth 0 1cm:   Deepest Tissue Noted in Base: Subcutaneous  Probe to Bone: No  Peripheral Skin Description: Attached, hyperkeratotic, mild erythema  Granulation: 100% Fibrotic Tissue: 0% Necrotic Tissue: 0%   Drainage Amount: minimal, serous  Signs of Infection: Yes, erythema, elevated CRP, elevated WBC            Neurological:  Gross sensation is intact  Protective sensation is diminished  Patient Reports numbness and/or paresthesias  Clinical Images 07/17/20: Additional data:     Lab Results: I have personally reviewed pertinent labs including:          Cultures: I have personally reviewed pertinent cultures including:              Imaging: I have personally reviewed pertinent reports in PACS  EKG, Pathology, and Other Studies: I have personally reviewed pertinent reports  ** Please Note: Portions of the record may have been created with voice recognition software  Occasional wrong word or "sound a like" substitutions may have occurred due to the inherent limitations of voice recognition software  Read the chart carefully and recognize, using context, where substitutions have occurred   **

## 2020-07-17 NOTE — DISCHARGE INSTRUCTIONS
Take Clindamycin 300 mg 4x daily for 5 days  Follow up with Podiatry this week  Return to ED if you develop worsening symptoms such as fevers, increased redness, swelling or drainage from wound site

## 2020-07-17 NOTE — ED PROVIDER NOTES
History  Chief Complaint   Patient presents with    Wound Infection     Pt "I have this wound on my right leg that just will not heal  I was just at my podiatrist office on 07/14/20 and finished up my antibiotics  Last night it started with the drainage and looking red "      S LLOYD  is a 52year old female with a pmh of DM2 and neuropathy who is presenting to the ED with a foot wound x 3 weeks  Patient is followed by podiatrist Dr Kojo Hidalgo who prescribed her Bactrim x 10 days on 7/2 for her foot wound and wound was debrided on 7/14  Patient  reports improvement in wound appearance, however she woke up with toe swelling and redness this morning and decided to present to the ED for further evaluation  Patient states she has neuropathy and usually does not have much feeling in her foot but today she is reporting increased pain  She also reports new onset of a foul smell from the wound  She denies fevers, chills, dizziness lightheadedness, cough, shortness of breath, chest pain, abdominal pain, nausea, vomiting, diarrhea and dysuria  Patient states she is allergic to Keflex  Reaction is a rash  Prior to Admission Medications   Prescriptions Last Dose Informant Patient Reported? Taking?    INVOKANA 100 MG 7/17/2020 at Unknown time  No Yes   Sig: TAKE ONE TABLET BY MOUTH EVERY DAY   glipiZIDE (GLUCOTROL) 5 mg tablet 7/17/2020 at Unknown time  No Yes   Sig: Take 1 tablet (5 mg total) by mouth 2 (two) times a day   lisinopril-hydrochlorothiazide (PRINZIDE,ZESTORETIC) 10-12 5 MG per tablet 7/17/2020 at Unknown time  No Yes   Sig: TAKE ONE TABLET BY MOUTH EVERY DAY   metFORMIN (GLUCOPHAGE) 1000 MG tablet 7/17/2020 at Unknown time  No Yes   Sig: TAKE ONE TABLET BY MOUTH 2 TIMES A DAY   simvastatin (ZOCOR) 40 mg tablet 7/16/2020 at Unknown time  No Yes   Sig: TAKE 1 TABLET AT BEDTIME    sodium hypochlorite (DAKIN'S HALF-STRENGTH) external solution 7/16/2020 at Unknown time  Yes Yes      Facility-Administered Medications: None       Past Medical History:   Diagnosis Date    Cellulitis of left lower extremity 4/15/2020    Diabetes mellitus ([address] )     Exposure to SARS-associated coronavirus 4/15/2020    Fever 4/15/2020    Hyperlipidemia     Hypertension        Past Surgical History:   Procedure Laterality Date    TONSILLECTOMY AND ADENOIDECTOMY         Family History   Problem Relation Age of Onset    Heart disease Mother     Diabetes Mother     Hypertension Mother         Benign Essential     Coronary artery disease Mother     Cancer Father         lung     Diabetes Sister         mellitus     Diabetes Maternal Grandfather         mellitus      I have reviewed and agree with the history as documented  E-Cigarette/Vaping    E-Cigarette Use Never User      E-Cigarette/Vaping Substances    Nicotine No     THC No     CBD No     Flavoring No     Other No     Unknown No      Social History     Tobacco Use    Smoking status: Never Smoker    Smokeless tobacco: Never Used   Substance Use Topics    Alcohol use: Yes     Frequency: 2-4 times a month     Comment: weekends    Drug use: No       Review of Systems   Constitutional: Negative for chills, diaphoresis and fever  HENT: Negative for congestion and sore throat  Eyes: Negative for photophobia and visual disturbance  Respiratory: Negative for cough and shortness of breath  Cardiovascular: Negative for chest pain and leg swelling  Gastrointestinal: Negative for abdominal pain, diarrhea, nausea and vomiting  Genitourinary: Negative for difficulty urinating and dysuria  Musculoskeletal: Positive for arthralgias  Skin: Positive for color change and wound  Neurological: Positive for numbness and headaches  Negative for dizziness and light-headedness  Physical Exam  Physical Exam   Constitutional: She appears well-developed and well-nourished  No distress  Well appearing female laying on exam table in no acute distress      HENT: Head: Normocephalic and atraumatic  Right Ear: External ear normal    Left Ear: External ear normal    Eyes: Conjunctivae and EOM are normal    Neck: Normal range of motion  Neck supple  Cardiovascular: Normal rate, regular rhythm, normal heart sounds and intact distal pulses  Pulmonary/Chest: Effort normal and breath sounds normal  She has no wheezes  She has no rales  Musculoskeletal: She exhibits tenderness  Mild swelling/erythema noted to right hallux  Mild swelling and erythema around wound  Ulceration is approximately 1 cm in diameter  Serous drainage from wound, no purulent drainage noted  Tender to palpation  (Photos included in this chart)  No LE edema bilaterally  Dorsalis pedis and posterior tibial pulses 2+ bilaterally  Neurological: A sensory deficit is present  Skin: Skin is warm and dry  Capillary refill takes less than 2 seconds  There is erythema  Psychiatric: She has a normal mood and affect  Her behavior is normal  Judgment and thought content normal    Nursing note and vitals reviewed        Vital Signs  ED Triage Vitals [07/17/20 1344]   Temperature Pulse Respirations Blood Pressure SpO2   98 2 °F (36 8 °C) 100 20 148/80 98 %      Temp Source Heart Rate Source Patient Position - Orthostatic VS BP Location FiO2 (%)   Oral Monitor Sitting Left arm --      Pain Score       3           Vitals:    07/17/20 1344   BP: 148/80   Pulse: 100   Patient Position - Orthostatic VS: Sitting         Visual Acuity  Visual Acuity      Most Recent Value   L Pupil Size (mm)  3   R Pupil Size (mm)  3          ED Medications  Medications - No data to display    Diagnostic Studies  Results Reviewed     Procedure Component Value Units Date/Time    Comprehensive metabolic panel [[id number]]  (Abnormal) Collected:  07/17/20 1509    Lab Status:  Final result Specimen:  Blood from Arm, Right Updated:  07/17/20 1542     Sodium 134 mmol/L      Potassium 3 7 mmol/L      Chloride 104 mmol/L      CO2 22 mmol/L      ANION GAP 8 mmol/L      BUN 21 mg/dL      Creatinine 0 76 mg/dL      Glucose 146 mg/dL      Calcium 9 0 mg/dL      AST 14 U/L      ALT 21 U/L      Alkaline Phosphatase 105 U/L      Total Protein 7 4 g/dL      Albumin 3 3 g/dL      Total Bilirubin 0 75 mg/dL      eGFR 92 ml/min/1 73sq m     Narrative:       National Kidney Disease Foundation guidelines for Chronic Kidney Disease (CKD):     Stage 1 with normal or high GFR (GFR > 90 mL/min/1 73 square meters)    Stage 2 Mild CKD (GFR = 60-89 mL/min/1 73 square meters)    Stage 3A Moderate CKD (GFR = 45-59 mL/min/1 73 square meters)    Stage 3B Moderate CKD (GFR = 30-44 mL/min/1 73 square meters)    Stage 4 Severe CKD (GFR = 15-29 mL/min/1 73 square meters)    Stage 5 End Stage CKD (GFR <15 mL/min/1 73 square meters)  Note: GFR calculation is accurate only with a steady state creatinine    C-reactive protein [[id number]]  (Abnormal) Collected:  07/17/20 1509    Lab Status:  Final result Specimen:  Blood from Arm, Right Updated:  07/17/20 1542     CRP 65 1 mg/L     CBC and differential [[id number]]  (Abnormal) Collected:  07/17/20 1509    Lab Status:  Final result Specimen:  Blood from Arm, Right Updated:  07/17/20 1528     WBC 11 57 Thousand/uL      RBC 4 60 Million/uL      Hemoglobin 12 8 g/dL      Hematocrit 39 9 %      MCV 87 fL      MCH 27 8 pg      MCHC 32 1 g/dL      RDW 13 2 %      MPV 9 3 fL      Platelets 967 Thousands/uL      nRBC 0 /100 WBCs      Neutrophils Relative 75 %      Immat GRANS % 0 %      Lymphocytes Relative 14 %      Monocytes Relative 9 %      Eosinophils Relative 1 %      Basophils Relative 1 %      Neutrophils Absolute 8 73 Thousands/µL      Immature Grans Absolute 0 04 Thousand/uL      Lymphocytes Absolute 1 62 Thousands/µL      Monocytes Absolute 1 01 Thousand/µL      Eosinophils Absolute 0 08 Thousand/µL      Basophils Absolute 0 09 Thousands/µL                  XR foot 3+ views RIGHT   Final Result by Renee Lewis MD (07/17 1442)      Marked soft tissue swelling at the 1st toe  No radiographic evidence of osteomyelitis  Workstation performed: [id number]                    Procedures  Procedures         ED Course                                             MDM  Number of Diagnoses or Management Options  Type 2 diabetes mellitus with foot ulcer [facility]):   Diagnosis management comments: -WBC: 11 57, CRP 65 1  -Right foot x-ray: Marked soft tissue swelling at the 1st toe  No radiographic evidence of osteomyelitis   -Podiatry consulted and evaluated patient  Podiatry wrapped wound and recommended clindamycin 300 QID x 5 days with outpatient podiatry follow-up this week  -Patient was given strict ED return precautions  Discussed signs and symptoms which return to the ED  All questions were answered  Patient was instructed to make appointment with Podiatry this week for follow-up evaluation  Patient verbalized understanding of our discussion of plan of care and agrees to return to the emergency department with concerns or progression of illness         Amount and/or Complexity of Data Reviewed  Clinical lab tests: ordered and reviewed  Tests in the radiology section of CPT®: ordered and reviewed          Disposition  Final diagnoses:   Type 2 diabetes mellitus with foot ulcer ([facility] [address] )     Time reflects when diagnosis was documented in both MDM as applicable and the Disposition within this note     Time User Action Codes Description Comment    7/17/2020  3:25 PM Ifeanyi Factor Add [F56 563,  L97 509] Type 2 diabetes mellitus with foot ulcer, without long-term current use of insulin ([address] )     7/17/2020  3:25 PM Ifeanyi Factor Modify [L03 442,  L97 509] Type 2 diabetes mellitus with foot ulcer, without long-term current use of insulin ([facility] [address] )     7/17/2020  3:43 PM Kimi Stein Add [F14 237,  L97 509] Type 2 diabetes mellitus with foot ulcer [facility])       ED Disposition     ED Disposition Condition Date/Time Comment    Discharge Stable Fri Jul 17, 2020  3:34 PM Selenea El discharge to home/self care  Follow-up Information     Follow up With Specialties Details Why Contact Info Additional Information    [facility] Pod[facility] Schedule an appointment as soon as possible for a visit in 1 week  [address] [facility] [phone] [address] [facility] [facility], [address] [facility] [place], [address]           Discharge Medication List as of 7/17/2020  3:49 PM      START taking these medications    Details   clindamycin (CLEOCIN) 300 MG capsule Take 1 capsule (300 mg total) by mouth 4 (four) times a day for 5 days, Starting Fri 7/17/2020, Until Wed 7/22/2020, Normal         CONTINUE these medications which have NOT CHANGED    Details   glipiZIDE (GLUCOTROL) 5 mg tablet Take 1 tablet (5 mg total) by mouth 2 (two) times a day, Starting Thu 2/20/2020, Normal      INVOKANA 100 MG TAKE ONE TABLET BY MOUTH EVERY DAY, Normal      lisinopril-hydrochlorothiazide (PRINZIDE,ZESTORETIC) 10-12 5 MG per tablet TAKE ONE TABLET BY MOUTH EVERY DAY, Normal      metFORMIN (GLUCOPHAGE) 1000 MG tablet TAKE ONE TABLET BY MOUTH 2 TIMES A DAY, Normal      simvastatin (ZOCOR) 40 mg tablet TAKE 1 TABLET AT BEDTIME , Normal      sodium hypochlorite (DAKIN'S HALF-STRENGTH) external solution Starting Thu 7/2/2020, Historical Med           No discharge procedures on file      PDMP Review     None          ED Provider  Electronically Signed by           Mayco Josue PA-C  07/18/20 [id number]

## 2020-07-23 ENCOUNTER — HOSPITAL ENCOUNTER (INPATIENT)
Facility: HOSPITAL | Age: 50
LOS: 4 days | Discharge: HOME | DRG: 623 | End: 2020-07-27
Attending: PODIATRIST | Admitting: PODIATRIST
Payer: COMMERCIAL

## 2020-07-23 ENCOUNTER — APPOINTMENT (INPATIENT)
Dept: RADIOLOGY | Facility: HOSPITAL | Age: 50
DRG: 623 | End: 2020-07-23
Payer: COMMERCIAL

## 2020-07-23 DIAGNOSIS — Z79.4 TYPE 2 DIABETES MELLITUS WITH DIABETIC NEUROPATHY, WITH LONG-TERM CURRENT USE OF INSULIN (HCC): Chronic | ICD-10-CM

## 2020-07-23 DIAGNOSIS — E11.40 TYPE 2 DIABETES MELLITUS WITH DIABETIC NEUROPATHY, WITH LONG-TERM CURRENT USE OF INSULIN (HCC): Chronic | ICD-10-CM

## 2020-07-23 DIAGNOSIS — L03.115 CELLULITIS OF RIGHT FOOT: ICD-10-CM

## 2020-07-23 DIAGNOSIS — I10 BENIGN ESSENTIAL HYPERTENSION: ICD-10-CM

## 2020-07-23 DIAGNOSIS — E78.2 MIXED HYPERLIPIDEMIA: Chronic | ICD-10-CM

## 2020-07-23 DIAGNOSIS — E11.40 TYPE 2 DIABETES MELLITUS WITH DIABETIC NEUROPATHY, UNSPECIFIED WHETHER LONG TERM INSULIN USE (HCC): ICD-10-CM

## 2020-07-23 DIAGNOSIS — L97.519 FOOT ULCER, RIGHT, WITH UNSPECIFIED SEVERITY (HCC): Primary | ICD-10-CM

## 2020-07-23 DIAGNOSIS — L97.519 ULCER OF RIGHT FOOT, UNSPECIFIED ULCER STAGE (HCC): Primary | ICD-10-CM

## 2020-07-23 LAB
ALBUMIN SERPL BCP-MCNC: 3.1 G/DL (ref 3.5–5)
ALP SERPL-CCNC: 117 U/L (ref 46–116)
ALT SERPL W P-5'-P-CCNC: 17 U/L (ref 12–78)
ANION GAP SERPL CALCULATED.3IONS-SCNC: 4 MMOL/L (ref 4–13)
AST SERPL W P-5'-P-CCNC: 12 U/L (ref 5–45)
BASOPHILS # BLD AUTO: 0.08 THOUSANDS/ΜL (ref 0–0.1)
BASOPHILS NFR BLD AUTO: 1 % (ref 0–1)
BILIRUB SERPL-MCNC: 0.3 MG/DL (ref 0.2–1)
BUN SERPL-MCNC: 13 MG/DL (ref 5–25)
CALCIUM SERPL-MCNC: 8.8 MG/DL (ref 8.3–10.1)
CHLORIDE SERPL-SCNC: 107 MMOL/L (ref 100–108)
CO2 SERPL-SCNC: 26 MMOL/L (ref 21–32)
CREAT SERPL-MCNC: 0.77 MG/DL (ref 0.6–1.3)
CRP SERPL QL: 53.9 MG/L
EOSINOPHIL # BLD AUTO: 0.19 THOUSAND/ΜL (ref 0–0.61)
EOSINOPHIL NFR BLD AUTO: 2 % (ref 0–6)
ERYTHROCYTE [DISTWIDTH] IN BLOOD BY AUTOMATED COUNT: 12.7 % (ref 11.6–15.1)
ERYTHROCYTE [SEDIMENTATION RATE] IN BLOOD: 60 MM/HOUR (ref 0–20)
GFR SERPL CREATININE-BSD FRML MDRD: 91 ML/MIN/1.73SQ M
GLUCOSE SERPL-MCNC: 100 MG/DL (ref 65–140)
GLUCOSE SERPL-MCNC: 113 MG/DL (ref 65–140)
GLUCOSE SERPL-MCNC: 118 MG/DL (ref 65–140)
HCT VFR BLD AUTO: 36.2 % (ref 34.8–46.1)
HGB BLD-MCNC: 11.6 G/DL (ref 11.5–15.4)
IMM GRANULOCYTES # BLD AUTO: 0.02 THOUSAND/UL (ref 0–0.2)
IMM GRANULOCYTES NFR BLD AUTO: 0 % (ref 0–2)
INR PPP: 1.03 (ref 0.84–1.19)
LYMPHOCYTES # BLD AUTO: 2.11 THOUSANDS/ΜL (ref 0.6–4.47)
LYMPHOCYTES NFR BLD AUTO: 27 % (ref 14–44)
MCH RBC QN AUTO: 27.9 PG (ref 26.8–34.3)
MCHC RBC AUTO-ENTMCNC: 32 G/DL (ref 31.4–37.4)
MCV RBC AUTO: 87 FL (ref 82–98)
MONOCYTES # BLD AUTO: 0.67 THOUSAND/ΜL (ref 0.17–1.22)
MONOCYTES NFR BLD AUTO: 9 % (ref 4–12)
NEUTROPHILS # BLD AUTO: 4.83 THOUSANDS/ΜL (ref 1.85–7.62)
NEUTS SEG NFR BLD AUTO: 61 % (ref 43–75)
NRBC BLD AUTO-RTO: 0 /100 WBCS
PLATELET # BLD AUTO: 287 THOUSANDS/UL (ref 149–390)
PLATELET # BLD AUTO: 290 THOUSANDS/UL (ref 149–390)
PMV BLD AUTO: 9.3 FL (ref 8.9–12.7)
PMV BLD AUTO: 9.4 FL (ref 8.9–12.7)
POTASSIUM SERPL-SCNC: 3.7 MMOL/L (ref 3.5–5.3)
PROT SERPL-MCNC: 7 G/DL (ref 6.4–8.2)
PROTHROMBIN TIME: 13.5 SECONDS (ref 11.6–14.5)
RBC # BLD AUTO: 4.16 MILLION/UL (ref 3.81–5.12)
SARS-COV-2 RNA RESP QL NAA+PROBE: NEGATIVE
SODIUM SERPL-SCNC: 137 MMOL/L (ref 136–145)
WBC # BLD AUTO: 7.9 THOUSAND/UL (ref 4.31–10.16)

## 2020-07-23 PROCEDURE — 11042 DBRDMT SUBQ TIS 1ST 20SQCM/<: CPT | Performed by: PODIATRIST

## 2020-07-23 PROCEDURE — 99255 IP/OBS CONSLTJ NEW/EST HI 80: CPT | Performed by: INTERNAL MEDICINE

## 2020-07-23 PROCEDURE — 99223 1ST HOSP IP/OBS HIGH 75: CPT | Performed by: PODIATRIST

## 2020-07-23 PROCEDURE — 73630 X-RAY EXAM OF FOOT: CPT

## 2020-07-23 PROCEDURE — 87040 BLOOD CULTURE FOR BACTERIA: CPT | Performed by: STUDENT IN AN ORGANIZED HEALTH CARE EDUCATION/TRAINING PROGRAM

## 2020-07-23 PROCEDURE — 73720 MRI LWR EXTREMITY W/O&W/DYE: CPT

## 2020-07-23 PROCEDURE — 85652 RBC SED RATE AUTOMATED: CPT | Performed by: STUDENT IN AN ORGANIZED HEALTH CARE EDUCATION/TRAINING PROGRAM

## 2020-07-23 PROCEDURE — 85610 PROTHROMBIN TIME: CPT | Performed by: STUDENT IN AN ORGANIZED HEALTH CARE EDUCATION/TRAINING PROGRAM

## 2020-07-23 PROCEDURE — U0003 INFECTIOUS AGENT DETECTION BY NUCLEIC ACID (DNA OR RNA); SEVERE ACUTE RESPIRATORY SYNDROME CORONAVIRUS 2 (SARS-COV-2) (CORONAVIRUS DISEASE [COVID-19]), AMPLIFIED PROBE TECHNIQUE, MAKING USE OF HIGH THROUGHPUT TECHNOLOGIES AS DESCRIBED BY CMS-2020-01-R: HCPCS | Performed by: STUDENT IN AN ORGANIZED HEALTH CARE EDUCATION/TRAINING PROGRAM

## 2020-07-23 PROCEDURE — 82948 REAGENT STRIP/BLOOD GLUCOSE: CPT

## 2020-07-23 PROCEDURE — 86140 C-REACTIVE PROTEIN: CPT | Performed by: STUDENT IN AN ORGANIZED HEALTH CARE EDUCATION/TRAINING PROGRAM

## 2020-07-23 PROCEDURE — 85049 AUTOMATED PLATELET COUNT: CPT | Performed by: STUDENT IN AN ORGANIZED HEALTH CARE EDUCATION/TRAINING PROGRAM

## 2020-07-23 PROCEDURE — 87205 SMEAR GRAM STAIN: CPT | Performed by: STUDENT IN AN ORGANIZED HEALTH CARE EDUCATION/TRAINING PROGRAM

## 2020-07-23 PROCEDURE — 85025 COMPLETE CBC W/AUTO DIFF WBC: CPT | Performed by: STUDENT IN AN ORGANIZED HEALTH CARE EDUCATION/TRAINING PROGRAM

## 2020-07-23 PROCEDURE — 87147 CULTURE TYPE IMMUNOLOGIC: CPT | Performed by: STUDENT IN AN ORGANIZED HEALTH CARE EDUCATION/TRAINING PROGRAM

## 2020-07-23 PROCEDURE — A9585 GADOBUTROL INJECTION: HCPCS | Performed by: PODIATRIST

## 2020-07-23 PROCEDURE — 87186 SC STD MICRODIL/AGAR DIL: CPT | Performed by: STUDENT IN AN ORGANIZED HEALTH CARE EDUCATION/TRAINING PROGRAM

## 2020-07-23 PROCEDURE — 87070 CULTURE OTHR SPECIMN AEROBIC: CPT | Performed by: STUDENT IN AN ORGANIZED HEALTH CARE EDUCATION/TRAINING PROGRAM

## 2020-07-23 PROCEDURE — 80053 COMPREHEN METABOLIC PANEL: CPT | Performed by: STUDENT IN AN ORGANIZED HEALTH CARE EDUCATION/TRAINING PROGRAM

## 2020-07-23 RX ORDER — PRAVASTATIN SODIUM 80 MG/1
80 TABLET ORAL
Status: DISCONTINUED | OUTPATIENT
Start: 2020-07-23 | End: 2020-07-27 | Stop reason: HOSPADM

## 2020-07-23 RX ORDER — DOXYCYCLINE HYCLATE 100 MG/1
100 CAPSULE ORAL EVERY 12 HOURS SCHEDULED
Status: DISCONTINUED | OUTPATIENT
Start: 2020-07-23 | End: 2020-07-23

## 2020-07-23 RX ORDER — HEPARIN SODIUM 5000 [USP'U]/ML
5000 INJECTION, SOLUTION INTRAVENOUS; SUBCUTANEOUS EVERY 8 HOURS SCHEDULED
Status: DISCONTINUED | OUTPATIENT
Start: 2020-07-23 | End: 2020-07-27 | Stop reason: HOSPADM

## 2020-07-23 RX ADMIN — CEFEPIME HYDROCHLORIDE 2000 MG: 2 INJECTION, POWDER, FOR SOLUTION INTRAVENOUS at 21:30

## 2020-07-23 RX ADMIN — DOXYCYCLINE 100 MG: 100 CAPSULE ORAL at 13:59

## 2020-07-23 RX ADMIN — METRONIDAZOLE 500 MG: 500 INJECTION, SOLUTION INTRAVENOUS at 21:44

## 2020-07-23 RX ADMIN — GADOBUTROL 10 ML: 604.72 INJECTION INTRAVENOUS at 18:49

## 2020-07-23 RX ADMIN — HEPARIN SODIUM 5000 UNITS: 5000 INJECTION INTRAVENOUS; SUBCUTANEOUS at 21:36

## 2020-07-23 RX ADMIN — HEPARIN SODIUM 5000 UNITS: 5000 INJECTION INTRAVENOUS; SUBCUTANEOUS at 14:05

## 2020-07-23 RX ADMIN — PRAVASTATIN SODIUM 80 MG: 80 TABLET ORAL at 15:57

## 2020-07-23 RX ADMIN — METRONIDAZOLE 500 MG: 500 INJECTION, SOLUTION INTRAVENOUS at 14:01

## 2020-07-23 NOTE — H&P
Podiatry - H&P  Taurus Torres 52 y o  female MRN: [id number]  [facility] Encounter: [id number]  Admission Date: 07/23/20    ASSESSMENT:    Taurus Torres is a 52 y o  female with:    1  Right foot diabetic ulceration - [place] 3 - POA  2  DM2 with neuropathy    PLAN:    · Plan discussed with Dr Narendra Perdue, bedside wound debridement performed today as described in the wound debridement template below  Local wound care performed consisting of Maxsorb, DSD  · MRI - Pending  · Xray pending  · VSS, no leukocytosis  · Appreciate ID consult for antibiotic recommendations  · Appreciate IM consult for medical management  · Patient started on PO doxycycline and IV Flagyl  · CMP, Protime-INR, ESR, CRP - pending  · Wound culture and anaerobic culture pending  · Blood culture pending  · LEADS pending  · Will discuss this plan with my attending and update as needed  Wound debridement note: After verbal consent was obtained, wound located at the right plantar foot was excisionally debrided with a #10 blade of all nonviable, devitalized, hyper-granular, hyperkeratotic tissue w/ excision of subcutaneous tissue to depth of subcutaneous tissue  Post debridement wound measurements 1 0cmx0 8cmx2 0cm, with appearance of wound fresh bleeding, viable, hypergranular with viable 80% vs nonviable 20%, <20sq cm debrided  Antibiotics started: metronidazole, PO doxycycline  Pharmacologic VTE Prophylaxis: Heparin   Mechanical VTE Prophylaxis: sequential compression device       Disposition:  Patient requires >2 midnight stay for non-healing right foot diabetic ulceration, rule out osteomyelitis, soft-tissue emphysema, abscess  SUBJECTIVE:    History of Present Illness:    Taurus Torres is a 52 y o  female who is being admitted 7/23/2020 due to a chronic non-healing diabetic ulceration  Patient has a past medical history of type 2 diabetes mellitus with polyneuropathy, hypertension, hyperlipidemia   Patient reports that this wound has been "on and off" for years  She says that she last saw Dr Luo Moment 2020 where her wound was dressed with silver alginate and DSD  She also mentions that she completed a 10-day course of bactrim on 2020  She denies severe pain to her right foot, but does mention that it "aches" when someone "messes with it " She says that since her last ER visit on 2020, her wound has become more painful, is draining slightly more, and has grown in size  Patient denies nausea, vomiting, chest pain, shortness of breath, chills, fever  Review of Systems:    Constitutional: Negative  HENT: Negative  Eyes: Negative  Respiratory: Negative  Cardiovascular: Negative  Gastrointestinal: Negative  Musculoskeletal: Negative   Skin:Right plantar foot diabetic ulceration   Neurological: Neuropathy  Psych: Negative       Past Medical and Surgical History:     Past Medical History:   Diagnosis Date    Cellulitis of left lower extremity 4/15/2020    Diabetes mellitus ([address] )     Exposure to SARS-associated coronavirus 4/15/2020    Fever 4/15/2020    Hyperlipidemia     Hypertension        Past Surgical History:   Procedure Laterality Date    TONSILLECTOMY AND ADENOIDECTOMY         Meds/Allergies:    Medications Prior to Admission   Medication    glipiZIDE (GLUCOTROL) 5 mg tablet    INVOKANA 100 MG    lisinopril-hydrochlorothiazide (PRINZIDE,ZESTORETIC) 10-12 5 MG per tablet    metFORMIN (GLUCOPHAGE) 1000 MG tablet    simvastatin (ZOCOR) 40 mg tablet    sodium hypochlorite (DAKIN'S HALF-STRENGTH) external solution    [] clindamycin (CLEOCIN) 300 MG capsule       Allergies   Allergen Reactions    Cephalexin Rash       Social History:    Social History     Marital Status: /Civil Union    Substance Use History:   Social History     Substance and Sexual Activity   Alcohol Use Yes    Frequency: 2-4 times a month    Comment: weekends     Social History     Tobacco Use   Smoking Status Never Smoker   Smokeless Tobacco Never Used     Social History     Substance and Sexual Activity   Drug Use No       Family History:    Family History   Problem Relation Age of Onset    Heart disease Mother     Diabetes Mother     Hypertension Mother         Benign Essential     Coronary artery disease Mother     Cancer Father         lung     Diabetes Sister         mellitus     Diabetes Maternal Grandfather         mellitus          OBJECTIVE:    First Vitals:   Blood Pressure: 142/75 (07/23/20 1244)  Pulse: 85 (07/23/20 1244)  Temperature: 98 2 °F (36 8 °C) (07/23/20 1244)  Respirations: 19 (07/23/20 1244)  Height: 5' 8" (172 7 cm) (07/23/20 1231)  SpO2: 98 % (07/23/20 1244)    Current Vitals:   Blood Pressure: 142/75 (07/23/20 1244)  Pulse: 85 (07/23/20 1244)  Temperature: 98 2 °F (36 8 °C) (07/23/20 1244)  Respirations: 19 (07/23/20 1244)  Height: 5' 8" (172 7 cm) (07/23/20 1231)  SpO2: 98 % (07/23/20 1244)      Physical Exam:    General Appearance: Alert, cooperative, no distress  HEENT: Head normocephalic, atraumatic, without obvious abnormality  Heart: Normal rate and rhythm  No murmurs or gallops noted  Lungs: Non-labored breathing  No respiratory distress  No wheezing, rhonchi, or rales  Abdomen:  Soft, nontender, without distension  Psychiatric: AAOx3  Lower Extremity:  Vascular:   Right DP and PT pulses are present  Left DP and PT pulses are present  CRT < 3 seconds at the digits  +1/4 edema noted at bilateral lower extremities  Pedal hair is present  Skin temperature is WNL bilaterally  Musculoskeletal:  MMT is 5/5 in all muscle compartments bilaterally  ROM at the 1st MPJ and ankle joint are decreased bilaterally with the leg extended  Pain on palpation of right foot submetatarsal 1 at and around location of ulceration       Dermatological:  Lower extremity wound(s) as noted below:    Wound #: 1  Location: Right submetatarsal 1  Length 1 0cm: Width 0 8cm: Depth 2 0cm:   Deepest Tissue Noted in Base: Bone  Probe to Bone: Yes  Peripheral Skin Description: rolled edge, hyperkeratotic, erythematous  Granulation: 80% Fibrotic Tissue: 20% Necrotic Tissue: 0%   Drainage Amount: moderate, serosanguinous  Signs of Infection: Yes - erythema, edema, probe to bone  Total debrided <20 square centimeters    Neurological:  Gross sensation is intact  Protective sensation is diminished  Patient Reports numbness and/or paresthesias  Additional Data:    Lab Results:   Admission on 07/23/2020   Component Date Value    WBC 07/23/2020 7 90     RBC 07/23/2020 4 16     Hemoglobin 07/23/2020 11 6     Hematocrit 07/23/2020 36 2     MCV 07/23/2020 87     MCH 07/23/2020 27 9     MCHC 07/23/2020 32 0     RDW 07/23/2020 12 7     MPV 07/23/2020 9 3     Platelets 98/14/3574 290     nRBC 07/23/2020 0     Neutrophils Relative 07/23/2020 61     Immat GRANS % 07/23/2020 0     Lymphocytes Relative 07/23/2020 27     Monocytes Relative 07/23/2020 9     Eosinophils Relative 07/23/2020 2     Basophils Relative 07/23/2020 1     Neutrophils Absolute 07/23/2020 4 83     Immature Grans Absolute 07/23/2020 0 02     Lymphocytes Absolute 07/23/2020 2 11     Monocytes Absolute 07/23/2020 0 67     Eosinophils Absolute 07/23/2020 0 19     Basophils Absolute 07/23/2020 0 08        Cultures:    Pending        Imaging: I have personally reviewed pertinent reports in PACS  No results found  EKG, Pathology, and Other Studies: I have personally reviewed pertinent reports        Code Status: Prior

## 2020-07-23 NOTE — CONSULTS
Consultation - Infectious Disease   Jaylin Locke 52 y o  female MRN: [id number]  Unit/Bed#: [facility] 928-01 Encounter: [id number]      Inpatient consult to Infectious Diseases  Consult performed by: Dania Santiago MD  Consult ordered by: Luda Bonilla DPM          IMPRESSION & RECOMMENDATIONS:   Impression:  1  Infected right foot diabetic ulcer with secondary cellulitis R/0 osteomyelitis 1st digit  2  DM type 2 with retinopathy, neuropathy R/0 PAD    Recommendations:    Discuss with the primary service  1  Patient will need MRI and arterial Doppler study  2  Patient has tolerated cephalexin and ampicillin/sulbactam in 2017 despite remote history of cephalexin skin rash allergic reaction  Therefore, have change doxycycline to cefepime 2 g q 12 hours IV and will continue metronidazole 500 mg q 8 hours p o  Pending further culture results  3  Patient will need further surgery depending on results of MRI and Doppler study      HISTORY OF PRESENT ILLNESS:    Reason for Consult:  Infected right foot diabetic  ulcer  HPI: Jaylin Locke is a 52y o  year old female R N  with type 2 DM who was admitted by Podiatry today for chronic nonhealing diabetic right foot wound which has been intermittently present for years  She has been followed by Podiatry as an outpatient and had recently completed a course of p o  Bactrim  Patient was seen in our ER for a flare-up of her inflammation on 07/17 and given clindamycin for 5 days p o  Prior outpatient wound cultures revealed MSSA and Enterobacter cloacae both Bactrim susceptible,  A right foot x-ray 7/8 and again on 07/17 was negative for osteomyelitis  A repeat x-ray is pending  A SARS-CoV-2 PCR test was negative  She underwent bedside wound debridement today  She is currently on doxycycline 100 mg q 12 hours p o  and metronidazole 500 mg q 8 hours IV    CBC and creatinine are entirely normal   Include chronic ulcer      Review of Systems positive findings include chronic ulcer ball of right foot, surrounding swelling and redness, numbness of feet, impaired vision,  A [id number] point system-based review of systems is otherwise negative  PAST MEDICAL HISTORY:  Past Medical History:   Diagnosis Date    Cellulitis of left lower extremity 4/15/2020    Diabetes mellitus ([address] )     Exposure to SARS-associated coronavirus 4/15/2020    Fever 4/15/2020    Hyperlipidemia     Hypertension      Past Surgical History:   Procedure Laterality Date    TONSILLECTOMY AND ADENOIDECTOMY         FAMILY HISTORY:  Non-contributory    SOCIAL HISTORY:  Social History   /Civil Union, 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  Social History     Substance and Sexual Activity   Alcohol Use Yes    Frequency: 2-4 times a month    Comment: weekends     Social History     Substance and Sexual Activity   Drug Use No     Social History     Tobacco Use   Smoking Status Never Smoker   Smokeless Tobacco Never Used       ALLERGIES:  Allergies   Allergen Reactions    Cephalexin Rash       MEDICATIONS:  All current active medications have been reviewed        PHYSICAL EXAM:  Temp:  [98 2 °F (36 8 °C)] 98 2 °F (36 8 °C)  HR:  [84-85] 84  Resp:  [16-19] 16  BP: (140-142)/(74-75) 140/74  SpO2:  [98 %] 98 %  Temp (24hrs), Av 2 °F (36 8 °C), Min:98 2 °F (36 8 °C), Max:98 2 °F (36 8 °C)  Current: Temperature: 98 2 °F (36 8 °C)  No intake or output data in the 24 hours ending 20 2594    General Appearance:  Appearing well, nontoxic, and in no distress, appears stated age   Head:  Normocephalic, without obvious abnormality, atraumatic   Eyes:  PERRL, conjunctiva pink and sclera anicteric, both eyes   Nose: Nares normal, mucosa normal, no drainage   Throat: Oropharynx moist without lesions; lips, mucosa, and tongue normal; teeth and gums normal   Neck: Supple, symmetrical, trachea midline, no adenopathy, no tenderness/mass/nodules   Back:   Symmetric, no curvature, ROM normal, no CVA tenderness   Lungs:   Clear to auscultation bilaterally, no audible wheezes, rhonchi and rales, respirations unlabored   Chest Wall:  No tenderness or deformity   Heart:  Regular rate and rhythm, S1, S2 normal, no murmur, rub or gallop   Abdomen:   Soft, non-tender, non-distended, positive bowel sounds, no masses, no organomegaly    No CVA tenderness   Extremities: Right anterior base of 1st metatarsal with 2 cm diameter ulcer with 1+ surrounding erythema and swelling  Peripheral pulses decreased   Skin: As above   Neurologic: Alert and oriented times 3, extremity strength 5/5 and symmetric           Invasive Devices:   Peripheral IV 07/23/20 Left Antecubital (Active)   Site Assessment Clean;Dry; Intact 7/23/2020  1:53 PM   Dressing Type Transparent 7/23/2020  1:53 PM   Line Status Blood return noted; Flushed;Saline locked 7/23/2020  1:53 PM   Dressing Status Clean;Dry; Intact 7/23/2020  1:53 PM   Dressing Change Due 07/27/20 7/23/2020  1:53 PM   Reason Not Rotated Not due 7/23/2020  1:53 PM       LABS, IMAGING, & OTHER STUDIES:  Lab Results:      I have personally reviewed pertinent labs  Results from last 7 days   Lab Units 07/23/20  1423 07/23/20  1315 07/17/20  1509   WBC Thousand/uL  --  7 90 11 57*   HEMOGLOBIN g/dL  --  11 6 12 8   PLATELETS Thousands/uL 287 290 292     Results from last 7 days   Lab Units 07/23/20  1422 07/17/20  1509   SODIUM mmol/L 137 134*   POTASSIUM mmol/L 3 7 3 7   CHLORIDE mmol/L 107 104   CO2 mmol/L 26 22   BUN mg/dL 13 21   CREATININE mg/dL 0 77 0 76   EGFR ml/min/1 73sq m 91 92   CALCIUM mg/dL 8 8 9 0   AST U/L 12 14   ALT U/L 17 21   ALK PHOS U/L 117* 105           Imaging Studies:   I have personally reviewed pertinent imaging study reports and images in PACS  EKG, Pathology, and Other Studies:   I have personally reviewed pertinent reports

## 2020-07-24 ENCOUNTER — ANESTHESIA EVENT (INPATIENT)
Dept: PERIOP | Facility: HOSPITAL | Age: 50
DRG: 623 | End: 2020-07-24
Payer: COMMERCIAL

## 2020-07-24 PROBLEM — E66.9 OBESITY: Status: ACTIVE | Noted: 2020-07-24

## 2020-07-24 LAB
ANION GAP SERPL CALCULATED.3IONS-SCNC: 6 MMOL/L (ref 4–13)
BASOPHILS # BLD AUTO: 0.08 THOUSANDS/ΜL (ref 0–0.1)
BASOPHILS NFR BLD AUTO: 1 % (ref 0–1)
BUN SERPL-MCNC: 14 MG/DL (ref 5–25)
CALCIUM SERPL-MCNC: 9.1 MG/DL (ref 8.3–10.1)
CHLORIDE SERPL-SCNC: 109 MMOL/L (ref 100–108)
CO2 SERPL-SCNC: 24 MMOL/L (ref 21–32)
CREAT SERPL-MCNC: 0.77 MG/DL (ref 0.6–1.3)
EOSINOPHIL # BLD AUTO: 0.23 THOUSAND/ΜL (ref 0–0.61)
EOSINOPHIL NFR BLD AUTO: 4 % (ref 0–6)
ERYTHROCYTE [DISTWIDTH] IN BLOOD BY AUTOMATED COUNT: 12.6 % (ref 11.6–15.1)
GFR SERPL CREATININE-BSD FRML MDRD: 91 ML/MIN/1.73SQ M
GLUCOSE SERPL-MCNC: 102 MG/DL (ref 65–140)
GLUCOSE SERPL-MCNC: 139 MG/DL (ref 65–140)
GLUCOSE SERPL-MCNC: 146 MG/DL (ref 65–140)
GLUCOSE SERPL-MCNC: 208 MG/DL (ref 65–140)
GLUCOSE SERPL-MCNC: 247 MG/DL (ref 65–140)
HCT VFR BLD AUTO: 36.1 % (ref 34.8–46.1)
HGB BLD-MCNC: 11.3 G/DL (ref 11.5–15.4)
IMM GRANULOCYTES # BLD AUTO: 0.02 THOUSAND/UL (ref 0–0.2)
IMM GRANULOCYTES NFR BLD AUTO: 0 % (ref 0–2)
LYMPHOCYTES # BLD AUTO: 1.89 THOUSANDS/ΜL (ref 0.6–4.47)
LYMPHOCYTES NFR BLD AUTO: 33 % (ref 14–44)
MCH RBC QN AUTO: 27.4 PG (ref 26.8–34.3)
MCHC RBC AUTO-ENTMCNC: 31.3 G/DL (ref 31.4–37.4)
MCV RBC AUTO: 87 FL (ref 82–98)
MONOCYTES # BLD AUTO: 0.59 THOUSAND/ΜL (ref 0.17–1.22)
MONOCYTES NFR BLD AUTO: 10 % (ref 4–12)
NEUTROPHILS # BLD AUTO: 3 THOUSANDS/ΜL (ref 1.85–7.62)
NEUTS SEG NFR BLD AUTO: 52 % (ref 43–75)
NRBC BLD AUTO-RTO: 0 /100 WBCS
PLATELET # BLD AUTO: 300 THOUSANDS/UL (ref 149–390)
PMV BLD AUTO: 9.4 FL (ref 8.9–12.7)
POTASSIUM SERPL-SCNC: 3.9 MMOL/L (ref 3.5–5.3)
RBC # BLD AUTO: 4.13 MILLION/UL (ref 3.81–5.12)
SODIUM SERPL-SCNC: 139 MMOL/L (ref 136–145)
WBC # BLD AUTO: 5.81 THOUSAND/UL (ref 4.31–10.16)

## 2020-07-24 PROCEDURE — 80048 BASIC METABOLIC PNL TOTAL CA: CPT | Performed by: STUDENT IN AN ORGANIZED HEALTH CARE EDUCATION/TRAINING PROGRAM

## 2020-07-24 PROCEDURE — 99233 SBSQ HOSP IP/OBS HIGH 50: CPT | Performed by: INTERNAL MEDICINE

## 2020-07-24 PROCEDURE — 99253 IP/OBS CNSLTJ NEW/EST LOW 45: CPT | Performed by: INTERNAL MEDICINE

## 2020-07-24 PROCEDURE — 82948 REAGENT STRIP/BLOOD GLUCOSE: CPT

## 2020-07-24 PROCEDURE — 85025 COMPLETE CBC W/AUTO DIFF WBC: CPT | Performed by: STUDENT IN AN ORGANIZED HEALTH CARE EDUCATION/TRAINING PROGRAM

## 2020-07-24 PROCEDURE — 99232 SBSQ HOSP IP/OBS MODERATE 35: CPT | Performed by: PODIATRIST

## 2020-07-24 RX ORDER — HYDROMORPHONE HCL/PF 1 MG/ML
1 SYRINGE (ML) INJECTION EVERY 4 HOURS PRN
Status: DISCONTINUED | OUTPATIENT
Start: 2020-07-24 | End: 2020-07-27 | Stop reason: HOSPADM

## 2020-07-24 RX ORDER — CEFAZOLIN SODIUM 2 G/50ML
2000 SOLUTION INTRAVENOUS
Status: DISCONTINUED | OUTPATIENT
Start: 2020-07-25 | End: 2020-07-25 | Stop reason: HOSPADM

## 2020-07-24 RX ORDER — ACETAMINOPHEN 325 MG/1
650 TABLET ORAL EVERY 6 HOURS PRN
Status: DISCONTINUED | OUTPATIENT
Start: 2020-07-24 | End: 2020-07-27 | Stop reason: HOSPADM

## 2020-07-24 RX ORDER — SODIUM CHLORIDE 9 MG/ML
75 INJECTION, SOLUTION INTRAVENOUS CONTINUOUS
Status: DISCONTINUED | OUTPATIENT
Start: 2020-07-25 | End: 2020-07-25

## 2020-07-24 RX ADMIN — SODIUM CHLORIDE 75 ML/HR: 0.9 INJECTION, SOLUTION INTRAVENOUS at 23:30

## 2020-07-24 RX ADMIN — METRONIDAZOLE 500 MG: 500 INJECTION, SOLUTION INTRAVENOUS at 23:25

## 2020-07-24 RX ADMIN — HEPARIN SODIUM 5000 UNITS: 5000 INJECTION INTRAVENOUS; SUBCUTANEOUS at 21:47

## 2020-07-24 RX ADMIN — CEFEPIME HYDROCHLORIDE 2000 MG: 2 INJECTION, POWDER, FOR SOLUTION INTRAVENOUS at 21:48

## 2020-07-24 RX ADMIN — PRAVASTATIN SODIUM 80 MG: 80 TABLET ORAL at 18:00

## 2020-07-24 RX ADMIN — LISINOPRIL: 10 TABLET ORAL at 09:16

## 2020-07-24 RX ADMIN — ACETAMINOPHEN 650 MG: 325 TABLET, FILM COATED ORAL at 18:01

## 2020-07-24 RX ADMIN — INSULIN LISPRO 3 UNITS: 100 INJECTION, SOLUTION INTRAVENOUS; SUBCUTANEOUS at 21:47

## 2020-07-24 RX ADMIN — HEPARIN SODIUM 5000 UNITS: 5000 INJECTION INTRAVENOUS; SUBCUTANEOUS at 14:29

## 2020-07-24 RX ADMIN — CEFEPIME HYDROCHLORIDE 2000 MG: 2 INJECTION, POWDER, FOR SOLUTION INTRAVENOUS at 09:16

## 2020-07-24 RX ADMIN — HEPARIN SODIUM 5000 UNITS: 5000 INJECTION INTRAVENOUS; SUBCUTANEOUS at 05:50

## 2020-07-24 RX ADMIN — METRONIDAZOLE 500 MG: 500 INJECTION, SOLUTION INTRAVENOUS at 05:50

## 2020-07-24 RX ADMIN — METRONIDAZOLE 500 MG: 500 INJECTION, SOLUTION INTRAVENOUS at 14:29

## 2020-07-24 NOTE — UTILIZATION REVIEW
Initial Clinical Review    Admission: Date/Time/Statement: Admission Orders (From admission, onward)     Ordered        07/23/20 1304  Inpatient Admission  Once                Orders Placed This Encounter   Procedures    Inpatient Admission     Standing Status:   Standing     Number of Occurrences:   1     Order Specific Question:   Admitting Physician     Answer:   Edna Kraft     Order Specific Question:   Level of Care     Answer:   Med Surg [16]     Order Specific Question:   Estimated length of stay     Answer:   More than 2 Midnights     Order Specific Question:   Certification     Answer:   I certify that inpatient services are medically necessary for this patient for a duration of greater than two midnights  See H&P and MD Progress Notes for additional information about the patient's course of treatment  Arrival Information:  7/23/20 at 1304 direct urgent inpatient per Podiatry 2nd worsening right foot diabetic ulcer with 2nd Cellulitis and MRI suspicious for acute osteomyelitis  Assessment/Plan: 52year old female with PMHx HTN, HLD, DM2 with polyneuropathy andc hronic nonhealing diabetic right foot wound  Reports being seen by Podiatry on 7/7/20 when wound was dressed with silver alginate and DSD  Also had  completed a 10-day course of bactrim on 07/12/2020  States that since her last ED visit on 07/17/2020 - wound is become more painful, draining slightly more, and has grown in size   Per Podiatry - being admitted inpatient 7/23/20 at 1304 2nd worsening right foot diabetic ulcer with 2nd Cellulitis and possible acute osteomyelitis per MRI after failed out-patient treatment with local care and oral antibiotics  Admitted inpatient for IV antibiotics, MRI foot and I+D Consult  7/24/20:  · Local wound care performed today consisting of ANA PAULA Platt  Podiatry planning surgical excision of right tibial sesamoid tomorrow (07/25/2020) due to MRI read of tibial sesamoid osteomyelitis  Surgery was discussed with the patient today, she was informed of the risks and complications and was amenable to the procedure tomorrow  · Appreciate ID consult: Patient switched to cefepime from doxy, will remain on flagyl  · MRI reviewed: Acute osteomyelitis of the tibial sesamoid noted      Wound #: 1  Location: right foot submetatarsal 1  Length 1 0cm: Width 0 8cm: Depth 2 0cm:   Deepest Tissue Noted in Base: bone  Probe to Bone: Yes  Peripheral Skin Description: rolled edge, hyperkeratotic, erythematous  Granulation: 70% Fibrotic Tissue: 30% Necrotic Tissue: 0%   Drainage Amount: moderate, serosanguinous  Signs of Infection: Yes - erythema, edema, probe to bone, MRI findings         ED Triage Vitals   Temperature Pulse Respirations Blood Pressure SpO2   20 1244 20 1244 20 1244 20 1244 20 1244   98 2 °F (36 8 °C) 85 19 142/75 98 %      Temp src Heart Rate Source Patient Position - Orthostatic VS BP Location FiO2 (%)   -- -- -- -- --             Pain Score       20 1218       2        Wt Readings from Last 1 Encounters:   20 102 kg (225 lb)     Additional Vital Signs:   /73   Pulse 77   Temp 98 3 °F (36 8 °C)   Resp 16   Ht 5' 8" (1 727 m)   Wt 102 kg (225 lb)   SpO2 97%   BMI 34 21 kg/m²      Temp (24hrs), Av 3 °F (36 8 °C), Min:98 2 °F (36 8 °C), Max:98 5 °F (36 9 °C)      07 0700  07 0700   P  O  220 960   Total Intake(mL/kg) 220 (2 2) 960 (9 4)   Urine (mL/kg/hr) 1650 700 (0 7)   Stool 0    Total Output 1650 700   Net -1430 +260        Unmeasured Urine Occurrence 1 x    Unmeasured Stool Occurrence 1 x      Pertinent Labs/Diagnostic Test Results:   Results from last 7 days   Lab Units 20  1409   SARS-COV-2  Negative     Results from last 7 days   Lab Units 20  0853 20  1423 20  1315   WBC Thousand/uL 5 81  --  7 90   HEMOGLOBIN g/dL 11 3*  --  11 6   HEMATOCRIT % 36 1  --  36 2   PLATELETS Thousands/uL 300 287 290   NEUTROS ABS Thousands/µL 3 00  --  4 83     Results from last 7 days   Lab Units 07/24/20  0853 07/23/20  1422   SODIUM mmol/L 139 137   POTASSIUM mmol/L 3 9 3 7   CHLORIDE mmol/L 109* 107   CO2 mmol/L 24 26   ANION GAP mmol/L 6 4   BUN mg/dL 14 13   CREATININE mg/dL 0 77 0 77   EGFR ml/min/1 73sq m 91 91   CALCIUM mg/dL 9 1 8 8     Results from last 7 days   Lab Units 07/23/20  1422   AST U/L 12   ALT U/L 17   ALK PHOS U/L 117*   TOTAL PROTEIN g/dL 7 0   ALBUMIN g/dL 3 1*   TOTAL BILIRUBIN mg/dL 0 30     Results from last 7 days   Lab Units 07/24/20  1140 07/24/20  0745 07/23/20  2100 07/23/20  1645   POC GLUCOSE mg/dl 139 102 113 100     Results from last 7 days   Lab Units 07/24/20  0853 07/23/20  1422   GLUCOSE RANDOM mg/dL 146* 118     Results from last 7 days   Lab Units 07/23/20  1422   PROTIME seconds 13 5   INR  1 03       Results from last 7 days   Lab Units 07/23/20  1315   CRP mg/L 53 9*   SED RATE mm/hour 60*     Results from last 7 days   Lab Units 07/23/20  1423 07/23/20  1228   BLOOD CULTURE  Received in Microbiology Lab  Culture in Progress  --    GRAM STAIN RESULT   --  No Polys*  Rare Gram positive cocci in pairs*   WOUND CULTURE   --  Culture results to follow  MRI - RIGHT FOOT WITH AND WITHOUT CONTRAST  Mildly abnormal signal of the medial sesamoid, which is located adjacent to the plantar ulcer, is suspicious for acute osteomyelitis       Past Medical History:   Diagnosis Date    Cellulitis of left lower extremity 4/15/2020    Diabetes mellitus [facility] )     Exposure to SARS-associated coronavirus 4/15/2020    Fever 4/15/2020    Hyperlipidemia     Hypertension      Present on Admission:   Type 2 diabetes mellitus with foot ulcer ([facility] )   Hyperlipidemia   [facility] neuropathy [facility]   Benign essential hypertension    Admitting Diagnosis: Ulcer of right foot, unspecified ulcer stage ([facility] )    Age/Sex: 52 y o  female    Admission Orders:  VS q4hrs  WBAT   BBG qid with SSI   Diet João/CHO Controlled; Consistent Carbohydrate Diet Level 2 (5 carb servings/75 grams CHO/meal)     Scheduled Medications:  cefepime 2,000 mg Intravenous Q12H   heparin (porcine) 5,000 Units Subcutaneous Q8H [address]   insulin lispro 1-6 Units Subcutaneous TID AC   insulin lispro 1-6 Units Subcutaneous HS   lisinopril-hydrochlorothiazide (PRINZIDE 10/12  5) combo dose  Oral Daily   metroNIDAZOLE 500 mg Intravenous Q8H   pravastatin 80 mg Oral Daily With Dinner      PRN Meds:  none     IP CONSULT TO INFECTIOUS DISEASES  IP CONSULT TO INTERNAL MEDICINE    Network Utilization Review Department  [email or web address]  ATTENTION: Please call with any questions or concerns to [phone] and carefully listen to the prompts so that you are directed to the right person  All voicemails are confidential   [facility] Doing all requests for admission clinical reviews, approved or denied determinations and any other requests to dedicated fax number below belonging to the campus where the patient is receiving treatment   List of dedicated fax numbers for the Facilities:  [address] DENIALS (Administrative/Medical Necessity) [phone]   [address] (Maternity/NICU/Pediatrics) [phone]   Maida Handing [phone]   [facility] [phone]   Neena Fenton [phone]   [address]  [phone]   1205 [facility] [address] [place] [address] [phone]   [facility]  [phone]   2205 [facility], [facility]  2401 [facility] [address] [facility] [phone]

## 2020-07-24 NOTE — UTILIZATION REVIEW
Notification of Inpatient Admission/Inpatient Authorization Request   This is a Notification of Inpatient Admission for 5 Arlene Ruiz  Be advised that this patient was admitted to our facility under Inpatient Status  Contact Magali Joseph at [phone] for additional admission information  [facility] DEPT  DEDICATED -[phone]  Patient Name:   Leila Perkins   YOB: 1970       [address]   [address]:   Sam Nolasco  Tax ID: [id number]  NPI: [id number] Attending Provider/NPI:  Phone:  Address: Dakota Erazo [[id number]]  [phone]  Same as Facility   Place of Service Code: 24 Place of Service Name:  [address] [place] [address]   Start Date: 7/23/20 1159 Discharge Date & Time: No discharge date for patient encounter  Type of Admission: Inpatient Status Discharge Disposition (if discharged): Home/Self Care   Patient Diagnoses: Ulcer of right foot, unspecified ulcer stage [facility])     Orders: Admission Orders (From admission, onward)     Ordered        07/23/20 1304  Inpatient Admission  Once                    Assigned Utilization Review Contact: Magali Joseph  Utilization   Network Utilization Review Department  Phone: [phone]; Fax [phone]  Email: [email or web address]   ATTENTION PAYERS: Please call the assigned Utilization  directly with any questions or concerns ALL voicemails in the department are confidential  Send all requests for admission clinical reviews, approved or denied determinations and any other requests to dedicated fax number belonging to the campus where the patient is receiving treatment

## 2020-07-24 NOTE — PROGRESS NOTES
Podiatry - Progress Note  Patient: Kate Solorzano 52 y o  female   MRN: [id number]  PCP: Jose Maria Enrique DO  Unit/Bed#: [facility] 928-01 Encounter: [id number]  Date Of Visit: 20    ASSESSMENT:    Kate Solorzano is a 52 y o  female with:     1  Right foot diabetic ulceration - [place] 3 - POA  2  DM2 with neuropathy      PLAN:    · Local wound care performed today consisting of ANA PAULA [address] planning surgical excision of right tibial sesamoid tomorrow (2020) due to MRI read of tibial sesamoid osteomyelitis  Surgery was discussed with the patient today, she was informed of the risks and complications and was amenable to the procedure tomorrow  · Appreciate ID consult: Patient switched to cefepime from doxy, will remain on flagyl  · VSS, no leukocytosis noted  · Xray reviewed: no evidence of osteomyelitis or soft-tissue emphysema  · MRI reviewed: Acute osteomyelitis of the tibial sesamoid noted  · Patient to be WBAT   · Rest of medical care per SLIM       SUBJECTIVE:     The patient was seen, evaluated, and assessed at bedside today  The patient was awake, alert, and in no acute distress  No acute events overnight  The patient reports mild pain to her right foot  Patient denies N/V/F/chills/SOB/CP  OBJECTIVE:     Vitals:   /73   Pulse 77   Temp 98 3 °F (36 8 °C)   Resp 16   Ht 5' 8" (1 727 m)   Wt 102 kg (225 lb)   SpO2 97%   BMI 34 21 kg/m²     Temp (24hrs), Av 3 °F (36 8 °C), Min:98 2 °F (36 8 °C), Max:98 5 °F (36 9 °C)      Physical Exam:     General:  Alert, cooperative, and in no distress  Lower extremity exam:  Cardiovascular status at baseline  Neurological status at baseline  Musculoskeletal status at baseline  No calf tenderness noted  Upon entering room, dressings were clean, dry, and intact with no strikethrough noted       Wound #: 1  Location: right foot submetatarsal 1  Length 1 0cm: Width 0 8cm: Depth 2 0cm:   Deepest Tissue Noted in Base: bone  Probe to Bone: Yes  Peripheral Skin Description: rolled edge, hyperkeratotic, erythematous  Granulation: 70% Fibrotic Tissue: 30% Necrotic Tissue: 0%   Drainage Amount: moderate, serosanguinous  Signs of Infection: Yes - erythema, edema, probe to bone, MRI findings          Additional Data:     Labs:    Results from last 7 days   Lab Units 07/23/20  1423 07/23/20  1315   WBC Thousand/uL  --  7 90   HEMOGLOBIN g/dL  --  11 6   HEMATOCRIT %  --  36 2   PLATELETS Thousands/uL 287 290   NEUTROS PCT %  --  61   LYMPHS PCT %  --  27   MONOS PCT %  --  9   EOS PCT %  --  2     Results from last 7 days   Lab Units 07/23/20  1422   POTASSIUM mmol/L 3 7   CHLORIDE mmol/L 107   CO2 mmol/L 26   BUN mg/dL 13   CREATININE mg/dL 0 77   CALCIUM mg/dL 8 8   ALK PHOS U/L 117*   ALT U/L 17   AST U/L 12     Results from last 7 days   Lab Units 07/23/20  1422   INR  1 03       * I Have Reviewed All Lab Data Listed Above  Recent Cultures (last 7 days):     Results from last 7 days   Lab Units 07/23/20  1423   BLOOD CULTURE  Received in Microbiology Lab  Culture in Progress  Imaging: I have personally reviewed pertinent films in PACS  EKG, Pathology, and Other Studies: I have personally reviewed pertinent reports  ** Please Note: Portions of the record may have been created with voice recognition software  Occasional wrong word or "sound a like" substitutions may have occurred due to the inherent limitations of voice recognition software  Read the chart carefully and recognize, using context, where substitutions have occurred   **

## 2020-07-24 NOTE — CONSULTS
Consult- Bakari Isidro 1970, 52 y o  female MRN: [id number]    Unit/Bed#: [facility] 928-01 Encounter: [id number]    Primary Care Provider: Sabrina Munoz DO   Date and time admitted to hospital: 7/23/2020 11:59 AM      Right foot ulcer, with unspecified severity ([address] )  Assessment & Plan  Right diabetic foot ulcer  IV antibiotics per infectious disease  Rest of care per primary team    Diabetes mellitus with diabetic neuropathy [facility])  Assessment & Plan  Lab Results   Component Value Date    HGBA1C 6 8 (H) 06/15/2020       Recent Labs     07/23/20  2100 07/24/20  0745 07/24/20  1140 07/24/20  1732   POCGLU 113 102 139 208*       Blood Sugar Average: Last 72 hrs:  (P) 132 4     Patient on glipizide, Invokana and metformin - presently on hold, can resume at discharge  Continue insulin sliding scale  Monitor Accu-Cheks closely  Avoid hypoglycemia  Hypoglycemia protocol in place    Obesity  Assessment & Plan  Therapeutic lifestyle modification    Benign essential hypertension  Assessment & Plan  Monitor blood pressures  Avoid hypotension    Hyperlipidemia  Assessment & Plan  Continue pravastatin            Inpatient Medical Consultation - [facility] Internal Medicine    Patient Information: Bakari Isidro 52 y o  female MRN: [id number]  Unit/Bed#: [facility] 928-01 Encounter: [id number]  PCP: Sabrina Munoz DO  Date of Admission:  7/23/2020  Date of Consultation: 07/24/20  Requesting Physician: Daniela Galeano DPM    Reason For Consultation:   Diabetes mellitus type 2, dyslipidemia, hypertension      VTE Prophylaxis: Sequential compression device (Venodyne)   / sequential compression device     Recommendations for Discharge:  · Resume Invokana metformin glipizide at discharge, outpatient follow-up primary care physician    Collaboration of Care:  Were Recommendations Directly Discussed with Primary Treatment Team? - Yes     History of Present Illness:    Bakari Isidro is a 52 y o  female who is originally admitted to the Podiatry service on 7/23/2020 due to nonhealing right foot ulcer  We are consulted for diabetes mellitus type 2, dyslipidemia, hypertension  Patient with nonhealing right foot ulcer follows with Podiatry, is admitted for IV antibiotics and possible operative treatment  She reports history of diabetes, reports being compliant with metformin Invokana and glipizide  No history suggestive hypoglycemia  Denies history of polyuria polydipsia  He reports numbness and tingling in her feet  Presently comfortably lying in bed, reports numbness in her feet  Denies exertional chest pain shortness breath palpitations presyncope syncope  No history of cough chest tightness wheezing  Denies urinary symptoms  Denies abdominal pain nausea vomiting or diarrhea  Review of Systems:    Review of Systems   All other systems reviewed and are negative  Past Medical and Surgical History:     Past Medical History:   Diagnosis Date    Cellulitis of left lower extremity 4/15/2020    Diabetes mellitus ([facility] [address] )     Exposure to SARS-associated coronavirus 4/15/2020    Fever 4/15/2020    Hyperlipidemia     Hypertension        Past Surgical History:   Procedure Laterality Date    TONSILLECTOMY AND ADENOIDECTOMY         Meds/Allergies:    all medications and allergies reviewed    Allergies:    Allergies   Allergen Reactions    Cephalexin Rash       Social History:     Marital Status: /Civil Union    Substance Use History:   Social History     Substance and Sexual Activity   Alcohol Use Yes    Frequency: 2-4 times a month    Comment: weekends     Social History     Tobacco Use   Smoking Status Never Smoker   Smokeless Tobacco Never Used     Social History     Substance and Sexual Activity   Drug Use No       Family History:    Family History   Problem Relation Age of Onset    Heart disease Mother     Diabetes Mother     Hypertension Mother         Benign Essential     Coronary artery disease Mother     Cancer Father         lung     Diabetes Sister         mellitus     Diabetes Maternal Grandfather         mellitus        Physical Exam:     Vitals:   Blood Pressure: 132/71 (07/24/20 1530)  Pulse: 79 (07/24/20 1530)  Temperature: 98 5 °F (36 9 °C) (07/24/20 1530)  Temp Source: Oral (07/24/20 1530)  Respirations: 18 (07/24/20 1530)  Height: 5' 8" (172 7 cm) (07/24/20 0548)  Weight - Scale: 102 kg (225 lb) (07/24/20 0548)  SpO2: 98 % (07/24/20 1530)    Physical Exam    Comfortably in bed  Obese  Short thick neck  Lungs bilateral vesicular breath sounds noted, no additional sounds  Heart sounds S1 and S2 noted  No murmurs audible  Abdomen soft nontender  Awake alert obeys simple commands  Right foot in bandages  No rash    Additional Data:     Lab Results: I have personally reviewed pertinent reports  Results from last 7 days   Lab Units 07/24/20  0853   WBC Thousand/uL 5 81   HEMOGLOBIN g/dL 11 3*   HEMATOCRIT % 36 1   PLATELETS Thousands/uL 300   NEUTROS PCT % 52   LYMPHS PCT % 33   MONOS PCT % 10   EOS PCT % 4     Results from last 7 days   Lab Units 07/24/20  0853 07/23/20  1422   POTASSIUM mmol/L 3 9 3 7   CHLORIDE mmol/L 109* 107   CO2 mmol/L 24 26   BUN mg/dL 14 13   CREATININE mg/dL 0 77 0 77   CALCIUM mg/dL 9 1 8 8   ALK PHOS U/L  --  117*   ALT U/L  --  17   AST U/L  --  12     Results from last 7 days   Lab Units 07/23/20  1422   INR  1 03       Imaging: I have personally reviewed pertinent reports  Xr Foot 3+ Vw Right    Result Date: 7/23/2020  Narrative: RIGHT FOOT INDICATION:   Type II diabetic admitted by podiatry for chronic, nonhealing diabetic right foot wound which has been intermittently present for years  COMPARISON:  7/17/2020 VIEWS:  XR FOOT 3+ VW RIGHT FINDINGS: Study is limited by superimposed artifact  There is no acute fracture or dislocation  Large plantar calcaneal spur  Spurring seen along the dorsal midfoot  No periosteal reaction or cortical destruction to suggest osteomyelitis  No soft tissue gas or radiopaque foreign body  Impression: No radiographic evidence of osteomyelitis  Workstation performed: [id number]     Xr Foot 3+ Views Right    Result Date: 7/17/2020  Narrative: RIGHT FOOT INDICATION:   diabetic foot ulcer  COMPARISON:  Radiograph 7/20/2020 VIEWS:  XR FOOT 3+ VW RIGHT FINDINGS: No acute fracture or dislocation  Calcaneal spur(s) noted  Mild degenerative changes of the midfoot  No periosteal reaction or cortical destruction to suggest osteomyelitis  Marked soft tissue swelling at the 1st toe  Impression: Marked soft tissue swelling at the 1st toe  No radiographic evidence of osteomyelitis  Workstation performed: [id number]     Xr Foot 3+ Vw Right    Result Date: 7/8/2020  Narrative: RIGHT FOOT INDICATION:   L97 519: Non-pressure chronic ulcer of other part of right foot with unspecified severity E11 40: Type 2 diabetes mellitus with diabetic neuropathy, unspecified  COMPARISON:  8/24/2019 VIEWS:  XR FOOT 3+ VW RIGHT FINDINGS: There is no acute fracture or dislocation  There is a large plantar calcaneal spur, present previously  No lytic or blastic osseous lesion  Previously seen plantar ulcer is not visualized     Impression: No acute osseous abnormality  Workstation performed: [id number]     Mri Foot/forefoot Toes Right W Wo Contrast    Result Date: 7/23/2020  Narrative: MRI - RIGHT FOOT WITH AND WITHOUT CONTRAST INDICATION:   om  "Infected right foot diabetic ulcer with secondary cellulitis R/0 osteomyelitis 1st digit" COMPARISON:  Right foot radiographs 7/23/2020 TECHNIQUE:  MR sequences were obtained of the right foot including the following:  Precontrast sequences: localizers, sagittal STIR, sagittal T1, coronal T1, coronal T2 fat sat/STIR, axial T2 fat sat/STIR, axial PD, coronal T1 fat sat  Postcontrast sequences: Coronal T1 fat sat, sagittal T1 fat sat    IV Contrast:  10 mL of gadobutrol injection (MULTI-DOSE) FINDINGS: SUBCUTANEOUS TISSUES: Plantar edema adjacent to the first metatarsal head  No collection to indicate an abscess  BONES: Mildly abnormal signal of the medial sesamoid, which is located adjacent to the plantar ulcer, is suspicious for acute osteomyelitis  #2/48 and 49 FIRST MTP JOINT:  Intact  OTHER ARTICULAR SURFACES:  Normal  PLANTAR FASCIA:  Intact  LISFRANC LIGAMENT:  Intact  FOREFOOT TENDONS:  Intact  INTERMETATARSAL REGIONS:  No bursitis or Nelson's neuroma  MUSCULATURE: Intact  Impression: Mildly abnormal signal of the medial sesamoid, which is located adjacent to the plantar ulcer, is suspicious for acute osteomyelitis  #2/48 and 49  The study was marked in [facility] for immediate notification  Workstation performed: [id number]         ** Please Note: This note has been constructed using a voice recognition system   **

## 2020-07-24 NOTE — SOCIAL WORK
LOS:1  Not a bundle  Readmission risk: Green  Met with pt and discussed role of CM  Pt lives with her  and son in a 2-story home without steps at entrance  Pt is independent in ADLs  No DME or prior HHC  Preference for pharmacy is [facility]  No MH/D&A tx hx  PCP- Damaris Borrego DO ([phone])  No POA  Main contact: - Chad Sons ([phone])  Anticipate family transport upon d/c     CM reviewed d/c planning process including the following: identifying help at home, patient preference for d/c planning needs, Discharge Lounge, [facility] Meds to Bed program, availability of treatment team to discuss questions or concerns patient and/or family may have regarding understanding medications and recognizing signs and symptoms once discharged  CM also encouraged patient to follow up with all recommended appointments after discharge  Patient advised of importance for patient and family to participate in managing patients medical well being  Patient/caregiver received discharge checklist  Content reviewed  Patient/caregiver encouraged to participate in discharge plan of care prior to discharge home

## 2020-07-24 NOTE — PLAN OF CARE
Problem: PAIN - ADULT  Goal: Verbalizes/displays adequate comfort level or baseline comfort level  Description  Interventions:  - Encourage patient to monitor pain and request assistance  - Assess pain using appropriate pain scale  - Administer analgesics based on type and severity of pain and evaluate response  - Implement non-pharmacological measures as appropriate and evaluate response  - Consider cultural and social influences on pain and pain management  - Notify physician/advanced practitioner if interventions unsuccessful or patient reports new pain  Outcome: Progressing     Problem: INFECTION - ADULT  Goal: Absence or prevention of progression during hospitalization  Description  INTERVENTIONS:  - Assess and monitor for signs and symptoms of infection  - Monitor lab/diagnostic results  - Monitor all insertion sites, i e  indwelling lines, tubes, and drains  - [place] appropriate cooling/warming therapies per order  - Administer medications as ordered  - Instruct and encourage patient and family to use good hand hygiene technique  - Identify and instruct in appropriate isolation precautions for identified infection/condition  Outcome: Progressing     Problem: SAFETY ADULT  Goal: Patient will remain free of falls  Description  INTERVENTIONS:  - Assess patient frequently for physical needs  -  Identify cognitive and physical deficits and behaviors that affect risk of falls    -  [place] fall precautions as indicated by assessment   - Educate patient/family on patient safety including physical limitations  - Instruct patient to call for assistance with activity based on assessment  - Modify environment to reduce risk of injury  - Consider OT/PT consult to assist with strengthening/mobility  Outcome: Progressing  Goal: Maintain or return to baseline ADL function  Description  INTERVENTIONS:  -  Assess patient's ability to carry out ADLs; assess patient's baseline for ADL function and identify physical deficits which impact ability to perform ADLs (bathing, care of mouth/teeth, toileting, grooming, dressing, etc )  - Assess/evaluate cause of self-care deficits   - Assess range of motion  - Assess patient's mobility; develop plan if impaired  - Assess patient's need for assistive devices and provide as appropriate  - Encourage maximum independence but intervene and supervise when necessary  - Involve family in performance of ADLs  - Assess for home care needs following discharge   - Consider OT consult to assist with ADL evaluation and planning for discharge  - Provide patient education as appropriate  Outcome: Progressing  Goal: Maintain or return mobility status to optimal level  Description  INTERVENTIONS:  - Assess patient's baseline mobility status (ambulation, transfers, stairs, etc )    - Identify cognitive and physical deficits and behaviors that affect mobility  - Identify mobility aids required to assist with transfers and/or ambulation (gait belt, sit-to-stand, lift, walker, cane, etc )  - [place] fall precautions as indicated by assessment  - Instruct patient to call for assistance with activity based on assessment  - Consider rehabilitation consult to assist with strengthening/weightbearing, etc   Outcome: Progressing     Problem: DISCHARGE PLANNING  Goal: Discharge to home or other facility with appropriate resources  Description  INTERVENTIONS:  - Refer to Case Management Department for coordinating discharge planning if the patient needs post-hospital services based on physician/advanced practitioner order or complex needs related to functional status, cognitive ability, or social support system  Outcome: Progressing     Problem: Knowledge Deficit  Goal: Patient/family/caregiver demonstrates understanding of disease process, treatment plan, medications, and discharge instructions  Description  Complete learning assessment and assess knowledge base    Interventions:  - Provide teaching at level of understanding  - Provide teaching via preferred learning methods  Outcome: Progressing

## 2020-07-24 NOTE — ASSESSMENT & PLAN NOTE
Lab Results   Component Value Date    HGBA1C 6 8 (H) 06/15/2020       Recent Labs     07/23/20  2100 07/24/20  0745 07/24/20  1140 07/24/20  1732   POCGLU 113 102 139 208*       Blood Sugar Average: Last 72 hrs:  (P) 132 4     Patient on glipizide, Invokana and metformin - presently on hold, can resume at discharge  Continue insulin sliding scale  Monitor Accu-Cheks closely  Avoid hypoglycemia  Hypoglycemia protocol in place

## 2020-07-25 ENCOUNTER — ANESTHESIA (INPATIENT)
Dept: PERIOP | Facility: HOSPITAL | Age: 50
DRG: 623 | End: 2020-07-25
Payer: COMMERCIAL

## 2020-07-25 ENCOUNTER — APPOINTMENT (INPATIENT)
Dept: RADIOLOGY | Facility: HOSPITAL | Age: 50
DRG: 623 | End: 2020-07-25
Payer: COMMERCIAL

## 2020-07-25 LAB
ANION GAP SERPL CALCULATED.3IONS-SCNC: 7 MMOL/L (ref 4–13)
BASOPHILS # BLD AUTO: 0.07 THOUSANDS/ΜL (ref 0–0.1)
BASOPHILS NFR BLD AUTO: 1 % (ref 0–1)
BUN SERPL-MCNC: 14 MG/DL (ref 5–25)
CALCIUM SERPL-MCNC: 8.6 MG/DL (ref 8.3–10.1)
CHLORIDE SERPL-SCNC: 110 MMOL/L (ref 100–108)
CO2 SERPL-SCNC: 24 MMOL/L (ref 21–32)
CREAT SERPL-MCNC: 0.67 MG/DL (ref 0.6–1.3)
EOSINOPHIL # BLD AUTO: 0.28 THOUSAND/ΜL (ref 0–0.61)
EOSINOPHIL NFR BLD AUTO: 5 % (ref 0–6)
ERYTHROCYTE [DISTWIDTH] IN BLOOD BY AUTOMATED COUNT: 12.5 % (ref 11.6–15.1)
GFR SERPL CREATININE-BSD FRML MDRD: 104 ML/MIN/1.73SQ M
GLUCOSE SERPL-MCNC: 114 MG/DL (ref 65–140)
GLUCOSE SERPL-MCNC: 123 MG/DL (ref 65–140)
GLUCOSE SERPL-MCNC: 136 MG/DL (ref 65–140)
GLUCOSE SERPL-MCNC: 147 MG/DL (ref 65–140)
GLUCOSE SERPL-MCNC: 159 MG/DL (ref 65–140)
HCT VFR BLD AUTO: 35.4 % (ref 34.8–46.1)
HGB BLD-MCNC: 11.1 G/DL (ref 11.5–15.4)
IMM GRANULOCYTES # BLD AUTO: 0.02 THOUSAND/UL (ref 0–0.2)
IMM GRANULOCYTES NFR BLD AUTO: 0 % (ref 0–2)
LYMPHOCYTES # BLD AUTO: 2.2 THOUSANDS/ΜL (ref 0.6–4.47)
LYMPHOCYTES NFR BLD AUTO: 36 % (ref 14–44)
MCH RBC QN AUTO: 27.5 PG (ref 26.8–34.3)
MCHC RBC AUTO-ENTMCNC: 31.4 G/DL (ref 31.4–37.4)
MCV RBC AUTO: 88 FL (ref 82–98)
MONOCYTES # BLD AUTO: 0.73 THOUSAND/ΜL (ref 0.17–1.22)
MONOCYTES NFR BLD AUTO: 12 % (ref 4–12)
NEUTROPHILS # BLD AUTO: 2.83 THOUSANDS/ΜL (ref 1.85–7.62)
NEUTS SEG NFR BLD AUTO: 46 % (ref 43–75)
NRBC BLD AUTO-RTO: 0 /100 WBCS
PLATELET # BLD AUTO: 286 THOUSANDS/UL (ref 149–390)
PMV BLD AUTO: 9.7 FL (ref 8.9–12.7)
POTASSIUM SERPL-SCNC: 3.7 MMOL/L (ref 3.5–5.3)
RBC # BLD AUTO: 4.03 MILLION/UL (ref 3.81–5.12)
SODIUM SERPL-SCNC: 141 MMOL/L (ref 136–145)
WBC # BLD AUTO: 6.13 THOUSAND/UL (ref 4.31–10.16)

## 2020-07-25 PROCEDURE — 80048 BASIC METABOLIC PNL TOTAL CA: CPT | Performed by: STUDENT IN AN ORGANIZED HEALTH CARE EDUCATION/TRAINING PROGRAM

## 2020-07-25 PROCEDURE — 99232 SBSQ HOSP IP/OBS MODERATE 35: CPT | Performed by: INTERNAL MEDICINE

## 2020-07-25 PROCEDURE — 73630 X-RAY EXAM OF FOOT: CPT

## 2020-07-25 PROCEDURE — 0QBN0ZZ EXCISION OF RIGHT METATARSAL, OPEN APPROACH: ICD-10-PCS | Performed by: STUDENT IN AN ORGANIZED HEALTH CARE EDUCATION/TRAINING PROGRAM

## 2020-07-25 PROCEDURE — 88307 TISSUE EXAM BY PATHOLOGIST: CPT | Performed by: PATHOLOGY

## 2020-07-25 PROCEDURE — 28315 REMOVAL OF SESAMOID BONE: CPT | Performed by: PODIATRIST

## 2020-07-25 PROCEDURE — NC001 PR NO CHARGE: Performed by: PODIATRIST

## 2020-07-25 PROCEDURE — 0JBQ0ZZ EXCISION OF RIGHT FOOT SUBCUTANEOUS TISSUE AND FASCIA, OPEN APPROACH: ICD-10-PCS | Performed by: STUDENT IN AN ORGANIZED HEALTH CARE EDUCATION/TRAINING PROGRAM

## 2020-07-25 PROCEDURE — 88311 DECALCIFY TISSUE: CPT | Performed by: PATHOLOGY

## 2020-07-25 PROCEDURE — 82948 REAGENT STRIP/BLOOD GLUCOSE: CPT

## 2020-07-25 PROCEDURE — 85025 COMPLETE CBC W/AUTO DIFF WBC: CPT | Performed by: STUDENT IN AN ORGANIZED HEALTH CARE EDUCATION/TRAINING PROGRAM

## 2020-07-25 RX ORDER — PROPOFOL 10 MG/ML
INJECTION, EMULSION INTRAVENOUS CONTINUOUS PRN
Status: DISCONTINUED | OUTPATIENT
Start: 2020-07-25 | End: 2020-07-25 | Stop reason: SURG

## 2020-07-25 RX ORDER — KETAMINE HYDROCHLORIDE 50 MG/ML
INJECTION, SOLUTION, CONCENTRATE INTRAMUSCULAR; INTRAVENOUS AS NEEDED
Status: DISCONTINUED | OUTPATIENT
Start: 2020-07-25 | End: 2020-07-25 | Stop reason: SURG

## 2020-07-25 RX ORDER — HYDROMORPHONE HCL/PF 1 MG/ML
0.4 SYRINGE (ML) INJECTION
Status: DISCONTINUED | OUTPATIENT
Start: 2020-07-25 | End: 2020-07-25 | Stop reason: HOSPADM

## 2020-07-25 RX ORDER — LIDOCAINE HYDROCHLORIDE 10 MG/ML
INJECTION, SOLUTION EPIDURAL; INFILTRATION; INTRACAUDAL; PERINEURAL AS NEEDED
Status: DISCONTINUED | OUTPATIENT
Start: 2020-07-25 | End: 2020-07-25 | Stop reason: SURG

## 2020-07-25 RX ORDER — FENTANYL CITRATE/PF 50 MCG/ML
25 SYRINGE (ML) INJECTION
Status: DISCONTINUED | OUTPATIENT
Start: 2020-07-25 | End: 2020-07-25 | Stop reason: HOSPADM

## 2020-07-25 RX ORDER — SODIUM CHLORIDE, SODIUM LACTATE, POTASSIUM CHLORIDE, CALCIUM CHLORIDE 600; 310; 30; 20 MG/100ML; MG/100ML; MG/100ML; MG/100ML
50 INJECTION, SOLUTION INTRAVENOUS CONTINUOUS
Status: DISCONTINUED | OUTPATIENT
Start: 2020-07-25 | End: 2020-07-25

## 2020-07-25 RX ORDER — CEFAZOLIN SODIUM 1 G/50ML
1000 SOLUTION INTRAVENOUS EVERY 8 HOURS
Status: DISCONTINUED | OUTPATIENT
Start: 2020-07-25 | End: 2020-07-27 | Stop reason: HOSPADM

## 2020-07-25 RX ORDER — SODIUM CHLORIDE 9 MG/ML
INJECTION, SOLUTION INTRAVENOUS AS NEEDED
Status: DISCONTINUED | OUTPATIENT
Start: 2020-07-25 | End: 2020-07-25 | Stop reason: HOSPADM

## 2020-07-25 RX ORDER — CEFAZOLIN SODIUM 2 G/50ML
SOLUTION INTRAVENOUS AS NEEDED
Status: DISCONTINUED | OUTPATIENT
Start: 2020-07-25 | End: 2020-07-25 | Stop reason: SURG

## 2020-07-25 RX ORDER — FENTANYL CITRATE 50 UG/ML
INJECTION, SOLUTION INTRAMUSCULAR; INTRAVENOUS AS NEEDED
Status: DISCONTINUED | OUTPATIENT
Start: 2020-07-25 | End: 2020-07-25 | Stop reason: SURG

## 2020-07-25 RX ORDER — ONDANSETRON 2 MG/ML
4 INJECTION INTRAMUSCULAR; INTRAVENOUS ONCE AS NEEDED
Status: DISCONTINUED | OUTPATIENT
Start: 2020-07-25 | End: 2020-07-25 | Stop reason: HOSPADM

## 2020-07-25 RX ORDER — BUPIVACAINE HYDROCHLORIDE 5 MG/ML
INJECTION, SOLUTION PERINEURAL AS NEEDED
Status: DISCONTINUED | OUTPATIENT
Start: 2020-07-25 | End: 2020-07-25 | Stop reason: HOSPADM

## 2020-07-25 RX ORDER — MIDAZOLAM HYDROCHLORIDE 2 MG/2ML
INJECTION, SOLUTION INTRAMUSCULAR; INTRAVENOUS AS NEEDED
Status: DISCONTINUED | OUTPATIENT
Start: 2020-07-25 | End: 2020-07-25 | Stop reason: SURG

## 2020-07-25 RX ORDER — OXYCODONE HYDROCHLORIDE AND ACETAMINOPHEN 5; 325 MG/1; MG/1
1 TABLET ORAL EVERY 8 HOURS PRN
Status: DISCONTINUED | OUTPATIENT
Start: 2020-07-25 | End: 2020-07-27 | Stop reason: HOSPADM

## 2020-07-25 RX ORDER — ONDANSETRON 2 MG/ML
4 INJECTION INTRAMUSCULAR; INTRAVENOUS EVERY 8 HOURS PRN
Status: DISCONTINUED | OUTPATIENT
Start: 2020-07-25 | End: 2020-07-27 | Stop reason: HOSPADM

## 2020-07-25 RX ORDER — ASPIRIN 81 MG/1
81 TABLET ORAL DAILY
Status: DISCONTINUED | OUTPATIENT
Start: 2020-07-25 | End: 2020-07-27 | Stop reason: HOSPADM

## 2020-07-25 RX ORDER — SODIUM CHLORIDE, SODIUM LACTATE, POTASSIUM CHLORIDE, CALCIUM CHLORIDE 600; 310; 30; 20 MG/100ML; MG/100ML; MG/100ML; MG/100ML
INJECTION, SOLUTION INTRAVENOUS CONTINUOUS PRN
Status: DISCONTINUED | OUTPATIENT
Start: 2020-07-25 | End: 2020-07-25 | Stop reason: SURG

## 2020-07-25 RX ORDER — LIDOCAINE HYDROCHLORIDE 10 MG/ML
INJECTION, SOLUTION EPIDURAL; INFILTRATION; INTRACAUDAL; PERINEURAL AS NEEDED
Status: DISCONTINUED | OUTPATIENT
Start: 2020-07-25 | End: 2020-07-25 | Stop reason: HOSPADM

## 2020-07-25 RX ADMIN — CEFAZOLIN SODIUM 1000 MG: 1 SOLUTION INTRAVENOUS at 22:12

## 2020-07-25 RX ADMIN — METRONIDAZOLE 500 MG: 500 INJECTION, SOLUTION INTRAVENOUS at 05:07

## 2020-07-25 RX ADMIN — METRONIDAZOLE 500 MG: 500 INJECTION, SOLUTION INTRAVENOUS at 23:23

## 2020-07-25 RX ADMIN — HEPARIN SODIUM 5000 UNITS: 5000 INJECTION INTRAVENOUS; SUBCUTANEOUS at 15:16

## 2020-07-25 RX ADMIN — ONDANSETRON 4 MG: 2 INJECTION INTRAMUSCULAR; INTRAVENOUS at 23:18

## 2020-07-25 RX ADMIN — HEPARIN SODIUM 5000 UNITS: 5000 INJECTION INTRAVENOUS; SUBCUTANEOUS at 22:16

## 2020-07-25 RX ADMIN — PHENYLEPHRINE HYDROCHLORIDE 20 MCG/MIN: 10 INJECTION INTRAVENOUS at 08:03

## 2020-07-25 RX ADMIN — SODIUM CHLORIDE, SODIUM LACTATE, POTASSIUM CHLORIDE, AND CALCIUM CHLORIDE 50 ML/HR: .6; .31; .03; .02 INJECTION, SOLUTION INTRAVENOUS at 09:47

## 2020-07-25 RX ADMIN — FENTANYL CITRATE 25 MCG: 50 INJECTION, SOLUTION INTRAMUSCULAR; INTRAVENOUS at 08:01

## 2020-07-25 RX ADMIN — PRAVASTATIN SODIUM 80 MG: 80 TABLET ORAL at 18:03

## 2020-07-25 RX ADMIN — PROPOFOL 180 MCG/KG/MIN: 10 INJECTION, EMULSION INTRAVENOUS at 08:03

## 2020-07-25 RX ADMIN — ACETAMINOPHEN 650 MG: 325 TABLET, FILM COATED ORAL at 18:05

## 2020-07-25 RX ADMIN — SODIUM CHLORIDE, SODIUM LACTATE, POTASSIUM CHLORIDE, AND CALCIUM CHLORIDE: .6; .31; .03; .02 INJECTION, SOLUTION INTRAVENOUS at 07:56

## 2020-07-25 RX ADMIN — LIDOCAINE HYDROCHLORIDE 70 MG: 10 INJECTION, SOLUTION EPIDURAL; INFILTRATION; INTRACAUDAL; PERINEURAL at 08:01

## 2020-07-25 RX ADMIN — ACETAMINOPHEN 650 MG: 325 TABLET, FILM COATED ORAL at 11:03

## 2020-07-25 RX ADMIN — METRONIDAZOLE 500 MG: 500 INJECTION, SOLUTION INTRAVENOUS at 15:11

## 2020-07-25 RX ADMIN — VANCOMYCIN HYDROCHLORIDE 1500 MG: 10 INJECTION, POWDER, LYOPHILIZED, FOR SOLUTION INTRAVENOUS at 20:06

## 2020-07-25 RX ADMIN — SODIUM CHLORIDE, SODIUM LACTATE, POTASSIUM CHLORIDE, AND CALCIUM CHLORIDE 50 ML/HR: .6; .31; .03; .02 INJECTION, SOLUTION INTRAVENOUS at 15:15

## 2020-07-25 RX ADMIN — MIDAZOLAM 2 MG: 1 INJECTION INTRAMUSCULAR; INTRAVENOUS at 07:52

## 2020-07-25 RX ADMIN — ASPIRIN 81 MG: 81 TABLET, COATED ORAL at 15:15

## 2020-07-25 RX ADMIN — CEFAZOLIN SODIUM 2000 MG: 2 SOLUTION INTRAVENOUS at 08:00

## 2020-07-25 RX ADMIN — KETAMINE HYDROCHLORIDE 20 MG: 50 INJECTION, SOLUTION INTRAMUSCULAR; INTRAVENOUS at 08:03

## 2020-07-25 NOTE — PLAN OF CARE
Problem: PAIN - ADULT  Goal: Verbalizes/displays adequate comfort level or baseline comfort level  Description  Interventions:  - Encourage patient to monitor pain and request assistance  - Assess pain using appropriate pain scale  - Administer analgesics based on type and severity of pain and evaluate response  - Implement non-pharmacological measures as appropriate and evaluate response  - Consider cultural and social influences on pain and pain management  - Notify physician/advanced practitioner if interventions unsuccessful or patient reports new pain  Outcome: Progressing     Problem: INFECTION - ADULT  Goal: Absence or prevention of progression during hospitalization  Description  INTERVENTIONS:  - Assess and monitor for signs and symptoms of infection  - Monitor lab/diagnostic results  - Monitor all insertion sites, i e  indwelling lines, tubes, and drains  - Monitor endotracheal if appropriate and nasal secretions for changes in amount and color  - [place] appropriate cooling/warming therapies per order  - Administer medications as ordered  - Instruct and encourage patient and family to use good hand hygiene technique  - Identify and instruct in appropriate isolation precautions for identified infection/condition  Outcome: Progressing     Problem: SAFETY ADULT  Goal: Patient will remain free of falls  Description  INTERVENTIONS:  - Assess patient frequently for physical needs  -  Identify cognitive and physical deficits and behaviors that affect risk of falls    -  [place] fall precautions as indicated by assessment   - Educate patient/family on patient safety including physical limitations  - Instruct patient to call for assistance with activity based on assessment  - Modify environment to reduce risk of injury  - Consider OT/PT consult to assist with strengthening/mobility  Outcome: Progressing  Goal: Maintain or return to baseline ADL function  Description  INTERVENTIONS:  -  Assess patient's ability to carry out ADLs; assess patient's baseline for ADL function and identify physical deficits which impact ability to perform ADLs (bathing, care of mouth/teeth, toileting, grooming, dressing, etc )  - Assess/evaluate cause of self-care deficits   - Assess range of motion  - Assess patient's mobility; develop plan if impaired  - Assess patient's need for assistive devices and provide as appropriate  - Encourage maximum independence but intervene and supervise when necessary  - Involve family in performance of ADLs  - Assess for home care needs following discharge   - Consider OT consult to assist with ADL evaluation and planning for discharge  - Provide patient education as appropriate  Outcome: Progressing  Goal: Maintain or return mobility status to optimal level  Description  INTERVENTIONS:  - Assess patient's baseline mobility status (ambulation, transfers, stairs, etc )    - Identify cognitive and physical deficits and behaviors that affect mobility  - Identify mobility aids required to assist with transfers and/or ambulation (gait belt, sit-to-stand, lift, walker, cane, etc )  - [place] fall precautions as indicated by assessment  - Record patient progress and toleration of activity level on Mobility SBAR; progress patient to next Phase/Stage  - Instruct patient to call for assistance with activity based on assessment  - Consider rehabilitation consult to assist with strengthening/weightbearing, etc   Outcome: Progressing     Problem: DISCHARGE PLANNING  Goal: Discharge to home or other facility with appropriate resources  Description  INTERVENTIONS:  - Identify barriers to discharge w/patient and caregiver  - Arrange for needed discharge resources and transportation as appropriate  - Identify discharge learning needs (meds, wound care, etc )  - Arrange for interpretive services to assist at discharge as needed  - Refer to Case Management Department for coordinating discharge planning if the patient needs post-hospital services based on physician/advanced practitioner order or complex needs related to functional status, cognitive ability, or social support system  Outcome: Progressing     Problem: Knowledge Deficit  Goal: Patient/family/caregiver demonstrates understanding of disease process, treatment plan, medications, and discharge instructions  Description  Complete learning assessment and assess knowledge base  Interventions:  - Provide teaching at level of understanding  - Provide teaching via preferred learning methods  Outcome: Progressing     Problem: Potential for Falls  Goal: Patient will remain free of falls  Description  INTERVENTIONS:  - Assess patient frequently for physical needs  -  Identify cognitive and physical deficits and behaviors that affect risk of falls    -  [place] fall precautions as indicated by assessment   - Educate patient/family on patient safety including physical limitations  - Instruct patient to call for assistance with activity based on assessment  - Modify environment to reduce risk of injury  - Consider OT/PT consult to assist with strengthening/mobility  Outcome: Progressing

## 2020-07-25 NOTE — PROGRESS NOTES
PRE-Operative Note - Podiatry  Esteban Galloway 52 y o  female MRN: [id number]  Unit/Bed#: OR POOL Encounter: [id number]    Assessment:  Esteban Galloway is a 52 y o  female with:     1  Right foot diabetic ulceration - [place] 3 - POA -   2  Osteomyelitis right tibial sesamoid  - surgical excision to be performed today by Dr Jamel Burrows  (07/25/2020)  3  DM2 with neuropathy    Plan:  1  Consent signed and in chart for right foot incision and drainage with removal of tibial sesamoid, and possible application of wound VAC  2  Confirmed NPO status  Patient's morning dose of heparin held  3  H&P reviewed, no changes  4  Alternatives, risks, and complications discussed with patient  5  All questions answered  No guarantees given to outcome of procedure  6  Ancef IV one dose 30 minutes pre-op  7  Will follow-up with Dr Jamel Burrows as an outpatient  Subjective/Objective   Chief Complaint: No chief complaint on file  Subjective:  Patient seen at bedside this morning  She denies any acute events overnight and still amenable to today's surgery  She endorses that she has not eaten since midnight  Patient denies nausea, vomiting, chest pain, shortness of breath, chills, fever  Blood pressure 130/70, pulse 79, temperature 98 3 °F (36 8 °C), resp  rate 18, height 5' 8" (1 727 m), weight 102 kg (225 lb), SpO2 96 %, not currently breastfeeding  ,Body mass index is 34 21 kg/m²  Invasive Devices     Peripheral Intravenous Line            Peripheral IV 07/24/20 Dorsal (posterior); Right Forearm less than 1 day                Physical Exam:   General appearance: alert, cooperative and no distress    Extremity:  Dressings to right foot are intact  Small amount of serous strike through noted plantarly at the location of the patient's wound  Lab, Imaging and other studies:   Admission on 07/23/2020   Component Date Value    Wound Culture 07/23/2020 Culture results to follow       Gram Stain Result 07/23/2020 No Polys*    Gram Stain Result 07/23/2020 Rare Gram positive cocci in pairs*    WBC 07/23/2020 7 90     RBC 07/23/2020 4 16     Hemoglobin 07/23/2020 11 6     Hematocrit 07/23/2020 36 2     MCV 07/23/2020 87     MCH 07/23/2020 27 9     MCHC 07/23/2020 32 0     RDW 07/23/2020 12 7     MPV 07/23/2020 9 3     Platelets 85/19/8200 290     nRBC 07/23/2020 0     Neutrophils Relative 07/23/2020 61     Immat GRANS % 07/23/2020 0     Lymphocytes Relative 07/23/2020 27     Monocytes Relative 07/23/2020 9     Eosinophils Relative 07/23/2020 2     Basophils Relative 07/23/2020 1     Neutrophils Absolute 07/23/2020 4 83     Immature Grans Absolute 07/23/2020 0 02     Lymphocytes Absolute 07/23/2020 2 11     Monocytes Absolute 07/23/2020 0 67     Eosinophils Absolute 07/23/2020 0 19     Basophils Absolute 07/23/2020 0 08     Sed Rate 07/23/2020 60*    CRP 07/23/2020 53 9*    Platelets 83/75/4282 287     MPV 07/23/2020 9 4     Sodium 07/23/2020 137     Potassium 07/23/2020 3 7     Chloride 07/23/2020 107     CO2 07/23/2020 26     ANION GAP 07/23/2020 4     BUN 07/23/2020 13     Creatinine 07/23/2020 0 77     Glucose 07/23/2020 118     Calcium 07/23/2020 8 8     AST 07/23/2020 12     ALT 07/23/2020 17     Alkaline Phosphatase 07/23/2020 117*    Total Protein 07/23/2020 7 0     Albumin 07/23/2020 3 1*    Total Bilirubin 07/23/2020 0 30     eGFR 07/23/2020 91     Protime 07/23/2020 13 5     INR 07/23/2020 1 03     Blood Culture 07/23/2020 No Growth at 24 hrs       SARS-CoV-2 07/23/2020 Negative     POC Glucose 07/23/2020 100     POC Glucose 07/23/2020 113     WBC 07/24/2020 5 81     RBC 07/24/2020 4 13     Hemoglobin 07/24/2020 11 3*    Hematocrit 07/24/2020 36 1     MCV 07/24/2020 87     MCH 07/24/2020 27 4     MCHC 07/24/2020 31 3*    RDW 07/24/2020 12 6     MPV 07/24/2020 9 4     Platelets 82/38/9514 300     nRBC 07/24/2020 0     Neutrophils Relative 07/24/2020 52     Immat GRANS % 07/24/2020 0     Lymphocytes Relative 07/24/2020 33     Monocytes Relative 07/24/2020 10     Eosinophils Relative 07/24/2020 4     Basophils Relative 07/24/2020 1     Neutrophils Absolute 07/24/2020 3 00     Immature Grans Absolute 07/24/2020 0 02     Lymphocytes Absolute 07/24/2020 1 89     Monocytes Absolute 07/24/2020 0 59     Eosinophils Absolute 07/24/2020 0 23     Basophils Absolute 07/24/2020 0 08     Sodium 07/24/2020 139     Potassium 07/24/2020 3 9     Chloride 07/24/2020 109*    CO2 07/24/2020 24     ANION GAP 07/24/2020 6     BUN 07/24/2020 14     Creatinine 07/24/2020 0 77     Glucose 07/24/2020 146*    Calcium 07/24/2020 9 1     eGFR 07/24/2020 91     POC Glucose 07/24/2020 102     POC Glucose 07/24/2020 139     POC Glucose 07/24/2020 208*    POC Glucose 07/24/2020 247*    WBC 07/25/2020 6 13     RBC 07/25/2020 4 03     Hemoglobin 07/25/2020 11 1*    Hematocrit 07/25/2020 35 4     MCV 07/25/2020 88     MCH 07/25/2020 27 5     MCHC 07/25/2020 31 4     RDW 07/25/2020 12 5     MPV 07/25/2020 9 7     Platelets 15/56/1745 286     nRBC 07/25/2020 0     Neutrophils Relative 07/25/2020 46     Immat GRANS % 07/25/2020 0     Lymphocytes Relative 07/25/2020 36     Monocytes Relative 07/25/2020 12     Eosinophils Relative 07/25/2020 5     Basophils Relative 07/25/2020 1     Neutrophils Absolute 07/25/2020 2 83     Immature Grans Absolute 07/25/2020 0 02     Lymphocytes Absolute 07/25/2020 2 20     Monocytes Absolute 07/25/2020 0 73     Eosinophils Absolute 07/25/2020 0 28     Basophils Absolute 07/25/2020 0 07     Sodium 07/25/2020 141     Potassium 07/25/2020 3 7     Chloride 07/25/2020 110*    CO2 07/25/2020 24     ANION GAP 07/25/2020 7     BUN 07/25/2020 14     Creatinine 07/25/2020 0 67     Glucose 07/25/2020 159*    Calcium 07/25/2020 8 6     eGFR 07/25/2020 104      Imaging: I have personally reviewed pertinent films in PACS

## 2020-07-25 NOTE — PROGRESS NOTES
Progress Note - Infectious Disease   Dana Pérez 52 y o  female MRN: [id number]  Unit/Bed#: [facility] 928-01 Encounter: [id number]      Impression:  1  Infected right foot plantar diabetic ulcer with secondary cellulitis 1st digit and osteomyelitis medial sesamoid  2  DM type 2 with retinopathy, neuropathy R/0 PAD    Recommendations:  Patient is afebrile with negative blood culture so far and pending wound culture  WBC count is WNL    Antibiotics:  1  Cefepime 2 g q 12 hours IV, day 2 Rx  2  Metronidazole 500 mg q 8 hours IV, day 2 Rx    Subjective: The patient has no complaints  Denies fevers, chills, or sweats  Denies nausea, vomiting, or diarrhea  Objective:  Vitals:  Temp:  [98 3 °F (36 8 °C)-98 5 °F (36 9 °C)] 98 5 °F (36 9 °C)  HR:  [77-88] 79  Resp:  [16-18] 18  BP: (132-138)/(71-74) 132/71  SpO2:  [97 %-98 %] 98 %  Temp (24hrs), Av 4 °F (36 9 °C), Min:98 3 °F (36 8 °C), Max:98 5 °F (36 9 °C)  Current: Temperature: 98 5 °F (36 9 °C)    Physical Exam:     General Appearance:  Alert, nontoxic, no acute distress  Throat: Oropharynx moist without lesions  Lips, mucosa, and tongue normal   Neck: Supple, symmetrical, trachea midline, no adenopathy,  no tenderness/mass/nodules   Lungs:   Clear to auscultation bilaterally, no audible wheezes, rhonchi or rales; respirations unlabored   Heart:  Regular rate and rhythm, S1, S2 normal, no murmur, rub or gallop   Abdomen:   Soft, non-tender, non-distended, positive bowel sounds  No masses, no organomegaly    No CVA tenderness   Extremities: Right anterior base of 1st metatarsal with 2 cm diameter ulcer and 1+ surrounding erythema and swelling  Picture today by Podiatry noted  Peripheral pulses are decreased but present  Skin: As above, tattoos           Invasive Devices     Peripheral Intravenous Line            Peripheral IV 20 Left Antecubital 1 day                Labs, Imaging, & Other studies:   All pertinent labs were personally reviewed  Results from last 7 days   Lab Units 07/24/20  0853 07/23/20  1423 07/23/20  1315   WBC Thousand/uL 5 81  --  7 90   HEMOGLOBIN g/dL 11 3*  --  11 6   PLATELETS Thousands/uL 300 287 290     Results from last 7 days   Lab Units 07/24/20  0853 07/23/20  1422   SODIUM mmol/L 139 137   POTASSIUM mmol/L 3 9 3 7   CHLORIDE mmol/L 109* 107   CO2 mmol/L 24 26   BUN mg/dL 14 13   CREATININE mg/dL 0 77 0 77   EGFR ml/min/1 73sq m 91 91   CALCIUM mg/dL 9 1 8 8   AST U/L  --  12   ALT U/L  --  17   ALK PHOS U/L  --  117*     Results from last 7 days   Lab Units 07/23/20  1423 07/23/20  1228   BLOOD CULTURE  No Growth at 24 hrs   --    GRAM STAIN RESULT   --  No Polys*  Rare Gram positive cocci in pairs*   WOUND CULTURE   --  Culture results to follow

## 2020-07-25 NOTE — ANESTHESIA PREPROCEDURE EVALUATION
Review of Systems/Medical History  Patient summary reviewed    No history of anesthetic complications     Cardiovascular  Exercise tolerance (METS): >4,  Hyperlipidemia, Hypertension , No angina ,    Pulmonary  Not a smoker , No shortness of breath, No recent URI , No sleep apnea ,        GI/Hepatic    No GERD ,        Negative  ROS        Endo/Other  Diabetes type 2 ,   Obesity    GYN       Hematology   Musculoskeletal    Comment: Right foot ulcer      Neurology  No seizures ,  No CVA , Diabetic neuropathy,    Psychology           Physical Exam    Airway    Mallampati score: II  TM Distance: >3 FB  Neck ROM: full     Dental   No notable dental hx     Cardiovascular      Pulmonary      Other Findings        Anesthesia Plan  ASA Score- 3     Anesthesia Type- IV sedation with anesthesia with ASA Monitors  Additional Monitors:   Airway Plan:     Comment: IV sedation, GA backup  Plan Factors-    Induction- intravenous  Postoperative Plan-     Informed Consent- Anesthetic plan and risks discussed with patient  I personally reviewed this patient with the CRNA  Discussed and agreed on the Anesthesia Plan with the CRNA  Nguyễn Richards

## 2020-07-25 NOTE — OP NOTE
OPERATIVE REPORT - Podiatry  PATIENT NAME: Taurus Torres    :  1970  MRN: [id number]  Pt Location: [facility] OR ROOM 07    SURGERY DATE: 2020    Surgeon(s) and Role:     * Meagan Herman DPM - Primary     * Mansi Boucher DPM - Assisting    Pre-op Diagnosis:  Foot ulcer, right, with unspecified severity ([address] ) [L97 519]  Osteomyelitis, tibial sesamoid [M86 171]    Post-Op Diagnosis Codes: Foot ulcer, right, with unspecified severity ([address] ) [L97 519]  Osteomyelitis, tibial sesamoid, right foot [M86 171]    Procedure(s) (LRB):  Excision of non healing diabetic right foot Ulcer, with closure of wound, excision of tibial sesamoid right foot (Right)    Specimen(s):  ID Type Source Tests Collected by Time Destination   1 : Right Tibial sesamoid Tissue Foot, Right TISSUE EXAM Meagan Herman DPM 2020 [id number]        Estimated Blood Loss:   Minimal    Drains:  * No LDAs found *    Anesthesia Type:   Choice with 10 ml of 1% Lidocaine and 0 5% Bupivacaine in a 1:1 mixture    Hemostasis:  18 in pneumatic ankle tourniquet, inflated to 250 mmHg for 36 minutes  Atraumatic surgical technique    Materials:  * No implants in log *  3-0 nylon suture    Operative Findings:  As described below in procedure and technique section    Complications:   None    Procedure and Technique:     Under mild sedation, the patient was brought into the operating room and placed on the operating room table in the supine position  IV sedation was achieved by anesthesia team and a universal timeout was performed where all parties are in agreement of correct patient, correct procedure and correct site  A pneumatic tourniquet was then placed over the patient's right lower extremity with ample padding  A Naranjo block was performed consisting of 10 ml of 1% Lidocaine and 0 5% Bupivacaine in a 1:1 mixture  The foot was then prepped and draped in the usual aseptic manner   An esmarch bandage was used to exsangunate the foot and the pneumatic tourniquet was then inflated to 250 mmHg  Attention was then directed to the right forefoot, where an elliptical incision was drawn with a skin marker in order to ellipse out the patient's right plantar submetatarsal 1 wound  A surgical blade was then used to carefully dissect through skin only  An electrocautery device was then used to dissect to full-thickness, and carefully excise the patient's wound  Metzenbaum scissors, as well as a surgical blade, were then used to carefully dissect down to expose the patient's tibial sesamoid  A Key elevator was used to strip the periosteum from the tibial sesamoid, next a [employer] elevator in combination with a surgical blade was used to free all soft tissue attachments to the tibial sesamoid  The sesamoid was carefully removed and passed off the field for pathological examination  The wound bed was then cleansed with a pulse lavage and copious amounts of normal saline  After the wound bed was cleansed, 3-0 nylon suture in horizontal mattress and simple interrupted technique was used to close the skin that was once overlying the patient's nonhealing diabetic ulceration  The incision site was then dressed with Betadine-soaked Adaptic, 4 x 4 gauze, and Kerlix  The tourniquet was deflated at approximately 36 min and normal hyperemic response was noted to all digits  The patient tolerated the procedure and anesthesia well without immediate complications and transferred to PACU with vital signs stable  As with many limb salvage procedures, we contemplate the possibility of performing further stages to this procedure  Procedures may include debridements, delayed closure, plastic surgery techniques, or more proximal amputations  This procedure may be considered part of a multi-staged limb salvage treatment plan  Dr Henrietta Marc was present during the entire procedure and participated in all ambriz aspects      Essence Mcfarlane DPM  DATE: July 25, 2020  TIME: 9:08 AM      Portions of the record may have been created with voice recognition software  Occasional wrong word or "sound a like" substitutions may have occurred due to the inherent limitations of voice recognition software  Read the chart carefully and recognize, using context, where substitutions have occurred

## 2020-07-25 NOTE — ASSESSMENT & PLAN NOTE
Lab Results   Component Value Date    HGBA1C 6 8 (H) 06/15/2020       Recent Labs     07/24/20  1732 07/24/20  2053 07/25/20  0924 07/25/20  1106   POCGLU 208* 247* 147* 114       Blood Sugar Average: Last 72 hrs:  (P) 146 25     Patient on glipizide, Invokana and metformin - presently on hold, can resume at discharge  Continue insulin sliding scale  Monitor Accu-Cheks closely  Avoid hypoglycemia  Hypoglycemia protocol in place    At aspirin 81 mg p o   Daily

## 2020-07-25 NOTE — DISCHARGE INSTRUCTIONS
Dr Ruth Hardin Instructions    1  Take your prescribed medication as directed  2  Upon arrival at home, lie down and elevate your surgical foot on 2 pillows  3  Remain quiet, off your feet as much as possible, for the first 24-48 hours  This is when your feet first swell and may become painful  After 48 hours you may begin limited walking following these restrictions:   Nonweightbearing to surgical foot  4  Drink large quantities of water  Consume no alcohol  Continue a well-balanced diet  5  Report any unusual discomfort or fever to this office  6  A limited amount of discomfort and swelling is to be expected  In some cases the skin may take on a bruised appearance  The surgical solution that was applied to your foot prior to the operation is dark in color and the operation site may appear to be oozing when it actually is not  7  A slight amount of blood is to be expected, and is no cause for alarm  Do not remove the dressings  If there is active bleeding and if the bleeding persists, add additional gauze to the bandage, apply direct pressure, elevate your feet and call this office  8  Do not get the dressings wet  As regular bathing may be inconvenient, sponge baths are recommended  9  When anesthesia wears off and if any discomfort should be present, apply an ice pack directly over the operated area for 15 minute intervals for several hours or until the pain leaves  (USE IN EXCESS OF 15 MINUTES COULD CAUSE FROSTBITE)  Do not use hot water bags or electric pads  A convenient icepack can be made by placing ice cubes in a plastic bag and covering this with a towel  10  If necessary, take a mild laxative before retiring  11  Having performed the operation, we are interested in a prompt recovery  Please cooperate by following the above instructions  12  Please call to confirm your post-op appointment or call with any other questions

## 2020-07-25 NOTE — ANESTHESIA POSTPROCEDURE EVALUATION
Post-Op Assessment Note    CV Status:  Stable  Pain Score: 0    Pain management: adequate     Mental Status:  Sleepy and arousable   Hydration Status:  Stable   PONV Controlled:  None   Airway Patency:  Patent   Post Op Vitals Reviewed: Yes      Staff: CRNA           BP   120/74   Temp  97 2   Pulse  75   Resp   14   SpO2   98 room air

## 2020-07-25 NOTE — PROGRESS NOTES
Progress Note - Infectious Disease   Becca Negrete 52 y o  female MRN: [id number]  Unit/Bed#: [facility] 928-01 Encounter: [id number]      Impression:  1  Infected right foot plantar diabetic ulcer with secondary cellulitis 1st digit and osteomyelitis medial sesamoid S/P ulcer and sesamoid bone excision POD 0  2  DM type 2 with retinopathy, neuropathy R/0 PAD    Recommendations:  Patient seen postop after right plantar ulcer and sesamoid bone excision  Patient is afebrile with negative blood culture  Wound showing 3+ group A strep and Staphylococcus aureus with susceptibilities pending  WBC count is WNL  Patient has been tolerating cephalosporins without any evidence of allergy  1  Change cefepime to cefazolin 1 g q 8 hours IV  2  Pending susceptibility of Staphylococcus aureus will give 1 dose of vancomycin 15 milligrams/kilogram IV (1500 mg)  3  Continue metronidazole 500 mg q 8 hours p o  Antibiotics:  1  Cefazolin 1 g q 8 hours IV, day 3 total antibiotic Rx  2  Metronidazole 500 mg q 8 hours IV, day 3 Rx  3  Vancomycin 1500 mg IV x1 dose  Subjective:  Her postoperative pain is a 3    Denies fevers, chills, or sweats  Denies nausea, vomiting, or diarrhea  Objective:  Vitals:  Temp:  [97 2 °F (36 2 °C)-98 9 °F (37 2 °C)] 98 9 °F (37 2 °C)  HR:  [66-79] 73  Resp:  [12-20] 20  BP: (105-142)/(59-83) 140/80  SpO2:  [96 %-100 %] 97 %  Temp (24hrs), Av °F (36 7 °C), Min:97 2 °F (36 2 °C), Max:98 9 °F (37 2 °C)  Current: Temperature: 98 9 °F (37 2 °C)    Physical Exam:     General Appearance:  Alert, nontoxic, no acute distress  Throat: Oropharynx moist without lesions    Lips, mucosa, and tongue normal   Neck: Supple, symmetrical, trachea midline, no adenopathy,  no tenderness/mass/nodules   Lungs:   Clear to auscultation bilaterally, no audible wheezes, rhonchi or rales; respirations unlabored   Heart:  Regular rate and rhythm, S1, S2 normal, no murmur, rub or gallop   Abdomen:   Soft, non-tender, non-distended, positive bowel sounds  No masses, no organomegaly    No CVA tenderness   Extremities: Right foot with fresh dry surgical dressing   Skin: As above, tattoos  Invasive Devices     Peripheral Intravenous Line            Peripheral IV 07/24/20 Dorsal (posterior); Right Forearm less than 1 day                Labs, Imaging, & Other studies:   All pertinent labs were personally reviewed  Results from last 7 days   Lab Units 07/25/20  0501 07/24/20  0853 07/23/20  1423 07/23/20  1315   WBC Thousand/uL 6 13 5 81  --  7 90   HEMOGLOBIN g/dL 11 1* 11 3*  --  11 6   PLATELETS Thousands/uL 286 300 287 290     Results from last 7 days   Lab Units 07/25/20  0501 07/24/20  0853 07/23/20  1422   SODIUM mmol/L 141 139 137   POTASSIUM mmol/L 3 7 3 9 3 7   CHLORIDE mmol/L 110* 109* 107   CO2 mmol/L 24 24 26   BUN mg/dL 14 14 13   CREATININE mg/dL 0 67 0 77 0 77   EGFR ml/min/1 73sq m 104 91 91   CALCIUM mg/dL 8 6 9 1 8 8   AST U/L  --   --  12   ALT U/L  --   --  17   ALK PHOS U/L  --   --  117*     Results from last 7 days   Lab Units 07/23/20  1423 07/23/20  1228   BLOOD CULTURE  No Growth at 48 hrs   --    GRAM STAIN RESULT   --  No Polys*  Rare Gram positive cocci in pairs*   WOUND CULTURE   --  3+ Growth of Streptococcus pyogenes (Group A)*  3+ Growth of Staphylococcus aureus*

## 2020-07-25 NOTE — PROGRESS NOTES
Progress Note - Janis Marquez 1970, 52 y o  female MRN: [id number]    [facility] En[facility]: [id number]    Primary Care Provider: Wesly Oneal DO   Date and time admitted to hospital: 7/23/2020 11:59 AM        Right foot ulcer, with unspecified severity ([address] )  Assessment & Plan  Right diabetic foot ulcer  IV antibiotics per infectious disease  Rest of care per primary team    Diabetes mellitus with diabetic neuropathy [facility])  Assessment & Plan  Lab Results   Component Value Date    HGBA1C 6 8 (H) 06/15/2020       Recent Labs     07/24/20  1732 07/24/20  2053 07/25/20  0924 07/25/20  1106   POCGLU 208* 247* 147* 114       Blood Sugar Average: Last 72 hrs:  (P) 146 25     Patient on glipizide, Invokana and metformin - presently on hold, can resume at discharge  Continue insulin sliding scale  Monitor Accu-Cheks closely  Avoid hypoglycemia  Hypoglycemia protocol in place    At aspirin 81 mg p o  Daily    Obesity  Assessment & Plan  Therapeutic lifestyle modification    Benign essential hypertension  Assessment & Plan  Monitor blood pressures  Avoid hypotension    Hyperlipidemia  Assessment & Plan  Continue pravastatin  Aspirin 81 mg p o  Daily        VTE Pharmacologic Prophylaxis:   Pharmacologic: Heparin  Mechanical VTE Prophylaxis in Place: Yes    Patient Centered Rounds: I have performed bedside rounds with nursing staff today  Discussions with Specialists or Other Care Team Provider:     Education and Discussions with Family / Patient:  Significant other at bedside updated    Time Spent for Care: 30 minutes  More than 50% of total time spent on counseling and coordination of care as described above      Current Length of Stay: 2 day(s)    Current Patient Status: Inpatient   Certification Statement: The patient will continue to require additional inpatient hospital stay due to As outlined    Discharge Plan:  Per primary service      Subjective:     Comfortably in bed      Objective: Vitals:   Temp (24hrs), Av 9 °F (36 6 °C), Min:97 2 °F (36 2 °C), Max:98 7 °F (37 1 °C)    Temp:  [97 2 °F (36 2 °C)-98 7 °F (37 1 °C)] 98 7 °F (37 1 °C)  HR:  [66-79] 77  Resp:  [12-18] 18  BP: (105-142)/(59-75) 112/59  SpO2:  [96 %-100 %] 97 %  Body mass index is 34 21 kg/m²  Input and Output Summary (last 24 hours): Intake/Output Summary (Last 24 hours) at 2020 1330  Last data filed at 2020 1318  Gross per 24 hour   Intake 2653 33 ml   Output 1400 ml   Net 1253 33 ml       Physical Exam:     Physical Exam    Comfortably in bed  Obese  Short thick neck  Lungs diminished breath sounds bilateral  Heart sounds S1-S2 noted  Abdomen soft nontender  Awake obeys simple commands  Right foot bandage is noted  No rash    Additional Data:     Labs:    Results from last 7 days   Lab Units 20  0501   WBC Thousand/uL 6 13   HEMOGLOBIN g/dL 11 1*   HEMATOCRIT % 35 4   PLATELETS Thousands/uL 286   NEUTROS PCT % 46   LYMPHS PCT % 36   MONOS PCT % 12   EOS PCT % 5     Results from last 7 days   Lab Units 20  0501  20  1422   SODIUM mmol/L 141   < > 137   POTASSIUM mmol/L 3 7   < > 3 7   CHLORIDE mmol/L 110*   < > 107   CO2 mmol/L 24   < > 26   BUN mg/dL 14   < > 13   CREATININE mg/dL 0 67   < > 0 77   ANION GAP mmol/L 7   < > 4   CALCIUM mg/dL 8 6   < > 8 8   ALBUMIN g/dL  --   --  3 1*   TOTAL BILIRUBIN mg/dL  --   --  0 30   ALK PHOS U/L  --   --  117*   ALT U/L  --   --  17   AST U/L  --   --  12   GLUCOSE RANDOM mg/dL 159*   < > 118    < > = values in this interval not displayed  Results from last 7 days   Lab Units 20  1422   INR  1 03     Results from last 7 days   Lab Units 20  1106 20  0924 20  2053 20  1732 20  1140 20  0745 20  2100 20  1645   POC GLUCOSE mg/dl 114 147* 247* 208* 139 102 113 100                   * I Have Reviewed All Lab Data Listed Above  * Additional Pertinent Lab Tests Reviewed:  All Labs Within Last 24 Hours Reviewed    Imaging:    Imaging Reports Reviewed Today Include:   Imaging Personally Reviewed by Myself Includes:      Recent Cultures (last 7 days):     Results from last 7 days   Lab Units 07/23/20  1423 07/23/20  1228   BLOOD CULTURE  No Growth at 24 hrs   --    GRAM STAIN RESULT   --  No Polys*  Rare Gram positive cocci in pairs*   WOUND CULTURE   --  3+ Growth of Streptococcus pyogenes (Group A)*  3+ Growth of Staphylococcus aureus*       Last 24 Hours Medication List:     Current Facility-Administered Medications:  acetaminophen 650 mg Oral Q6H PRN Highland Carte, DPM    aspirin 81 mg Oral Daily Ryan Farooq MD    cefepime 2,000 mg Intravenous Q12H Saurabh Carte, DPM Last Rate: Stopped (07/24/20 2218)   heparin (porcine) 5,000 Units Subcutaneous [facility] Carlos Mishko, DPM    HYDROmorphone 1 mg Intravenous Q4H PRN Carlos Taylorhko, DPM    insulin lispro 1-6 Units Subcutaneous TID AC Carlos Vazquezhko, DPM    insulin lispro 1-6 Units Subcutaneous HS Carlos Mishko, DPM    lactated ringers 50 mL/hr Intravenous Continuous Shahrzad Howell, CRNA Last Rate: Stopped (07/25/20 1318)   lisinopril-hydrochlorothiazide (PRINZIDE 10/12  5) combo dose  Oral Daily Saurabh Carte, DPM    metroNIDAZOLE 500 mg Intravenous Q8H Carlos Mishko, DPM Last Rate: 500 mg (07/25/20 0507)   pravastatin 80 mg Oral Daily With [employer] Mishko, DPM    sodium chloride 75 mL/hr Intravenous Continuous Saurabh Carte, DPM Last Rate: Stopped (07/25/20 6613)        Today, Patient Was Seen By: Ryan Farooq MD    ** Please Note: Dictation voice to text software may have been used in the creation of this document   **

## 2020-07-26 LAB
ANION GAP SERPL CALCULATED.3IONS-SCNC: 5 MMOL/L (ref 4–13)
BACTERIA WND AEROBE CULT: ABNORMAL
BACTERIA WND AEROBE CULT: ABNORMAL
BASOPHILS # BLD AUTO: 0.07 THOUSANDS/ΜL (ref 0–0.1)
BASOPHILS NFR BLD AUTO: 1 % (ref 0–1)
BUN SERPL-MCNC: 10 MG/DL (ref 5–25)
CALCIUM SERPL-MCNC: 8.3 MG/DL (ref 8.3–10.1)
CHLORIDE SERPL-SCNC: 111 MMOL/L (ref 100–108)
CO2 SERPL-SCNC: 25 MMOL/L (ref 21–32)
CREAT SERPL-MCNC: 0.69 MG/DL (ref 0.6–1.3)
EOSINOPHIL # BLD AUTO: 0.25 THOUSAND/ΜL (ref 0–0.61)
EOSINOPHIL NFR BLD AUTO: 3 % (ref 0–6)
ERYTHROCYTE [DISTWIDTH] IN BLOOD BY AUTOMATED COUNT: 12.4 % (ref 11.6–15.1)
GFR SERPL CREATININE-BSD FRML MDRD: 103 ML/MIN/1.73SQ M
GLUCOSE SERPL-MCNC: 116 MG/DL (ref 65–140)
GLUCOSE SERPL-MCNC: 172 MG/DL (ref 65–140)
GLUCOSE SERPL-MCNC: 199 MG/DL (ref 65–140)
GLUCOSE SERPL-MCNC: 199 MG/DL (ref 65–140)
GLUCOSE SERPL-MCNC: 97 MG/DL (ref 65–140)
GRAM STN SPEC: ABNORMAL
GRAM STN SPEC: ABNORMAL
HCT VFR BLD AUTO: 33.5 % (ref 34.8–46.1)
HGB BLD-MCNC: 10.6 G/DL (ref 11.5–15.4)
IMM GRANULOCYTES # BLD AUTO: 0.03 THOUSAND/UL (ref 0–0.2)
IMM GRANULOCYTES NFR BLD AUTO: 0 % (ref 0–2)
LYMPHOCYTES # BLD AUTO: 2.08 THOUSANDS/ΜL (ref 0.6–4.47)
LYMPHOCYTES NFR BLD AUTO: 25 % (ref 14–44)
MCH RBC QN AUTO: 27.6 PG (ref 26.8–34.3)
MCHC RBC AUTO-ENTMCNC: 31.6 G/DL (ref 31.4–37.4)
MCV RBC AUTO: 87 FL (ref 82–98)
MONOCYTES # BLD AUTO: 0.81 THOUSAND/ΜL (ref 0.17–1.22)
MONOCYTES NFR BLD AUTO: 10 % (ref 4–12)
NEUTROPHILS # BLD AUTO: 5.12 THOUSANDS/ΜL (ref 1.85–7.62)
NEUTS SEG NFR BLD AUTO: 61 % (ref 43–75)
NRBC BLD AUTO-RTO: 0 /100 WBCS
PLATELET # BLD AUTO: 267 THOUSANDS/UL (ref 149–390)
PMV BLD AUTO: 9.4 FL (ref 8.9–12.7)
POTASSIUM SERPL-SCNC: 4 MMOL/L (ref 3.5–5.3)
RBC # BLD AUTO: 3.84 MILLION/UL (ref 3.81–5.12)
SODIUM SERPL-SCNC: 141 MMOL/L (ref 136–145)
WBC # BLD AUTO: 8.36 THOUSAND/UL (ref 4.31–10.16)

## 2020-07-26 PROCEDURE — 99024 POSTOP FOLLOW-UP VISIT: CPT | Performed by: PODIATRIST

## 2020-07-26 PROCEDURE — 85025 COMPLETE CBC W/AUTO DIFF WBC: CPT | Performed by: STUDENT IN AN ORGANIZED HEALTH CARE EDUCATION/TRAINING PROGRAM

## 2020-07-26 PROCEDURE — 97163 PT EVAL HIGH COMPLEX 45 MIN: CPT

## 2020-07-26 PROCEDURE — 82948 REAGENT STRIP/BLOOD GLUCOSE: CPT

## 2020-07-26 PROCEDURE — 99231 SBSQ HOSP IP/OBS SF/LOW 25: CPT | Performed by: INTERNAL MEDICINE

## 2020-07-26 PROCEDURE — 97166 OT EVAL MOD COMPLEX 45 MIN: CPT

## 2020-07-26 PROCEDURE — 80048 BASIC METABOLIC PNL TOTAL CA: CPT | Performed by: STUDENT IN AN ORGANIZED HEALTH CARE EDUCATION/TRAINING PROGRAM

## 2020-07-26 PROCEDURE — 99232 SBSQ HOSP IP/OBS MODERATE 35: CPT | Performed by: INTERNAL MEDICINE

## 2020-07-26 RX ORDER — AMOXICILLIN 250 MG
1 CAPSULE ORAL 2 TIMES DAILY
Status: DISCONTINUED | OUTPATIENT
Start: 2020-07-26 | End: 2020-07-27 | Stop reason: HOSPADM

## 2020-07-26 RX ADMIN — ACETAMINOPHEN 650 MG: 325 TABLET, FILM COATED ORAL at 20:55

## 2020-07-26 RX ADMIN — LISINOPRIL: 10 TABLET ORAL at 08:44

## 2020-07-26 RX ADMIN — INSULIN LISPRO 1 UNITS: 100 INJECTION, SOLUTION INTRAVENOUS; SUBCUTANEOUS at 18:00

## 2020-07-26 RX ADMIN — CEFAZOLIN SODIUM 1000 MG: 1 SOLUTION INTRAVENOUS at 20:52

## 2020-07-26 RX ADMIN — CEFAZOLIN SODIUM 1000 MG: 1 SOLUTION INTRAVENOUS at 12:43

## 2020-07-26 RX ADMIN — PRAVASTATIN SODIUM 80 MG: 80 TABLET ORAL at 18:01

## 2020-07-26 RX ADMIN — HEPARIN SODIUM 5000 UNITS: 5000 INJECTION INTRAVENOUS; SUBCUTANEOUS at 05:42

## 2020-07-26 RX ADMIN — ASPIRIN 81 MG: 81 TABLET, COATED ORAL at 08:45

## 2020-07-26 RX ADMIN — CEFAZOLIN SODIUM 1000 MG: 1 SOLUTION INTRAVENOUS at 04:45

## 2020-07-26 RX ADMIN — HEPARIN SODIUM 5000 UNITS: 5000 INJECTION INTRAVENOUS; SUBCUTANEOUS at 21:00

## 2020-07-26 RX ADMIN — ACETAMINOPHEN 650 MG: 325 TABLET, FILM COATED ORAL at 04:48

## 2020-07-26 RX ADMIN — ACETAMINOPHEN 650 MG: 325 TABLET, FILM COATED ORAL at 12:46

## 2020-07-26 RX ADMIN — INSULIN LISPRO 2 UNITS: 100 INJECTION, SOLUTION INTRAVENOUS; SUBCUTANEOUS at 21:01

## 2020-07-26 RX ADMIN — METRONIDAZOLE 500 MG: 500 INJECTION, SOLUTION INTRAVENOUS at 05:41

## 2020-07-26 RX ADMIN — METRONIDAZOLE 500 MG: 500 INJECTION, SOLUTION INTRAVENOUS at 14:06

## 2020-07-26 RX ADMIN — INSULIN LISPRO 2 UNITS: 100 INJECTION, SOLUTION INTRAVENOUS; SUBCUTANEOUS at 12:47

## 2020-07-26 RX ADMIN — HEPARIN SODIUM 5000 UNITS: 5000 INJECTION INTRAVENOUS; SUBCUTANEOUS at 14:02

## 2020-07-26 NOTE — PROGRESS NOTES
Progress Note - Kaur Franco 1970, 52 y o  female MRN: [id number]    Unit/Bed#: [facility] 928-01 Encounter: [id number]    Primary Care Provider: Anne-Marie Lopez DO   Date and time admitted to hospital: 7/23/2020 11:59 AM        Right foot ulcer, with unspecified severity ([address] )  Assessment & Plan  Right diabetic foot ulcer  antibiotics per infectious disease  Rest of care per primary team    Diabetes mellitus with diabetic neuropathy [facility])  Assessment & Plan  Lab Results   Component Value Date    HGBA1C 6 8 (H) 06/15/2020       Recent Labs     07/25/20  1654 07/25/20  2058 07/26/20  0838 07/26/20  1055   POCGLU 123 136 97 199*       Blood Sugar Average: Last 72 hrs:  (P) 143 75     Patient on glipizide, Invokana and metformin - presently on hold, can resume at discharge  Continue insulin sliding scale  Monitor Accu-Cheks closely  Avoid hypoglycemia  Hypoglycemia protocol in place    aspirin 81 mg p o  Daily    Obesity  Assessment & Plan  Therapeutic lifestyle modification    Benign essential hypertension  Assessment & Plan  Monitor blood pressures  Avoid hypotension    Hyperlipidemia  Assessment & Plan  Continue statin  Aspirin 81 mg p o  Daily        VTE Pharmacologic Prophylaxis:   Pharmacologic: Heparin  Mechanical VTE Prophylaxis in Place: Yes    Patient Centered Rounds: I have performed bedside rounds with nursing staff today  Discussions with Specialists or Other Care Team Provider:     Education and Discussions with Family / Patient:  Patient, significant other at bedside updated    Time Spent for Care: 30 minutes  More than 50% of total time spent on counseling and coordination of care as described above      Current Length of Stay: 3 day(s)    Current Patient Status: Inpatient   Certification Statement: The patient will continue to require additional inpatient hospital stay due to As outlined    Discharge Plan:  Per primary service    Code Status: Prior      Subjective:     Comfortably in bed  Reports feeling better  Encouraged out of bed into chair    Objective:     Vitals:   Temp (24hrs), Av 4 °F (36 9 °C), Min:97 7 °F (36 5 °C), Max:98 9 °F (37 2 °C)    Temp:  [97 7 °F (36 5 °C)-98 9 °F (37 2 °C)] 98 2 °F (36 8 °C)  HR:  [66-79] 69  Resp:  [18-20] 20  BP: (112-140)/(59-83) 134/66  SpO2:  [96 %-100 %] 98 %  Body mass index is 34 21 kg/m²  Input and Output Summary (last 24 hours): Intake/Output Summary (Last 24 hours) at 2020 1206  Last data filed at 2020 3843  Gross per 24 hour   Intake 1620 83 ml   Output 1400 ml   Net 220 83 ml       Physical Exam:     Physical Exam    Comfortably in bed  Obese  Short thick neck  Lungs diminished breath sounds bilaterally no additional sounds  Heart sounds S1-S2 noted no murmurs appreciable  Abdomen soft nontender  Awake alert obeys simple commands  Pulses noted  Right lower extremity in bandage is noted  No rash    Additional Data:     Labs:    Results from last 7 days   Lab Units 20  0620   WBC Thousand/uL 8 36   HEMOGLOBIN g/dL 10 6*   HEMATOCRIT % 33 5*   PLATELETS Thousands/uL 267   NEUTROS PCT % 61   LYMPHS PCT % 25   MONOS PCT % 10   EOS PCT % 3     Results from last 7 days   Lab Units 20  0619  20  1422   SODIUM mmol/L 141   < > 137   POTASSIUM mmol/L 4 0   < > 3 7   CHLORIDE mmol/L 111*   < > 107   CO2 mmol/L 25   < > 26   BUN mg/dL 10   < > 13   CREATININE mg/dL 0 69   < > 0 77   ANION GAP mmol/L 5   < > 4   CALCIUM mg/dL 8 3   < > 8 8   ALBUMIN g/dL  --   --  3 1*   TOTAL BILIRUBIN mg/dL  --   --  0 30   ALK PHOS U/L  --   --  117*   ALT U/L  --   --  17   AST U/L  --   --  12   GLUCOSE RANDOM mg/dL 116   < > 118    < > = values in this interval not displayed       Results from last 7 days   Lab Units 20  1422   INR  1 03     Results from last 7 days   Lab Units 20  1055 20  0838 208 20  1654 20  1106 20  0924 20  1732 20  1140 07/24/20  0745 07/23/20  2100 07/23/20  1645   POC GLUCOSE mg/dl 199* 97 136 123 114 147* 247* 208* 139 102 113 100                   * I Have Reviewed All Lab Data Listed Above  * Additional Pertinent Lab Tests Reviewed: All Labs Within Last 24 Hours Reviewed    Imaging:    Imaging Reports Reviewed Today Include:   Imaging Personally Reviewed by Myself Includes:      Recent Cultures (last 7 days):     Results from last 7 days   Lab Units 07/23/20  1423 07/23/20  1228   BLOOD CULTURE  No Growth at 48 hrs   --    GRAM STAIN RESULT   --  No Polys*  Rare Gram positive cocci in pairs*   WOUND CULTURE   --  3+ Growth of Streptococcus pyogenes (Group A)*  3+ Growth of Staphylococcus aureus*       Last 24 Hours Medication List:     Current Facility-Administered Medications:  acetaminophen 650 mg Oral Q6H PRN Harpreet [place], DPM    aspirin 81 mg Oral Daily Ligia Devlin MD    cefazolin 1,000 mg Intravenous Q8H Kimmy Hood MD Last Rate: Stopped (07/26/20 0515)   heparin (porcine) 5,000 Units Subcutaneous [facility] Calros Lopez DPM    HYDROmorphone 1 mg Intravenous Q4H PRN Carlos Lopez DPM    insulin lispro 1-6 Units Subcutaneous TID AC Carlos Lopez DPKADEN    insulin lispro 1-6 Units Subcutaneous HS Carlos Lopez DPM    lisinopril-hydrochlorothiazide (PRINZIDE 10/12  5) combo dose  Oral Daily Harpreet [place], DPM    metroNIDAZOLE 500 mg Intravenous Q8H Carlos Lopez DPM Last Rate: Stopped (07/26/20 7631)   ondansetron 4 mg Intravenous Q8H PRN Harpreet [place], DPM    oxyCODONE-acetaminophen 1 tablet Oral Q8H PRN Harpreet [place], DPM    pravastatin 80 mg Oral Daily With [employer]    senna-docusate sodium 1 tablet Oral BID Harpreet [place], DPM         Today, Patient Was Seen By: Ligia Devlin MD    ** Please Note: Dictation voice to text software may have been used in the creation of this document   **

## 2020-07-26 NOTE — PROGRESS NOTES
Progress Note - Infectious Disease   Mahlon Koyanagi 52 y o  female MRN: [id number]  Unit/Bed#: [facility] 928-01 Encounter: [id number]      Impression:  1  Infected right foot plantar diabetic ulcer with secondary cellulitis 1st digit and osteomyelitis medial sesamoid S/P ulcer and sesamoid bone excision POD 1  2  DM type 2 with retinopathy, neuropathy R/0 PAD    Recommendations:    Patient is afebrile with negative blood culture  Wound showing 3+ group A strep and Staphylococcus aureus (MSSA)    WBC count is WNL  Patient has been tolerating cephalosporins without any evidence of allergy  1  Continue cefazolin 1 g q 8 hours IV  2  Will discontinue metronidazole 500 mg q 8 hours p o   3  If patient is discharged tomorrow could switch to p o  Cephalexin 500 mg q i d  for additional 5 days    Antibiotics:  1  Cefazolin 1 g q 8 hours IV, day 4 total antibiotic Rx    Subjective:  Her pain is controlled  Denies fevers, chills, or sweats  Denies nausea, vomiting, or diarrhea  Objective:  Vitals:  Temp:  [98 2 °F (36 8 °C)-98 5 °F (36 9 °C)] 98 4 °F (36 9 °C)  HR:  [69-79] 74  Resp:  [17-20] 17  BP: (134-148)/(64-84) 148/84  SpO2:  [96 %-98 %] 96 %  Temp (24hrs), Av 4 °F (36 9 °C), Min:98 2 °F (36 8 °C), Max:98 5 °F (36 9 °C)  Current: Temperature: 98 4 °F (36 9 °C)    Physical Exam:     General Appearance:  Alert, nontoxic, no acute distress  Throat: Oropharynx moist without lesions  Lips, mucosa, and tongue normal   Neck: Supple, symmetrical, trachea midline, no adenopathy,  no tenderness/mass/nodules   Lungs:   Clear to auscultation bilaterally, no audible wheezes, rhonchi or rales; respirations unlabored   Heart:  Regular rate and rhythm, S1, S2 normal, no murmur, rub or gallop   Abdomen:   Soft, non-tender, non-distended, positive bowel sounds  No masses, no organomegaly    No CVA tenderness   Extremities: Right foot with dry surgical dressing   Skin: As above, tattoos           Invasive Devices Peripheral Intravenous Line            Peripheral IV 07/25/20 Left Antecubital 1 day                Labs, Imaging, & Other studies:   All pertinent labs were personally reviewed  Results from last 7 days   Lab Units 07/26/20  0620 07/25/20  0501 07/24/20  0853   WBC Thousand/uL 8 36 6 13 5 81   HEMOGLOBIN g/dL 10 6* 11 1* 11 3*   PLATELETS Thousands/uL 267 286 300     Results from last 7 days   Lab Units 07/26/20  0619 07/25/20  0501 07/24/20  0853 07/23/20  1422   SODIUM mmol/L 141 141 139 137   POTASSIUM mmol/L 4 0 3 7 3 9 3 7   CHLORIDE mmol/L 111* 110* 109* 107   CO2 mmol/L 25 24 24 26   BUN mg/dL 10 14 14 13   CREATININE mg/dL 0 69 0 67 0 77 0 77   EGFR ml/min/1 73sq m 103 104 91 91   CALCIUM mg/dL 8 3 8 6 9 1 8 8   AST U/L  --   --   --  12   ALT U/L  --   --   --  17   ALK PHOS U/L  --   --   --  117*     Results from last 7 days   Lab Units 07/23/20  1423 07/23/20  1228   BLOOD CULTURE  No Growth at 72 hrs   --    GRAM STAIN RESULT   --  No Polys*  Rare Gram positive cocci in pairs*   WOUND CULTURE   --  3+ Growth of Streptococcus pyogenes (Group A)*  3+ Growth of Staphylococcus aureus*

## 2020-07-26 NOTE — OCCUPATIONAL THERAPY NOTE
Occupational Therapy Evaluation     Patient Name: Parmjit CULP'S Date: 7/26/2020  Problem List  Principal Problem:    Type 2 diabetes mellitus with foot ulcer ([facility] )  Active Problems:    Diabetes mellitus with diabetic neuropathy ([facility] )    Hyperlipidemia    Benign essential hypertension    Right foot ulcer, with unspecified severity ([facility] )    Obesity    Past Medical History  Past Medical History:   Diagnosis Date    Cellulitis of left lower extremity 4/15/2020    Diabetes mellitus ([facility] )     Exposure to SARS-associated coronavirus 4/15/2020    Fever 4/15/2020    Hyperlipidemia     Hypertension      Past Surgical History  Past Surgical History:   Procedure Laterality Date    TONSILLECTOMY AND ADENOIDECTOMY               07/26/20 1014   Note Type   Note type Eval/Treat   Restrictions/Precautions   Weight Bearing Precautions Per Order Yes   RLE Weight Bearing Per Order NWB   Other Precautions Fall Risk;Pain   Pain Assessment   Pain Assessment Tool 0-10   Pain Score 2   Pain Location/Orientation Orientation: Right;Location: Foot   Patient's Stated Pain Goal No pain   Hospital Pain Intervention(s) Repositioned; Ambulation/increased activity; Emotional support   Home Living   Type of [address] [place] [address] Two level; Other (Comment);1/2 bath on main level;Bed/bath upstairs; Performs ADLs on one level  (0 ABDIEL)   Bathroom Shower/Tub Tub/shower unit   Bathroom Toilet Standard   Bathroom Equipment Other (Comment)  (denies any)   Home Equipment Other (Comment)  (denies any)   Additional Comments pt reports living in 2 SH, 0 ABDIEL, 1/2 bath 1st floor, main bed/bath 2nd floor w/ FF up   Prior Function   Level of Fellows Independent with ADLs and functional mobility   Lives With Spouse   Receives Help From Family   ADL Assistance Independent   IADLs Independent   Falls in the last 6 months 0   Vocational Full time employment   Comments Pt reports being I w/ ADLS/IADLS, transfers and functional mobility PTA Lifestyle   Autonomy I ADLS/IADLS, transfers and functional mobility PTA   Reciprocal Relationships pt lives w/ spouse; who works during the day   Service to 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   Intrinsic Gratification Enjoys being active/outdoors   Psychosocial   Psychosocial (WDL) WDL   Length of Time/Family Visitation 31 min -1hr  (spouse)   ADL   Eating Assistance 7  Independent   Grooming Assistance 7  Independent   UB Bathing Assistance 7  [facility] 5  [facility] 66  7  [address] [facility] [address]  [address] [place] [address] 4  Minimal Assistance   Functional Deficit Steadying;Verbal cueing;Supervision/safety; Increased time to complete   Bed Mobility   Supine to Sit 5  Supervision   Additional items HOB elevated; Increased time required;Verbal cues   Sit to Supine Unable to assess   Additional Comments Pt went from supine to sit w/ S, HOB elevated for assist  Sat EOB w/ G balance/trunk control   Transfers   Sit to Stand 4  Minimal assistance   Additional items Assist x 1; Increased time required;Verbal cues   Stand to Sit 4  Minimal assistance   Additional items Assist x 1; Increased time required;Verbal cues   Additional Comments Pt performed STS transfer from EOB w/ Min A x1 for steadying balance, +VC for hand placement on RW  Functional Mobility   Functional Mobility 4  Minimal assistance   Additional Comments Pt ambulated short household distance w/ Min A x1, use of RW   +VC for proper use of RW   Additional items Rolling walker   Balance   Static Sitting Fair +   Dynamic Sitting Fair -   Static Standing Fair -   Dynamic Standing Poor +   Ambulatory Poor +   Activity Tolerance   Activity Tolerance Patient tolerated treatment well   Medical Staff Made Aware PT   Nurse Made Aware yes   RUE Assessment   RUE Assessment WFL   LUE Assessment   LUE Assessment WFL   Hand Function   Gross Motor Coordination Functional   Fine Motor Coordination Functional   Sensation   Light Touch No apparent deficits   Vision-Basic Assessment   Current Vision No visual deficits   Cognition   Overall Cognitive Status WFL   Arousal/Participation Responsive; Cooperative   Attention Within functional limits   Orientation Level Oriented X4   Memory Within functional limits   Following Commands Follows all commands and directions without difficulty   Comments pt is pleasant and cooperative   Assessment   Limitation Decreased ADL status; Decreased endurance;Decreased self-care trans;Decreased high-level ADLs   Prognosis Fair   Assessment Pt is a 51 y/o female seen for OT eval s/p adm to [facility] w/ chronic non-healing diabetic ulceration  Pt is dx'd w/ type 2 DM w/ foot ulcer  Pt is s/p the following procedure "Excision of non healing diabetic right foot Ulcer, with closure of wound, excision of tibial sesamoid right foot (Right)" performed on 7/25  Pt is NWB in R LE  Pt  has a past medical history of Cellulitis of left lower extremity (4/15/2020), Diabetes mellitus ([facility] ), Exposure to SARS-associated coronavirus (4/15/2020), Fever (4/15/2020), Hyperlipidemia, and Hypertension  Pt with active OT orders and up with assistance  orders  Pt lives with spouse and son in 2 SH, 0 ABDIEL, bed/bath 2nd floor, 1/2 bath available on 1st  Pt was I w/ ADLS and IADLS, drove, & required no use of DME PTA  Pt is currently demonstrating the following occupational deficits: I UB ADLS, Min A LB ADLS, Min A transfers/functional mobility w/ RW, +VC for proper technique  These deficits that are impacting pt's baseline areas of occupation are a result of the following impairments: pain, endurance, activity tolerance, functional mobility, forward functional reach, balance, functional standing tolerance, unsupportive home environment and decreased I w/ ADLS/IADLS  The following Occupational Performance Areas to address include: bathing/shower, toilet hygiene, dressing, health maintenance, functional mobility, community mobility, clothing management, household maintenance and job performance/volunteering  Pt scored overall 55/100 on the Barthel Index  Based on the aforementioned OT evaluation, functional performance deficits, and assessments, pt has been identified as a moderate complexity evaluation  Recommend home with family support upon D/C, when medically stable  Pt to continue to benefit from acute immediate OT services to address the following goals 3-5x/week to  w/in 10-14 days:    Goals   Patient Goals to go home   LTG Time Frame 10-14   Long Term Goal #1 see below listed goals   Plan   Treatment Interventions ADL retraining;Functional transfer training; Endurance training;Equipment evaluation/education;Patient/family training; Compensatory technique education;Continued evaluation; Energy conservation; Activityengagement   Goal Expiration Date 20   OT Frequency 3-5x/wk   Recommendation   OT Discharge Recommendation Return to previous environment with social support   Equipment Recommended Tub seat with back   OT - OK to Discharge Yes  (when medically stable)   Barthel Index   Feeding 10   Bathing 0   Grooming Score 5   Dressing Score 5   Bladder Score 10   Bowels Score 10   Toilet Use Score 5   Transfers (Bed/Chair) Score 10   Mobility (Level Surface) Score 0   Stairs Score 0   Barthel Index Score 55          GOALS    1) Pt will improve activity tolerance to G for min 30 min txment sessions for increase engagement in functional tasks  2) Pt will complete UB/LB dressing/self care w/ mod I using adaptive device and DME as needed  3) Pt will complete bathing w/ Mod I w/ use of AE and DME as needed  4) Pt will complete toileting w/ mod I w/ G hygiene/thoroughness using DME as needed  5) Pt will improve functional transfers to Mod I on/off all surfaces using DME as needed w/ G balance/safety   6) Pt will improve functional mobility during ADL/IADL/leisure tasks to Mod I using DME as needed w/ G balance/safety   7) Pt will participate in simulated IADL management task to increase independence to Mod I w/ G safety and endurance  8) Pt will demonstrate G carryover of pt/caregiver education and training as appropriate w/o cues w/ good tolerance to increase safety during functional tasks  9) Pt will demonstrate 100% carryover of WBS and energy conservation techniques t/o functional I/ADL/leisure tasks w/o cues s/p skilled education to increase endurance during functional tasks         Tammy Hughes MS, OTR/L

## 2020-07-26 NOTE — PLAN OF CARE
Problem: PHYSICAL THERAPY ADULT  Goal: Performs mobility at highest level of function for planned discharge setting  See evaluation for individualized goals  Description  Treatment/Interventions: Functional transfer training, LE strengthening/ROM, Elevations, Therapeutic exercise, Endurance training, Gait training, Bed mobility, Spoke to nursing, OT  Equipment Recommended: Nikos Parada       See flowsheet documentation for full assessment, interventions and recommendations  Outcome: Progressing  Note:   Prognosis: Good  Problem List: Decreased strength, Impaired balance, Decreased mobility, Pain  Assessment: Pt is a 53 yo F who presents POD 0 RLE 1st digit sesamoid bone excision due to DM foot ulcer  Order placed for PT eval and treat with activity orders for NWB R LE  Pt presents with PMHx of DM2, diabetic neuropathy, HLD, HTN, R fot ulcer, obesity and personal factors of living in 2 story house, mobilizing w/ assistive device, inability to navigate community distances, inability to navigate level surfaces w/o external assistance, unable to perform dynamic tasks in community, limited home support and inability to perform current job functions  Pt clinical presentation is unstable/unpredictable based on these factors  Pt PLOF was Independent with ADLs, IADLs, and functional mobility without use of AD  Upon evaluation, Pt impairments include pain, weakness, decreased endurance, impaired balance, gait deviations, orthopedic restrictions and fall risk  Functional mobility assessment showed a need for Ezra for ambulation and utilizes RW AD for mobilization  Pt demonstrates good tolerance to activity and is able to follow commands appropriately  Pt is a good candidate for IP PT to address their impairments and to prevent secondary complications associated with hospital stay  Pt D/C recommendation is for Home with home PT consult to facilitate safe discharge and facilitate further recovery to PLOF   Plan to perform stair training with crutches next session, however this is not required for discharge  PT Discharge Recommendation: Home with skilled therapy(HHPT consult)     PT - OK to Discharge: Yes    See flowsheet documentation for full assessment

## 2020-07-26 NOTE — PLAN OF CARE
Problem: PAIN - ADULT  Goal: Verbalizes/displays adequate comfort level or baseline comfort level  Description  Interventions:  - Encourage patient to monitor pain and request assistance  - Assess pain using appropriate pain scale  - Administer analgesics based on type and severity of pain and evaluate response  - Implement non-pharmacological measures as appropriate and evaluate response  - Consider cultural and social influences on pain and pain management  - Notify physician/advanced practitioner if interventions unsuccessful or patient reports new pain  Outcome: Progressing     Problem: INFECTION - ADULT  Goal: Absence or prevention of progression during hospitalization  Description  INTERVENTIONS:  - Assess and monitor for signs and symptoms of infection  - Monitor lab/diagnostic results  - Monitor all insertion sites, i e  indwelling lines, tubes, and drains  - Monitor endotracheal if appropriate and nasal secretions for changes in amount and color  - [place] appropriate cooling/warming therapies per order  - Administer medications as ordered  - Instruct and encourage patient and family to use good hand hygiene technique  - Identify and instruct in appropriate isolation precautions for identified infection/condition  Outcome: Progressing     Problem: SAFETY ADULT  Goal: Patient will remain free of falls  Description  INTERVENTIONS:  - Assess patient frequently for physical needs  -  Identify cognitive and physical deficits and behaviors that affect risk of falls    -  [place] fall precautions as indicated by assessment   - Educate patient/family on patient safety including physical limitations  - Instruct patient to call for assistance with activity based on assessment  - Modify environment to reduce risk of injury  - Consider OT/PT consult to assist with strengthening/mobility  Outcome: Progressing  Goal: Maintain or return to baseline ADL function  Description  INTERVENTIONS:  -  Assess patient's ability to carry out ADLs; assess patient's baseline for ADL function and identify physical deficits which impact ability to perform ADLs (bathing, care of mouth/teeth, toileting, grooming, dressing, etc )  - Assess/evaluate cause of self-care deficits   - Assess range of motion  - Assess patient's mobility; develop plan if impaired  - Assess patient's need for assistive devices and provide as appropriate  - Encourage maximum independence but intervene and supervise when necessary  - Involve family in performance of ADLs  - Assess for home care needs following discharge   - Consider OT consult to assist with ADL evaluation and planning for discharge  - Provide patient education as appropriate  Outcome: Progressing  Goal: Maintain or return mobility status to optimal level  Description  INTERVENTIONS:  - Assess patient's baseline mobility status (ambulation, transfers, stairs, etc )    - Identify cognitive and physical deficits and behaviors that affect mobility  - Identify mobility aids required to assist with transfers and/or ambulation (gait belt, sit-to-stand, lift, walker, cane, etc )  - [place] fall precautions as indicated by assessment  - Record patient progress and toleration of activity level on Mobility SBAR; progress patient to next Phase/Stage  - Instruct patient to call for assistance with activity based on assessment  - Consider rehabilitation consult to assist with strengthening/weightbearing, etc   Outcome: Progressing     Problem: DISCHARGE PLANNING  Goal: Discharge to home or other facility with appropriate resources  Description  INTERVENTIONS:  - Identify barriers to discharge w/patient and caregiver  - Arrange for needed discharge resources and transportation as appropriate  - Identify discharge learning needs (meds, wound care, etc )  - Arrange for interpretive services to assist at discharge as needed  - Refer to Case Management Department for coordinating discharge planning if the patient needs post-hospital services based on physician/advanced practitioner order or complex needs related to functional status, cognitive ability, or social support system  Outcome: Progressing     Problem: Knowledge Deficit  Goal: Patient/family/caregiver demonstrates understanding of disease process, treatment plan, medications, and discharge instructions  Description  Complete learning assessment and assess knowledge base  Interventions:  - Provide teaching at level of understanding  - Provide teaching via preferred learning methods  Outcome: Progressing     Problem: Potential for Falls  Goal: Patient will remain free of falls  Description  INTERVENTIONS:  - Assess patient frequently for physical needs  -  Identify cognitive and physical deficits and behaviors that affect risk of falls    -  [place] fall precautions as indicated by assessment   - Educate patient/family on patient safety including physical limitations  - Instruct patient to call for assistance with activity based on assessment  - Modify environment to reduce risk of injury  - Consider OT/PT consult to assist with strengthening/mobility  Outcome: Progressing

## 2020-07-26 NOTE — PLAN OF CARE
Problem: OCCUPATIONAL THERAPY ADULT  Goal: Performs self-care activities at highest level of function for planned discharge setting  See evaluation for individualized goals  Description  Treatment Interventions: ADL retraining, Functional transfer training, Endurance training, Equipment evaluation/education, Patient/family training, Compensatory technique education, Continued evaluation, Energy conservation, Activityengagement  Equipment Recommended: (S) Tub seat with back       See flowsheet documentation for full assessment, interventions and recommendations  Note:   Limitation: Decreased ADL status, Decreased endurance, Decreased self-care trans, Decreased high-level ADLs  Prognosis: Fair  Assessment: Pt is a 53 y/o female seen for OT eval s/p adm to [facility] w/ chronic non-healing diabetic ulceration  Pt is dx'd w/ type 2 DM w/ foot ulcer  Pt is s/p the following procedure "Excision of non healing diabetic right foot Ulcer, with closure of wound, excision of tibial sesamoid right foot (Right)" performed on 7/25  Pt is NWB in R LE  Pt  has a past medical history of Cellulitis of left lower extremity (4/15/2020), Diabetes mellitus ([facility] [address] ), Exposure to SARS-associated coronavirus (4/15/2020), Fever (4/15/2020), Hyperlipidemia, and Hypertension  Pt with active OT orders and up with assistance  orders  Pt lives with spouse and son in 2 SH, 0 ABDIEL, bed/bath 2nd floor, 1/2 bath available on 1st  Pt was I w/ ADLS and IADLS, drove, & required no use of DME PTA  Pt is currently demonstrating the following occupational deficits: I UB ADLS, Min A LB ADLS, Min A transfers/functional mobility w/ RW, +VC for proper technique  These deficits that are impacting pt's baseline areas of occupation are a result of the following impairments: pain, endurance, activity tolerance, functional mobility, forward functional reach, balance, functional standing tolerance, unsupportive home environment and decreased I w/ ADLS/IADLS  The following Occupational Performance Areas to address include: bathing/shower, toilet hygiene, dressing, health maintenance, functional mobility, community mobility, clothing management, household maintenance and job performance/volunteering  Pt scored overall 55/100 on the Barthel Index  Based on the aforementioned OT evaluation, functional performance deficits, and assessments, pt has been identified as a moderate complexity evaluation  Recommend home with family support upon D/C, when medically stable   Pt to continue to benefit from acute immediate OT services to address the following goals 3-5x/week to  w/in 10-14 days:      OT Discharge Recommendation: Return to previous environment with social support  OT - OK to Discharge: Yes(when medically stable)     Mildred Gottron MS, OTR/L

## 2020-07-26 NOTE — PROGRESS NOTES
Podiatry - Progress Note  Patient: Bakari Isidro 52 y o  female   MRN: [id number]  PCP: Sabrina Munoz DO  [facility]: [id number]  Date Of Visit: 20    ASSESSMENT:    Bakari Isidro is a 52 y o  female with:     1  Right foot diabetic ulceration - [place] 3 - POA  1  S/P right foot wound excision with closure, as well as tibial sesamoid removal (2020)  2  DM2 with neuropathy      PLAN:    · Patient seen at bedside today S/P right ulcer excision with closure, and tibial sesamoid removal (DOS: 2020)  Patient's dressings are clean, dry, and intact and will be changed tomorrow prior to discharge  · VSS, no leukocytosis  · Infectious disease consult appreciated: cefepime IV, flagyl PO  · PT/OT consulted  · Post-operative xray reviewed: no osteomyelitis or ABDIEL  Normal post-operative changes noted  · Wound culture reviewed: Group A strep, current antibiotic regiment will cover this organism  · Hopeful to discharge tomorrow  · NWB to right lower extremity  · VTE prophylaxis: heparin and SCD  · Rest of medical care per primary team        SUBJECTIVE:     The patient was seen, evaluated, and assessed at bedside today  The patient was awake, alert, and in no acute distress  No acute events overnight  The patient reports that she experienced nausea last night, which she believes was secondary to vancomycin, she says that it was well controlled with Zofran and she has not experienced it since that time  Patient denies N/V/F/chills/SOB/CP  OBJECTIVE:     Vitals:   /66 (BP Location: Left arm)   Pulse 69   Temp 98 2 °F (36 8 °C) (Oral)   Resp 20   Ht 5' 8" (1 727 m)   Wt 102 kg (225 lb)   SpO2 98%   BMI 34 21 kg/m²     Temp (24hrs), Av 3 °F (36 8 °C), Min:97 5 °F (36 4 °C), Max:98 9 °F (37 2 °C)      Physical Exam:     General:  Alert, cooperative, and in no distress  Lower extremity exam:  Cardiovascular status at baseline  Neurological status at baseline  Musculoskeletal status at baseline  No calf tenderness noted  Dressings clean, dry, and intact today at bedside  Additional Data:     Labs:    Results from last 7 days   Lab Units 07/26/20  0620   WBC Thousand/uL 8 36   HEMOGLOBIN g/dL 10 6*   HEMATOCRIT % 33 5*   PLATELETS Thousands/uL 267   NEUTROS PCT % 61   LYMPHS PCT % 25   MONOS PCT % 10   EOS PCT % 3     Results from last 7 days   Lab Units 07/26/20  0619  07/23/20  1422   POTASSIUM mmol/L 4 0   < > 3 7   CHLORIDE mmol/L 111*   < > 107   CO2 mmol/L 25   < > 26   BUN mg/dL 10   < > 13   CREATININE mg/dL 0 69   < > 0 77   CALCIUM mg/dL 8 3   < > 8 8   ALK PHOS U/L  --   --  117*   ALT U/L  --   --  17   AST U/L  --   --  12    < > = values in this interval not displayed  Results from last 7 days   Lab Units 07/23/20  1422   INR  1 03       * I Have Reviewed All Lab Data Listed Above  Recent Cultures (last 7 days):     Results from last 7 days   Lab Units 07/23/20  1423 07/23/20  1228   BLOOD CULTURE  No Growth at 48 hrs   --    GRAM STAIN RESULT   --  No Polys*  Rare Gram positive cocci in pairs*   WOUND CULTURE   --  3+ Growth of Streptococcus pyogenes (Group A)*  3+ Growth of Staphylococcus aureus*           Imaging: I have personally reviewed pertinent films in PACS  EKG, Pathology, and Other Studies: I have personally reviewed pertinent reports  ** Please Note: Portions of the record may have been created with voice recognition software  Occasional wrong word or "sound a like" substitutions may have occurred due to the inherent limitations of voice recognition software  Read the chart carefully and recognize, using context, where substitutions have occurred   **

## 2020-07-26 NOTE — PHYSICAL THERAPY NOTE
PHYSICAL THERAPY Evaluation NOTE    Patient Name: Kristie HASSANRIN'G Date: 7/26/2020   AGE:   52 y o   Mrn:   [id number]  ADMIT DX:  Ulcer of right foot, unspecified ulcer stage [facility])    Past Medical History:   Diagnosis Date    Cellulitis of left lower extremity 4/15/2020    Diabetes mellitus ([address] )     Exposure to SARS-associated coronavirus 4/15/2020    Fever 4/15/2020    Hyperlipidemia     Hypertension      Length Of Stay: 3  PHYSICAL THERAPY EVALUATION :      07/26/20 1013   Note Type   Note type Eval/Treat   Pain Assessment   Pain Assessment Tool 0-10   Pain Score 2   Pain Location/Orientation Orientation: Right;Location: Foot   Home Living   Type of Home House  ([facility] 0STE)   Home Layout Two level;Performs ADLs on one level; Able to live on main level with bedroom/bathroom   Bathroom Shower/Tub Tub/shower unit   Bathroom Toilet Standard   Bathroom Equipment Other (Comment)  (none)   Home Equipment Other (Comment)  (none)   Additional Comments Pt lives in [facility] with 0 ABDIEL  Bedroom is on 2nd floor but pt is able to do 1st floor set-up  Bathroom has a tub/shower with no DME  Prior Function   Level of [place] Independent with ADLs and functional mobility   Lives With Spouse   Receives Help From Family   ADL Assistance Independent   IADLs Independent   Falls in the last 6 months 0   Vocational Full time employment   Comments Pt reports INDEP with ADLs, IADLs, and functional mobility  Pt lives with  who works full time but reports that she has friends in the neighborhood who can also provide assistance  Pt denies use of DME or AD PTA for functional mobility   + drives   Restrictions/Precautions   Weight Bearing Precautions Per Order Yes   RLE Weight Bearing Per Order NWB   General   Additional Pertinent History Pt is a 51 yo F who presents POD 0 RLE 1st digit sesamoid bone excision due to DM foot ulcer Family/Caregiver Present Yes  ()   Cognition   Overall Cognitive Status WFL   Arousal/Participation Cooperative   Orientation Level Oriented X4   Memory Within functional limits   Following Commands Follows all commands and directions without difficulty   Comments Pt identified with full name and birthdate   RLE Assessment   RLE Assessment   (limited MMT due to POD 0)   LLE Assessment   LLE Assessment   (functionally graded >3+/5)   Coordination   Movements are Fluid and Coordinated 0   Coordination and Movement Description Mild postural sway and LOB with turning   Bed Mobility   Supine to Sit 5  Supervision   Additional items Assist x 1   Additional Comments Pt was supine in bed at start and seated OOB in recliner with legs elevated at end of PT evaluation  Pt reported that she can stay on the first floor when discharged but also feels comfortable bumping up/down the steps  Pt was also given a pair of crutches for stairs   Transfers   Sit to Stand 4  Minimal assistance   Additional items Assist x 1;Verbal cues  (to push up from the bed)   Stand to Sit 4  Minimal assistance   Additional items Assist x 1;Verbal cues  (to reach back to control descent)   Ambulation/Elevation   Gait pattern   ((hopping, increased postural sway))   Gait Assistance 4  Minimal assist   Additional items Assist x 1;Verbal cues  (VC for AD management to stay within the walker)   Assistive Device Rolling walker   Distance 50' x1 with Ezra  LOB x2 with turning, requiring Ezra to recorrect balance  Balance   Static Sitting Good   Dynamic Sitting Fair +   Static Standing Poor +   Dynamic Standing Poor +   Ambulatory Poor +   Endurance Deficit   Endurance Deficit No   Activity Tolerance   Activity Tolerance Patient tolerated treatment well;Patient limited by pain   Medical Staff Made Aware Spoke to Judy Rose   Nurse Made Aware Spoke to RN   Assessment   Prognosis Good   Problem List Decreased strength; Impaired balance;Decreased mobility;Pain   Assessment Pt is a 51 yo F who presents POD 0 RLE 1st digit sesamoid bone excision due to DM foot ulcer  Order placed for PT eval and treat with activity orders for NWB R LE  Pt presents with PMHx of DM2, diabetic neuropathy, HLD, HTN, R fot ulcer, obesity and personal factors of living in 2 story house, mobilizing w/ assistive device, inability to navigate community distances, inability to navigate level surfaces w/o external assistance, unable to perform dynamic tasks in community, limited home support and inability to perform current job functions  Pt clinical presentation is unstable/unpredictable based on these factors  Pt PLOF was Independent with ADLs, IADLs, and functional mobility without use of AD  Upon evaluation, Pt impairments include pain, weakness, decreased endurance, impaired balance, gait deviations, orthopedic restrictions and fall risk  Functional mobility assessment showed a need for Ezra for ambulation and utilizes RW AD for mobilization  Pt demonstrates good tolerance to activity and is able to follow commands appropriately  Pt is a good candidate for IP PT to address their impairments and to prevent secondary complications associated with hospital stay  Pt D/C recommendation is for Home with home PT consult to facilitate safe discharge and facilitate further recovery to PLOF  Plan to perform stair training with crutches next session, however this is not required for discharge  Goals   Patient Goals to go home   STG Expiration Date 08/05/20   Short Term Goal #1 pt will:  Increase L LE strength 1/2 grade to facilitate independent mobility, Perform all bed mobility tasks independently to decrease fall risk factors, Perform all transfers modified independent to improve independence, Ambulate 50 ft  with roller walker while hopping modified independently w/o LOB, Navigate 13 stairs w/ supervision using axillary crutches with unilateral handrail to facilitate return to previous living environment, Increase all balance 1/2 grade to decrease risk for falls and Improve Barthel Index score to 95 or greater to facilitate independence   PT Treatment Day 0   Plan   Treatment/Interventions Functional transfer training;LE strengthening/ROM; Elevations; Therapeutic exercise; Endurance training;Gait training;Bed mobility;Spoke to nursing;OT   PT Frequency Other (Comment)  (3-5x/wk)   Recommendation   PT Discharge Recommendation Home with skilled therapy  (HHPT consult)   Equipment Recommended Walker;Crutches   PT - OK to Discharge Yes   Additional Comments when medically appropriate   Barthel Index   Feeding 10   Bathing 5   Grooming Score 5   Dressing Score 10   Bladder Score 10   Bowels Score 10   Toilet Use Score 5   Transfers (Bed/Chair) Score 10   Mobility (Level Surface) Score 0   Stairs Score 5   Barthel Index Score 70       Skilled PT recommended while in hospital and upon DC to progress pt toward treatment goals       Rosa Flores, SPT 7/26/2020

## 2020-07-26 NOTE — ASSESSMENT & PLAN NOTE
Lab Results   Component Value Date    HGBA1C 6 8 (H) 06/15/2020       Recent Labs     07/25/20  1654 07/25/20  2058 07/26/20  0838 07/26/20  1055   POCGLU 123 136 97 199*       Blood Sugar Average: Last 72 hrs:  (P) 143 75     Patient on glipizide, Invokana and metformin - presently on hold, can resume at discharge  Continue insulin sliding scale  Monitor Accu-Cheks closely  Avoid hypoglycemia  Hypoglycemia protocol in place    aspirin 81 mg p o   Daily

## 2020-07-26 NOTE — SOCIAL WORK
Spoke with Ayleen Zavala, podiatry  He indicates pt is tentative for d/c 7/27  She will need [facility] follow up for dressing changes  [facility] AT [facility] provider list reviewed  Choice is VNSL  Rferral made via ecin

## 2020-07-27 VITALS
WEIGHT: 225 LBS | SYSTOLIC BLOOD PRESSURE: 86 MMHG | OXYGEN SATURATION: 100 % | BODY MASS INDEX: 34.1 KG/M2 | RESPIRATION RATE: 18 BRPM | DIASTOLIC BLOOD PRESSURE: 50 MMHG | HEIGHT: 68 IN | TEMPERATURE: 98.2 F | HEART RATE: 113 BPM

## 2020-07-27 LAB
GLUCOSE SERPL-MCNC: 137 MG/DL (ref 65–140)
GLUCOSE SERPL-MCNC: 219 MG/DL (ref 65–140)

## 2020-07-27 PROCEDURE — 97530 THERAPEUTIC ACTIVITIES: CPT

## 2020-07-27 PROCEDURE — 99232 SBSQ HOSP IP/OBS MODERATE 35: CPT | Performed by: INTERNAL MEDICINE

## 2020-07-27 PROCEDURE — 97116 GAIT TRAINING THERAPY: CPT

## 2020-07-27 PROCEDURE — 99024 POSTOP FOLLOW-UP VISIT: CPT | Performed by: PODIATRIST

## 2020-07-27 PROCEDURE — 97535 SELF CARE MNGMENT TRAINING: CPT

## 2020-07-27 PROCEDURE — 82948 REAGENT STRIP/BLOOD GLUCOSE: CPT

## 2020-07-27 RX ORDER — OXYCODONE HYDROCHLORIDE AND ACETAMINOPHEN 5; 325 MG/1; MG/1
1 TABLET ORAL EVERY 8 HOURS PRN
Qty: 5 TABLET | Refills: 0 | Status: SHIPPED | OUTPATIENT
Start: 2020-07-27 | End: 2020-07-31

## 2020-07-27 RX ORDER — CEPHALEXIN 500 MG/1
500 CAPSULE ORAL EVERY 6 HOURS SCHEDULED
Qty: 20 CAPSULE | Refills: 0 | Status: SHIPPED | OUTPATIENT
Start: 2020-07-27 | End: 2020-08-01

## 2020-07-27 RX ADMIN — HEPARIN SODIUM 5000 UNITS: 5000 INJECTION INTRAVENOUS; SUBCUTANEOUS at 05:00

## 2020-07-27 RX ADMIN — LISINOPRIL: 10 TABLET ORAL at 08:33

## 2020-07-27 RX ADMIN — INSULIN LISPRO 2 UNITS: 100 INJECTION, SOLUTION INTRAVENOUS; SUBCUTANEOUS at 12:20

## 2020-07-27 RX ADMIN — CEFAZOLIN SODIUM 1000 MG: 1 SOLUTION INTRAVENOUS at 04:52

## 2020-07-27 RX ADMIN — ASPIRIN 81 MG: 81 TABLET, COATED ORAL at 08:32

## 2020-07-27 NOTE — PHYSICAL THERAPY NOTE
Physical Therapy [place]    Patient Name: Farheen MCMAHANKGL'ARBEN Date: 7/27/2020     Problem List  Principal Problem:    Type 2 diabetes mellitus with foot ulcer ([facility] [address] )  Active Problems:    Diabetes mellitus with diabetic neuropathy ([facility] )    Hyperlipidemia    Benign essential hypertension    Right foot ulcer, with unspecified severity ([facility] )    Obesity       Past Medical History  Past Medical History:   Diagnosis Date    Cellulitis of left lower extremity 4/15/2020    Diabetes mellitus ([facility] )     Exposure to SARS-associated coronavirus 4/15/2020    Fever 4/15/2020    Hyperlipidemia     Hypertension         Past Surgical History  Past Surgical History:   Procedure Laterality Date    TONSILLECTOMY AND ADENOIDECTOMY      WOUND DEBRIDEMENT Right 7/25/2020    Procedure: Excision of non healing diabetic right foot Ulcer, with closure of wound, excision of tibial sesamoid right foot;  Surgeon: Lillie Banerjee DPM;  Location: [facility];  Service: Podiatry           07/27/20 0952   Pain Assessment   Pain Assessment Tool 0-10   Pain Score 4   Pain Location/Orientation Orientation: Right;Location: Foot   Restrictions/Precautions   Weight Bearing Precautions Per Order Yes   RLE Weight Bearing Per Order NWB   Other Precautions Pain; Fall Risk;WBS   General   Family/Caregiver Present No   Cognition   Overall Cognitive Status WFL   Orientation Level Oriented X4   Comments PLEASANT AND MOTIVATED - EAGER TO GET HOME    Subjective   Subjective PT AGREEABLE TO PT- EAGER TO GET HOME    Bed Mobility   Rolling R 7  Independent   Rolling L 7  Independent   Supine to Sit 7  Independent   Sit to Supine 7  Independent   Transfers   Sit to Stand 6  Modified independent   Stand to Sit 6  Modified independent   Stand pivot 6  Modified independent   Toilet transfer 5  Supervision   Additional Comments GOOD AWARENESS AND COMPLIANCE W/ NWB USING RW- DISCUSSED RW VS AC FOR HOME USE AND EDUCATED ON ADVANTAGES OF RW FOR BALANCE AND COMPLIANCE W/ WBS- PT IN AGREEMENT    Ambulation/Elevation   Gait Assistance 5  Supervision   Additional items Assist x 1;Verbal cues   Assistive Device Rolling walker   Distance APPROX 50' THEN 10' W/ AC - PT ABLE TO MAINTAIN NWB ON RLE THROUGHOUT    Stair Management Assistance 4  Minimal assist  (CGA)   Stair Management Technique One rail R;Foreward;Backward; With crutches  (NWB W/ RAIL AND CRUTCH- CAN ALSO COMPLETE ON BUTTOCKS )   Number of Stairs 2   Balance   Static Sitting Good   Dynamic Sitting Good   Static Standing Fair   Dynamic Standing Fair   Ambulatory Fair  (RW)   Endurance Deficit   Endurance Deficit Yes  (UE FATIGUE W/ NEW NWB TO LE )   Activity Tolerance   Activity Tolerance Patient tolerated treatment well   Medical Staff Made Aware OT CM   Nurse Made Aware YES- UPDATED- CLEARED FOR D/C HOME    Exercises   Ankle Pumps 25 reps   Equipment Use   Comments SIMULATED CAR AND TUB XFERS    Assessment   Prognosis Excellent   Problem List Decreased strength;Decreased range of motion;Decreased endurance; Impaired balance;Decreased mobility; Impaired sensation;Pain;Decreased skin integrity   Assessment PT PERFORMING ALL XFERS AND HOUSEHOLD DISTANCE GAIT W/ S/SBA W/ 100% COMPLIANCE W/ NWB TO RLE THROUGHOUT  EDUCATED ON USE OF RW FOR VARIED XFERS AND GAIT INCLUDING SIMULATED CAR XFERS- PT ABLE TO NEGOTIATE STEPS W/ AC AND RAIL W/ GOOD COMPLIANCE W/ WBS  PT DECLINING NEED FOR HOME PT- GOOD AWARENSS OF NEEDS/ 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  GOALS ACHIEVED CLEARED FOR D/C    Goals   Patient Goals GO HOME    STG Expiration Date 08/05/20   PT Treatment Day 1   Plan   Treatment/Interventions Functional transfer training;LE strengthening/ROM; Elevations; Therapeutic exercise; Endurance training;Patient/family training;Equipment eval/education; Bed mobility;Gait training; Compensatory technique education;Continued evaluation;Spoke to nursing;Spoke to case management;OT;Family   Progress Discontinue PT   Recommendation   PT Discharge Recommendation Return to previous environment with social support  (DENIES [address] )   Equipment Recommended Walker;Crutches   PT - OK to Discharge Yes     Ramona Collet, PT

## 2020-07-27 NOTE — PROGRESS NOTES
Progress Note - Infectious Disease   Miryam Ruiz 52 y o  female MRN: [id number]  Unit/Bed#: [facility] 928-01 Encounter: [id number]      Impression:  1  Infected right foot plantar diabetic ulcer with secondary cellulitis 1st digit and osteomyelitis medial sesamoid S/P ulcer and sesamoid bone excision POD 2  2  DM type 2 with retinopathy, neuropathy R/0 PAD    Recommendations:    Patient is afebrile with negative blood culture  Wound showing 3+ group A strep and Staphylococcus aureus (MSSA)    WBC count is WNL  Patient has been tolerating cephalosporins without any evidence of allergy  1  Discontinue cefazolin 1 g q 8 hours IV  2  On discharge switch to p o  Cephalexin 500 mg q i d  for additional 5 days  3  F/U by podiatry    Antibiotics:  1  Cefazolin 1 g q 8 hours IV, day 5 total antibiotic Rx    Subjective:  Denies pain  Denies fevers, chills, or sweats  Denies nausea, vomiting, or diarrhea  Objective:  Vitals:  Temp:  [98 2 °F (36 8 °C)-98 7 °F (37 1 °C)] 98 2 °F (36 8 °C)  HR:  [68-78] 68  Resp:  [16-17] 16  BP: (131-148)/(70-84) 131/70  SpO2:  [96 %-98 %] 98 %  Temp (24hrs), Av 4 °F (36 9 °C), Min:98 2 °F (36 8 °C), Max:98 7 °F (37 1 °C)  Current: Temperature: 98 2 °F (36 8 °C)    Physical Exam:     General Appearance:  Alert, nontoxic, no acute distress  Throat: Oropharynx moist without lesions  Lips, mucosa, and tongue normal   Neck: Supple, symmetrical, trachea midline, no adenopathy,  no tenderness/mass/nodules   Lungs:   Clear to auscultation bilaterally, no audible wheezes, rhonchi or rales; respirations unlabored   Heart:  Regular rate and rhythm, S1, S2 normal, no murmur, rub or gallop   Abdomen:   Soft, non-tender, non-distended, positive bowel sounds  No masses, no organomegaly    No CVA tenderness   Extremities: Right foot wound picture by podiatry noted  Wound appears clean  Skin: As above, tattoos           Invasive Devices     Peripheral Intravenous Line            Peripheral IV 07/27/20 Left Wrist less than 1 day                Labs, Imaging, & Other studies:   All pertinent labs were personally reviewed  Results from last 7 days   Lab Units 07/26/20  0620 07/25/20  0501 07/24/20  0853   WBC Thousand/uL 8 36 6 13 5 81   HEMOGLOBIN g/dL 10 6* 11 1* 11 3*   PLATELETS Thousands/uL 267 286 300     Results from last 7 days   Lab Units 07/26/20  0619 07/25/20  0501 07/24/20  0853 07/23/20  1422   SODIUM mmol/L 141 141 139 137   POTASSIUM mmol/L 4 0 3 7 3 9 3 7   CHLORIDE mmol/L 111* 110* 109* 107   CO2 mmol/L 25 24 24 26   BUN mg/dL 10 14 14 13   CREATININE mg/dL 0 69 0 67 0 77 0 77   EGFR ml/min/1 73sq m 103 104 91 91   CALCIUM mg/dL 8 3 8 6 9 1 8 8   AST U/L  --   --   --  12   ALT U/L  --   --   --  17   ALK PHOS U/L  --   --   --  117*     Results from last 7 days   Lab Units 07/23/20  1423 07/23/20  1228   BLOOD CULTURE  No Growth at 72 hrs   --    GRAM STAIN RESULT   --  No Polys*  Rare Gram positive cocci in pairs*   WOUND CULTURE   --  3+ Growth of Streptococcus pyogenes (Group A)*  3+ Growth of Staphylococcus aureus*

## 2020-07-27 NOTE — ASSESSMENT & PLAN NOTE
Lab Results   Component Value Date    HGBA1C 6 8 (H) 06/15/2020       Recent Labs     07/26/20  1706 07/26/20  2035 07/27/20  0805 07/27/20  1129   POCGLU 172* 199* 137 219*       Blood Sugar Average: Last 72 hrs:  (P) 216 [id number]     Patient on glipizide, Invokana and metformin - presently on hold, can resume at discharge  Continue insulin sliding scale  Monitor Accu-Cheks closely  Avoid hypoglycemia  Hypoglycemia protocol in place    aspirin 81 mg p o   Daily

## 2020-07-27 NOTE — PLAN OF CARE
Problem: PHYSICAL THERAPY ADULT  Goal: Performs mobility at highest level of function for planned discharge setting  See evaluation for individualized goals  Description  Treatment/Interventions: Functional transfer training, LE strengthening/ROM, Elevations, Therapeutic exercise, Endurance training, Gait training, Bed mobility, Spoke to nursing, OT  Equipment Recommended: Lars Ly       See flowsheet documentation for full assessment, interventions and recommendations  Outcome: Adequate for Discharge  Note:   Prognosis: Excellent  Problem List: Decreased strength, Decreased range of motion, Decreased endurance, Impaired balance, Decreased mobility, Impaired sensation, Pain, Decreased skin integrity  Assessment: PT PERFORMING ALL XFERS AND HOUSEHOLD DISTANCE GAIT W/ S/SBA W/ 100% COMPLIANCE W/ NWB TO RLE THROUGHOUT  EDUCATED ON USE OF RW FOR VARIED XFERS AND GAIT INCLUDING SIMULATED CAR XFERS- PT ABLE TO NEGOTIATE STEPS W/ AC AND RAIL W/ GOOD COMPLIANCE W/ WBS  PT DECLINING NEED FOR HOME PT- GOOD 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

## 2020-07-27 NOTE — PROGRESS NOTES
Progress Note - Taurus Torres 1970, 52 y o  female MRN: [id number]    Unit/Bed#: [facility] 928-01 Encounter: [id number]    Primary Care Provider: Nuvia Avila DO   Date and time admitted to hospital: 7/23/2020 11:59 AM        Right foot ulcer, with unspecified severity ([address] )  Assessment & Plan  Right diabetic foot ulcer  antibiotics per infectious disease  Rest of care per primary team    Diabetes mellitus with diabetic neuropathy [facility])  Assessment & Plan  Lab Results   Component Value Date    HGBA1C 6 8 (H) 06/15/2020       Recent Labs     07/26/20  1706 07/26/20  2035 07/27/20  0805 07/27/20  1129   POCGLU 172* 199* 137 219*       Blood Sugar Average: Last 72 hrs:  (P) 662 [id number]     Patient on glipizide, Invokana and metformin - presently on hold, can resume at discharge  Continue insulin sliding scale  Monitor Accu-Cheks closely  Avoid hypoglycemia  Hypoglycemia protocol in place    aspirin 81 mg p o  Daily    Obesity  Assessment & Plan  Therapeutic lifestyle modification    Benign essential hypertension  Assessment & Plan  Monitor blood pressures  Avoid hypotension    Hyperlipidemia  Assessment & Plan  Continue statin  Aspirin 81 mg p o  Daily        VTE Pharmacologic Prophylaxis:   Pharmacologic: Heparin  Mechanical VTE Prophylaxis in Place: Yes    Patient Centered Rounds: I have performed bedside rounds with nursing staff today  Discussions with Specialists or Other Care Team Provider:     Education and Discussions with Family / Patient:  Discussed with the patient, significant other at bedside    Time Spent for Care: 30 minutes  More than 50% of total time spent on counseling and coordination of care as described above      Current Length of Stay: 4 day(s)    Current Patient Status: Inpatient   Certification Statement: The patient will continue to require additional inpatient hospital stay due to As outlined    Discharge Plan:  Outpatient follow-up with primary care physician, discharge planning per primary service    Code Status: Prior      Subjective:     Comfortably in bed  Reports feeling okay    Objective:     Vitals:   Temp (24hrs), Av 4 °F (36 9 °C), Min:98 2 °F (36 8 °C), Max:98 7 °F (37 1 °C)    Temp:  [98 2 °F (36 8 °C)-98 7 °F (37 1 °C)] 98 2 °F (36 8 °C)  HR:  [68-78] 68  Resp:  [16-17] 16  BP: (131-148)/(70-84) 131/70  SpO2:  [96 %-98 %] 98 %  Body mass index is 34 21 kg/m²  Input and Output Summary (last 24 hours): Intake/Output Summary (Last 24 hours) at 2020 1332  Last data filed at 2020 0946  Gross per 24 hour   Intake 600 ml   Output 400 ml   Net 200 ml       Physical Exam:     Physical Exam    Comfortably in bed  Obese  Short thick neck  Lungs diminished breath sounds bilateral bases no additional sounds  Heart sounds S1-S2 noted no murmurs appreciable  Abdomen soft nontender  Abdominal obesity noted  Awake obeys simple commands  Right foot in bandages  No rash    Additional Data:     Labs:    Results from last 7 days   Lab Units 20  0620   WBC Thousand/uL 8 36   HEMOGLOBIN g/dL 10 6*   HEMATOCRIT % 33 5*   PLATELETS Thousands/uL 267   NEUTROS PCT % 61   LYMPHS PCT % 25   MONOS PCT % 10   EOS PCT % 3     Results from last 7 days   Lab Units 20  0619  20  1422   SODIUM mmol/L 141   < > 137   POTASSIUM mmol/L 4 0   < > 3 7   CHLORIDE mmol/L 111*   < > 107   CO2 mmol/L 25   < > 26   BUN mg/dL 10   < > 13   CREATININE mg/dL 0 69   < > 0 77   ANION GAP mmol/L 5   < > 4   CALCIUM mg/dL 8 3   < > 8 8   ALBUMIN g/dL  --   --  3 1*   TOTAL BILIRUBIN mg/dL  --   --  0 30   ALK PHOS U/L  --   --  117*   ALT U/L  --   --  17   AST U/L  --   --  12   GLUCOSE RANDOM mg/dL 116   < > 118    < > = values in this interval not displayed       Results from last 7 days   Lab Units 20  1422   INR  1 03     Results from last 7 days   Lab Units 20  1129 20  0805 20  2035 20  1706 20  1055 20  0059 20  3609 07/25/20  1654 07/25/20  1106 07/25/20  0924 07/24/20  2053 07/24/20  1732   POC GLUCOSE mg/dl 219* 137 199* 172* 199* 97 136 123 114 147* 247* 208*                   * I Have Reviewed All Lab Data Listed Above  * Additional Pertinent Lab Tests Reviewed: All Labs Within Last 24 Hours Reviewed    Imaging:    Imaging Reports Reviewed Today Include:   Imaging Personally Reviewed by Myself Includes:     Recent Cultures (last 7 days):     Results from last 7 days   Lab Units 07/23/20  1423 07/23/20  1228   BLOOD CULTURE  No Growth at 72 hrs   --    GRAM STAIN RESULT   --  No Polys*  Rare Gram positive cocci in pairs*   WOUND CULTURE   --  3+ Growth of Streptococcus pyogenes (Group A)*  3+ Growth of Staphylococcus aureus*       Last 24 Hours Medication List:     Current Facility-Administered Medications:  acetaminophen 650 mg Oral Q6H PRN Clint Cisneros DPM    aspirin 81 mg Oral Daily Conner Pierce MD    cefazolin 1,000 mg Intravenous Q8H Juan Luis Joe MD Last Rate: 1,000 mg (07/27/20 0452)   heparin (porcine) 5,000 Units Subcutaneous [facility] Carlos Lpoez DPM    HYDROmorphone 1 mg Intravenous Q4H PRN Carlos Lopez DPM    insulin lispro 1-6 Units Subcutaneous TID AC Carlos Lopez DPM    insulin lispro 1-6 Units Subcutaneous HS Carlos Lopez DPM    lisinopril-hydrochlorothiazide (PRINZIDE 10/12  5) combo dose  Oral Daily Carlos Lopez DPM    ondansetron 4 mg Intravenous Q8H PRN Clint Cisneros DPM    oxyCODONE-acetaminophen 1 tablet Oral Q8H PRN Clint Cisneros DPM    pravastatin 80 mg Oral Daily With [employer] CASEY Lopez    senna-docusate sodium 1 tablet Oral BID Clint Cisneros DPM         Today, Patient Was Seen By: Conner Pierce MD    ** Please Note: Dictation voice to text software may have been used in the creation of this document   **

## 2020-07-27 NOTE — SOCIAL WORK
Referral made to [facility] DME via [facility] for RW per pt's request  Referral to [facility] canceled as per podiatry, pt will not need dressing changes at home--will f/u outpt at office

## 2020-07-27 NOTE — PROGRESS NOTES
Progress Note - Podiatry  Sachin Lora 52 y o  female MRN: [id number]  [facility] Encounter: [id number]    Assessment:  Sachin Lora is a 52 y o  female with:     1  Right foot diabetic ulceration - [place] 3 - POA  1  S/P right foot wound excision with closure, as well as tibial sesamoid removal (07/25/2020)  2  DM2 with neuropathy    Plan:  - Right foot surgical assessed and appears well coapted without dehiscence or signs of infection  Patient is stable for discharge  Dressed with Betadine-soaked Adaptic and dry dressing  Patient will follow up with Dr Skinny Jalloh (discharge instructions placed)  - Antibiotics per ID: IV ancef  Dispo with po keflex for 5 days  - Weight bearing status: NWB RLE  PT/OT on board; rec home with skilled therapy      Disposition: patient stable for dispo today      Subjective/Objective   Chief Complaint: No chief complaint on file  Subjective: 52 y o  female was seen and evaluated at bedside  Feels well today, ready to be discharged    Blood pressure 131/70, pulse 68, temperature 98 2 °F (36 8 °C), resp  rate 16, height 5' 8" (1 727 m), weight 102 kg (225 lb), SpO2 98 %, not currently breastfeeding  Body mass index is 34 21 kg/m²  Physical Exam:   General: Alert, cooperative and no distress  Lower Extremities: Neurovascular status at baseline bilaterally, musculoskeletal function at baseline bilaterally, no calf tenderness bilaterally  Right submetatarsal one head incision at site of tibial sesamoid excision appears well coapted without dehiscence, all sutures intact, no tissue necrosis noted  No signs of active infection: no purulence, no malodor, no ascending erythema, no crepitus, no fluctuance       right foot         Lab, Imaging and other studies:   Results from last 7 days   Lab Units 07/26/20  0620   WBC Thousand/uL 8 36   HEMOGLOBIN g/dL 10 6*   HEMATOCRIT % 33 5*   PLATELETS Thousands/uL 267   NEUTROS PCT % 61   LYMPHS PCT % 25   MONOS PCT % 10   EOS PCT % 3     Results from last 7 days   Lab Units 07/26/20  0619  07/23/20  1422   POTASSIUM mmol/L 4 0   < > 3 7   CHLORIDE mmol/L 111*   < > 107   CO2 mmol/L 25   < > 26   BUN mg/dL 10   < > 13   CREATININE mg/dL 0 69   < > 0 77   CALCIUM mg/dL 8 3   < > 8 8   ALK PHOS U/L  --   --  117*   ALT U/L  --   --  17   AST U/L  --   --  12    < > = values in this interval not displayed  Results from last 7 days   Lab Units 07/23/20  1422   INR  1 03       Results from last 7 days   Lab Units 07/23/20  1423 07/23/20  1228   BLOOD CULTURE  No Growth at 72 hrs   --    GRAM STAIN RESULT   --  No Polys*  Rare Gram positive cocci in pairs*   WOUND CULTURE   --  3+ Growth of Streptococcus pyogenes (Group A)*  3+ Growth of Staphylococcus aureus*             Portions of the record may have been created with voice recognition software  Occasional wrong word or "sound a like" substitutions may have occurred due to the inherent limitations of voice recognition software  Read the chart carefully and recognize, using context, where substitutions have occurred      Mandy Salazar DPM

## 2020-07-27 NOTE — OCCUPATIONAL THERAPY NOTE
Occupational Therapy Treatment Note:       07/27/20 0945   Restrictions/Precautions   Weight Bearing Precautions Per Order Yes   RLE Weight Bearing Per Order NWB   Other Precautions Fall Risk   ADL   Where Assessed Edge of bed   LB Dressing Assistance 6  Modified independent   LB Dressing Deficit Don/doff L sock; Thread RLE into pants; Thread LLE into pants;Pull up over hips   LB Dressing Comments seated and in stance at RW   Functional Standing Tolerance   Time ~5 mins   Activity staitc standing    Comments RW level   Bed Mobility   Rolling R 7  Independent   Rolling L 7  Independent   Supine to Sit 7  Independent   Sit to Supine 7  Independent   Additional Comments presents supine, seated OOOB in chiar at end of session   Transfers   Sit to Stand 6  Modified independent   Stand to Sit 6  Modified independent   Stand pivot 6  Modified independent   Toilet transfer 5  Supervision   Additional Comments RW level   Functional Mobility   Functional Mobility 5  Supervision   Additional Comments house hold distances   Additional items Rolling walker   Cognition   Overall Cognitive Status WFL   Arousal/Participation Responsive; Cooperative   Attention Within functional limits   Orientation Level Oriented X4   Memory Within functional limits   Following Commands Follows all commands and directions without difficulty   Comments plesant and cooperative, good carryover of educaiton   Activity Tolerance   Activity Tolerance Patient tolerated treatment well   Medical Staff Made Aware NSG aware   Assessment   Assessment Pt was seen this date for OT tx session focusing on self care tasks, sit to stand progressions, bed mobility, tranfers, funcoitnal mobility, stanidng tolerance, home d/c discussion and planning an doverall activity tolerance  Pt with noted progress toward goals, demosntrates good carryover of compensatory techqniues and WBS, has supportive  at home to assist if needed  Would recommend RW and shower chair   Pt goals have been addressed, spoekw ti hOTR/L No immeidate need, OT will sign off   Plan   Treatment Interventions ADL retraining   Goal Expiration Date 08/09/20   OT Treatment Day 1   OT Frequency 3-5x/wk   Recommendation   OT Discharge Recommendation Return to previous environment with social support   Equipment Recommended Tub seat with back     Eduardo Doctor, [address]

## 2020-07-27 NOTE — PLAN OF CARE
Problem: OCCUPATIONAL THERAPY ADULT  Goal: Performs self-care activities at highest level of function for planned discharge setting  See evaluation for individualized goals  Description  Treatment Interventions: ADL retraining, Functional transfer training, Endurance training, Equipment evaluation/education, Patient/family training, Compensatory technique education, Continued evaluation, Energy conservation, Activityengagement  Equipment Recommended: (S) Tub seat with back       See flowsheet documentation for full assessment, interventions and recommendations  Outcome: Completed  Note:   Limitation: Decreased ADL status, Decreased endurance, Decreased self-care trans, Decreased high-level ADLs  Prognosis: Fair  Assessment: Pt was seen this date for OT tx session focusing on self care tasks, sit to stand progressions, bed mobility, tranfers, funcoitnal mobility, stanidng tolerance, home d/c discussion and planning an doverall activity tolerance  Pt with noted progress toward goals, demosntrates good carryover of compensatory techqniues and WBS, has supportive  at home to assist if needed  Would recommend RW and shower chair   Pt goals have been addressed, spoekw ti hOTR/L No immeidate need, OT will sign off     OT Discharge Recommendation: Return to previous environment with social support  OT - OK to Discharge: Yes(when medically stable)

## 2020-07-27 NOTE — DISCHARGE SUMMARY
Discharge Summary -   Dana Pérez 52 y o  female MRN: [id number]  [facility] Encounter: [id number]    Admission Date: 7/23/2020     Admitting Diagnosis: Ulcer of right foot, unspecified ulcer stage [facility])    HPI: "Dana Pérez is a 52 y o  female who is being admitted 7/23/2020 due to a chronic non-healing diabetic ulceration  Patient has a past medical history of type 2 diabetes mellitus with polyneuropathy, hypertension, hyperlipidemia  Patient reports that this wound has been "on and off" for years  She says that she last saw Dr Yasemin Downing 07/07/2020 where her wound was dressed with silver alginate and DSD  She also mentions that she completed a 10-day course of bactrim on 07/12/2020  She denies severe pain to her right foot, but does mention that it "aches" when someone "messes with it " She says that since her last ER visit on 07/17/2020, her wound has become more painful, is draining slightly more, and has grown in size  Patient denies nausea, vomiting, chest pain, shortness of breath, chills, fever "     Procedures Performed: Excision of non healing diabetic right foot Ulcer, with closure of wound, excision of tibial sesamoid right foot:     Hospital Course:  Patient was admitted 07/23/2020 under podiatry service, Dr Yasemin Downing for right infected DFU  See below for hospital course in sequential order  Significant Findings, Care, Treatment and Services Provided:   1  Right foot XR (7/23): no ABDIEL or OM  2  Right foot MRI (7/23): right tibial sesamoid acute osteomyelitis  3  Antibiotics: po doxycycline (7/23-24), cefepime (7/24-25), flagyl (7/24-27), ancef (7/25-7/27), keflex at dispo for 5 days  4  ID consultation (see above for abx)  5  Internal medicine consultation: clearance and medical management  6  Wound culture: group A strep & Staph Aureus  7  OR/surgery: Right tibial sesamoid excision & wound excision (DOS 7/25/20)  8  PT/OT on consulted and rec home with home services  9   Stable for dispo 7/27/20 with po keflex, JOSEFINAB KATERINAE  F/u with Dr Kojo Hidalgo outpt  Complications:  None    Discharge Diagnosis:  Same as admission, plus s/p right tibial sesamoid excision secondary to osteomyelitis (DOS 7/25/20)    Condition at Discharge: stable     Discharge instructions/Information to patient and family:   See after visit summary for information provided to patient and family  Provisions for Follow-Up Care/Important appointments:  See after visit summary for information related to follow-up care and any pertinent home health orders  Disposition: Home    Planned Readmission: No    Discharge Statement   I spent 30 minutes discharging the patient  This time was spent on the day of discharge  I had direct contact with the patient on the day of discharge  The details of this patient's discharge     Discharge Medications:  See after visit summary for reconciled discharge medications provided to patient and family

## 2020-07-27 NOTE — NURSING NOTE
DC instruction reviewed  IV taken out  Pt escorted via wheelchair down to pharmacy to  Cephalexin & Percocet

## 2020-07-28 ENCOUNTER — TRANSITIONAL CARE MANAGEMENT (OUTPATIENT)
Dept: FAMILY MEDICINE CLINIC | Facility: CLINIC | Age: 50
End: 2020-07-28

## 2020-07-28 DIAGNOSIS — Z71.89 COMPLEX CARE COORDINATION: Primary | ICD-10-CM

## 2020-07-28 LAB — BACTERIA BLD CULT: NORMAL

## 2020-07-30 ENCOUNTER — PATIENT OUTREACH (OUTPATIENT)
Dept: FAMILY MEDICINE CLINIC | Facility: CLINIC | Age: 50
End: 2020-07-30

## 2020-07-31 ENCOUNTER — OFFICE VISIT (OUTPATIENT)
Dept: PODIATRY | Facility: CLINIC | Age: 50
End: 2020-07-31

## 2020-07-31 VITALS
HEART RATE: 87 BPM | HEIGHT: 68 IN | SYSTOLIC BLOOD PRESSURE: 121 MMHG | BODY MASS INDEX: 34.21 KG/M2 | DIASTOLIC BLOOD PRESSURE: 70 MMHG

## 2020-07-31 DIAGNOSIS — E11.40 TYPE 2 DIABETES MELLITUS WITH DIABETIC NEUROPATHY, UNSPECIFIED WHETHER LONG TERM INSULIN USE (HCC): ICD-10-CM

## 2020-07-31 DIAGNOSIS — L97.519 ULCER OF RIGHT FOOT, UNSPECIFIED ULCER STAGE (HCC): Primary | ICD-10-CM

## 2020-07-31 PROCEDURE — 99024 POSTOP FOLLOW-UP VISIT: CPT | Performed by: PODIATRIST

## 2020-07-31 PROCEDURE — 3074F SYST BP LT 130 MM HG: CPT | Performed by: PODIATRIST

## 2020-07-31 PROCEDURE — 3078F DIAST BP <80 MM HG: CPT | Performed by: PODIATRIST

## 2020-07-31 PROCEDURE — 1111F DSCHRG MED/CURRENT MED MERGE: CPT | Performed by: PODIATRIST

## 2020-07-31 NOTE — PROGRESS NOTES
PATIENT:  Parmjit Tan      1970    ASSESSMENT     1  Ulcer of right foot, unspecified ulcer stage ([facility] [address] )     2  Type 2 diabetes mellitus with diabetic neuropathy, unspecified whether long term insulin use [facility])            PLAN  Patient is doing well post-operatively  Sutures left intact  Incision was cleaned with betadine and DSD applied to be kept C/D/I  Continue post-op care as instructed  NWB right foot  Stressed on patient compliance about proper off-loading, staying off of feet, and proper dressing care  Call if any increase in pain, fevers, calf pain, shortness of breath, or general distress is noted  Patient instructed to go to ER if call is not returned immediately  RA in 1 week  HISTORY OF PRESENT ILLNESS  Patient presents for right foot evaluation  S/P wound closure and removal of sesamoid due to osteomyelitis  Pain is minimal   The patient is feeling well and in good spirits  Patient reported no post-op concern  REVIEW OF SYSTEMS  Patient denied CP, SOB, fever, chills, palpatation, HA, GI problem, or calf pain  PHYSICAL EXAMINATION  GENERAL  The patient appears in NAD / non-toxic  Afebrile  VSS    VASCULAR EXAM  Pedal pulses and vascular status are intact  No calf pain or edema bilaterally  No cyanosis  DERMATOLOGIC EXAM  Incision is coapted and healing well  No signs of infection  No active drainage  Minimal edema  No redness  No necrosis or dehiscence  NEUROLOGIC EXAM  AAO X 3  No focal neurologic deficit  Neurologic status is intact BLE  MUSCULOSKELETAL EXAM  Normal post-op findings  ROM intact  No fluctuation or crepitus

## 2020-08-07 ENCOUNTER — OFFICE VISIT (OUTPATIENT)
Dept: PODIATRY | Facility: CLINIC | Age: 50
End: 2020-08-07

## 2020-08-07 VITALS
SYSTOLIC BLOOD PRESSURE: 125 MMHG | BODY MASS INDEX: 34.21 KG/M2 | HEIGHT: 68 IN | HEART RATE: 89 BPM | DIASTOLIC BLOOD PRESSURE: 74 MMHG | TEMPERATURE: 95.9 F

## 2020-08-07 DIAGNOSIS — E11.40 TYPE 2 DIABETES MELLITUS WITH DIABETIC NEUROPATHY, UNSPECIFIED WHETHER LONG TERM INSULIN USE (HCC): ICD-10-CM

## 2020-08-07 DIAGNOSIS — L97.519 ULCER OF RIGHT FOOT, UNSPECIFIED ULCER STAGE (HCC): Primary | ICD-10-CM

## 2020-08-07 PROCEDURE — 1111F DSCHRG MED/CURRENT MED MERGE: CPT | Performed by: PODIATRIST

## 2020-08-07 PROCEDURE — 3078F DIAST BP <80 MM HG: CPT | Performed by: PODIATRIST

## 2020-08-07 PROCEDURE — 3074F SYST BP LT 130 MM HG: CPT | Performed by: PODIATRIST

## 2020-08-07 PROCEDURE — 99024 POSTOP FOLLOW-UP VISIT: CPT | Performed by: PODIATRIST

## 2020-08-07 NOTE — PROGRESS NOTES
PATIENT:  Farheen Wheeler      1970    ASSESSMENT     1  Ulcer of right foot, unspecified ulcer stage ([facility] [address] )     2  Type 2 diabetes mellitus with diabetic neuropathy, unspecified whether long term insulin use [facility])            PLAN  Patient is doing well post-operatively  Sutures left intact  Incision was cleaned  Bacitracin and DSD applied  Continue post-op care as instructed  NWB right foot  Stressed on patient compliance about proper off-loading, staying off of feet, and proper dressing care  Call if any increase in pain, fevers, calf pain, shortness of breath, or general distress is noted  Patient instructed to go to ER if call is not returned immediately  RA in 1 week for suture removal         HISTORY OF PRESENT ILLNESS  Patient presents for right foot evaluation  S/P wound closure and removal of sesamoid due to osteomyelitis  Pain is minimal   The patient is feeling well and in good spirits  She presents with crutches for NWB  REVIEW OF SYSTEMS  Patient denied CP, SOB, fever, chills, palpatation, HA, GI problem, or calf pain  PHYSICAL EXAMINATION  GENERAL  The patient appears in NAD / non-toxic  Afebrile  VSS    VASCULAR EXAM  Pedal pulses and vascular status are intact  No calf pain or edema bilaterally  No cyanosis  DERMATOLOGIC EXAM  Incision is coapted and healing well  No signs of infection  No active drainage  Minimal edema  No redness  No necrosis or dehiscence  NEUROLOGIC EXAM  AAO X 3  No focal neurologic deficit  Neurologic status is intact BLE  MUSCULOSKELETAL EXAM  Normal post-op findings  ROM intact  No fluctuation or crepitus

## 2020-08-14 ENCOUNTER — OFFICE VISIT (OUTPATIENT)
Dept: PODIATRY | Facility: CLINIC | Age: 50
End: 2020-08-14

## 2020-08-14 VITALS
HEIGHT: 68 IN | DIASTOLIC BLOOD PRESSURE: 80 MMHG | TEMPERATURE: 97.7 F | HEART RATE: 92 BPM | SYSTOLIC BLOOD PRESSURE: 124 MMHG | BODY MASS INDEX: 34.21 KG/M2

## 2020-08-14 DIAGNOSIS — E11.40 TYPE 2 DIABETES MELLITUS WITH DIABETIC NEUROPATHY, UNSPECIFIED WHETHER LONG TERM INSULIN USE (HCC): ICD-10-CM

## 2020-08-14 DIAGNOSIS — L97.519 ULCER OF RIGHT FOOT, UNSPECIFIED ULCER STAGE (HCC): Primary | ICD-10-CM

## 2020-08-14 PROCEDURE — 3079F DIAST BP 80-89 MM HG: CPT | Performed by: PODIATRIST

## 2020-08-14 PROCEDURE — 3074F SYST BP LT 130 MM HG: CPT | Performed by: PODIATRIST

## 2020-08-14 PROCEDURE — 99024 POSTOP FOLLOW-UP VISIT: CPT | Performed by: PODIATRIST

## 2020-08-14 PROCEDURE — 1111F DSCHRG MED/CURRENT MED MERGE: CPT | Performed by: PODIATRIST

## 2020-08-14 NOTE — PROGRESS NOTES
PATIENT:  Gino Mendoza      1970    ASSESSMENT     1  Ulcer of right foot, unspecified ulcer stage ([facility] [address] )     2  Type 2 diabetes mellitus with diabetic neuropathy, unspecified whether long term insulin use [facility])            PLAN  Patient is doing well post-operatively  Sutures removed  Instructed for skin care and protection  Continue dry dressing  Okay to use CAM walker for WB  Call if any increase in pain, fevers, calf pain, shortness of breath, or general distress is noted  Patient instructed to go to ER if call is not returned immediately  RA in 2 weeks  HISTORY OF PRESENT ILLNESS  Patient presents for right foot evaluation  S/P wound closure and removal of sesamoid due to osteomyelitis  No pain  The patient is feeling well and in good spirits  She presents with crutches for NWB  REVIEW OF SYSTEMS  Patient denied CP, SOB, fever, chills, palpatation, HA, GI problem, or calf pain  PHYSICAL EXAMINATION  GENERAL  The patient appears in NAD / non-toxic  Afebrile  VSS    VASCULAR EXAM  Pedal pulses and vascular status are intact  No calf pain or edema bilaterally  No cyanosis  DERMATOLOGIC EXAM  Incision is coapted and healed well  No signs of infection  No active drainage  Minimal edema  No redness  No necrosis or dehiscence  NEUROLOGIC EXAM  AAO X 3  No focal neurologic deficit  Neurologic status is intact BLE  MUSCULOSKELETAL EXAM  Normal post-op findings  ROM intact  No fluctuation or crepitus

## 2020-08-20 ENCOUNTER — PATIENT OUTREACH (OUTPATIENT)
Dept: FAMILY MEDICINE CLINIC | Facility: CLINIC | Age: 50
End: 2020-08-20

## 2020-08-20 NOTE — LETTER
Date: 08/20/20    Dear Debbie Armas,   My name is Louie Aschoff; I am a registered nurse care manager working with [employer]  I have not been able to reach you and would like to set a time that I can talk with you over the phone or in-person  My work is to help patients that have complex medical conditions get the care they need  This includes patients who may have been in the hospital or emergency room  I have enclosed information for you  Please call me with any questions you may have  I look forward to meeting with you    Sincerely,  Louie Aschoff RN, BSN  [phone]  Outpatient Care Manager  Copy:  Kristen Ybarra,

## 2020-08-28 ENCOUNTER — OFFICE VISIT (OUTPATIENT)
Dept: PODIATRY | Facility: CLINIC | Age: 50
End: 2020-08-28

## 2020-08-28 VITALS — HEIGHT: 68 IN | WEIGHT: 225 LBS | BODY MASS INDEX: 34.1 KG/M2 | TEMPERATURE: 97.7 F

## 2020-08-28 DIAGNOSIS — L97.519 ULCER OF RIGHT FOOT, UNSPECIFIED ULCER STAGE (HCC): Primary | ICD-10-CM

## 2020-08-28 DIAGNOSIS — E11.40 TYPE 2 DIABETES MELLITUS WITH DIABETIC NEUROPATHY, UNSPECIFIED WHETHER LONG TERM INSULIN USE (HCC): ICD-10-CM

## 2020-08-28 PROCEDURE — 3079F DIAST BP 80-89 MM HG: CPT | Performed by: PODIATRIST

## 2020-08-28 PROCEDURE — 99024 POSTOP FOLLOW-UP VISIT: CPT | Performed by: PODIATRIST

## 2020-08-28 PROCEDURE — 1111F DSCHRG MED/CURRENT MED MERGE: CPT | Performed by: PODIATRIST

## 2020-08-28 PROCEDURE — 3074F SYST BP LT 130 MM HG: CPT | Performed by: PODIATRIST

## 2020-08-28 PROCEDURE — 3008F BODY MASS INDEX DOCD: CPT | Performed by: PODIATRIST

## 2020-08-28 NOTE — PROGRESS NOTES
PATIENT:  Christy Layne      1970    ASSESSMENT     1  Ulcer of right foot, unspecified ulcer stage ([facility] [address] )  Device prior authorization   2  Type 2 diabetes mellitus with diabetic neuropathy, unspecified whether long term insulin use ([facility] [address] )  Device prior authorization          PLAN  Patient is doing well post-operatively  Instructed for skin care and protection  Okay to try sneakers around the house  Will make new orthotics in 2 weeks  Okay to return to work next week  HISTORY OF PRESENT ILLNESS  Patient presents for right foot evaluation  S/P wound closure and removal of sesamoid due to osteomyelitis  No pain  The patient is feeling well and in good spirits  No wound or redness right foot  REVIEW OF SYSTEMS  Patient denied CP, SOB, fever, chills, palpatation, HA, GI problem, or calf pain  PHYSICAL EXAMINATION  GENERAL  The patient appears in NAD / non-toxic  Afebrile  VSS    VASCULAR EXAM  Pedal pulses and vascular status are intact  No calf pain or edema bilaterally  No cyanosis  DERMATOLOGIC EXAM  No signs of infection  No active drainage  Minimal edema  No redness  No necrosis or dehiscence  NEUROLOGIC EXAM  AAO X 3  No focal neurologic deficit  Neurologic status is intact BLE  MUSCULOSKELETAL EXAM  Normal post-op findings  ROM intact  No fluctuation or crepitus

## 2020-09-03 ENCOUNTER — TELEPHONE (OUTPATIENT)
Dept: PODIATRY | Facility: CLINIC | Age: 50
End: 2020-09-03

## 2020-09-03 NOTE — TELEPHONE ENCOUNTER
Spoke with Carmen Hinton at [facility] for code  with dx codes of L97 519 and E11 40    Pt has met her $250 deductible, no prior is required, so she is covered 100%    TYU#[id number]    LM for pt to let her know that she is covered and can call back and schedule but she will need to make this a separate appt than her post op appt on 9/11 bc it takes more time than a regular visit

## 2020-09-18 ENCOUNTER — OFFICE VISIT (OUTPATIENT)
Dept: PODIATRY | Facility: CLINIC | Age: 50
End: 2020-09-18
Payer: COMMERCIAL

## 2020-09-18 VITALS
DIASTOLIC BLOOD PRESSURE: 76 MMHG | HEIGHT: 68 IN | HEART RATE: 82 BPM | SYSTOLIC BLOOD PRESSURE: 156 MMHG | BODY MASS INDEX: 33.34 KG/M2 | TEMPERATURE: 97 F | WEIGHT: 220 LBS

## 2020-09-18 DIAGNOSIS — L97.519 ULCER OF RIGHT FOOT, UNSPECIFIED ULCER STAGE (HCC): Primary | ICD-10-CM

## 2020-09-18 DIAGNOSIS — E11.40 TYPE 2 DIABETES MELLITUS WITH DIABETIC NEUROPATHY, UNSPECIFIED WHETHER LONG TERM INSULIN USE (HCC): ICD-10-CM

## 2020-09-18 PROCEDURE — S0395 IMPRESSION CASTING FT: HCPCS | Performed by: PODIATRIST

## 2020-09-18 PROCEDURE — 99024 POSTOP FOLLOW-UP VISIT: CPT | Performed by: PODIATRIST

## 2020-09-18 NOTE — PROGRESS NOTES
PATIENT:  Dwaine Davis      1970    ASSESSMENT     1  Ulcer of right foot, unspecified ulcer stage (HCC)  XR foot 3+ vw right   2  Type 2 diabetes mellitus with diabetic neuropathy, unspecified whether long term [facility]            PLAN  1  Patient is doing well post-operatively  Instructed for skin care and protection  2  She had some hypertrophy in lateral column in the past   Previous X-ray showed the prominent lateral column with pes planus / arch collapse  No clinical evidence of Charcot foot  Will still send her for X-ray for radiographic assessments  3  She was casted for orthotics bilateral feet  4  RA in 4 weeks  HISTORY OF PRESENT ILLNESS  Patient presents for right foot evaluation  S/P wound closure and removal of sesamoid due to osteomyelitis  No pain  The patient is feeling well and in good spirits  No wound or redness right foot  She is doing well at work  REVIEW OF SYSTEMS  Patient denied CP, SOB, fever, chills, palpatation, HA, GI problem, or calf pain  PHYSICAL EXAMINATION  GENERAL  The patient appears in NAD / non-toxic  Afebrile  VSS    VASCULAR EXAM  Pedal pulses and vascular status are intact  No calf pain or edema bilaterally  No cyanosis  DERMATOLOGIC EXAM  No signs of infection  No active drainage  No acute edema  No redness  No necrosis or dehiscence  NEUROLOGIC EXAM  AAO X 3  No focal neurologic deficit  Neurologic status is intact BLE  MUSCULOSKELETAL EXAM  Normal post-op findings  ROM intact  No fluctuation or crepitus  Hypertrophy noted lateral column right foot without redness, warmth, pain, or edema  Pes planus noted

## 2020-09-28 DIAGNOSIS — E11.40 CONTROLLED TYPE 2 DIABETES MELLITUS WITH NEUROPATHY (HCC): ICD-10-CM

## 2020-09-30 ENCOUNTER — HOSPITAL ENCOUNTER (OUTPATIENT)
Dept: RADIOLOGY | Facility: HOSPITAL | Age: 50
Discharge: HOME | End: 2020-09-30
Attending: PODIATRIST
Payer: COMMERCIAL

## 2020-09-30 ENCOUNTER — TRANSCRIBE ORDERS (OUTPATIENT)
Dept: RADIOLOGY | Facility: HOSPITAL | Age: 50
End: 2020-09-30

## 2020-09-30 DIAGNOSIS — L97.519 ULCER OF RIGHT FOOT, UNSPECIFIED ULCER STAGE (HCC): ICD-10-CM

## 2020-09-30 PROCEDURE — 73630 X-RAY EXAM OF FOOT: CPT

## 2020-10-13 ENCOUNTER — APPOINTMENT (EMERGENCY)
Dept: RADIOLOGY | Facility: HOSPITAL | Age: 50
End: 2020-10-13
Payer: COMMERCIAL

## 2020-10-13 ENCOUNTER — HOSPITAL ENCOUNTER (EMERGENCY)
Facility: HOSPITAL | Age: 50
Discharge: HOME | End: 2020-10-13
Attending: EMERGENCY MEDICINE
Payer: COMMERCIAL

## 2020-10-13 VITALS
RESPIRATION RATE: 16 BRPM | OXYGEN SATURATION: 96 % | TEMPERATURE: 97.5 F | WEIGHT: 222.66 LBS | SYSTOLIC BLOOD PRESSURE: 200 MMHG | DIASTOLIC BLOOD PRESSURE: 63 MMHG | HEART RATE: 102 BPM | BODY MASS INDEX: 33.86 KG/M2

## 2020-10-13 DIAGNOSIS — K12.2 CELLULITIS OF SUBMANDIBULAR REGION: ICD-10-CM

## 2020-10-13 DIAGNOSIS — K04.7 PERIAPICAL ABSCESS: Primary | ICD-10-CM

## 2020-10-13 DIAGNOSIS — K11.20 SIALOADENITIS: ICD-10-CM

## 2020-10-13 LAB
ANION GAP SERPL CALCULATED.3IONS-SCNC: 7 MMOL/L (ref 4–13)
BASOPHILS # BLD AUTO: 0.06 THOUSANDS/ΜL (ref 0–0.1)
BASOPHILS NFR BLD AUTO: 0 % (ref 0–1)
BUN SERPL-MCNC: 16 MG/DL (ref 5–25)
CALCIUM SERPL-MCNC: 10 MG/DL (ref 8.3–10.1)
CHLORIDE SERPL-SCNC: 111 MMOL/L (ref 100–108)
CO2 SERPL-SCNC: 23 MMOL/L (ref 21–32)
CREAT SERPL-MCNC: 0.89 MG/DL (ref 0.6–1.3)
EOSINOPHIL # BLD AUTO: 0.03 THOUSAND/ΜL (ref 0–0.61)
EOSINOPHIL NFR BLD AUTO: 0 % (ref 0–6)
ERYTHROCYTE [DISTWIDTH] IN BLOOD BY AUTOMATED COUNT: 14.3 % (ref 11.6–15.1)
GFR SERPL CREATININE-BSD FRML MDRD: 76 ML/MIN/1.73SQ M
GLUCOSE SERPL-MCNC: 186 MG/DL (ref 65–140)
HCT VFR BLD AUTO: 39.5 % (ref 34.8–46.1)
HGB BLD-MCNC: 12.6 G/DL (ref 11.5–15.4)
IMM GRANULOCYTES # BLD AUTO: 0.04 THOUSAND/UL (ref 0–0.2)
IMM GRANULOCYTES NFR BLD AUTO: 0 % (ref 0–2)
LYMPHOCYTES # BLD AUTO: 1 THOUSANDS/ΜL (ref 0.6–4.47)
LYMPHOCYTES NFR BLD AUTO: 7 % (ref 14–44)
MCH RBC QN AUTO: 28.1 PG (ref 26.8–34.3)
MCHC RBC AUTO-ENTMCNC: 31.9 G/DL (ref 31.4–37.4)
MCV RBC AUTO: 88 FL (ref 82–98)
MONOCYTES # BLD AUTO: 1.73 THOUSAND/ΜL (ref 0.17–1.22)
MONOCYTES NFR BLD AUTO: 11 % (ref 4–12)
NEUTROPHILS # BLD AUTO: 12.33 THOUSANDS/ΜL (ref 1.85–7.62)
NEUTS SEG NFR BLD AUTO: 82 % (ref 43–75)
NRBC BLD AUTO-RTO: 0 /100 WBCS
PLATELET # BLD AUTO: 285 THOUSANDS/UL (ref 149–390)
PMV BLD AUTO: 9.6 FL (ref 8.9–12.7)
POTASSIUM SERPL-SCNC: 4 MMOL/L (ref 3.5–5.3)
RBC # BLD AUTO: 4.49 MILLION/UL (ref 3.81–5.12)
SODIUM SERPL-SCNC: 141 MMOL/L (ref 136–145)
WBC # BLD AUTO: 15.19 THOUSAND/UL (ref 4.31–10.16)

## 2020-10-13 PROCEDURE — G1004 CDSM NDSC: HCPCS

## 2020-10-13 PROCEDURE — 85025 COMPLETE CBC W/AUTO DIFF WBC: CPT | Performed by: EMERGENCY MEDICINE

## 2020-10-13 PROCEDURE — 99284 EMERGENCY DEPT VISIT MOD MDM: CPT

## 2020-10-13 PROCEDURE — 96376 TX/PRO/DX INJ SAME DRUG ADON: CPT

## 2020-10-13 PROCEDURE — 80048 BASIC METABOLIC PNL TOTAL CA: CPT | Performed by: EMERGENCY MEDICINE

## 2020-10-13 PROCEDURE — 99285 EMERGENCY DEPT VISIT HI MDM: CPT | Performed by: EMERGENCY MEDICINE

## 2020-10-13 PROCEDURE — 70491 CT SOFT TISSUE NECK W/DYE: CPT

## 2020-10-13 PROCEDURE — 96374 THER/PROPH/DIAG INJ IV PUSH: CPT

## 2020-10-13 PROCEDURE — 96375 TX/PRO/DX INJ NEW DRUG ADDON: CPT

## 2020-10-13 PROCEDURE — 36415 COLL VENOUS BLD VENIPUNCTURE: CPT | Performed by: EMERGENCY MEDICINE

## 2020-10-13 RX ORDER — DOCUSATE SODIUM 100 MG/1
100 CAPSULE, LIQUID FILLED ORAL 2 TIMES DAILY
Qty: 6 CAPSULE | Refills: 0 | Status: ON HOLD | OUTPATIENT
Start: 2020-10-13 | End: 2020-10-14 | Stop reason: CLARIF

## 2020-10-13 RX ORDER — MORPHINE SULFATE 15 MG/1
15 TABLET ORAL EVERY 4 HOURS PRN
Qty: 12 TABLET | Refills: 0 | Status: ON HOLD | OUTPATIENT
Start: 2020-10-13 | End: 2020-10-14 | Stop reason: CLARIF

## 2020-10-13 RX ORDER — ONDANSETRON 2 MG/ML
4 INJECTION INTRAMUSCULAR; INTRAVENOUS ONCE
Status: COMPLETED | OUTPATIENT
Start: 2020-10-13 | End: 2020-10-13

## 2020-10-13 RX ORDER — ONDANSETRON 2 MG/ML
INJECTION INTRAMUSCULAR; INTRAVENOUS
Status: COMPLETED
Start: 2020-10-13 | End: 2020-10-13

## 2020-10-13 RX ORDER — FENTANYL CITRATE 50 UG/ML
50 INJECTION, SOLUTION INTRAMUSCULAR; INTRAVENOUS ONCE
Status: COMPLETED | OUTPATIENT
Start: 2020-10-13 | End: 2020-10-13

## 2020-10-13 RX ORDER — HYDROMORPHONE HCL/PF 1 MG/ML
0.5 SYRINGE (ML) INJECTION ONCE
Status: COMPLETED | OUTPATIENT
Start: 2020-10-13 | End: 2020-10-13

## 2020-10-13 RX ADMIN — IOHEXOL 100 ML: 350 INJECTION, SOLUTION INTRAVENOUS at 19:45

## 2020-10-13 RX ADMIN — HYDROMORPHONE HYDROCHLORIDE 0.5 MG: 1 INJECTION, SOLUTION INTRAMUSCULAR; INTRAVENOUS; SUBCUTANEOUS at 19:48

## 2020-10-13 RX ADMIN — ONDANSETRON 4 MG: 2 INJECTION INTRAMUSCULAR; INTRAVENOUS at 21:02

## 2020-10-13 RX ADMIN — ONDANSETRON 4 MG: 2 INJECTION INTRAMUSCULAR; INTRAVENOUS at 18:09

## 2020-10-13 RX ADMIN — FENTANYL CITRATE 50 MCG: 50 INJECTION INTRAMUSCULAR; INTRAVENOUS at 18:10

## 2020-10-14 ENCOUNTER — ANESTHESIA EVENT (INPATIENT)
Dept: PERIOP | Facility: HOSPITAL | Age: 50
DRG: 144 | End: 2020-10-14
Payer: COMMERCIAL

## 2020-10-14 ENCOUNTER — ANESTHESIA (INPATIENT)
Dept: PERIOP | Facility: HOSPITAL | Age: 50
DRG: 144 | End: 2020-10-14
Payer: COMMERCIAL

## 2020-10-14 ENCOUNTER — APPOINTMENT (EMERGENCY)
Dept: RADIOLOGY | Facility: HOSPITAL | Age: 50
DRG: 144 | End: 2020-10-14
Payer: COMMERCIAL

## 2020-10-14 ENCOUNTER — HOSPITAL ENCOUNTER (INPATIENT)
Facility: HOSPITAL | Age: 50
LOS: 3 days | Discharge: HOME | DRG: 144 | End: 2020-10-17
Attending: EMERGENCY MEDICINE | Admitting: FAMILY MEDICINE
Payer: COMMERCIAL

## 2020-10-14 VITALS — HEART RATE: 98 BPM

## 2020-10-14 DIAGNOSIS — K04.7 PERIAPICAL ABSCESS WITH FACIAL INVOLVEMENT: ICD-10-CM

## 2020-10-14 DIAGNOSIS — K02.9 DENTAL CARIES: Primary | ICD-10-CM

## 2020-10-14 DIAGNOSIS — I48.91 A-FIB (HCC): ICD-10-CM

## 2020-10-14 DIAGNOSIS — K12.2 CELLULITIS OF SUBMANDIBULAR REGION: ICD-10-CM

## 2020-10-14 DIAGNOSIS — K04.7 DENTAL ABSCESS: ICD-10-CM

## 2020-10-14 LAB
ANION GAP SERPL CALCULATED.3IONS-SCNC: 8 MMOL/L (ref 4–13)
BASOPHILS # BLD AUTO: 0.08 THOUSANDS/ΜL (ref 0–0.1)
BASOPHILS NFR BLD AUTO: 1 % (ref 0–1)
BUN SERPL-MCNC: 20 MG/DL (ref 5–25)
CALCIUM SERPL-MCNC: 9.9 MG/DL (ref 8.3–10.1)
CHLORIDE SERPL-SCNC: 108 MMOL/L (ref 100–108)
CO2 SERPL-SCNC: 22 MMOL/L (ref 21–32)
CREAT SERPL-MCNC: 0.94 MG/DL (ref 0.6–1.3)
EOSINOPHIL # BLD AUTO: 0.02 THOUSAND/ΜL (ref 0–0.61)
EOSINOPHIL NFR BLD AUTO: 0 % (ref 0–6)
ERYTHROCYTE [DISTWIDTH] IN BLOOD BY AUTOMATED COUNT: 14.5 % (ref 11.6–15.1)
GFR SERPL CREATININE-BSD FRML MDRD: 71 ML/MIN/1.73SQ M
GLUCOSE SERPL-MCNC: 109 MG/DL (ref 65–140)
GLUCOSE SERPL-MCNC: 140 MG/DL (ref 65–140)
GLUCOSE SERPL-MCNC: 189 MG/DL (ref 65–140)
HCT VFR BLD AUTO: 40.5 % (ref 34.8–46.1)
HGB BLD-MCNC: 12.7 G/DL (ref 11.5–15.4)
IMM GRANULOCYTES # BLD AUTO: 0.03 THOUSAND/UL (ref 0–0.2)
IMM GRANULOCYTES NFR BLD AUTO: 0 % (ref 0–2)
LYMPHOCYTES # BLD AUTO: 0.61 THOUSANDS/ΜL (ref 0.6–4.47)
LYMPHOCYTES NFR BLD AUTO: 5 % (ref 14–44)
MCH RBC QN AUTO: 27.7 PG (ref 26.8–34.3)
MCHC RBC AUTO-ENTMCNC: 31.4 G/DL (ref 31.4–37.4)
MCV RBC AUTO: 88 FL (ref 82–98)
MONOCYTES # BLD AUTO: 1.16 THOUSAND/ΜL (ref 0.17–1.22)
MONOCYTES NFR BLD AUTO: 9 % (ref 4–12)
NEUTROPHILS # BLD AUTO: 10.8 THOUSANDS/ΜL (ref 1.85–7.62)
NEUTS SEG NFR BLD AUTO: 85 % (ref 43–75)
NRBC BLD AUTO-RTO: 0 /100 WBCS
PLATELET # BLD AUTO: 269 THOUSANDS/UL (ref 149–390)
PMV BLD AUTO: 10.2 FL (ref 8.9–12.7)
POTASSIUM SERPL-SCNC: 3.9 MMOL/L (ref 3.5–5.3)
RBC # BLD AUTO: 4.58 MILLION/UL (ref 3.81–5.12)
SODIUM SERPL-SCNC: 138 MMOL/L (ref 136–145)
WBC # BLD AUTO: 12.7 THOUSAND/UL (ref 4.31–10.16)

## 2020-10-14 PROCEDURE — 99285 EMERGENCY DEPT VISIT HI MDM: CPT | Performed by: EMERGENCY MEDICINE

## 2020-10-14 PROCEDURE — 0CDXXZ1 EXTRACTION OF LOWER TOOTH, MULTIPLE, EXTERNAL APPROACH: ICD-10-PCS | Performed by: DENTIST

## 2020-10-14 PROCEDURE — 96375 TX/PRO/DX INJ NEW DRUG ADDON: CPT

## 2020-10-14 PROCEDURE — 0CDWXZ1 EXTRACTION OF UPPER TOOTH, MULTIPLE, EXTERNAL APPROACH: ICD-10-PCS | Performed by: DENTIST

## 2020-10-14 PROCEDURE — 99223 1ST HOSP IP/OBS HIGH 75: CPT | Performed by: FAMILY MEDICINE

## 2020-10-14 PROCEDURE — 87205 SMEAR GRAM STAIN: CPT | Performed by: DENTIST

## 2020-10-14 PROCEDURE — 96365 THER/PROPH/DIAG IV INF INIT: CPT

## 2020-10-14 PROCEDURE — 0W930ZZ DRAINAGE OF ORAL CAVITY AND THROAT, OPEN APPROACH: ICD-10-PCS | Performed by: DENTIST

## 2020-10-14 PROCEDURE — 0C9M0ZZ DRAINAGE OF PHARYNX, OPEN APPROACH: ICD-10-PCS | Performed by: DENTIST

## 2020-10-14 PROCEDURE — 36415 COLL VENOUS BLD VENIPUNCTURE: CPT | Performed by: EMERGENCY MEDICINE

## 2020-10-14 PROCEDURE — 82948 REAGENT STRIP/BLOOD GLUCOSE: CPT

## 2020-10-14 PROCEDURE — 0J910ZZ DRAINAGE OF FACE SUBCUTANEOUS TISSUE AND FASCIA, OPEN APPROACH: ICD-10-PCS | Performed by: DENTIST

## 2020-10-14 PROCEDURE — 87176 TISSUE HOMOGENIZATION CULTR: CPT | Performed by: DENTIST

## 2020-10-14 PROCEDURE — 87075 CULTR BACTERIA EXCEPT BLOOD: CPT | Performed by: DENTIST

## 2020-10-14 PROCEDURE — 70355 PANORAMIC X-RAY OF JAWS: CPT

## 2020-10-14 PROCEDURE — 80048 BASIC METABOLIC PNL TOTAL CA: CPT | Performed by: EMERGENCY MEDICINE

## 2020-10-14 PROCEDURE — 99285 EMERGENCY DEPT VISIT HI MDM: CPT

## 2020-10-14 PROCEDURE — 85025 COMPLETE CBC W/AUTO DIFF WBC: CPT | Performed by: EMERGENCY MEDICINE

## 2020-10-14 PROCEDURE — 87070 CULTURE OTHR SPECIMN AEROBIC: CPT | Performed by: DENTIST

## 2020-10-14 RX ORDER — FENTANYL CITRATE/PF 50 MCG/ML
25 SYRINGE (ML) INJECTION
Status: DISCONTINUED | OUTPATIENT
Start: 2020-10-14 | End: 2020-10-14 | Stop reason: HOSPADM

## 2020-10-14 RX ORDER — PRAVASTATIN SODIUM 80 MG/1
80 TABLET ORAL
Status: DISCONTINUED | OUTPATIENT
Start: 2020-10-15 | End: 2020-10-17 | Stop reason: HOSPADM

## 2020-10-14 RX ORDER — SUCCINYLCHOLINE/SOD CL,ISO/PF 100 MG/5ML
SYRINGE (ML) INTRAVENOUS AS NEEDED
Status: DISCONTINUED | OUTPATIENT
Start: 2020-10-14 | End: 2020-10-14

## 2020-10-14 RX ORDER — BUPIVACAINE HYDROCHLORIDE AND EPINEPHRINE 5; 5 MG/ML; UG/ML
INJECTION, SOLUTION PERINEURAL AS NEEDED
Status: DISCONTINUED | OUTPATIENT
Start: 2020-10-14 | End: 2020-10-14 | Stop reason: HOSPADM

## 2020-10-14 RX ORDER — ONDANSETRON 2 MG/ML
4 INJECTION INTRAMUSCULAR; INTRAVENOUS EVERY 4 HOURS PRN
Status: DISCONTINUED | OUTPATIENT
Start: 2020-10-14 | End: 2020-10-17 | Stop reason: HOSPADM

## 2020-10-14 RX ORDER — OXYCODONE HYDROCHLORIDE AND ACETAMINOPHEN 5; 325 MG/1; MG/1
1 TABLET ORAL EVERY 4 HOURS PRN
Status: DISCONTINUED | OUTPATIENT
Start: 2020-10-14 | End: 2020-10-16

## 2020-10-14 RX ORDER — POLYETHYLENE GLYCOL 3350 17 G/17G
17 POWDER, FOR SOLUTION ORAL DAILY PRN
Status: DISCONTINUED | OUTPATIENT
Start: 2020-10-14 | End: 2020-10-17 | Stop reason: HOSPADM

## 2020-10-14 RX ORDER — FENTANYL CITRATE/PF 50 MCG/ML
50 SYRINGE (ML) INJECTION
Status: DISCONTINUED | OUTPATIENT
Start: 2020-10-14 | End: 2020-10-14 | Stop reason: HOSPADM

## 2020-10-14 RX ORDER — KETOROLAC TROMETHAMINE 30 MG/ML
15 INJECTION, SOLUTION INTRAMUSCULAR; INTRAVENOUS ONCE
Status: COMPLETED | OUTPATIENT
Start: 2020-10-14 | End: 2020-10-14

## 2020-10-14 RX ORDER — METOPROLOL TARTRATE 5 MG/5ML
INJECTION INTRAVENOUS AS NEEDED
Status: DISCONTINUED | OUTPATIENT
Start: 2020-10-14 | End: 2020-10-14

## 2020-10-14 RX ORDER — KETOROLAC TROMETHAMINE 30 MG/ML
15 INJECTION, SOLUTION INTRAMUSCULAR; INTRAVENOUS EVERY 6 HOURS PRN
Status: DISPENSED | OUTPATIENT
Start: 2020-10-14 | End: 2020-10-15

## 2020-10-14 RX ORDER — SODIUM CHLORIDE, SODIUM LACTATE, POTASSIUM CHLORIDE, CALCIUM CHLORIDE 600; 310; 30; 20 MG/100ML; MG/100ML; MG/100ML; MG/100ML
125 INJECTION, SOLUTION INTRAVENOUS CONTINUOUS
Status: DISCONTINUED | OUTPATIENT
Start: 2020-10-14 | End: 2020-10-14

## 2020-10-14 RX ORDER — DIPHENHYDRAMINE HYDROCHLORIDE 50 MG/ML
12.5 INJECTION INTRAMUSCULAR; INTRAVENOUS ONCE AS NEEDED
Status: DISCONTINUED | OUTPATIENT
Start: 2020-10-14 | End: 2020-10-14 | Stop reason: HOSPADM

## 2020-10-14 RX ORDER — CHLORHEXIDINE GLUCONATE 0.12 MG/ML
15 RINSE ORAL EVERY 12 HOURS SCHEDULED
Status: DISCONTINUED | OUTPATIENT
Start: 2020-10-15 | End: 2020-10-17 | Stop reason: HOSPADM

## 2020-10-14 RX ORDER — LIDOCAINE HYDROCHLORIDE 10 MG/ML
INJECTION, SOLUTION EPIDURAL; INFILTRATION; INTRACAUDAL; PERINEURAL AS NEEDED
Status: DISCONTINUED | OUTPATIENT
Start: 2020-10-14 | End: 2020-10-14

## 2020-10-14 RX ORDER — METOCLOPRAMIDE HYDROCHLORIDE 5 MG/ML
10 INJECTION INTRAMUSCULAR; INTRAVENOUS ONCE AS NEEDED
Status: COMPLETED | OUTPATIENT
Start: 2020-10-14 | End: 2020-10-14

## 2020-10-14 RX ORDER — FENTANYL CITRATE 50 UG/ML
INJECTION, SOLUTION INTRAMUSCULAR; INTRAVENOUS AS NEEDED
Status: DISCONTINUED | OUTPATIENT
Start: 2020-10-14 | End: 2020-10-14

## 2020-10-14 RX ORDER — HYDROMORPHONE HCL/PF 1 MG/ML
0.5 SYRINGE (ML) INJECTION
Status: DISCONTINUED | OUTPATIENT
Start: 2020-10-14 | End: 2020-10-17 | Stop reason: HOSPADM

## 2020-10-14 RX ORDER — SENNOSIDES 8.6 MG
1 TABLET ORAL
Status: DISCONTINUED | OUTPATIENT
Start: 2020-10-14 | End: 2020-10-17 | Stop reason: HOSPADM

## 2020-10-14 RX ORDER — DEXAMETHASONE SODIUM PHOSPHATE 10 MG/ML
INJECTION, SOLUTION INTRAMUSCULAR; INTRAVENOUS AS NEEDED
Status: DISCONTINUED | OUTPATIENT
Start: 2020-10-14 | End: 2020-10-14

## 2020-10-14 RX ORDER — CLINDAMYCIN PHOSPHATE 600 MG/50ML
600 INJECTION INTRAVENOUS ONCE
Status: COMPLETED | OUTPATIENT
Start: 2020-10-14 | End: 2020-10-14

## 2020-10-14 RX ORDER — SODIUM CHLORIDE, SODIUM LACTATE, POTASSIUM CHLORIDE, CALCIUM CHLORIDE 600; 310; 30; 20 MG/100ML; MG/100ML; MG/100ML; MG/100ML
INJECTION, SOLUTION INTRAVENOUS CONTINUOUS PRN
Status: DISCONTINUED | OUTPATIENT
Start: 2020-10-14 | End: 2020-10-14

## 2020-10-14 RX ORDER — HYDROMORPHONE HCL/PF 1 MG/ML
0.5 SYRINGE (ML) INJECTION
Status: DISCONTINUED | OUTPATIENT
Start: 2020-10-14 | End: 2020-10-14 | Stop reason: HOSPADM

## 2020-10-14 RX ORDER — LIDOCAINE HYDROCHLORIDE AND EPINEPHRINE 10; 10 MG/ML; UG/ML
INJECTION, SOLUTION INFILTRATION; PERINEURAL AS NEEDED
Status: DISCONTINUED | OUTPATIENT
Start: 2020-10-14 | End: 2020-10-14 | Stop reason: HOSPADM

## 2020-10-14 RX ORDER — SODIUM CHLORIDE, SODIUM LACTATE, POTASSIUM CHLORIDE, CALCIUM CHLORIDE 600; 310; 30; 20 MG/100ML; MG/100ML; MG/100ML; MG/100ML
75 INJECTION, SOLUTION INTRAVENOUS CONTINUOUS
Status: DISCONTINUED | OUTPATIENT
Start: 2020-10-14 | End: 2020-10-17 | Stop reason: HOSPADM

## 2020-10-14 RX ORDER — ONDANSETRON 2 MG/ML
INJECTION INTRAMUSCULAR; INTRAVENOUS AS NEEDED
Status: DISCONTINUED | OUTPATIENT
Start: 2020-10-14 | End: 2020-10-14

## 2020-10-14 RX ORDER — ONDANSETRON 2 MG/ML
4 INJECTION INTRAMUSCULAR; INTRAVENOUS ONCE AS NEEDED
Status: COMPLETED | OUTPATIENT
Start: 2020-10-14 | End: 2020-10-14

## 2020-10-14 RX ORDER — PROPOFOL 10 MG/ML
INJECTION, EMULSION INTRAVENOUS AS NEEDED
Status: DISCONTINUED | OUTPATIENT
Start: 2020-10-14 | End: 2020-10-14

## 2020-10-14 RX ADMIN — PHENYLEPHRINE HYDROCHLORIDE 100 MCG: 10 INJECTION INTRAVENOUS at 19:43

## 2020-10-14 RX ADMIN — Medication 25 MCG: at 20:37

## 2020-10-14 RX ADMIN — ONDANSETRON 4 MG: 2 INJECTION INTRAMUSCULAR; INTRAVENOUS at 20:14

## 2020-10-14 RX ADMIN — HYDROMORPHONE HYDROCHLORIDE 0.5 MG: 1 INJECTION, SOLUTION INTRAMUSCULAR; INTRAVENOUS; SUBCUTANEOUS at 15:32

## 2020-10-14 RX ADMIN — FENTANYL CITRATE 25 MCG: 50 INJECTION, SOLUTION INTRAMUSCULAR; INTRAVENOUS at 19:34

## 2020-10-14 RX ADMIN — FENTANYL CITRATE 50 MCG: 50 INJECTION, SOLUTION INTRAMUSCULAR; INTRAVENOUS at 18:58

## 2020-10-14 RX ADMIN — CLINDAMYCIN IN 5 PERCENT DEXTROSE 600 MG: 12 INJECTION, SOLUTION INTRAVENOUS at 12:57

## 2020-10-14 RX ADMIN — FENTANYL CITRATE 50 MCG: 50 INJECTION, SOLUTION INTRAMUSCULAR; INTRAVENOUS at 18:40

## 2020-10-14 RX ADMIN — METOROPROLOL TARTRATE 3 MG: 5 INJECTION, SOLUTION INTRAVENOUS at 18:53

## 2020-10-14 RX ADMIN — Medication 200 MG: at 18:42

## 2020-10-14 RX ADMIN — DEXAMETHASONE SODIUM PHOSPHATE 15 MG: 10 INJECTION, SOLUTION INTRAMUSCULAR; INTRAVENOUS at 18:47

## 2020-10-14 RX ADMIN — ONDANSETRON 4 MG: 2 INJECTION INTRAMUSCULAR; INTRAVENOUS at 19:47

## 2020-10-14 RX ADMIN — METOCLOPRAMIDE 10 MG: 5 INJECTION, SOLUTION INTRAMUSCULAR; INTRAVENOUS at 20:27

## 2020-10-14 RX ADMIN — PHENYLEPHRINE HYDROCHLORIDE 400 MCG: 10 INJECTION INTRAVENOUS at 19:09

## 2020-10-14 RX ADMIN — AMPICILLIN SODIUM AND SULBACTAM SODIUM 3 G: 2; 1 INJECTION, POWDER, FOR SOLUTION INTRAMUSCULAR; INTRAVENOUS at 22:37

## 2020-10-14 RX ADMIN — PHENYLEPHRINE HYDROCHLORIDE 400 MCG: 10 INJECTION INTRAVENOUS at 19:14

## 2020-10-14 RX ADMIN — PHENYLEPHRINE HYDROCHLORIDE 400 MCG: 10 INJECTION INTRAVENOUS at 19:23

## 2020-10-14 RX ADMIN — SODIUM CHLORIDE, SODIUM LACTATE, POTASSIUM CHLORIDE, AND CALCIUM CHLORIDE: .6; .31; .03; .02 INJECTION, SOLUTION INTRAVENOUS at 19:34

## 2020-10-14 RX ADMIN — KETOROLAC TROMETHAMINE 15 MG: 30 INJECTION, SOLUTION INTRAMUSCULAR at 17:47

## 2020-10-14 RX ADMIN — SODIUM CHLORIDE, SODIUM LACTATE, POTASSIUM CHLORIDE, AND CALCIUM CHLORIDE: .6; .31; .03; .02 INJECTION, SOLUTION INTRAVENOUS at 18:33

## 2020-10-14 RX ADMIN — KETOROLAC TROMETHAMINE 15 MG: 30 INJECTION, SOLUTION INTRAMUSCULAR at 12:10

## 2020-10-14 RX ADMIN — SODIUM CHLORIDE, SODIUM LACTATE, POTASSIUM CHLORIDE, AND CALCIUM CHLORIDE 75 ML/HR: .6; .31; .03; .02 INJECTION, SOLUTION INTRAVENOUS at 21:09

## 2020-10-14 RX ADMIN — PROPOFOL 200 MG: 10 INJECTION, EMULSION INTRAVENOUS at 18:42

## 2020-10-14 RX ADMIN — METOROPROLOL TARTRATE 2 MG: 5 INJECTION, SOLUTION INTRAVENOUS at 19:05

## 2020-10-14 RX ADMIN — AMPICILLIN SODIUM AND SULBACTAM SODIUM 3 G: 2; 1 INJECTION, POWDER, FOR SOLUTION INTRAMUSCULAR; INTRAVENOUS at 15:54

## 2020-10-14 RX ADMIN — ONDANSETRON 4 MG: 2 INJECTION INTRAMUSCULAR; INTRAVENOUS at 15:37

## 2020-10-14 RX ADMIN — LIDOCAINE HYDROCHLORIDE 50 MG: 10 INJECTION, SOLUTION EPIDURAL; INFILTRATION; INTRACAUDAL; PERINEURAL at 18:42

## 2020-10-14 RX ADMIN — PHENYLEPHRINE HYDROCHLORIDE 300 MCG: 10 INJECTION INTRAVENOUS at 18:47

## 2020-10-14 RX ADMIN — METOROPROLOL TARTRATE 2 MG: 5 INJECTION, SOLUTION INTRAVENOUS at 18:49

## 2020-10-14 RX ADMIN — PHENYLEPHRINE HYDROCHLORIDE 300 MCG: 10 INJECTION INTRAVENOUS at 18:52

## 2020-10-15 ENCOUNTER — APPOINTMENT (INPATIENT)
Dept: NON INVASIVE DIAGNOSTICS | Facility: HOSPITAL | Age: 50
DRG: 144 | End: 2020-10-15
Payer: COMMERCIAL

## 2020-10-15 ENCOUNTER — TELEPHONE (OUTPATIENT)
Dept: NON INVASIVE DIAGNOSTICS | Facility: HOSPITAL | Age: 50
End: 2020-10-15

## 2020-10-15 DIAGNOSIS — I47.1 SVT (SUPRAVENTRICULAR TACHYCARDIA) (HCC): Primary | ICD-10-CM

## 2020-10-15 PROBLEM — I48.91 A-FIB (HCC): Status: ACTIVE | Noted: 2020-10-15

## 2020-10-15 PROBLEM — R00.0 TACHYCARDIA: Status: ACTIVE | Noted: 2020-10-15

## 2020-10-15 LAB
ANION GAP SERPL CALCULATED.3IONS-SCNC: 12 MMOL/L (ref 4–13)
ATRIAL RATE: 76 BPM
BUN SERPL-MCNC: 24 MG/DL (ref 5–25)
CALCIUM SERPL-MCNC: 8.3 MG/DL (ref 8.3–10.1)
CHLORIDE SERPL-SCNC: 109 MMOL/L (ref 100–108)
CO2 SERPL-SCNC: 18 MMOL/L (ref 21–32)
CREAT SERPL-MCNC: 0.86 MG/DL (ref 0.6–1.3)
ERYTHROCYTE [DISTWIDTH] IN BLOOD BY AUTOMATED COUNT: 14.5 % (ref 11.6–15.1)
GFR SERPL CREATININE-BSD FRML MDRD: 79 ML/MIN/1.73SQ M
GLUCOSE SERPL-MCNC: 128 MG/DL (ref 65–140)
GLUCOSE SERPL-MCNC: 138 MG/DL (ref 65–140)
GLUCOSE SERPL-MCNC: 139 MG/DL (ref 65–140)
GLUCOSE SERPL-MCNC: 141 MG/DL (ref 65–140)
GLUCOSE SERPL-MCNC: 224 MG/DL (ref 65–140)
GLUCOSE SERPL-MCNC: 225 MG/DL (ref 65–140)
GLUCOSE SERPL-MCNC: 243 MG/DL (ref 65–140)
HCT VFR BLD AUTO: 37.6 % (ref 34.8–46.1)
HGB BLD-MCNC: 11.6 G/DL (ref 11.5–15.4)
MAGNESIUM SERPL-MCNC: 2.1 MG/DL (ref 1.6–2.6)
MCH RBC QN AUTO: 27.8 PG (ref 26.8–34.3)
MCHC RBC AUTO-ENTMCNC: 30.9 G/DL (ref 31.4–37.4)
MCV RBC AUTO: 90 FL (ref 82–98)
P AXIS: 32 DEGREES
PLATELET # BLD AUTO: 294 THOUSANDS/UL (ref 149–390)
PMV BLD AUTO: 9.8 FL (ref 8.9–12.7)
POTASSIUM SERPL-SCNC: 4.1 MMOL/L (ref 3.5–5.3)
PR INTERVAL: 132 MS
QRS AXIS: 71 DEGREES
QRSD INTERVAL: 90 MS
QT INTERVAL: 400 MS
QTC INTERVAL: 450 MS
RBC # BLD AUTO: 4.17 MILLION/UL (ref 3.81–5.12)
SODIUM SERPL-SCNC: 139 MMOL/L (ref 136–145)
T WAVE AXIS: -7 DEGREES
TSH SERPL DL<=0.05 MIU/L-ACNC: 0.76 UIU/ML (ref 0.36–3.74)
VENTRICULAR RATE: 76 BPM
WBC # BLD AUTO: 17.11 THOUSAND/UL (ref 4.31–10.16)

## 2020-10-15 PROCEDURE — 93010 ELECTROCARDIOGRAM REPORT: CPT | Performed by: INTERNAL MEDICINE

## 2020-10-15 PROCEDURE — 82948 REAGENT STRIP/BLOOD GLUCOSE: CPT

## 2020-10-15 PROCEDURE — 93005 ELECTROCARDIOGRAM TRACING: CPT

## 2020-10-15 PROCEDURE — 99232 SBSQ HOSP IP/OBS MODERATE 35: CPT | Performed by: INTERNAL MEDICINE

## 2020-10-15 PROCEDURE — 83735 ASSAY OF MAGNESIUM: CPT | Performed by: PHYSICIAN ASSISTANT

## 2020-10-15 PROCEDURE — 84443 ASSAY THYROID STIM HORMONE: CPT | Performed by: PHYSICIAN ASSISTANT

## 2020-10-15 PROCEDURE — 93306 TTE W/DOPPLER COMPLETE: CPT

## 2020-10-15 PROCEDURE — 99254 IP/OBS CNSLTJ NEW/EST MOD 60: CPT | Performed by: INTERNAL MEDICINE

## 2020-10-15 PROCEDURE — 80048 BASIC METABOLIC PNL TOTAL CA: CPT | Performed by: PHYSICIAN ASSISTANT

## 2020-10-15 PROCEDURE — 85027 COMPLETE CBC AUTOMATED: CPT | Performed by: PHYSICIAN ASSISTANT

## 2020-10-15 RX ORDER — ACETAMINOPHEN 325 MG/1
650 TABLET ORAL EVERY 6 HOURS PRN
Status: DISCONTINUED | OUTPATIENT
Start: 2020-10-15 | End: 2020-10-17 | Stop reason: HOSPADM

## 2020-10-15 RX ORDER — INSULIN GLARGINE 100 [IU]/ML
6 INJECTION, SOLUTION SUBCUTANEOUS
Status: DISCONTINUED | OUTPATIENT
Start: 2020-10-15 | End: 2020-10-17

## 2020-10-15 RX ORDER — LISINOPRIL 10 MG/1
10 TABLET ORAL DAILY
Status: DISCONTINUED | OUTPATIENT
Start: 2020-10-16 | End: 2020-10-17

## 2020-10-15 RX ADMIN — KETOROLAC TROMETHAMINE 15 MG: 30 INJECTION, SOLUTION INTRAMUSCULAR at 00:59

## 2020-10-15 RX ADMIN — INSULIN LISPRO 2 UNITS: 100 INJECTION, SOLUTION INTRAVENOUS; SUBCUTANEOUS at 11:38

## 2020-10-15 RX ADMIN — AMPICILLIN SODIUM AND SULBACTAM SODIUM 3 G: 2; 1 INJECTION, POWDER, FOR SOLUTION INTRAMUSCULAR; INTRAVENOUS at 09:40

## 2020-10-15 RX ADMIN — CHLORHEXIDINE GLUCONATE 0.12% ORAL RINSE 15 ML: 1.2 LIQUID ORAL at 21:32

## 2020-10-15 RX ADMIN — INSULIN LISPRO 1 UNITS: 100 INJECTION, SOLUTION INTRAVENOUS; SUBCUTANEOUS at 21:31

## 2020-10-15 RX ADMIN — ACETAMINOPHEN 650 MG: 325 TABLET, FILM COATED ORAL at 18:26

## 2020-10-15 RX ADMIN — AMPICILLIN SODIUM AND SULBACTAM SODIUM 3 G: 2; 1 INJECTION, POWDER, FOR SOLUTION INTRAMUSCULAR; INTRAVENOUS at 21:32

## 2020-10-15 RX ADMIN — PRAVASTATIN SODIUM 80 MG: 80 TABLET ORAL at 15:58

## 2020-10-15 RX ADMIN — INSULIN LISPRO 2 UNITS: 100 INJECTION, SOLUTION INTRAVENOUS; SUBCUTANEOUS at 18:23

## 2020-10-15 RX ADMIN — PERFLUTREN 0.8 ML/MIN: 6.52 INJECTION, SUSPENSION INTRAVENOUS at 18:00

## 2020-10-15 RX ADMIN — LISINOPRIL: 10 TABLET ORAL at 09:38

## 2020-10-15 RX ADMIN — AMPICILLIN SODIUM AND SULBACTAM SODIUM 3 G: 2; 1 INJECTION, POWDER, FOR SOLUTION INTRAMUSCULAR; INTRAVENOUS at 15:58

## 2020-10-15 RX ADMIN — ACETAMINOPHEN 650 MG: 325 TABLET, FILM COATED ORAL at 09:40

## 2020-10-15 RX ADMIN — AMPICILLIN SODIUM AND SULBACTAM SODIUM 3 G: 2; 1 INJECTION, POWDER, FOR SOLUTION INTRAMUSCULAR; INTRAVENOUS at 04:18

## 2020-10-15 RX ADMIN — SODIUM CHLORIDE, SODIUM LACTATE, POTASSIUM CHLORIDE, AND CALCIUM CHLORIDE 75 ML/HR: .6; .31; .03; .02 INJECTION, SOLUTION INTRAVENOUS at 11:59

## 2020-10-15 RX ADMIN — CHLORHEXIDINE GLUCONATE 0.12% ORAL RINSE 15 ML: 1.2 LIQUID ORAL at 09:40

## 2020-10-15 RX ADMIN — INSULIN GLARGINE 6 UNITS: 100 INJECTION, SOLUTION SUBCUTANEOUS at 21:32

## 2020-10-16 LAB
ANION GAP SERPL CALCULATED.3IONS-SCNC: 4 MMOL/L (ref 4–13)
BACTERIA SPEC ANAEROBE CULT: NORMAL
BACTERIA SPEC ANAEROBE CULT: NORMAL
BACTERIA TISS AEROBE CULT: ABNORMAL
BASOPHILS # BLD AUTO: 0.05 THOUSANDS/ΜL (ref 0–0.1)
BASOPHILS NFR BLD AUTO: 0 % (ref 0–1)
BUN SERPL-MCNC: 26 MG/DL (ref 5–25)
CALCIUM SERPL-MCNC: 8.1 MG/DL (ref 8.3–10.1)
CHLORIDE SERPL-SCNC: 109 MMOL/L (ref 100–108)
CO2 SERPL-SCNC: 27 MMOL/L (ref 21–32)
CREAT SERPL-MCNC: 0.74 MG/DL (ref 0.6–1.3)
EOSINOPHIL # BLD AUTO: 0.11 THOUSAND/ΜL (ref 0–0.61)
EOSINOPHIL NFR BLD AUTO: 1 % (ref 0–6)
ERYTHROCYTE [DISTWIDTH] IN BLOOD BY AUTOMATED COUNT: 14.6 % (ref 11.6–15.1)
GFR SERPL CREATININE-BSD FRML MDRD: 95 ML/MIN/1.73SQ M
GLUCOSE SERPL-MCNC: 107 MG/DL (ref 65–140)
GLUCOSE SERPL-MCNC: 116 MG/DL (ref 65–140)
GLUCOSE SERPL-MCNC: 134 MG/DL (ref 65–140)
GLUCOSE SERPL-MCNC: 95 MG/DL (ref 65–140)
GRAM STN SPEC: ABNORMAL
GRAM STN SPEC: ABNORMAL
HCT VFR BLD AUTO: 32.3 % (ref 34.8–46.1)
HGB BLD-MCNC: 10.5 G/DL (ref 11.5–15.4)
IMM GRANULOCYTES # BLD AUTO: 0.05 THOUSAND/UL (ref 0–0.2)
IMM GRANULOCYTES NFR BLD AUTO: 0 % (ref 0–2)
LYMPHOCYTES # BLD AUTO: 2.32 THOUSANDS/ΜL (ref 0.6–4.47)
LYMPHOCYTES NFR BLD AUTO: 20 % (ref 14–44)
MAGNESIUM SERPL-MCNC: 2.3 MG/DL (ref 1.6–2.6)
MCH RBC QN AUTO: 28.5 PG (ref 26.8–34.3)
MCHC RBC AUTO-ENTMCNC: 32.5 G/DL (ref 31.4–37.4)
MCV RBC AUTO: 88 FL (ref 82–98)
MONOCYTES # BLD AUTO: 1.15 THOUSAND/ΜL (ref 0.17–1.22)
MONOCYTES NFR BLD AUTO: 10 % (ref 4–12)
NEUTROPHILS # BLD AUTO: 7.82 THOUSANDS/ΜL (ref 1.85–7.62)
NEUTS SEG NFR BLD AUTO: 69 % (ref 43–75)
NRBC BLD AUTO-RTO: 0 /100 WBCS
PLATELET # BLD AUTO: 258 THOUSANDS/UL (ref 149–390)
PMV BLD AUTO: 9.2 FL (ref 8.9–12.7)
POTASSIUM SERPL-SCNC: 3.7 MMOL/L (ref 3.5–5.3)
RBC # BLD AUTO: 3.68 MILLION/UL (ref 3.81–5.12)
SODIUM SERPL-SCNC: 140 MMOL/L (ref 136–145)
WBC # BLD AUTO: 11.5 THOUSAND/UL (ref 4.31–10.16)

## 2020-10-16 PROCEDURE — 82948 REAGENT STRIP/BLOOD GLUCOSE: CPT

## 2020-10-16 PROCEDURE — 83735 ASSAY OF MAGNESIUM: CPT | Performed by: INTERNAL MEDICINE

## 2020-10-16 PROCEDURE — 80048 BASIC METABOLIC PNL TOTAL CA: CPT | Performed by: INTERNAL MEDICINE

## 2020-10-16 PROCEDURE — 93306 TTE W/DOPPLER COMPLETE: CPT | Performed by: INTERNAL MEDICINE

## 2020-10-16 PROCEDURE — 85025 COMPLETE CBC W/AUTO DIFF WBC: CPT | Performed by: INTERNAL MEDICINE

## 2020-10-16 PROCEDURE — 99232 SBSQ HOSP IP/OBS MODERATE 35: CPT | Performed by: INTERNAL MEDICINE

## 2020-10-16 RX ORDER — OXYCODONE HYDROCHLORIDE 5 MG/1
5 TABLET ORAL EVERY 4 HOURS PRN
Status: DISCONTINUED | OUTPATIENT
Start: 2020-10-16 | End: 2020-10-17 | Stop reason: HOSPADM

## 2020-10-16 RX ORDER — HYDROMORPHONE HCL/PF 1 MG/ML
0.5 SYRINGE (ML) INJECTION EVERY 4 HOURS PRN
Status: DISCONTINUED | OUTPATIENT
Start: 2020-10-16 | End: 2020-10-17 | Stop reason: HOSPADM

## 2020-10-16 RX ORDER — ACETAMINOPHEN 325 MG/1
975 TABLET ORAL EVERY 8 HOURS SCHEDULED
Status: DISCONTINUED | OUTPATIENT
Start: 2020-10-16 | End: 2020-10-17 | Stop reason: HOSPADM

## 2020-10-16 RX ADMIN — SODIUM CHLORIDE, SODIUM LACTATE, POTASSIUM CHLORIDE, AND CALCIUM CHLORIDE 75 ML/HR: .6; .31; .03; .02 INJECTION, SOLUTION INTRAVENOUS at 04:23

## 2020-10-16 RX ADMIN — OXYCODONE HYDROCHLORIDE AND ACETAMINOPHEN 1 TABLET: 5; 325 TABLET ORAL at 10:17

## 2020-10-16 RX ADMIN — CHLORHEXIDINE GLUCONATE 0.12% ORAL RINSE 15 ML: 1.2 LIQUID ORAL at 08:05

## 2020-10-16 RX ADMIN — INSULIN LISPRO 1 UNITS: 100 INJECTION, SOLUTION INTRAVENOUS; SUBCUTANEOUS at 22:08

## 2020-10-16 RX ADMIN — ACETAMINOPHEN 650 MG: 325 TABLET, FILM COATED ORAL at 19:13

## 2020-10-16 RX ADMIN — AMPICILLIN SODIUM AND SULBACTAM SODIUM 3 G: 2; 1 INJECTION, POWDER, FOR SOLUTION INTRAMUSCULAR; INTRAVENOUS at 09:32

## 2020-10-16 RX ADMIN — LISINOPRIL 10 MG: 10 TABLET ORAL at 08:05

## 2020-10-16 RX ADMIN — SODIUM CHLORIDE, SODIUM LACTATE, POTASSIUM CHLORIDE, AND CALCIUM CHLORIDE 75 ML/HR: .6; .31; .03; .02 INJECTION, SOLUTION INTRAVENOUS at 20:07

## 2020-10-16 RX ADMIN — ACETAMINOPHEN 975 MG: 325 TABLET, FILM COATED ORAL at 13:33

## 2020-10-16 RX ADMIN — PRAVASTATIN SODIUM 80 MG: 80 TABLET ORAL at 16:06

## 2020-10-16 RX ADMIN — OXYCODONE HYDROCHLORIDE 5 MG: 5 TABLET ORAL at 13:32

## 2020-10-16 RX ADMIN — INSULIN GLARGINE 6 UNITS: 100 INJECTION, SOLUTION SUBCUTANEOUS at 22:08

## 2020-10-16 RX ADMIN — ACETAMINOPHEN 650 MG: 325 TABLET, FILM COATED ORAL at 08:05

## 2020-10-16 RX ADMIN — CHLORHEXIDINE GLUCONATE 0.12% ORAL RINSE 15 ML: 1.2 LIQUID ORAL at 22:09

## 2020-10-16 RX ADMIN — ONDANSETRON 4 MG: 2 INJECTION INTRAMUSCULAR; INTRAVENOUS at 16:06

## 2020-10-16 RX ADMIN — AMPICILLIN SODIUM AND SULBACTAM SODIUM 3 G: 2; 1 INJECTION, POWDER, FOR SOLUTION INTRAMUSCULAR; INTRAVENOUS at 16:10

## 2020-10-16 RX ADMIN — AMPICILLIN SODIUM AND SULBACTAM SODIUM 3 G: 2; 1 INJECTION, POWDER, FOR SOLUTION INTRAMUSCULAR; INTRAVENOUS at 22:08

## 2020-10-16 RX ADMIN — AMPICILLIN SODIUM AND SULBACTAM SODIUM 3 G: 2; 1 INJECTION, POWDER, FOR SOLUTION INTRAMUSCULAR; INTRAVENOUS at 04:23

## 2020-10-17 VITALS
HEART RATE: 72 BPM | RESPIRATION RATE: 18 BRPM | SYSTOLIC BLOOD PRESSURE: 119 MMHG | BODY MASS INDEX: 34.57 KG/M2 | DIASTOLIC BLOOD PRESSURE: 57 MMHG | TEMPERATURE: 98.2 F | HEIGHT: 67 IN | WEIGHT: 220.24 LBS | OXYGEN SATURATION: 98 %

## 2020-10-17 PROBLEM — I47.19 NARROW COMPLEX TACHYCARDIA: Status: ACTIVE | Noted: 2020-10-15

## 2020-10-17 PROBLEM — I47.1 NARROW COMPLEX TACHYCARDIA (HCC): Status: ACTIVE | Noted: 2020-10-15

## 2020-10-17 LAB
BACTERIA TISS AEROBE CULT: ABNORMAL
GLUCOSE SERPL-MCNC: 111 MG/DL (ref 65–140)
GLUCOSE SERPL-MCNC: 78 MG/DL (ref 65–140)
GRAM STN SPEC: ABNORMAL
GRAM STN SPEC: ABNORMAL

## 2020-10-17 PROCEDURE — 99239 HOSP IP/OBS DSCHRG MGMT >30: CPT | Performed by: INTERNAL MEDICINE

## 2020-10-17 PROCEDURE — 82948 REAGENT STRIP/BLOOD GLUCOSE: CPT

## 2020-10-17 RX ORDER — AMOXICILLIN AND CLAVULANATE POTASSIUM 875; 125 MG/1; MG/1
1 TABLET, FILM COATED ORAL EVERY 12 HOURS SCHEDULED
Qty: 14 TABLET | Refills: 0 | Status: SHIPPED | OUTPATIENT
Start: 2020-10-17 | End: 2020-10-24

## 2020-10-17 RX ORDER — ACETAMINOPHEN 325 MG/1
975 TABLET ORAL EVERY 8 HOURS SCHEDULED
Qty: 45 TABLET | Refills: 0 | Status: SHIPPED | OUTPATIENT
Start: 2020-10-17 | End: 2020-10-22

## 2020-10-17 RX ADMIN — LISINOPRIL 10 MG: 10 TABLET ORAL at 08:51

## 2020-10-17 RX ADMIN — ACETAMINOPHEN 975 MG: 325 TABLET, FILM COATED ORAL at 05:24

## 2020-10-17 RX ADMIN — ACETAMINOPHEN 650 MG: 325 TABLET, FILM COATED ORAL at 02:29

## 2020-10-17 RX ADMIN — AMPICILLIN SODIUM AND SULBACTAM SODIUM 3 G: 2; 1 INJECTION, POWDER, FOR SOLUTION INTRAMUSCULAR; INTRAVENOUS at 04:54

## 2020-10-17 RX ADMIN — AMPICILLIN SODIUM AND SULBACTAM SODIUM 3 G: 2; 1 INJECTION, POWDER, FOR SOLUTION INTRAMUSCULAR; INTRAVENOUS at 10:36

## 2020-10-17 RX ADMIN — CHLORHEXIDINE GLUCONATE 0.12% ORAL RINSE 15 ML: 1.2 LIQUID ORAL at 08:53

## 2020-10-19 ENCOUNTER — TRANSITIONAL CARE MANAGEMENT (OUTPATIENT)
Dept: FAMILY MEDICINE CLINIC | Facility: CLINIC | Age: 50
End: 2020-10-19

## 2020-10-19 LAB — GLUCOSE SERPL-MCNC: 155 MG/DL (ref 65–140)

## 2020-10-20 ENCOUNTER — OFFICE VISIT (OUTPATIENT)
Dept: FAMILY MEDICINE CLINIC | Facility: CLINIC | Age: 50
End: 2020-10-20
Payer: COMMERCIAL

## 2020-10-20 VITALS
DIASTOLIC BLOOD PRESSURE: 78 MMHG | TEMPERATURE: 97.6 F | OXYGEN SATURATION: 98 % | SYSTOLIC BLOOD PRESSURE: 160 MMHG | HEART RATE: 79 BPM | WEIGHT: 221 LBS | BODY MASS INDEX: 34.61 KG/M2

## 2020-10-20 DIAGNOSIS — K04.7 DENTAL ABSCESS: ICD-10-CM

## 2020-10-20 DIAGNOSIS — Z09 HOSPITAL DISCHARGE FOLLOW-UP: Primary | ICD-10-CM

## 2020-10-20 DIAGNOSIS — I47.1 NARROW COMPLEX TACHYCARDIA (HCC): ICD-10-CM

## 2020-10-20 PROBLEM — L97.519 FOOT ULCER, RIGHT, WITH UNSPECIFIED SEVERITY (HCC): Status: RESOLVED | Noted: 2019-06-12 | Resolved: 2020-10-20

## 2020-10-20 PROCEDURE — 99495 TRANSJ CARE MGMT MOD F2F 14D: CPT | Performed by: FAMILY MEDICINE

## 2020-10-20 RX ORDER — LISINOPRIL AND HYDROCHLOROTHIAZIDE 12.5; 1 MG/1; MG/1
1 TABLET ORAL DAILY
COMMUNITY
End: 2021-04-13

## 2020-10-20 RX ORDER — GLYBURIDE 5 MG/1
5 TABLET ORAL 2 TIMES DAILY WITH MEALS
COMMUNITY
End: 2021-02-25 | Stop reason: SDUPTHER

## 2020-10-29 ENCOUNTER — TELEPHONE (OUTPATIENT)
Dept: PODIATRY | Facility: CLINIC | Age: 50
End: 2020-10-29

## 2020-10-30 ENCOUNTER — OFFICE VISIT (OUTPATIENT)
Dept: PODIATRY | Facility: CLINIC | Age: 50
End: 2020-10-30
Payer: COMMERCIAL

## 2020-10-30 VITALS — HEIGHT: 67 IN | TEMPERATURE: 97.8 F | BODY MASS INDEX: 34.69 KG/M2 | WEIGHT: 221 LBS

## 2020-10-30 DIAGNOSIS — L97.519 ULCER OF RIGHT FOOT, UNSPECIFIED ULCER STAGE (HCC): Primary | ICD-10-CM

## 2020-10-30 DIAGNOSIS — E11.40 TYPE 2 DIABETES MELLITUS WITH DIABETIC NEUROPATHY, UNSPECIFIED WHETHER LONG TERM INSULIN USE (HCC): ICD-10-CM

## 2020-10-30 PROCEDURE — L3000 FT INSERT UCB BERKELEY SHELL: HCPCS | Performed by: PODIATRIST

## 2020-11-16 ENCOUNTER — TELEPHONE (OUTPATIENT)
Dept: CARDIOLOGY CLINIC | Facility: CLINIC | Age: 50
End: 2020-11-16

## 2020-12-19 ENCOUNTER — IMMUNIZATIONS (OUTPATIENT)
Dept: FAMILY MEDICINE CLINIC | Facility: HOSPITAL | Age: 50
End: 2020-12-19
Payer: COMMERCIAL

## 2020-12-19 DIAGNOSIS — Z23 ENCOUNTER FOR IMMUNIZATION: ICD-10-CM

## 2020-12-19 PROCEDURE — 91300 SARS-COV-2 / COVID-19 MRNA VACCINE (PFIZER-BIONTECH) 30 MCG: CPT

## 2020-12-19 PROCEDURE — 0001A SARS-COV-2 / COVID-19 MRNA VACCINE (PFIZER-BIONTECH) 30 MCG: CPT

## 2020-12-22 ENCOUNTER — APPOINTMENT (EMERGENCY)
Dept: RADIOLOGY | Facility: HOSPITAL | Age: 50
End: 2020-12-22
Payer: COMMERCIAL

## 2020-12-22 ENCOUNTER — HOSPITAL ENCOUNTER (OUTPATIENT)
Dept: INFUSION CENTER | Facility: HOSPITAL | Age: 50
Discharge: HOME | End: 2020-12-22
Payer: COMMERCIAL

## 2020-12-22 ENCOUNTER — TELEMEDICINE (OUTPATIENT)
Dept: FAMILY MEDICINE CLINIC | Facility: CLINIC | Age: 50
End: 2020-12-22
Payer: COMMERCIAL

## 2020-12-22 ENCOUNTER — HOSPITAL ENCOUNTER (EMERGENCY)
Facility: HOSPITAL | Age: 50
Discharge: HOME | End: 2020-12-22
Attending: EMERGENCY MEDICINE | Admitting: EMERGENCY MEDICINE
Payer: COMMERCIAL

## 2020-12-22 VITALS
RESPIRATION RATE: 20 BRPM | DIASTOLIC BLOOD PRESSURE: 79 MMHG | OXYGEN SATURATION: 97 % | TEMPERATURE: 99.1 F | WEIGHT: 221 LBS | BODY MASS INDEX: 34.69 KG/M2 | HEART RATE: 90 BPM | HEIGHT: 67 IN | SYSTOLIC BLOOD PRESSURE: 157 MMHG

## 2020-12-22 VITALS
SYSTOLIC BLOOD PRESSURE: 154 MMHG | RESPIRATION RATE: 19 BRPM | HEART RATE: 76 BPM | DIASTOLIC BLOOD PRESSURE: 74 MMHG | TEMPERATURE: 98.6 F | OXYGEN SATURATION: 100 %

## 2020-12-22 VITALS — WEIGHT: 226 LBS | BODY MASS INDEX: 35.4 KG/M2

## 2020-12-22 DIAGNOSIS — R07.9 CHEST PAIN: ICD-10-CM

## 2020-12-22 DIAGNOSIS — U07.1 COVID-19: ICD-10-CM

## 2020-12-22 DIAGNOSIS — U07.1 COVID-19: Primary | ICD-10-CM

## 2020-12-22 DIAGNOSIS — R00.2 PALPITATIONS: ICD-10-CM

## 2020-12-22 PROBLEM — K04.7 DENTAL ABSCESS: Status: RESOLVED | Noted: 2020-10-14 | Resolved: 2020-12-22

## 2020-12-22 LAB
ANION GAP SERPL CALCULATED.3IONS-SCNC: 8 MMOL/L (ref 4–13)
ATRIAL RATE: 96 BPM
BASOPHILS # BLD AUTO: 0.08 THOUSANDS/ΜL (ref 0–0.1)
BASOPHILS NFR BLD AUTO: 1 % (ref 0–1)
BUN SERPL-MCNC: 12 MG/DL (ref 5–25)
CALCIUM SERPL-MCNC: 8.9 MG/DL (ref 8.3–10.1)
CHLORIDE SERPL-SCNC: 109 MMOL/L (ref 100–108)
CO2 SERPL-SCNC: 21 MMOL/L (ref 21–32)
CREAT SERPL-MCNC: 0.81 MG/DL (ref 0.6–1.3)
EOSINOPHIL # BLD AUTO: 0.11 THOUSAND/ΜL (ref 0–0.61)
EOSINOPHIL NFR BLD AUTO: 2 % (ref 0–6)
ERYTHROCYTE [DISTWIDTH] IN BLOOD BY AUTOMATED COUNT: 13.3 % (ref 11.6–15.1)
FLUAV RNA RESP QL NAA+PROBE: NEGATIVE
FLUBV RNA RESP QL NAA+PROBE: NEGATIVE
GFR SERPL CREATININE-BSD FRML MDRD: 85 ML/MIN/1.73SQ M
GLUCOSE SERPL-MCNC: 176 MG/DL (ref 65–140)
HCT VFR BLD AUTO: 40.2 % (ref 34.8–46.1)
HGB BLD-MCNC: 12.6 G/DL (ref 11.5–15.4)
IMM GRANULOCYTES # BLD AUTO: 0.02 THOUSAND/UL (ref 0–0.2)
IMM GRANULOCYTES NFR BLD AUTO: 0 % (ref 0–2)
LYMPHOCYTES # BLD AUTO: 0.74 THOUSANDS/ΜL (ref 0.6–4.47)
LYMPHOCYTES NFR BLD AUTO: 10 % (ref 14–44)
MCH RBC QN AUTO: 27.3 PG (ref 26.8–34.3)
MCHC RBC AUTO-ENTMCNC: 31.3 G/DL (ref 31.4–37.4)
MCV RBC AUTO: 87 FL (ref 82–98)
MONOCYTES # BLD AUTO: 1.28 THOUSAND/ΜL (ref 0.17–1.22)
MONOCYTES NFR BLD AUTO: 18 % (ref 4–12)
NEUTROPHILS # BLD AUTO: 4.87 THOUSANDS/ΜL (ref 1.85–7.62)
NEUTS SEG NFR BLD AUTO: 69 % (ref 43–75)
NRBC BLD AUTO-RTO: 0 /100 WBCS
P AXIS: 27 DEGREES
PLATELET # BLD AUTO: 267 THOUSANDS/UL (ref 149–390)
PMV BLD AUTO: 9.8 FL (ref 8.9–12.7)
POTASSIUM SERPL-SCNC: 3.9 MMOL/L (ref 3.5–5.3)
PR INTERVAL: 122 MS
QRS AXIS: 18 DEGREES
QRSD INTERVAL: 76 MS
QT INTERVAL: 326 MS
QTC INTERVAL: 411 MS
RBC # BLD AUTO: 4.61 MILLION/UL (ref 3.81–5.12)
RSV RNA RESP QL NAA+PROBE: NEGATIVE
SARS-COV-2 RNA RESP QL NAA+PROBE: POSITIVE
SODIUM SERPL-SCNC: 138 MMOL/L (ref 136–145)
T WAVE AXIS: 17 DEGREES
TROPONIN I SERPL-MCNC: <0.02 NG/ML
VENTRICULAR RATE: 96 BPM
WBC # BLD AUTO: 7.1 THOUSAND/UL (ref 4.31–10.16)

## 2020-12-22 PROCEDURE — M0239 BAMLANIVIMAB-XXXX INFUSION: HCPCS | Performed by: FAMILY MEDICINE

## 2020-12-22 PROCEDURE — 93010 ELECTROCARDIOGRAM REPORT: CPT | Performed by: INTERNAL MEDICINE

## 2020-12-22 PROCEDURE — 93005 ELECTROCARDIOGRAM TRACING: CPT

## 2020-12-22 PROCEDURE — 84484 ASSAY OF TROPONIN QUANT: CPT | Performed by: EMERGENCY MEDICINE

## 2020-12-22 PROCEDURE — 0241U HB NFCT DS VIR RESP RNA 4 TRGT: CPT | Performed by: EMERGENCY MEDICINE

## 2020-12-22 PROCEDURE — 85025 COMPLETE CBC W/AUTO DIFF WBC: CPT | Performed by: EMERGENCY MEDICINE

## 2020-12-22 PROCEDURE — 99285 EMERGENCY DEPT VISIT HI MDM: CPT

## 2020-12-22 PROCEDURE — 71045 X-RAY EXAM CHEST 1 VIEW: CPT

## 2020-12-22 PROCEDURE — 99285 EMERGENCY DEPT VISIT HI MDM: CPT | Performed by: EMERGENCY MEDICINE

## 2020-12-22 PROCEDURE — 99213 OFFICE O/P EST LOW 20 MIN: CPT | Performed by: FAMILY MEDICINE

## 2020-12-22 PROCEDURE — 36415 COLL VENOUS BLD VENIPUNCTURE: CPT | Performed by: EMERGENCY MEDICINE

## 2020-12-22 PROCEDURE — 96375 TX/PRO/DX INJ NEW DRUG ADDON: CPT

## 2020-12-22 PROCEDURE — 80048 BASIC METABOLIC PNL TOTAL CA: CPT | Performed by: EMERGENCY MEDICINE

## 2020-12-22 PROCEDURE — 99441 PR PHYS/QHP TELEPHONE EVALUATION 5-10 MIN: CPT | Performed by: FAMILY MEDICINE

## 2020-12-22 PROCEDURE — 96374 THER/PROPH/DIAG INJ IV PUSH: CPT

## 2020-12-22 RX ORDER — ACETAMINOPHEN 325 MG/1
650 TABLET ORAL ONCE AS NEEDED
Status: DISCONTINUED | OUTPATIENT
Start: 2020-12-22 | End: 2020-12-23

## 2020-12-22 RX ORDER — MELATONIN
2000 DAILY
Qty: 10 TABLET | Refills: 0 | Status: SHIPPED | OUTPATIENT
Start: 2020-12-22 | End: 2022-02-22

## 2020-12-22 RX ORDER — ALBUTEROL SULFATE 90 UG/1
3 AEROSOL, METERED RESPIRATORY (INHALATION) ONCE AS NEEDED
Status: CANCELLED | OUTPATIENT
Start: 2020-12-22

## 2020-12-22 RX ORDER — IBUPROFEN 600 MG/1
600 TABLET ORAL AS NEEDED
COMMUNITY
End: 2022-02-22

## 2020-12-22 RX ORDER — ALBUTEROL SULFATE 90 UG/1
3 AEROSOL, METERED RESPIRATORY (INHALATION) ONCE AS NEEDED
Status: DISCONTINUED | OUTPATIENT
Start: 2020-12-22 | End: 2020-12-23

## 2020-12-22 RX ORDER — B-COMPLEX WITH VITAMIN C
1 TABLET ORAL 2 TIMES DAILY
Qty: 20 TABLET | Refills: 0 | Status: SHIPPED | OUTPATIENT
Start: 2020-12-22 | End: 2022-02-22

## 2020-12-22 RX ORDER — SODIUM CHLORIDE 9 MG/ML
20 INJECTION, SOLUTION INTRAVENOUS ONCE
Status: CANCELLED | OUTPATIENT
Start: 2020-12-22

## 2020-12-22 RX ORDER — KETOROLAC TROMETHAMINE 30 MG/ML
15 INJECTION, SOLUTION INTRAMUSCULAR; INTRAVENOUS ONCE
Status: COMPLETED | OUTPATIENT
Start: 2020-12-22 | End: 2020-12-22

## 2020-12-22 RX ORDER — SODIUM CHLORIDE 9 MG/ML
20 INJECTION, SOLUTION INTRAVENOUS ONCE
Status: COMPLETED | OUTPATIENT
Start: 2020-12-22 | End: 2020-12-22

## 2020-12-22 RX ORDER — ACETAMINOPHEN 325 MG/1
650 TABLET ORAL ONCE AS NEEDED
Status: CANCELLED | OUTPATIENT
Start: 2020-12-22

## 2020-12-22 RX ORDER — MULTIVITAMIN
1 TABLET ORAL DAILY
Qty: 10 TABLET | Refills: 0 | Status: SHIPPED | OUTPATIENT
Start: 2020-12-22 | End: 2022-02-22

## 2020-12-22 RX ORDER — ONDANSETRON 2 MG/ML
4 INJECTION INTRAMUSCULAR; INTRAVENOUS ONCE
Status: COMPLETED | OUTPATIENT
Start: 2020-12-22 | End: 2020-12-22

## 2020-12-22 RX ADMIN — SODIUM CHLORIDE 700 MG: 9 INJECTION, SOLUTION INTRAVENOUS at 14:10

## 2020-12-22 RX ADMIN — SODIUM CHLORIDE 20 ML/HR: 9 INJECTION, SOLUTION INTRAVENOUS at 13:35

## 2020-12-22 RX ADMIN — KETOROLAC TROMETHAMINE 15 MG: 30 INJECTION, SOLUTION INTRAMUSCULAR at 04:33

## 2020-12-22 RX ADMIN — ONDANSETRON 4 MG: 2 INJECTION INTRAMUSCULAR; INTRAVENOUS at 04:33

## 2020-12-23 ENCOUNTER — TELEMEDICINE (OUTPATIENT)
Dept: FAMILY MEDICINE CLINIC | Facility: CLINIC | Age: 50
End: 2020-12-23
Payer: COMMERCIAL

## 2020-12-23 VITALS — TEMPERATURE: 99.9 F | HEART RATE: 96 BPM

## 2020-12-23 DIAGNOSIS — U07.1 COVID-19: Primary | ICD-10-CM

## 2020-12-23 PROCEDURE — 99212 OFFICE O/P EST SF 10 MIN: CPT | Performed by: FAMILY MEDICINE

## 2020-12-24 ENCOUNTER — TELEMEDICINE (OUTPATIENT)
Dept: FAMILY MEDICINE CLINIC | Facility: CLINIC | Age: 50
End: 2020-12-24
Payer: COMMERCIAL

## 2020-12-24 VITALS — OXYGEN SATURATION: 97 % | TEMPERATURE: 99 F

## 2020-12-24 DIAGNOSIS — U07.1 COVID-19: Primary | ICD-10-CM

## 2020-12-24 PROCEDURE — 99213 OFFICE O/P EST LOW 20 MIN: CPT | Performed by: NURSE PRACTITIONER

## 2020-12-28 ENCOUNTER — TELEMEDICINE (OUTPATIENT)
Dept: FAMILY MEDICINE CLINIC | Facility: CLINIC | Age: 50
End: 2020-12-28
Payer: COMMERCIAL

## 2020-12-28 DIAGNOSIS — U07.1 COVID-19: ICD-10-CM

## 2020-12-28 DIAGNOSIS — Z00.00 ANNUAL PHYSICAL EXAM: Primary | ICD-10-CM

## 2020-12-28 PROCEDURE — 99213 OFFICE O/P EST LOW 20 MIN: CPT | Performed by: NURSE PRACTITIONER

## 2021-02-25 DIAGNOSIS — E11.40 CONTROLLED TYPE 2 DIABETES MELLITUS WITH NEUROPATHY (HCC): Primary | ICD-10-CM

## 2021-02-25 RX ORDER — GLYBURIDE 5 MG/1
5 TABLET ORAL 2 TIMES DAILY WITH MEALS
Qty: 180 TABLET | Refills: 1 | Status: SHIPPED | OUTPATIENT
Start: 2021-02-25 | End: 2022-02-18

## 2021-03-03 ENCOUNTER — IMMUNIZATIONS (OUTPATIENT)
Dept: FAMILY MEDICINE CLINIC | Facility: HOSPITAL | Age: 51
End: 2021-03-03

## 2021-03-03 DIAGNOSIS — Z23 ENCOUNTER FOR IMMUNIZATION: Primary | ICD-10-CM

## 2021-03-03 PROCEDURE — 91300 SARS-COV-2 / COVID-19 MRNA VACCINE (PFIZER-BIONTECH) 30 MCG: CPT

## 2021-03-03 PROCEDURE — 0002A SARS-COV-2 / COVID-19 MRNA VACCINE (PFIZER-BIONTECH) 30 MCG: CPT

## 2021-04-09 DIAGNOSIS — I10 BENIGN ESSENTIAL HYPERTENSION: Primary | ICD-10-CM

## 2021-04-13 RX ORDER — LISINOPRIL AND HYDROCHLOROTHIAZIDE 12.5; 1 MG/1; MG/1
TABLET ORAL
Qty: 90 TABLET | Refills: 3 | Status: SHIPPED | OUTPATIENT
Start: 2021-04-13 | End: 2022-05-15

## 2021-04-13 NOTE — TELEPHONE ENCOUNTER
Requested medication(s) are due for refill today: Yes  Patient has already received a courtesy refill: No     Other reason request has been forwarded to provider: Per protocol      Patient has a follow up appointment

## 2021-06-11 ENCOUNTER — APPOINTMENT (OUTPATIENT)
Dept: LAB | Facility: HOSPITAL | Age: 51
End: 2021-06-11
Payer: COMMERCIAL

## 2021-06-11 DIAGNOSIS — E11.40 TYPE 2 DIABETES MELLITUS WITH DIABETIC NEUROPATHY, WITHOUT LONG-TERM CURRENT USE OF INSULIN (HCC): ICD-10-CM

## 2021-06-11 LAB
EST. AVERAGE GLUCOSE BLD GHB EST-MCNC: 140 MG/DL
HBA1C MFR BLD: 6.5 %

## 2021-06-11 PROCEDURE — 83036 HEMOGLOBIN GLYCOSYLATED A1C: CPT

## 2021-06-11 PROCEDURE — 36415 COLL VENOUS BLD VENIPUNCTURE: CPT

## 2021-06-14 ENCOUNTER — OFFICE VISIT (OUTPATIENT)
Dept: FAMILY MEDICINE CLINIC | Facility: CLINIC | Age: 51
End: 2021-06-14
Payer: COMMERCIAL

## 2021-06-14 VITALS
HEIGHT: 68 IN | SYSTOLIC BLOOD PRESSURE: 144 MMHG | BODY MASS INDEX: 35.07 KG/M2 | DIASTOLIC BLOOD PRESSURE: 78 MMHG | TEMPERATURE: 98.2 F | WEIGHT: 231.4 LBS | HEART RATE: 92 BPM | OXYGEN SATURATION: 100 % | RESPIRATION RATE: 16 BRPM

## 2021-06-14 DIAGNOSIS — E78.5 HYPERLIPIDEMIA, UNSPECIFIED HYPERLIPIDEMIA TYPE: ICD-10-CM

## 2021-06-14 DIAGNOSIS — Z12.11 SCREENING FOR COLORECTAL CANCER: ICD-10-CM

## 2021-06-14 DIAGNOSIS — Z12.12 SCREENING FOR COLORECTAL CANCER: ICD-10-CM

## 2021-06-14 DIAGNOSIS — I10 BENIGN ESSENTIAL HYPERTENSION: ICD-10-CM

## 2021-06-14 DIAGNOSIS — Z12.31 ENCOUNTER FOR SCREENING MAMMOGRAM FOR BREAST CANCER: ICD-10-CM

## 2021-06-14 DIAGNOSIS — I47.1 NARROW COMPLEX TACHYCARDIA (HCC): ICD-10-CM

## 2021-06-14 DIAGNOSIS — E11.40 TYPE 2 DIABETES MELLITUS WITH DIABETIC NEUROPATHY, WITHOUT LONG-TERM CURRENT USE OF INSULIN (HCC): ICD-10-CM

## 2021-06-14 DIAGNOSIS — Z00.00 ANNUAL PHYSICAL EXAM: Primary | ICD-10-CM

## 2021-06-14 PROBLEM — U07.1 COVID-19: Status: RESOLVED | Noted: 2020-12-22 | Resolved: 2021-06-14

## 2021-06-14 PROBLEM — Z09 HOSPITAL DISCHARGE FOLLOW-UP: Status: RESOLVED | Noted: 2020-10-20 | Resolved: 2021-06-14

## 2021-06-14 PROCEDURE — 99396 PREV VISIT EST AGE 40-64: CPT | Performed by: FAMILY MEDICINE

## 2021-06-14 NOTE — PATIENT INSTRUCTIONS

## 2021-06-14 NOTE — ASSESSMENT & PLAN NOTE
Stable on current dose  Needs eye exam  Lab Results   Component Value Date    HGBA1C 6 5 (H) 06/11/2021

## 2021-06-14 NOTE — PROGRESS NOTES
[facility]    NAME: Lyndsey Grissom  AGE: 48 y o  SEX: female  : 1970     DATE: 2021     Assessment and Plan:     Problem List Items Addressed This Visit        Endocrine    Diabetes mellitus with diabetic neuropathy ([address] ) (Chronic)     Stable on current dose  Needs eye exam  Lab Results   Component Value Date    HGBA1C 6 5 (H) 2021            Relevant Orders    Microalbumin / creatinine urine ratio       Cardiovascular and Mediastinum    Benign essential hypertension    Relevant Orders    CBC    Comprehensive metabolic panel    TSH, 3rd generation with Free T4 reflex       Other    Hyperlipidemia (Chronic)    Relevant Orders    Lipid Panel with Direct LDL reflex    TSH, 3rd generation with Free T4 reflex    Narrow complex tachycardia (HCC)     No further issues  Likely infection related         Annual physical exam - Primary     Mammogram and cologuard         Encounter for screening mammogram for breast cancer    Relevant Orders    Mammo screening bilateral w 3d & cad    Screening for colorectal cancer    Relevant Orders    Cologuard          Immunizations and preventive care screenings were discussed with patient today  Appropriate education was printed on patient's after visit summary  Counseling:  Dental Health: discussed importance of regular tooth brushing, flossing, and dental visits  · Exercise: the importance of regular exercise/physical activity was discussed  Recommend exercise 3-5 times per week for at least 30 minutes  BMI Counseling: Body mass index is 34 75 kg/m²  The BMI is above normal  Nutrition recommendations include encouraging healthy choices of fruits and vegetables  Exercise recommendations include exercising 3-5 times per week  No pharmacotherapy was ordered  Return in 1 year (on 2022)       Chief Complaint:     Chief Complaint   Patient presents with   [facility] Annual Exam History of Present Illness:     Adult Annual Physical   Patient here for a comprehensive physical exam  The patient reports no problems  Diet and Physical Activity  · Diet/Nutrition: well balanced diet  · Exercise: walking  Depression Screening  PHQ-9 Depression Screening    PHQ-9:   Frequency of the following problems over the past two weeks:      Little interest or pleasure in doing things: 0 - not at all  Feeling down, depressed, or hopeless: 0 - not at all  PHQ-2 Score: 0       General Health  · Sleep: sleeps well  · Hearing: normal - bilateral   · Vision: no vision problems  · Dental: regular dental visits  /GYN Health  · Patient is: premenopausal  · Last menstrual period: regular  · Contraceptive method: n/a  Review of Systems:     Review of Systems   Constitutional: Negative  HENT: Negative  Eyes: Negative  Respiratory: Negative  Cardiovascular: Negative  Gastrointestinal: Negative  Endocrine: Negative  Genitourinary: Negative  Musculoskeletal: Negative  Skin: Negative  Allergic/Immunologic: Negative  Neurological: Negative  Hematological: Negative  Psychiatric/Behavioral: Negative         Past Medical History:     Past Medical History:   Diagnosis Date    Cellulitis of left lower extremity 4/15/2020    COVID-19 12/22/2020    Diabetes mellitus ([facility] [address] )     Exposure to SARS-associated coronavirus 4/15/2020    Fever 4/15/2020    Foot ulcer, right, with unspecified severity ([facility] [address] ) 6/12/2019    Hyperlipidemia     Hypertension       Past Surgical History:     Past Surgical History:   Procedure Laterality Date    INCISION AND DRAINAGE INTRA ORAL ABSCESS Left 10/14/2020    Procedure: INCISION AND DRAINAGE  (I&D) WOUND ORAL EXTRAORAL LEFT SUBMANDIBULAR SPACE, LEFT  SPACE SPACE, INTRAORAL LEFT LATERAL PHARYNGEAL SPACE AND LEFT SUBLINGUAL SPACE;  Surgeon: Lucinda Ortega DDS;  Location: [facility];  Service: Maxillofacial    REMOVAL OF IMPACTED TOOTH - COMPLETELY BONY Left 10/14/2020    Procedure: EXTRACTION TEETH MULTIPLE (RETAINED ROOTS #1,3,14, TEETH #4,17,18,19,32;  Surgeon: Lucinda Ortega DDS;  Location: [facility];  Service: Maxillofacial    TONSILLECTOMY AND ADENOIDECTOMY      WOUND DEBRIDEMENT Right 7/25/2020    Procedure: Excision of non healing diabetic right foot Ulcer, with closure of wound, excision of tibial sesamoid right foot;  Surgeon: Jonathan Vincent DPM;  Location: [facility];  Service: Podiatry      Social History:     Social History     Socioeconomic History    Marital status: /Civil Union     Spouse name: None    Number of children: None    Years of education: None    Highest education level: None   Occupational History    None   Tobacco Use    Smoking status: Never Smoker    Smokeless tobacco: Never Used   Vaping Use    Vaping Use: Never used   Substance and Sexual Activity    Alcohol use: Yes     Comment: weekends    Drug use: No    Sexual activity: None   Other Topics Concern    None   Social History Narrative    None     Social Determinants of Health     Financial Resource Strain:     Difficulty of Paying Living Expenses:    Food Insecurity:     Worried About Running Out of Food in the Last Year:     Ran Out of Food in the Last Year:    Transportation Needs:     Lack of Transportation (Medical):      Lack of Transportation (Non-Medical):    Physical Activity:     Days of Exercise per Week:     Minutes of Exercise per Session:    Stress:     Feeling of Stress :    Social Connections:     Frequency of Communication with Friends and Family:     Frequency of Social Gatherings with Friends and Family:     Attends Confucianist Services:     Active Member of Clubs or Organizations:     Attends Club or Organization Meetings:     Marital Status:    Intimate Partner Violence:     Fear of Current or Ex-Partner:     Emotionally Abused:     Physically Abused:     Sexually Abused:       Family History: Family History   Problem Relation Age of Onset    Heart disease Mother     Diabetes Mother     Hypertension Mother         Benign Essential     Coronary artery disease Mother     Cancer Father         lung     Diabetes Sister         mellitus     Diabetes Maternal Grandfather         mellitus       Current Medications:     Current Outpatient Medications   Medication Sig Dispense Refill    calcium carbonate-vitamin D (OSCAL-D) 500 mg-200 units per tablet Take 1 tablet by mouth 2 (two) times a day 20 tablet 0    cholecalciferol (VITAMIN D3) 1,000 units tablet Take 2 tablets (2,000 Units total) by mouth daily 10 tablet 0    glyBURIDE (DIABETA) 5 mg tablet Take 1 tablet (5 mg total) by mouth 2 (two) times a day with meals 180 tablet 1    ibuprofen (MOTRIN) 600 mg tablet Take 600 mg by mouth as needed for mild pain      INVOKANA 100 MG TAKE ONE TABLET BY MOUTH EVERY DAY 90 tablet 3    lisinopril-hydrochlorothiazide (PRINZIDE,ZESTORETIC) 10-12 5 MG per tablet TAKE ONE TABLET BY MOUTH EVERY DAY 90 tablet 3    metFORMIN (GLUCOPHAGE) 1000 MG tablet Take 1 tablet (1,000 mg total) by mouth 2 (two) times a day 180 tablet 3    Multiple Vitamin (multivitamin) tablet Take 1 tablet by mouth daily 10 tablet 0    simvastatin (ZOCOR) 40 mg tablet TAKE 1 TABLET AT BEDTIME  90 tablet 3     No current facility-administered medications for this visit  Allergies:     No Known Allergies   Physical Exam:     /78   Pulse 92   Temp 98 2 °F (36 8 °C) (Tympanic)   Resp 16   Ht 5' 8 43" (1 738 m)   Wt 105 kg (231 lb 6 4 oz)   SpO2 100%   BMI 34 75 kg/m²     Physical Exam  Vitals and nursing note reviewed  Constitutional:       Appearance: Normal appearance  She is well-developed  HENT:      Head: Normocephalic and atraumatic  Right Ear: External ear normal       Left Ear: External ear normal       Nose: Nose normal    Eyes:      Extraocular Movements: Extraocular movements intact  Conjunctiva/sclera: Conjunctivae normal       Pupils: Pupils are equal, round, and reactive to light  Cardiovascular:      Rate and Rhythm: Normal rate and regular rhythm  Pulses: Normal pulses  Heart sounds: Normal heart sounds  Pulmonary:      Effort: Pulmonary effort is normal       Breath sounds: Normal breath sounds  Abdominal:      General: Abdomen is flat  Bowel sounds are normal       Palpations: Abdomen is soft  Musculoskeletal:         General: Normal range of motion  Cervical back: Normal range of motion and neck supple  Skin:     General: Skin is warm and dry  Capillary Refill: Capillary refill takes less than 2 seconds  Neurological:      General: No focal deficit present  Mental Status: She is alert and oriented to person, place, and time  Psychiatric:         Mood and Affect: Mood normal          Behavior: Behavior normal          Thought Content:  Thought content normal          Judgment: Judgment normal           Grabiel Bess DO  [facility]

## 2021-07-16 ENCOUNTER — TELEPHONE (OUTPATIENT)
Dept: OTHER | Facility: OTHER | Age: 51
End: 2021-07-16

## 2021-07-16 NOTE — TELEPHONE ENCOUNTER
Patient called to cancel appointment schedule for today Friday Jul 16, 2021  8:30 AM  FOLLOW UP [facility]  Jonathan Parada, DPM  [facility] [place]  Due to was called into work and would like a call back from office to reschedule

## 2021-07-19 ENCOUNTER — HOSPITAL ENCOUNTER (OUTPATIENT)
Dept: MAMMOGRAPHY | Facility: MEDICAL CENTER | Age: 51
Discharge: HOME | End: 2021-07-19
Payer: COMMERCIAL

## 2021-07-19 VITALS — BODY MASS INDEX: 35.01 KG/M2 | WEIGHT: 231 LBS | HEIGHT: 68 IN

## 2021-07-19 DIAGNOSIS — Z12.31 ENCOUNTER FOR SCREENING MAMMOGRAM FOR BREAST CANCER: ICD-10-CM

## 2021-07-19 PROCEDURE — 77067 SCR MAMMO BI INCL CAD: CPT

## 2021-07-19 PROCEDURE — 77063 BREAST TOMOSYNTHESIS BI: CPT

## 2021-07-31 DIAGNOSIS — E11.40 TYPE 2 DIABETES MELLITUS WITH DIABETIC NEUROPATHY, WITHOUT LONG-TERM CURRENT USE OF INSULIN (HCC): Chronic | ICD-10-CM

## 2021-08-02 RX ORDER — CANAGLIFLOZIN 100 MG/1
TABLET, FILM COATED ORAL
Qty: 90 TABLET | Refills: 0 | Status: SHIPPED | OUTPATIENT
Start: 2021-08-02 | End: 2021-10-30

## 2021-08-20 LAB
LEFT EYE DIABETIC RETINOPATHY: NORMAL
RIGHT EYE DIABETIC RETINOPATHY: NORMAL
SEVERITY (EYE EXAM): NORMAL

## 2021-08-30 ENCOUNTER — APPOINTMENT (OUTPATIENT)
Dept: LAB | Facility: HOSPITAL | Age: 51
End: 2021-08-30
Payer: COMMERCIAL

## 2021-08-30 DIAGNOSIS — E11.40 TYPE 2 DIABETES MELLITUS WITH DIABETIC NEUROPATHY, WITHOUT LONG-TERM CURRENT USE OF INSULIN (HCC): ICD-10-CM

## 2021-08-30 DIAGNOSIS — I10 BENIGN ESSENTIAL HYPERTENSION: ICD-10-CM

## 2021-08-30 DIAGNOSIS — E78.5 HYPERLIPIDEMIA, UNSPECIFIED HYPERLIPIDEMIA TYPE: ICD-10-CM

## 2021-08-30 LAB
ALBUMIN SERPL BCP-MCNC: 3 G/DL (ref 3.5–5)
ALP SERPL-CCNC: 93 U/L (ref 46–116)
ALT SERPL W P-5'-P-CCNC: 21 U/L (ref 12–78)
ANION GAP SERPL CALCULATED.3IONS-SCNC: 4 MMOL/L (ref 4–13)
AST SERPL W P-5'-P-CCNC: 18 U/L (ref 5–45)
BILIRUB SERPL-MCNC: 0.3 MG/DL (ref 0.2–1)
BUN SERPL-MCNC: 19 MG/DL (ref 5–25)
CALCIUM ALBUM COR SERPL-MCNC: 9.2 MG/DL (ref 8.3–10.1)
CALCIUM SERPL-MCNC: 8.4 MG/DL (ref 8.3–10.1)
CHLORIDE SERPL-SCNC: 110 MMOL/L (ref 100–108)
CHOLEST SERPL-MCNC: 199 MG/DL (ref 50–200)
CO2 SERPL-SCNC: 23 MMOL/L (ref 21–32)
CREAT SERPL-MCNC: 0.87 MG/DL (ref 0.6–1.3)
CREAT UR-MCNC: 68.7 MG/DL
ERYTHROCYTE [DISTWIDTH] IN BLOOD BY AUTOMATED COUNT: 13.2 % (ref 11.6–15.1)
GFR SERPL CREATININE-BSD FRML MDRD: 78 ML/MIN/1.73SQ M
GLUCOSE P FAST SERPL-MCNC: 129 MG/DL (ref 65–99)
HCT VFR BLD AUTO: 38.8 % (ref 34.8–46.1)
HDLC SERPL-MCNC: 48 MG/DL
HGB BLD-MCNC: 12.1 G/DL (ref 11.5–15.4)
LDLC SERPL CALC-MCNC: 132 MG/DL (ref 0–100)
MCH RBC QN AUTO: 27.6 PG (ref 26.8–34.3)
MCHC RBC AUTO-ENTMCNC: 31.2 G/DL (ref 31.4–37.4)
MCV RBC AUTO: 89 FL (ref 82–98)
MICROALBUMIN UR-MCNC: 6.1 MG/L (ref 0–20)
MICROALBUMIN/CREAT 24H UR: 9 MG/G CREATININE (ref 0–30)
PLATELET # BLD AUTO: 300 THOUSANDS/UL (ref 149–390)
PMV BLD AUTO: 9.7 FL (ref 8.9–12.7)
POTASSIUM SERPL-SCNC: 4 MMOL/L (ref 3.5–5.3)
PROT SERPL-MCNC: 6.8 G/DL (ref 6.4–8.2)
RBC # BLD AUTO: 4.38 MILLION/UL (ref 3.81–5.12)
SODIUM SERPL-SCNC: 137 MMOL/L (ref 136–145)
TRIGL SERPL-MCNC: 96 MG/DL
TSH SERPL DL<=0.05 MIU/L-ACNC: 1.88 UIU/ML (ref 0.36–3.74)
WBC # BLD AUTO: 7.45 THOUSAND/UL (ref 4.31–10.16)

## 2021-08-30 PROCEDURE — 80061 LIPID PANEL: CPT

## 2021-08-30 PROCEDURE — 82043 UR ALBUMIN QUANTITATIVE: CPT

## 2021-08-30 PROCEDURE — 82570 ASSAY OF URINE CREATININE: CPT

## 2021-08-30 PROCEDURE — 80053 COMPREHEN METABOLIC PANEL: CPT

## 2021-08-30 PROCEDURE — 84443 ASSAY THYROID STIM HORMONE: CPT

## 2021-08-30 PROCEDURE — 85027 COMPLETE CBC AUTOMATED: CPT

## 2021-08-30 PROCEDURE — 36415 COLL VENOUS BLD VENIPUNCTURE: CPT

## 2021-10-11 DIAGNOSIS — E11.40 CONTROLLED TYPE 2 DIABETES MELLITUS WITH NEUROPATHY (HCC): ICD-10-CM

## 2021-10-30 DIAGNOSIS — E11.40 TYPE 2 DIABETES MELLITUS WITH DIABETIC NEUROPATHY, WITHOUT LONG-TERM CURRENT USE OF INSULIN (HCC): Chronic | ICD-10-CM

## 2021-10-30 RX ORDER — CANAGLIFLOZIN 100 MG/1
TABLET, FILM COATED ORAL
Qty: 90 TABLET | Refills: 0 | Status: SHIPPED | OUTPATIENT
Start: 2021-10-30 | End: 2022-02-09

## 2022-01-05 ENCOUNTER — HOSPITAL ENCOUNTER (EMERGENCY)
Facility: HOSPITAL | Age: 52
Discharge: HOME | End: 2022-01-05
Attending: EMERGENCY MEDICINE
Payer: COMMERCIAL

## 2022-01-05 VITALS
HEART RATE: 96 BPM | SYSTOLIC BLOOD PRESSURE: 147 MMHG | TEMPERATURE: 98 F | RESPIRATION RATE: 16 BRPM | OXYGEN SATURATION: 100 % | DIASTOLIC BLOOD PRESSURE: 65 MMHG

## 2022-01-05 DIAGNOSIS — R68.89 FLU-LIKE SYMPTOMS: Primary | ICD-10-CM

## 2022-01-05 PROCEDURE — 99283 EMERGENCY DEPT VISIT LOW MDM: CPT

## 2022-01-05 PROCEDURE — 87636 SARSCOV2 & INF A&B AMP PRB: CPT

## 2022-01-05 PROCEDURE — 99282 EMERGENCY DEPT VISIT SF MDM: CPT | Performed by: EMERGENCY MEDICINE

## 2022-01-05 NOTE — ED PROVIDER NOTES
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 Taking? Invokana 100 MG   No No   Sig: TAKE ONE TABLET BY MOUTH EVERY DAY   Multiple Vitamin (multivitamin) tablet   No No   Sig: Take 1 tablet by mouth daily   calcium carbonate-vitamin D (OSCAL-D) 500 mg-200 units per tablet   No No   Sig: Take 1 tablet by mouth 2 (two) times a day   cholecalciferol (VITAMIN D3) 1,000 units tablet   No No   Sig: Take 2 tablets (2,000 Units total) by mouth daily   glyBURIDE (DIABETA) 5 mg tablet   No No   Sig: Take 1 tablet (5 mg total) by mouth 2 (two) times a day with meals   ibuprofen (MOTRIN) 600 mg tablet   Yes No   Sig: Take 600 mg by mouth as needed for mild pain   lisinopril-hydrochlorothiazide (PRINZIDE,ZESTORETIC) 10-12 5 MG per tablet   No No   Sig: TAKE ONE TABLET BY MOUTH EVERY DAY   metFORMIN (GLUCOPHAGE) 1000 MG tablet   No No   Sig: TAKE 1 TABLET BY MOUTH TWICE A DAY   simvastatin (ZOCOR) 40 mg tablet   No No   Sig: TAKE 1 TABLET AT BEDTIME        Facility-Administered Medications: None       Past Medical History:   Diagnosis Date    Cellulitis of left lower extremity 4/15/2020    COVID-19 12/22/2020    Diabetes mellitus ([facility] [address] )     Exposure to SARS-associated coronavirus 4/15/2020    Fever 4/15/2020    Foot ulcer, right, with unspecified severity ([facility] [address] ) 6/12/2019    Hyperlipidemia     Hypertension        Past Surgical History:   Procedure Laterality Date    INCISION AND DRAINAGE INTRA ORAL ABSCESS Left 10/14/2020    Procedure: INCISION AND DRAINAGE  (I&D) WOUND ORAL EXTRAORAL LEFT SUBMANDIBULAR SPACE, LEFT  SPACE SPACE, INTRAORAL LEFT LATERAL PHARYNGEAL SPACE AND LEFT SUBLINGUAL SPACE;  Surgeon: Triny Burrows DDS;  [facility];  Service: Maxillofacial    REMOVAL OF IMPACTED TOOTH - COMPLETELY BONY Left 10/14/2020    Procedure: EXTRACTION TEETH MULTIPLE (RETAINED ROOTS #1,3,14, TEETH #4,17,18,19,32;  Surgeon: Triny Burrows DDS;  [facility];  Service: Maxillofacial    TONSILLECTOMY AND ADENOIDECTOMY      WOUND DEBRIDEMENT Right 7/25/2020    Procedure: Excision of non healing diabetic right foot Ulcer, with closure of wound, excision of tibial sesamoid right foot;  Surgeon: Bertram Mccullough DPM;  [facility];  Service: Podiatry       Family History   Problem Relation Age of Onset    Heart disease Mother     Diabetes Mother     Hypertension Mother         Benign Essential     Coronary artery disease Mother     Cancer Father         lung     Diabetes Sister         mellitus     Diabetes Maternal Grandfather         mellitus     No Known Problems Maternal Grandmother     Breast cancer Paternal Grandmother         unknown age   [facility] No Known Problems Paternal Grandfather     No Known Problems Maternal Aunt     Breast cancer Paternal Aunt      I have reviewed and agree with the history as documented  E-Cigarette/Vaping    E-Cigarette Use Never User      E-Cigarette/Vaping Substances    Nicotine No     THC No     CBD No     Flavoring No     Other No     Unknown No      Social History     Tobacco Use    Smoking status: Never Smoker    Smokeless tobacco: Never Used   Vaping Use    Vaping Use: Never used   Substance Use Topics    Alcohol use: Yes     Comment: weekends    Drug use: No        Review of Systems   All other systems reviewed and are negative        Physical Exam  ED Triage Vitals [01/05/22 0244]   Temperature Pulse Respirations Blood Pressure SpO2   98 °F (36 7 °C) 96 16 147/65 100 % Temp Source Heart Rate Source Patient Position - Orthostatic VS BP Location FiO2 (%)   Oral Monitor Lying Right arm --      Pain Score       --             Orthostatic Vital Signs  Vitals:    01/05/22 0244   BP: 147/65   Pulse: 96   Patient Position - Orthostatic VS: Lying       Physical Exam  Vitals and nursing note reviewed  Constitutional:       General: She is in acute distress  Appearance: Normal appearance  She is not ill-appearing, toxic-appearing or diaphoretic  HENT:      Head: Normocephalic and atraumatic  Right Ear: External ear normal       Left Ear: External ear normal       Nose: Congestion present  Mouth/Throat:      Mouth: Mucous membranes are moist       Pharynx: Oropharynx is clear  Eyes:      General:         Right eye: No discharge  Left eye: No discharge  Extraocular Movements: Extraocular movements intact  Conjunctiva/sclera: Conjunctivae normal       Pupils: Pupils are equal, round, and reactive to light  Cardiovascular:      Rate and Rhythm: Normal rate and regular rhythm  Pulses: Normal pulses  Heart sounds: Normal heart sounds  No murmur heard  No friction rub  Pulmonary:      Effort: Pulmonary effort is normal  No respiratory distress  Breath sounds: Normal breath sounds  No wheezing or rales  Abdominal:      General: There is no distension  Palpations: Abdomen is soft  Tenderness: There is no abdominal tenderness  There is no guarding  Musculoskeletal:         General: Normal range of motion  Cervical back: Normal range of motion and neck supple  Skin:     General: Skin is warm and dry  Capillary Refill: Capillary refill takes less than 2 seconds  Coloration: Skin is not pale  Neurological:      General: No focal deficit present  Mental Status: She is alert and oriented to person, place, and time     Psychiatric:         Mood and Affect: Mood normal          Behavior: Behavior normal  ED Medications  Medications - No data to display    Diagnostic Studies  Results Reviewed     Procedure Component Value Units Date/Time    COVID/FLU - 24 hour TAT [[id number]] Collected: 01/05/22 0302    Lab Status: In process Specimen: Nares from Nose Updated: 01/05/22 0312    COVID/FLU/RSV - 2 hour TAT [[id number]]     Lab Status: No result Specimen: Nares                  No orders to display         Procedures  Procedures      ED Course                                       MDM  Number of Diagnoses or Management Options  Flu-like symptoms: new and requires workup  Diagnosis management comments: Impression: symptoms likely due to viral illness  Plan: COVID swab, pt does not want anything for pain       Amount and/or Complexity of Data Reviewed  Clinical lab tests: ordered  Review and summarize past medical records: yes    Risk of Complications, Morbidity, and/or Mortality  Presenting problems: low  Diagnostic procedures: minimal  Management options: minimal    Patient Progress  Patient progress: stable      Disposition  Final diagnoses:   Flu-like symptoms     Time reflects when diagnosis was documented in both MDM as applicable and the Disposition within this note     Time User Action Codes Description Comment    1/5/2022  3:07 AM Tervon Gold Add [R68 89] Flu-like symptoms       ED Disposition     ED Disposition Condition Date/Time Comment    Discharge Stable Wed Jan 5, 2022  3:07 AM Misty Morillo discharge to home/self care              Follow-up Information    None         Discharge Medication List as of 1/5/2022  3:17 AM      CONTINUE these medications which have NOT CHANGED    Details   calcium carbonate-vitamin D (OSCAL-D) 500 mg-200 units per tablet Take 1 tablet by mouth 2 (two) times a day, Starting Tue 12/22/2020, Normal      cholecalciferol (VITAMIN D3) 1,000 units tablet Take 2 tablets (2,000 Units total) by mouth daily, Starting Tue 12/22/2020, Normal      glyBURIDE (DIABETA) 5 mg tablet Take 1 tablet (5 mg total) by mouth 2 (two) times a day with meals, Starting Thu 2/25/2021, Normal      ibuprofen (MOTRIN) 600 mg tablet Take 600 mg by mouth as needed for mild pain, Historical Med      Invokana 100 MG TAKE ONE TABLET BY MOUTH EVERY DAY, Normal      lisinopril-hydrochlorothiazide (PRINZIDE,ZESTORETIC) 10-12 5 MG per tablet TAKE ONE TABLET BY MOUTH EVERY DAY, Normal      metFORMIN (GLUCOPHAGE) 1000 MG tablet TAKE 1 TABLET BY MOUTH TWICE A DAY, Normal      Multiple Vitamin (multivitamin) tablet Take 1 tablet by mouth daily, Starting Tue 12/22/2020, Normal      simvastatin (ZOCOR) 40 mg tablet TAKE 1 TABLET AT BEDTIME , Normal           No discharge procedures on file  PDMP Review     None           ED Provider  Attending physically available and evaluated Becca Negrete I managed the patient along with the ED Attending      Electronically Signed by         Belkis Cesar MD  01/07/22 3468

## 2022-01-05 NOTE — ED ATTENDING ATTESTATION
1/5/2022  I, Ginny Doan MD, saw and evaluated the patient  I have discussed the patient with the resident/non-physician practitioner and agree with the resident's/non-physician practitioner's findings, Plan of Care, and MDM as documented in the resident's/non-physician practitioner's note, except where noted  All available labs and Radiology studies were reviewed  I was present for key portions of any procedure(s) performed by the resident/non-physician practitioner and I was immediately available to provide assistance  At this point I agree with the current assessment done in the Emergency Department  I have conducted an independent evaluation of this patient a history and physical is as follows:    ED Course      Emergency Department Note- Misty Morillo 46 y o  female MRN: [id number]    Unit/Bed#: RW 02 Encounter: [id number]    Misty Morillo is a 46 y o  female who presents with   Chief Complaint   Patient presents with    Flu Symptoms     Pt states coming to work at 7 with a dull HA, States now having chills, body ache, and cough as well  History of Present Illness   HPI:  Misty Morillo is a 46 y o  female who presents for evaluation of:  Headaches, chills, body aches, cough  Patient denies anosmia and ageusia  She has had exposure to patients with COVID  Patient is up-to-date with her vaccinations and has had the COVID vaccine and the booster vaccine  Patient had COVID about 1 year ago at this time  Review of Systems   Constitutional: Positive for chills and fever  HENT: Positive for congestion and rhinorrhea  Respiratory: Positive for cough  Negative for shortness of breath  Gastrointestinal: Negative for nausea and vomiting  Genitourinary: Negative for dysuria and frequency  Neurological: Positive for headaches  Negative for light-headedness  All other systems reviewed and are negative        Historical Information   Past Medical History:   Diagnosis Date    Cellulitis of left lower extremity 4/15/2020    COVID-19 12/22/2020    Diabetes mellitus ([facility] [address] )     Exposure to SARS-associated coronavirus 4/15/2020    Fever 4/15/2020    Foot ulcer, right, with unspecified severity ([facility] [address] ) 6/12/2019    Hyperlipidemia     Hypertension      Past Surgical History:   Procedure Laterality Date    INCISION AND DRAINAGE INTRA ORAL ABSCESS Left 10/14/2020    Procedure: INCISION AND DRAINAGE  (I&D) WOUND ORAL EXTRAORAL LEFT SUBMANDIBULAR SPACE, LEFT  SPACE SPACE, INTRAORAL LEFT LATERAL PHARYNGEAL SPACE AND LEFT SUBLINGUAL SPACE;  Surgeon: Carmen Grullon DDS;  [facility];  Service: Maxillofacial    REMOVAL OF IMPACTED TOOTH - COMPLETELY BONY Left 10/14/2020    Procedure: EXTRACTION TEETH MULTIPLE (RETAINED ROOTS #1,3,14, TEETH #4,17,18,19,32;  Surgeon: Carmen Grullon DDS;  [facility];  Service: Maxillofacial    TONSILLECTOMY AND ADENOIDECTOMY      WOUND DEBRIDEMENT Right 7/25/2020    Procedure: Excision of non healing diabetic right foot Ulcer, with closure of wound, excision of tibial sesamoid right foot;  Surgeon: Anastasiya Sanchez DPM;  [facility];  Service: Podiatry     Social History   Social History     Substance and Sexual Activity   Alcohol Use Yes    Comment: weekends     Social History     Substance and Sexual Activity   Drug Use No     Social History     Tobacco Use   Smoking Status Never Smoker   Smokeless Tobacco Never Used     Family History:   Family History   Problem Relation Age of Onset    Heart disease Mother     Diabetes Mother     Hypertension Mother         Benign Essential     Coronary artery disease Mother     Cancer Father         lung     Diabetes Sister         mellitus     Diabetes Maternal Grandfather         mellitus     No Known Problems Maternal Grandmother     Breast cancer Paternal Grandmother         unknown age   [place] No Known Problems Paternal Grandfather     No Known Problems Maternal Aunt     Breast cancer Paternal Aunt        Meds/Allergies   PTA meds:   Prior to Admission Medications   Prescriptions Last Dose Informant Patient Reported? Taking? Invokana 100 MG   No No   Sig: TAKE ONE TABLET BY MOUTH EVERY DAY   Multiple Vitamin (multivitamin) tablet   No No   Sig: Take 1 tablet by mouth daily   calcium carbonate-vitamin D (OSCAL-D) 500 mg-200 units per tablet   No No   Sig: Take 1 tablet by mouth 2 (two) times a day   cholecalciferol (VITAMIN D3) 1,000 units tablet   No No   Sig: Take 2 tablets (2,000 Units total) by mouth daily   glyBURIDE (DIABETA) 5 mg tablet   No No   Sig: Take 1 tablet (5 mg total) by mouth 2 (two) times a day with meals   ibuprofen (MOTRIN) 600 mg tablet   Yes No   Sig: Take 600 mg by mouth as needed for mild pain   lisinopril-hydrochlorothiazide (PRINZIDE,ZESTORETIC) 10-12 5 MG per tablet   No No   Sig: TAKE ONE TABLET BY MOUTH EVERY DAY   metFORMIN (GLUCOPHAGE) 1000 MG tablet   No No   Sig: TAKE 1 TABLET BY MOUTH TWICE A DAY   simvastatin (ZOCOR) 40 mg tablet   No No   Sig: TAKE 1 TABLET AT BEDTIME  Facility-Administered Medications: None     No Known Allergies    Objective   First Vitals:   Blood Pressure: 147/65 (01/05/22 0244)  Pulse: 96 (01/05/22 0244)  Temperature: 98 °F (36 7 °C) (01/05/22 0244)  Temp Source: Oral (01/05/22 0244)  Respirations: 16 (01/05/22 0244)  SpO2: 100 % (01/05/22 0244)    Current Vitals:   Blood Pressure: 147/65 (01/05/22 0244)  Pulse: 96 (01/05/22 0244)  Temperature: 98 °F (36 7 °C) (01/05/22 0244)  Temp Source: Oral (01/05/22 0244)  Respirations: 16 (01/05/22 0244)  SpO2: 100 % (01/05/22 0244)    No intake or output data in the 24 hours ending 01/05/22 0343    Invasive Devices  Report    Peripheral Intravenous Line            Peripheral IV 12/22/20 Proximal;Right;Ventral (anterior) Forearm 378 days                Physical Exam  Vitals and nursing note reviewed  Constitutional:       General: She is not in acute distress       Appearance: Normal appearance  She is well-developed  HENT:      Head: Normocephalic and atraumatic  Right Ear: External ear normal       Left Ear: External ear normal       Nose: Nose normal       Mouth/Throat:      Pharynx: No oropharyngeal exudate  Eyes:      Conjunctiva/sclera: Conjunctivae normal       Pupils: Pupils are equal, round, and reactive to light  Cardiovascular:      Rate and Rhythm: Normal rate and regular rhythm  Pulmonary:      Effort: Pulmonary effort is normal  No respiratory distress  Abdominal:      General: Abdomen is flat  There is no distension  Musculoskeletal:         General: No deformity  Normal range of motion  Cervical back: Normal range of motion and neck supple  Skin:     General: Skin is warm and dry  Capillary Refill: Capillary refill takes less than 2 seconds  Neurological:      General: No focal deficit present  Mental Status: She is alert and oriented to person, place, and time  Mental status is at baseline  Coordination: Coordination normal    Psychiatric:         Mood and Affect: Mood normal          Behavior: Behavior normal          Thought Content: Thought content normal          Judgment: Judgment normal            Medical Decision Makin  Acute viral URI:  Screen for COVID    No results found for this or any previous visit (from the past 39 hour(s))  No orders to display         Portions of the record may have been created with voice recognition software  Occasional wrong word or "sound a like" substitutions may have occurred due to the inherent limitations of voice recognition software  Read the chart carefully and recognize, using context, where substitutions have occurred            Critical Care Time  Procedures

## 2022-01-09 LAB
FLUAV RNA RESP QL NAA+PROBE: NEGATIVE
FLUBV RNA RESP QL NAA+PROBE: NEGATIVE
SARS-COV-2 RNA RESP QL NAA+PROBE: POSITIVE

## 2022-02-18 DIAGNOSIS — E11.40 CONTROLLED TYPE 2 DIABETES MELLITUS WITH NEUROPATHY (HCC): ICD-10-CM

## 2022-02-18 RX ORDER — GLYBURIDE 5 MG/1
TABLET ORAL
Qty: 180 TABLET | Refills: 0 | OUTPATIENT
Start: 2022-02-18

## 2022-02-23 ENCOUNTER — OFFICE VISIT (OUTPATIENT)
Dept: FAMILY MEDICINE CLINIC | Facility: CLINIC | Age: 52
End: 2022-02-23
Payer: COMMERCIAL

## 2022-02-23 VITALS
OXYGEN SATURATION: 97 % | DIASTOLIC BLOOD PRESSURE: 60 MMHG | SYSTOLIC BLOOD PRESSURE: 120 MMHG | WEIGHT: 233.6 LBS | HEART RATE: 89 BPM | HEIGHT: 68 IN | TEMPERATURE: 97.6 F | RESPIRATION RATE: 16 BRPM | BODY MASS INDEX: 35.4 KG/M2

## 2022-02-23 DIAGNOSIS — I10 BENIGN ESSENTIAL HYPERTENSION: ICD-10-CM

## 2022-02-23 DIAGNOSIS — E11.40 TYPE 2 DIABETES MELLITUS WITH DIABETIC NEUROPATHY, WITHOUT LONG-TERM CURRENT USE OF INSULIN (HCC): Chronic | ICD-10-CM

## 2022-02-23 DIAGNOSIS — E78.2 MIXED HYPERLIPIDEMIA: ICD-10-CM

## 2022-02-23 DIAGNOSIS — E78.5 HYPERLIPIDEMIA, UNSPECIFIED HYPERLIPIDEMIA TYPE: Chronic | ICD-10-CM

## 2022-02-23 DIAGNOSIS — E11.40 CONTROLLED TYPE 2 DIABETES MELLITUS WITH NEUROPATHY (HCC): Primary | ICD-10-CM

## 2022-02-23 DIAGNOSIS — I47.1 NARROW COMPLEX TACHYCARDIA (HCC): ICD-10-CM

## 2022-02-23 LAB — SL AMB POCT HEMOGLOBIN AIC: 7.1 (ref ?–6.5)

## 2022-02-23 PROCEDURE — 83036 HEMOGLOBIN GLYCOSYLATED A1C: CPT | Performed by: FAMILY MEDICINE

## 2022-02-23 PROCEDURE — 99214 OFFICE O/P EST MOD 30 MIN: CPT | Performed by: FAMILY MEDICINE

## 2022-02-23 RX ORDER — SIMVASTATIN 40 MG
40 TABLET ORAL
Qty: 90 TABLET | Refills: 3 | Status: SHIPPED | OUTPATIENT
Start: 2022-02-23

## 2022-02-23 NOTE — PROGRESS NOTES
Assessment/Plan:    1  Controlled type 2 diabetes mellitus with neuropathy (HCC)  -     POCT hemoglobin A1c    2  Type 2 diabetes mellitus with diabetic neuropathy, without long-term current use of insulin (HCC)  Assessment & Plan:  Stable    Lab Results   Component Value Date    HGBA1C 7 1 (A) 02/23/2022       Orders:  -     Hemoglobin A1C; Future; Expected date: 06/01/2022  -     Microalbumin / creatinine urine ratio; Future; Expected date: 06/01/2022    3  Benign essential hypertension  Assessment & Plan:  Stable on current meds    Orders:  -     CBC; Future; Expected date: 06/01/2022  -     Comprehensive metabolic panel; Future; Expected date: 06/01/2022  -     TSH, 3rd generation with Free T4 reflex; Future; Expected date: 06/01/2022    4  Hyperlipidemia, unspecified hyperlipidemia type  Assessment & Plan:  Stable on zocor    Orders:  -     Lipid Panel with Direct LDL reflex; Future; Expected date: 06/01/2022  -     TSH, 3rd generation with Free T4 reflex; Future; Expected date: 06/01/2022    5  Narrow complex tachycardia (HCC)  Assessment & Plan:  No further episdoes      BMI Counseling: Body mass index is 35 52 kg/m²  The BMI is above normal  Nutrition recommendations include encouraging healthy choices of fruits and vegetables  Exercise recommendations include exercising 3-5 times per week  No pharmacotherapy was ordered  Rationale for BMI follow-up plan is due to patient being overweight or obese  There are no Patient Instructions on file for this visit  Return in about 4 months (around 6/23/2022)  Subjective:      Patient ID: Becca Negrete is a 46 y o  female  Chief Complaint   Patient presents with    Follow-up     Patient here for 6 month follow up on Type II Diabetes        Here for follow up  Had covid and recovered  New diabetic med is just ok-nausea and headache    Hypertension  This is a chronic problem  The current episode started more than 1 year ago   The problem is controlled  Associated symptoms include headaches  There are no associated agents to hypertension  Risk factors for coronary artery disease include diabetes mellitus and dyslipidemia  Past treatments include ACE inhibitors  The current treatment provides significant improvement  There are no compliance problems  Diabetes  She presents for her follow-up diabetic visit  She has type 2 diabetes mellitus  Her disease course has been stable  Hypoglycemia symptoms include headaches  There are no diabetic associated symptoms  There are no hypoglycemic complications  Symptoms are stable  There are no diabetic complications  Risk factors for coronary artery disease include diabetes mellitus, dyslipidemia and hypertension  Current diabetic treatment includes oral agent (dual therapy)  She is compliant with treatment all of the time  She is following a diabetic diet  She has not had a previous visit with a dietitian  She rarely participates in exercise  There is no change in her home blood glucose trend  An ACE inhibitor/angiotensin II receptor blocker is being taken  She does not see a podiatrist Eye exam is current  The following portions of the patient's history were reviewed and updated as appropriate: allergies, current medications, past family history, past medical history, past social history, past surgical history and problem list     Review of Systems   Constitutional: Negative  HENT: Negative  Eyes: Negative  Respiratory: Negative  Cardiovascular: Negative  Gastrointestinal: Negative  Endocrine: Negative  Genitourinary: Negative  Musculoskeletal: Negative  Skin: Positive for wound (healing)  Neurological: Positive for numbness and headaches  Hematological: Negative  Psychiatric/Behavioral: Negative            Current Outpatient Medications   Medication Sig Dispense Refill    Empagliflozin 25 MG TABS Take 1 tablet (25 mg total) by mouth every morning 90 tablet 3    glyBURIDE (DIABETA) 5 mg tablet Take 1 tablet (5 mg total) by mouth 2 (two) times a day with meals 60 tablet 0    lisinopril-hydrochlorothiazide (PRINZIDE,ZESTORETIC) 10-12 5 MG per tablet TAKE ONE TABLET BY MOUTH EVERY DAY 90 tablet 3    metFORMIN (GLUCOPHAGE) 1000 MG tablet TAKE 1 TABLET BY MOUTH TWICE A  tablet 0    simvastatin (ZOCOR) 40 mg tablet Take 1 tablet (40 mg total) by mouth daily at bedtime 90 tablet 3     No current facility-administered medications for this visit  Objective:    /60 (BP Location: Left arm, Patient Position: Sitting, Cuff Size: Standard)   Pulse 89   Temp 97 6 °F (36 4 °C)   Resp 16   Ht 5' 8" (1 727 m)   Wt 106 kg (233 lb 9 6 oz)   SpO2 97%   BMI 35 52 kg/m²        Physical Exam  Vitals and nursing note reviewed  Constitutional:       Appearance: She is well-developed  HENT:      Head: Normocephalic and atraumatic  Nose: Nose normal    Eyes:      General: Lids are normal       Conjunctiva/sclera: Conjunctivae normal       Pupils: Pupils are equal, round, and reactive to light  Cardiovascular:      Rate and Rhythm: Normal rate and regular rhythm  Pulses: no weak pulses          Dorsalis pedis pulses are 2+ on the right side and 2+ on the left side  Posterior tibial pulses are 2+ on the right side and 2+ on the left side  Heart sounds: Normal heart sounds, S1 normal and S2 normal    Pulmonary:      Effort: Pulmonary effort is normal       Breath sounds: Normal breath sounds  Abdominal:      General: Bowel sounds are normal       Palpations: Abdomen is soft  Musculoskeletal:         General: Normal range of motion  Cervical back: Normal range of motion and neck supple  Feet:    Feet:      Right foot:      Skin integrity: Dry skin present  Left foot:      Skin integrity: Dry skin present  Skin:     General: Skin is warm and dry  Neurological:      General: No focal deficit present        Mental Status: She is alert and oriented to person, place, and time  Deep Tendon Reflexes: Reflexes are normal and symmetric  Psychiatric:         Mood and Affect: Mood normal          Speech: Speech normal          Behavior: Behavior normal          Thought Content: Thought content normal          Judgment: Judgment normal            Patient's shoes and socks removed  Right Foot/Ankle   Right Foot Inspection  Skin Exam: dry skin  Toe Exam: ROM and strength within normal limits  Sensory   Monofilament testing: diminished    Vascular  Capillary refills: < 3 seconds  The right DP pulse is 2+  The right PT pulse is 2+  Left Foot/Ankle  Left Foot Inspection  Skin Exam: dry skin  Toe Exam: ROM and strength within normal limits  Sensory   Monofilament testing: diminished    Vascular  Capillary refills: < 3 seconds  The left DP pulse is 2+  The left PT pulse is 2+       Assign Risk Category  No deformity present  Loss of protective sensation  No weak pulses  Risk: 1           Kiki Verma,

## 2022-02-24 ENCOUNTER — TELEPHONE (OUTPATIENT)
Dept: ADMINISTRATIVE | Facility: OTHER | Age: 52
End: 2022-02-24

## 2022-02-24 NOTE — LETTER
Diabetic Eye Exam Form    Date Requested: 22  Patient: Kristie Wilhelm  Patient : 1970   Referring Provider: Victor Hugo Rosales,     Dilated Retinal Exam, Optomap-Iris Exam, or Fundus Photography Done         Yes ([place] one above)         No     Date of Diabetic Eye Exam ______________________________  Left Eye      Exam did show retinopathy    Exam did not show retinopathy         Mild       Moderate       None       Proliferative       Severe     Right Eye     Exam did show retinopathy    Exam did not show retinopathy         Mild       Moderate       None       Proliferative       Severe     Comments __________________________________________________________    Practice Providing Exam ______________________________________________    Exam Performed By (print name) _______________________________________      Provider Signature ___________________________________________________      These reports are needed for  compliance  Please fax this completed form and a copy of the Diabetic Eye Exam report to our office located at [place] [address] as soon as possible via [phone] rosemarie Ayoub: Phone [phone]    We thank you for your assistance in treating our mutual patient

## 2022-02-24 NOTE — TELEPHONE ENCOUNTER
----- Message from Anton Benítez sent at 2/23/2022  2:43 PM EST -----  Regarding: DM Eye Exam  02/23/22 2:43 PM    Hello, our patient Esteban Galloway has had DM Eye Exam completed/performed  Please assist in updating the patient chart by  [facility] for [address], [address] Phone [phone] The date of service is most recent      Thank you,  Anton Benítez  [facility] Jaison QUARLES

## 2022-02-24 NOTE — TELEPHONE ENCOUNTER
Upon review of the In Basket request and the patient's chart, initial outreach has been made via fax, please see Contacts section for details       Thank you  Leena Diana

## 2022-02-25 NOTE — TELEPHONE ENCOUNTER
Upon review of the In Basket request we were able to locate, review, and update the patient chart as requested for Diabetic Eye Exam     Any additional questions or concerns should be emailed to the Practice Liaisons via [email or web address] email, please do not reply via In Basket      Thank you  Karthikeyan Cobian

## 2022-03-10 ENCOUNTER — TELEPHONE (OUTPATIENT)
Dept: PODIATRY | Facility: CLINIC | Age: 52
End: 2022-03-10

## 2022-04-06 ENCOUNTER — OFFICE VISIT (OUTPATIENT)
Dept: PODIATRY | Facility: CLINIC | Age: 52
End: 2022-04-06
Payer: COMMERCIAL

## 2022-04-06 VITALS
DIASTOLIC BLOOD PRESSURE: 79 MMHG | SYSTOLIC BLOOD PRESSURE: 128 MMHG | HEART RATE: 90 BPM | WEIGHT: 232 LBS | HEIGHT: 68 IN | BODY MASS INDEX: 35.16 KG/M2

## 2022-04-06 DIAGNOSIS — S90.221A CONTUSION OF RIGHT LESSER TOE(S) WITH DAMAGE TO NAIL, INITIAL ENCOUNTER: ICD-10-CM

## 2022-04-06 DIAGNOSIS — L97.519 ULCER OF RIGHT FOOT, UNSPECIFIED ULCER STAGE (HCC): Primary | ICD-10-CM

## 2022-04-06 DIAGNOSIS — E11.40 TYPE 2 DIABETES MELLITUS WITH DIABETIC NEUROPATHY, UNSPECIFIED WHETHER LONG TERM INSULIN USE (HCC): ICD-10-CM

## 2022-04-06 PROCEDURE — 11730 AVULSION NAIL PLATE SIMPLE 1: CPT | Performed by: PODIATRIST

## 2022-04-06 PROCEDURE — 99214 OFFICE O/P EST MOD 30 MIN: CPT | Performed by: PODIATRIST

## 2022-04-06 RX ORDER — CEPHALEXIN 500 MG/1
500 CAPSULE ORAL EVERY 6 HOURS SCHEDULED
Qty: 28 CAPSULE | Refills: 0 | Status: SHIPPED | OUTPATIENT
Start: 2022-04-06 | End: 2022-04-13 | Stop reason: HOSPADM

## 2022-04-06 NOTE — PROGRESS NOTES
PATIENT:  Mustapha Pena  1970     Assessment     1  Ulcer of right foot, unspecified ulcer stage (HCC)  XR toe right second min 2 views    Post Op Shoe    cephalexin (KEFLEX) 500 mg capsule   2  Contusion of right lesser toe(s) with damage to nail, initial encounter  Nail removal   3  Type 2 diabetes mellitus with diabetic neuropathy, unspecified whether long term insulin use ([facility] [address] )             Plan:  1  Patient was counseled on the condition and diagnosis  2  The diagnosis, treatment options and prognosis were discussed with the patient  3  Reviewed / discussed recent blood work  4  Recommended nail avulsion right 2nd toe  See procedure  5  Instructed local care and off-loading  Sent her for surgical shoe  Will keep her out of work for now  5  Sent her for X-ray of right 2nd toe  6  Start her on Keflex  7  The patient will return in 1 week for re-evaluation  Nail removal    Date/Time: 4/6/2022 11:53 AM  Performed by: Savannah Fenton DPM  Authorized by: Savannah Fenton DPM     Patient location:  Clinic[facility] Protocol:  Consent: Verbal consent obtained  Risks and benefits: risks, benefits and alternatives were discussed  Consent given by: patient  Timeout called at: 4/6/2022 11:53 AM   Patient understanding: patient states understanding of the procedure being performed  Patient identity confirmed: verbally with patient      Location:     Foot:  R second toe  Pre-procedure details:     Skin preparation:  Betadine    Preparation: Patient was prepped and draped in the usual sterile fashion    Anesthesia (see MAR for exact dosages): Anesthesia method:  None  Nail Removal:     Nail removed:  Complete  Post-procedure details:     Dressing:  4x4 sterile gauze, antibiotic ointment and gauze roll          Subjective: The patient presents with chief complaint of new ulcer on right 2nd toe  She had it for a couple of weeks  Increased swelling and nail is falling off    Wound seems to be smaller with local care  No pain  She has high risk diabetic foot with history of foot ulcer and infection  She has numbness/ neuropathy in her feet from diabetes  Her BS is under control  The following portions of the patient's history were reviewed and updated as appropriate: allergies, current medications, past family history, past medical history, past social history, past surgical history and problem list   All pertinent labs and images were reviewed  Past Medical History  Past Medical History:   Diagnosis Date    Cellulitis of left lower extremity 4/15/2020    COVID-19 12/22/2020    Diabetes mellitus ([facility] [address] )     Exposure to SARS-associated coronavirus 4/15/2020    Fever 4/15/2020    Foot ulcer, right, with unspecified severity ([facility] [address] ) 6/12/2019    Hyperlipidemia     Hypertension        Past Surgical History  Past Surgical History:   Procedure Laterality Date    INCISION AND DRAINAGE INTRA ORAL ABSCESS Left 10/14/2020    Procedure: INCISION AND DRAINAGE  (I&D) WOUND ORAL EXTRAORAL LEFT SUBMANDIBULAR SPACE, LEFT  SPACE SPACE, INTRAORAL LEFT LATERAL PHARYNGEAL SPACE AND LEFT SUBLINGUAL SPACE;  Surgeon: Ann Beltre DDS;  [facility];  Service: Maxillofacial    REMOVAL OF IMPACTED TOOTH - COMPLETELY BONY Left 10/14/2020    Procedure: EXTRACTION TEETH MULTIPLE (RETAINED ROOTS #1,3,14, TEETH #4,17,18,19,32;  Surgeon: Ann Beltre DDS;  [facility];  Service: Maxillofacial    TONSILLECTOMY AND ADENOIDECTOMY      WOUND DEBRIDEMENT Right 7/25/2020    Procedure: Excision of non healing diabetic right foot Ulcer, with closure of wound, excision of tibial sesamoid right foot;  Surgeon: Bruna Hale DPM;  [facility];  Service: Podiatry        Allergies:  Patient has no known allergies      Medications:  Current Outpatient Medications   Medication Sig Dispense Refill    Empagliflozin 25 MG TABS Take 1 tablet (25 mg total) by mouth every morning 90 tablet 3  lisinopril-hydrochlorothiazide (PRINZIDE,ZESTORETIC) 10-12 5 MG per tablet TAKE ONE TABLET BY MOUTH EVERY DAY 90 tablet 3    metFORMIN (GLUCOPHAGE) 1000 MG tablet TAKE 1 TABLET BY MOUTH TWICE A  tablet 0    simvastatin (ZOCOR) 40 mg tablet Take 1 tablet (40 mg total) by mouth daily at bedtime 90 tablet 3    cephalexin (KEFLEX) 500 mg capsule Take 1 capsule (500 mg total) by mouth every 6 (six) hours for 7 days 28 capsule 0    glyBURIDE (DIABETA) 5 mg tablet Take 1 tablet (5 mg total) by mouth 2 (two) times a day with meals 60 tablet 0     No current facility-administered medications for this visit  Social History:  Social History     Socioeconomic History    Marital status: /Civil Union     Spouse name: None    Number of children: None    Years of education: None    Highest education level: None   Occupational History    None   Tobacco Use    Smoking status: Never Smoker    Smokeless tobacco: Never Used   Vaping Use    Vaping Use: Never used   Substance and Sexual Activity    Alcohol use: Yes     Comment: weekends    Drug use: No    Sexual activity: None   Other Topics Concern    None   Social History Narrative    None     Social Determinants of Health     Financial Resource Strain: Not on file   Food Insecurity: Not on file   Transportation Needs: Not on file   Physical Activity: Not on file   Stress: Not on file   Social Connections: Not on file   Intimate Partner Violence: Not on file   Housing Stability: Not on file          Review of Systems   Constitutional: Negative for chills, fatigue and fever  Respiratory: Negative for cough and shortness of breath  Cardiovascular: Negative for chest pain and leg swelling  Gastrointestinal: Negative for diarrhea, nausea and vomiting  Skin: Positive for wound  Neurological: Positive for numbness         Objective:    /79   Pulse 90   Ht 5' 8" (1 727 m) Comment: verbal  Wt 105 kg (232 lb)   BMI 35 28 kg/m² Physical Exam  Vitals reviewed  Constitutional:       General: She is not in acute distress  Appearance: She is well-developed  She is obese  She is not toxic-appearing or diaphoretic  HENT:      Head: Normocephalic and atraumatic  Eyes:      Extraocular Movements: Extraocular movements intact  Cardiovascular:      Rate and Rhythm: Normal rate and regular rhythm  Pulses:           Dorsalis pedis pulses are 2+ on the right side and 2+ on the left side  Posterior tibial pulses are 1+ on the right side and 1+ on the left side  Pulmonary:      Effort: Pulmonary effort is normal  No respiratory distress  Musculoskeletal:         General: Deformity present  No tenderness or signs of injury  Cervical back: Normal range of motion and neck supple  Right foot: No Charcot foot or foot drop  Left foot: No Charcot foot or foot drop  Comments: Tight achilles tendon with equinus bilaterally  Hammertoe presents  Skin:     General: Skin is dry  Capillary Refill: Capillary refill takes less than 2 seconds  Findings: No erythema  Comments: Wound presents at the tip of right 2nd toe  It is 0 3 X 0 3 X 0 1 cm  Nail plate is loose right 2nd toe  Mild edema / erythema right 2nd toe  No deep probing  No purulence  Neurological:      General: No focal deficit present  Mental Status: She is alert and oriented to person, place, and time  Cranial Nerves: No cranial nerve deficit  Sensory: Sensory deficit present  Motor: No abnormal muscle tone  Coordination: Coordination normal    Psychiatric:         Mood and Affect: Mood normal          Behavior: Behavior normal          Thought Content:  Thought content normal          Judgment: Judgment normal

## 2022-04-07 ENCOUNTER — HOSPITAL ENCOUNTER (OUTPATIENT)
Dept: RADIOLOGY | Facility: HOSPITAL | Age: 52
Discharge: HOME | End: 2022-04-07
Attending: PODIATRIST
Payer: COMMERCIAL

## 2022-04-07 DIAGNOSIS — L97.519 ULCER OF RIGHT FOOT, UNSPECIFIED ULCER STAGE (HCC): ICD-10-CM

## 2022-04-07 PROCEDURE — 73660 X-RAY EXAM OF TOE(S): CPT

## 2022-04-11 ENCOUNTER — HOSPITAL ENCOUNTER (INPATIENT)
Facility: HOSPITAL | Age: 52
LOS: 2 days | Discharge: HOME | DRG: 617 | End: 2022-04-13
Attending: PODIATRIST | Admitting: PODIATRIST
Payer: COMMERCIAL

## 2022-04-11 DIAGNOSIS — E78.5 HYPERLIPIDEMIA, UNSPECIFIED HYPERLIPIDEMIA TYPE: ICD-10-CM

## 2022-04-11 DIAGNOSIS — L97.514 ULCER OF RIGHT SECOND TOE WITH NECROSIS OF BONE (HCC): ICD-10-CM

## 2022-04-11 DIAGNOSIS — E11.40 TYPE 2 DIABETES MELLITUS WITH DIABETIC NEUROPATHY, WITHOUT LONG-TERM CURRENT USE OF INSULIN (HCC): Primary | ICD-10-CM

## 2022-04-11 DIAGNOSIS — M86.171 ACUTE OSTEOMYELITIS OF TOE OF RIGHT FOOT (HCC): Primary | ICD-10-CM

## 2022-04-11 DIAGNOSIS — Z01.818 PREOPERATIVE CLEARANCE: ICD-10-CM

## 2022-04-11 PROBLEM — I47.10 PAROXYSMAL SVT (SUPRAVENTRICULAR TACHYCARDIA): Status: ACTIVE | Noted: 2020-10-15

## 2022-04-11 LAB
ALBUMIN SERPL BCP-MCNC: 3.7 G/DL (ref 3.5–5)
ALP SERPL-CCNC: 124 U/L (ref 46–116)
ALT SERPL W P-5'-P-CCNC: 24 U/L (ref 12–78)
ANION GAP SERPL CALCULATED.3IONS-SCNC: 3 MMOL/L (ref 4–13)
AST SERPL W P-5'-P-CCNC: 19 U/L (ref 5–45)
BASOPHILS # BLD AUTO: 0.1 THOUSANDS/ΜL (ref 0–0.1)
BASOPHILS NFR BLD AUTO: 1 % (ref 0–1)
BILIRUB SERPL-MCNC: 0.18 MG/DL (ref 0.2–1)
BUN SERPL-MCNC: 24 MG/DL (ref 5–25)
CALCIUM SERPL-MCNC: 9.6 MG/DL (ref 8.3–10.1)
CHLORIDE SERPL-SCNC: 105 MMOL/L (ref 100–108)
CO2 SERPL-SCNC: 28 MMOL/L (ref 21–32)
CREAT SERPL-MCNC: 0.95 MG/DL (ref 0.6–1.3)
EOSINOPHIL # BLD AUTO: 0.22 THOUSAND/ΜL (ref 0–0.61)
EOSINOPHIL NFR BLD AUTO: 2 % (ref 0–6)
ERYTHROCYTE [DISTWIDTH] IN BLOOD BY AUTOMATED COUNT: 13.5 % (ref 11.6–15.1)
GFR SERPL CREATININE-BSD FRML MDRD: 69 ML/MIN/1.73SQ M
GLUCOSE SERPL-MCNC: 180 MG/DL (ref 65–140)
GLUCOSE SERPL-MCNC: 182 MG/DL (ref 65–140)
HCT VFR BLD AUTO: 38.5 % (ref 34.8–46.1)
HGB BLD-MCNC: 12.2 G/DL (ref 11.5–15.4)
IMM GRANULOCYTES # BLD AUTO: 0.03 THOUSAND/UL (ref 0–0.2)
IMM GRANULOCYTES NFR BLD AUTO: 0 % (ref 0–2)
LYMPHOCYTES # BLD AUTO: 2.77 THOUSANDS/ΜL (ref 0.6–4.47)
LYMPHOCYTES NFR BLD AUTO: 28 % (ref 14–44)
MCH RBC QN AUTO: 27.4 PG (ref 26.8–34.3)
MCHC RBC AUTO-ENTMCNC: 31.7 G/DL (ref 31.4–37.4)
MCV RBC AUTO: 86 FL (ref 82–98)
MONOCYTES # BLD AUTO: 0.82 THOUSAND/ΜL (ref 0.17–1.22)
MONOCYTES NFR BLD AUTO: 8 % (ref 4–12)
NEUTROPHILS # BLD AUTO: 6.02 THOUSANDS/ΜL (ref 1.85–7.62)
NEUTS SEG NFR BLD AUTO: 61 % (ref 43–75)
NRBC BLD AUTO-RTO: 0 /100 WBCS
PLATELET # BLD AUTO: 298 THOUSANDS/UL (ref 149–390)
PMV BLD AUTO: 9.3 FL (ref 8.9–12.7)
POTASSIUM SERPL-SCNC: 3.5 MMOL/L (ref 3.5–5.3)
PROT SERPL-MCNC: 7.1 G/DL (ref 6.4–8.2)
RBC # BLD AUTO: 4.46 MILLION/UL (ref 3.81–5.12)
SODIUM SERPL-SCNC: 136 MMOL/L (ref 136–145)
WBC # BLD AUTO: 9.96 THOUSAND/UL (ref 4.31–10.16)

## 2022-04-11 PROCEDURE — 99253 IP/OBS CNSLTJ NEW/EST LOW 45: CPT | Performed by: INTERNAL MEDICINE

## 2022-04-11 PROCEDURE — 82948 REAGENT STRIP/BLOOD GLUCOSE: CPT

## 2022-04-11 PROCEDURE — 99223 1ST HOSP IP/OBS HIGH 75: CPT | Performed by: PODIATRIST

## 2022-04-11 PROCEDURE — 87040 BLOOD CULTURE FOR BACTERIA: CPT | Performed by: STUDENT IN AN ORGANIZED HEALTH CARE EDUCATION/TRAINING PROGRAM

## 2022-04-11 PROCEDURE — 93005 ELECTROCARDIOGRAM TRACING: CPT

## 2022-04-11 PROCEDURE — 85025 COMPLETE CBC W/AUTO DIFF WBC: CPT | Performed by: STUDENT IN AN ORGANIZED HEALTH CARE EDUCATION/TRAINING PROGRAM

## 2022-04-11 PROCEDURE — 80053 COMPREHEN METABOLIC PANEL: CPT | Performed by: STUDENT IN AN ORGANIZED HEALTH CARE EDUCATION/TRAINING PROGRAM

## 2022-04-11 RX ORDER — INSULIN GLARGINE 100 [IU]/ML
15 INJECTION, SOLUTION SUBCUTANEOUS
Status: DISCONTINUED | OUTPATIENT
Start: 2022-04-11 | End: 2022-04-12

## 2022-04-11 RX ORDER — PRAVASTATIN SODIUM 80 MG/1
80 TABLET ORAL
Status: DISCONTINUED | OUTPATIENT
Start: 2022-04-12 | End: 2022-04-13 | Stop reason: HOSPADM

## 2022-04-11 RX ORDER — SODIUM CHLORIDE 9 MG/ML
100 INJECTION, SOLUTION INTRAVENOUS CONTINUOUS
Status: DISCONTINUED | OUTPATIENT
Start: 2022-04-11 | End: 2022-04-12

## 2022-04-11 RX ORDER — CEFAZOLIN SODIUM 2 G/50ML
2000 SOLUTION INTRAVENOUS EVERY 8 HOURS
Status: DISCONTINUED | OUTPATIENT
Start: 2022-04-11 | End: 2022-04-13 | Stop reason: HOSPADM

## 2022-04-11 RX ORDER — LISINOPRIL 10 MG/1
10 TABLET ORAL DAILY
Status: DISCONTINUED | OUTPATIENT
Start: 2022-04-12 | End: 2022-04-12

## 2022-04-11 RX ORDER — CHLORHEXIDINE GLUCONATE 0.12 MG/ML
15 RINSE ORAL ONCE
Status: CANCELLED | OUTPATIENT
Start: 2022-04-11 | End: 2022-04-11

## 2022-04-11 RX ADMIN — SODIUM CHLORIDE 100 ML/HR: 0.9 INJECTION, SOLUTION INTRAVENOUS at 23:03

## 2022-04-11 RX ADMIN — METRONIDAZOLE 500 MG: 500 INJECTION, SOLUTION INTRAVENOUS at 23:37

## 2022-04-11 RX ADMIN — INSULIN GLARGINE 15 UNITS: 100 INJECTION, SOLUTION SUBCUTANEOUS at 22:54

## 2022-04-11 RX ADMIN — CEFAZOLIN SODIUM 2000 MG: 2 SOLUTION INTRAVENOUS at 22:56

## 2022-04-12 ENCOUNTER — ANESTHESIA (INPATIENT)
Dept: PERIOP | Facility: HOSPITAL | Age: 52
DRG: 617 | End: 2022-04-12
Payer: COMMERCIAL

## 2022-04-12 ENCOUNTER — ANESTHESIA EVENT (INPATIENT)
Dept: PERIOP | Facility: HOSPITAL | Age: 52
DRG: 617 | End: 2022-04-12
Payer: COMMERCIAL

## 2022-04-12 ENCOUNTER — APPOINTMENT (INPATIENT)
Dept: RADIOLOGY | Facility: HOSPITAL | Age: 52
DRG: 617 | End: 2022-04-12
Payer: COMMERCIAL

## 2022-04-12 VITALS
TEMPERATURE: 98.4 F | DIASTOLIC BLOOD PRESSURE: 58 MMHG | HEART RATE: 76 BPM | SYSTOLIC BLOOD PRESSURE: 125 MMHG | RESPIRATION RATE: 18 BRPM | OXYGEN SATURATION: 98 %

## 2022-04-12 LAB
ANION GAP SERPL CALCULATED.3IONS-SCNC: 6 MMOL/L (ref 4–13)
ATRIAL RATE: 78 BPM
BUN SERPL-MCNC: 21 MG/DL (ref 5–25)
CALCIUM SERPL-MCNC: 8.8 MG/DL (ref 8.3–10.1)
CHLORIDE SERPL-SCNC: 110 MMOL/L (ref 100–108)
CO2 SERPL-SCNC: 23 MMOL/L (ref 21–32)
CREAT SERPL-MCNC: 0.75 MG/DL (ref 0.6–1.3)
ERYTHROCYTE [DISTWIDTH] IN BLOOD BY AUTOMATED COUNT: 13.6 % (ref 11.6–15.1)
GFR SERPL CREATININE-BSD FRML MDRD: 92 ML/MIN/1.73SQ M
GLUCOSE SERPL-MCNC: 46 MG/DL (ref 65–140)
GLUCOSE SERPL-MCNC: 60 MG/DL (ref 65–140)
GLUCOSE SERPL-MCNC: 60 MG/DL (ref 65–140)
GLUCOSE SERPL-MCNC: 70 MG/DL (ref 65–140)
GLUCOSE SERPL-MCNC: 74 MG/DL (ref 65–140)
GLUCOSE SERPL-MCNC: 74 MG/DL (ref 65–140)
GLUCOSE SERPL-MCNC: 77 MG/DL (ref 65–140)
GLUCOSE SERPL-MCNC: 84 MG/DL (ref 65–140)
GLUCOSE SERPL-MCNC: 94 MG/DL (ref 65–140)
HCT VFR BLD AUTO: 36.4 % (ref 34.8–46.1)
HGB BLD-MCNC: 11.4 G/DL (ref 11.5–15.4)
MCH RBC QN AUTO: 27.4 PG (ref 26.8–34.3)
MCHC RBC AUTO-ENTMCNC: 31.3 G/DL (ref 31.4–37.4)
MCV RBC AUTO: 88 FL (ref 82–98)
P AXIS: 14 DEGREES
PLATELET # BLD AUTO: 259 THOUSANDS/UL (ref 149–390)
PMV BLD AUTO: 9.3 FL (ref 8.9–12.7)
POTASSIUM SERPL-SCNC: 3.7 MMOL/L (ref 3.5–5.3)
PR INTERVAL: 134 MS
QRS AXIS: 25 DEGREES
QRSD INTERVAL: 88 MS
QT INTERVAL: 392 MS
QTC INTERVAL: 446 MS
RBC # BLD AUTO: 4.16 MILLION/UL (ref 3.81–5.12)
SODIUM SERPL-SCNC: 139 MMOL/L (ref 136–145)
T WAVE AXIS: 10 DEGREES
VENTRICULAR RATE: 78 BPM
WBC # BLD AUTO: 8.2 THOUSAND/UL (ref 4.31–10.16)

## 2022-04-12 PROCEDURE — A9585 GADOBUTROL INJECTION: HCPCS | Performed by: PODIATRIST

## 2022-04-12 PROCEDURE — 88305 TISSUE EXAM BY PATHOLOGIST: CPT | Performed by: PATHOLOGY

## 2022-04-12 PROCEDURE — 28825 PARTIAL AMPUTATION OF TOE: CPT | Performed by: PODIATRIST

## 2022-04-12 PROCEDURE — 88311 DECALCIFY TISSUE: CPT | Performed by: PATHOLOGY

## 2022-04-12 PROCEDURE — 71045 X-RAY EXAM CHEST 1 VIEW: CPT

## 2022-04-12 PROCEDURE — 0Y6R0Z3 DETACHMENT AT RIGHT 2ND TOE, LOW, OPEN APPROACH: ICD-10-PCS | Performed by: PODIATRIST

## 2022-04-12 PROCEDURE — 73630 X-RAY EXAM OF FOOT: CPT

## 2022-04-12 PROCEDURE — 73720 MRI LWR EXTREMITY W/O&W/DYE: CPT

## 2022-04-12 PROCEDURE — 82948 REAGENT STRIP/BLOOD GLUCOSE: CPT

## 2022-04-12 PROCEDURE — 85027 COMPLETE CBC AUTOMATED: CPT | Performed by: STUDENT IN AN ORGANIZED HEALTH CARE EDUCATION/TRAINING PROGRAM

## 2022-04-12 PROCEDURE — 80048 BASIC METABOLIC PNL TOTAL CA: CPT | Performed by: STUDENT IN AN ORGANIZED HEALTH CARE EDUCATION/TRAINING PROGRAM

## 2022-04-12 PROCEDURE — NC001 PR NO CHARGE: Performed by: PODIATRIST

## 2022-04-12 PROCEDURE — G1004 CDSM NDSC: HCPCS

## 2022-04-12 PROCEDURE — 93010 ELECTROCARDIOGRAM REPORT: CPT | Performed by: INTERNAL MEDICINE

## 2022-04-12 RX ORDER — FENTANYL CITRATE/PF 50 MCG/ML
25 SYRINGE (ML) INJECTION
Status: DISCONTINUED | OUTPATIENT
Start: 2022-04-12 | End: 2022-04-12 | Stop reason: HOSPADM

## 2022-04-12 RX ORDER — PROPOFOL 10 MG/ML
INJECTION, EMULSION INTRAVENOUS CONTINUOUS PRN
Status: DISCONTINUED | OUTPATIENT
Start: 2022-04-12 | End: 2022-04-12

## 2022-04-12 RX ORDER — MIDAZOLAM HYDROCHLORIDE 2 MG/2ML
INJECTION, SOLUTION INTRAMUSCULAR; INTRAVENOUS AS NEEDED
Status: DISCONTINUED | OUTPATIENT
Start: 2022-04-12 | End: 2022-04-12

## 2022-04-12 RX ORDER — SODIUM CHLORIDE, SODIUM LACTATE, POTASSIUM CHLORIDE, CALCIUM CHLORIDE 600; 310; 30; 20 MG/100ML; MG/100ML; MG/100ML; MG/100ML
INJECTION, SOLUTION INTRAVENOUS CONTINUOUS PRN
Status: DISCONTINUED | OUTPATIENT
Start: 2022-04-12 | End: 2022-04-12

## 2022-04-12 RX ORDER — DEXTROSE AND SODIUM CHLORIDE 5; .45 G/100ML; G/100ML
75 INJECTION, SOLUTION INTRAVENOUS CONTINUOUS
Status: DISCONTINUED | OUTPATIENT
Start: 2022-04-12 | End: 2022-04-12

## 2022-04-12 RX ORDER — PROPOFOL 10 MG/ML
INJECTION, EMULSION INTRAVENOUS AS NEEDED
Status: DISCONTINUED | OUTPATIENT
Start: 2022-04-12 | End: 2022-04-12

## 2022-04-12 RX ORDER — DEXTROSE MONOHYDRATE 25 G/50ML
INJECTION, SOLUTION INTRAVENOUS
Status: COMPLETED
Start: 2022-04-12 | End: 2022-04-12

## 2022-04-12 RX ORDER — SODIUM CHLORIDE, SODIUM LACTATE, POTASSIUM CHLORIDE, CALCIUM CHLORIDE 600; 310; 30; 20 MG/100ML; MG/100ML; MG/100ML; MG/100ML
50 INJECTION, SOLUTION INTRAVENOUS CONTINUOUS
Status: DISCONTINUED | OUTPATIENT
Start: 2022-04-12 | End: 2022-04-13 | Stop reason: HOSPADM

## 2022-04-12 RX ORDER — LIDOCAINE HYDROCHLORIDE 10 MG/ML
10 INJECTION, SOLUTION EPIDURAL; INFILTRATION; INTRACAUDAL; PERINEURAL ONCE
Status: DISCONTINUED | OUTPATIENT
Start: 2022-04-12 | End: 2022-04-12

## 2022-04-12 RX ORDER — LIDOCAINE HYDROCHLORIDE 10 MG/ML
INJECTION, SOLUTION EPIDURAL; INFILTRATION; INTRACAUDAL; PERINEURAL AS NEEDED
Status: DISCONTINUED | OUTPATIENT
Start: 2022-04-12 | End: 2022-04-12 | Stop reason: HOSPADM

## 2022-04-12 RX ORDER — CEFAZOLIN SODIUM 1 G/3ML
INJECTION, POWDER, FOR SOLUTION INTRAMUSCULAR; INTRAVENOUS AS NEEDED
Status: DISCONTINUED | OUTPATIENT
Start: 2022-04-12 | End: 2022-04-12

## 2022-04-12 RX ORDER — LISINOPRIL 10 MG/1
10 TABLET ORAL DAILY
Status: DISCONTINUED | OUTPATIENT
Start: 2022-04-13 | End: 2022-04-13 | Stop reason: HOSPADM

## 2022-04-12 RX ADMIN — CEFAZOLIN SODIUM 2000 MG: 2 SOLUTION INTRAVENOUS at 06:22

## 2022-04-12 RX ADMIN — PROPOFOL 70 MCG/KG/MIN: 10 INJECTION, EMULSION INTRAVENOUS at 19:52

## 2022-04-12 RX ADMIN — SODIUM CHLORIDE 100 ML/HR: 0.9 INJECTION, SOLUTION INTRAVENOUS at 10:39

## 2022-04-12 RX ADMIN — SODIUM CHLORIDE, SODIUM LACTATE, POTASSIUM CHLORIDE, AND CALCIUM CHLORIDE: .6; .31; .03; .02 INJECTION, SOLUTION INTRAVENOUS at 19:40

## 2022-04-12 RX ADMIN — ENOXAPARIN SODIUM 40 MG: 40 INJECTION SUBCUTANEOUS at 08:33

## 2022-04-12 RX ADMIN — PROPOFOL 40 MG: 10 INJECTION, EMULSION INTRAVENOUS at 19:49

## 2022-04-12 RX ADMIN — METRONIDAZOLE 500 MG: 500 INJECTION, SOLUTION INTRAVENOUS at 23:32

## 2022-04-12 RX ADMIN — DEXTROSE MONOHYDRATE 25 ML: 25 INJECTION, SOLUTION INTRAVENOUS at 17:11

## 2022-04-12 RX ADMIN — CEFAZOLIN 2000 MG: 1 INJECTION, POWDER, FOR SOLUTION INTRAMUSCULAR; INTRAVENOUS at 19:50

## 2022-04-12 RX ADMIN — PRAVASTATIN SODIUM 80 MG: 80 TABLET ORAL at 17:37

## 2022-04-12 RX ADMIN — DEXTROSE AND SODIUM CHLORIDE 75 ML/HR: 5; .45 INJECTION, SOLUTION INTRAVENOUS at 17:56

## 2022-04-12 RX ADMIN — METRONIDAZOLE 500 MG: 500 INJECTION, SOLUTION INTRAVENOUS at 08:33

## 2022-04-12 RX ADMIN — CEFAZOLIN SODIUM 2000 MG: 2 SOLUTION INTRAVENOUS at 22:16

## 2022-04-12 RX ADMIN — CEFAZOLIN SODIUM 2000 MG: 2 SOLUTION INTRAVENOUS at 13:34

## 2022-04-12 RX ADMIN — METRONIDAZOLE 500 MG: 500 INJECTION, SOLUTION INTRAVENOUS at 14:02

## 2022-04-12 RX ADMIN — MIDAZOLAM 2 MG: 1 INJECTION INTRAMUSCULAR; INTRAVENOUS at 19:46

## 2022-04-12 RX ADMIN — GADOBUTROL 10 ML: 604.72 INJECTION INTRAVENOUS at 00:19

## 2022-04-12 RX ADMIN — PROPOFOL 30 MG: 10 INJECTION, EMULSION INTRAVENOUS at 19:46

## 2022-04-12 NOTE — CONSULTS
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  · Admitted to podiatry service with plan for OR tomorrow  · Care per podiatry team    Diabetes mellitus with diabetic neuropathy [facility])  Assessment & Plan  Lab Results   Component Value Date    HGBA1C 7 1 (A) 02/23/2022       Recent Labs     04/11/22  2113   POCGLU 182*       Blood Sugar Average: Last 72 hrs:  (P) 182     · Hold home meds metformin, glyburide, and empagliflozin while inpatient  · Start SSI q6 while NPO, start lantus 15U qhs, will uptitrate as necessary    Peripheral neuropathy-resolved as of 4/11/2022  Assessment & Plan  · Noted    Benign essential hypertension  Assessment & Plan  · Continue lisinopril 10  · Restart HCTZ 12 5 post op   · Check BMP    Hyperlipidemia  Assessment & Plan  · Last lipid panel 8/2021 with LDL mildly elevated   · Pravastatin substituted for home simvastatin     Obesity  Assessment & Plan  · Lifestyle modifications advised    Paroxysmal SVT (supraventricular tachycardia) (HCC)  Assessment & Plan  · History noted in 2020  · Brief run of afib intraop in 2020 during odontogenic abscess I+D, 2 brief runs of SVT post op  · Was seen by cardiology IP who recommended 24 hour event monitor however does not appear patient followed up    Preoperative clearance  Assessment & Plan  · Please see attending attestation for preoperative risk stratification  · EKG NSR 73 bpm, qtc 446, no ischemic changes  · CXR pending      VTE Prophylaxis:   Moderate Risk (Score 3-4) - Pharmacological DVT Prophylaxis Ordered: enoxaparin (Lovenox)  Recommendations for Discharge:  · Pending hospital course    Counseling / Coordination of Care Time: 30 minutes Greater than 50% of total time spent on patient counseling and coordination of care  Collaboration of Care: Were Recommendations Directly Discussed with Primary Treatment Team? Yes    History of Present Illness:  Lilliana Calabrese is a 46 y o  female who is originally admitted to the podiatry service due to ulcer on the right 2nd toe  We are consulted for medical management and preoperative clearance  Review of Systems:  Review of Systems   Constitutional: Negative  HENT: Negative  Eyes: Negative  Respiratory: Negative  Cardiovascular: Negative  Gastrointestinal: Negative  Genitourinary: Negative  Musculoskeletal: Negative  Skin: Positive for wound  Neurological: Negative          Past Medical and Surgical History:   Past Medical History:   Diagnosis Date    Cellulitis of left lower extremity 4/15/2020    COVID-19 12/22/2020    Diabetes mellitus ([facility] [address] )     Exposure to SARS-associated coronavirus 4/15/2020    Fever 4/15/2020    Foot ulcer, right, with unspecified severity ([facility] [address] ) 6/12/2019    Hyperlipidemia     Hypertension        Past Surgical History:   Procedure Laterality Date    INCISION AND DRAINAGE INTRA ORAL ABSCESS Left 10/14/2020    Procedure: INCISION AND DRAINAGE  (I&D) WOUND ORAL EXTRAORAL LEFT SUBMANDIBULAR SPACE, LEFT  SPACE SPACE, INTRAORAL LEFT LATERAL PHARYNGEAL SPACE AND LEFT SUBLINGUAL SPACE;  Surgeon: Marykay Castleman, DDS;  [facility];  Service: Maxillofacial    REMOVAL OF IMPACTED TOOTH - COMPLETELY BONY Left 10/14/2020    Procedure: EXTRACTION TEETH MULTIPLE (RETAINED ROOTS #1,3,14, TEETH #4,17,18,19,32; Surgeon: Marian Mancilla DDS;  [facility];  Service: Maxillofacial    TONSILLECTOMY AND ADENOIDECTOMY      WOUND DEBRIDEMENT Right 7/25/2020    Procedure: Excision of non healing diabetic right foot Ulcer, with closure of wound, excision of tibial sesamoid right foot;  Surgeon: Deloris Hodgkins, DPM;  [facility];  Service: Podiatry       Meds/Allergies:  all medications and allergies reviewed    Allergies: No Known Allergies    Social History:  Marital Status: /Civil Union  Substance Use History:   Social History     Substance and Sexual Activity   Alcohol Use Yes    Comment: weekends     Social History     Tobacco Use   Smoking Status Never Smoker   Smokeless Tobacco Never Used     Social History     Substance and Sexual Activity   Drug Use No       Family History:  Family History   Problem Relation Age of Onset    Heart disease Mother     Diabetes Mother     Hypertension Mother         Benign Essential     Coronary artery disease Mother     Cancer Father         lung     Diabetes Sister         mellitus     Diabetes Maternal Grandfather         mellitus     No Known Problems Maternal Grandmother     Breast cancer Paternal Grandmother         unknown age   Emaline Folds No Known Problems Paternal Grandfather     No Known Problems Maternal Aunt     Breast cancer Paternal Aunt        Physical Exam:   Vitals:   Blood Pressure: 124/68 (04/11/22 2218)  Pulse: 77 (04/11/22 2218)  Temperature: 97 9 °F (36 6 °C) (04/11/22 2218)  Respirations: 18 (04/11/22 2218)  SpO2: 99 % (04/11/22 2218)    Physical Exam  Constitutional:       General: She is not in acute distress  Appearance: She is obese  She is not ill-appearing or toxic-appearing  HENT:      Head: Normocephalic  Cardiovascular:      Rate and Rhythm: Normal rate and regular rhythm  Heart sounds: Normal heart sounds  No murmur heard  No friction rub  No gallop  Pulmonary:      Breath sounds: Normal breath sounds   No wheezing, rhonchi or rales  Abdominal:      General: Bowel sounds are normal  There is no distension  Palpations: Abdomen is soft  Tenderness: There is no abdominal tenderness  Musculoskeletal:         General: Signs of injury (R 2nd toe) present  Cervical back: Neck supple  Right lower leg: No edema  Left lower leg: No edema  Skin:     General: Skin is warm and dry  Capillary Refill: Capillary refill takes less than 2 seconds  Neurological:      Mental Status: She is alert and oriented to person, place, and time  Mental status is at baseline  Additional Data:   Lab Results:    Results from last 7 days   Lab Units 04/11/22  2148   WBC Thousand/uL 9 96   HEMOGLOBIN g/dL 12 2   HEMATOCRIT % 38 5   PLATELETS Thousands/uL 298   NEUTROS PCT % 61   LYMPHS PCT % 28   MONOS PCT % 8   EOS PCT % 2     Results from last 7 days   Lab Units 04/11/22  2148   SODIUM mmol/L 136   POTASSIUM mmol/L 3 5   CHLORIDE mmol/L 105   CO2 mmol/L 28   BUN mg/dL 24   CREATININE mg/dL 0 95   ANION GAP mmol/L 3*   CALCIUM mg/dL 9 6   ALBUMIN g/dL 3 7   TOTAL BILIRUBIN mg/dL 0 18*   ALK PHOS U/L 124*   ALT U/L 24   AST U/L 19   GLUCOSE RANDOM mg/dL 180*             Lab Results   Component Value Date/Time    HGBA1C 7 1 (A) 02/23/2022 02:00 PM    HGBA1C 6 5 (H) 06/11/2021 10:06 AM    HGBA1C 6 8 (H) 06/15/2020 11:25 AM    HGBA1C 7 0 (H) 01/24/2020 12:29 PM    HGBA1C 8 6 (H) 08/27/2015 09:57 AM    HGBA1C 8 6 (H) 07/19/2014 06:00 AM     Results from last 7 days   Lab Units 04/11/22  2113   POC GLUCOSE mg/dl 182*           Imaging: Reviewed radiology reports from this admission including: xray(s)  XR chest portable    (Results Pending)   MRI foot/forefoot toes right w wo contrast    (Results Pending)       EKG, Pathology, and Other Studies Reviewed on Admission:   · EKG: NSR  HR 78    · QTc 446    ** Please Note: This note may have been constructed using a voice recognition system   **

## 2022-04-12 NOTE — ASSESSMENT & PLAN NOTE
· History noted in 2020  · Brief run of afib intraop in 2020 during odontogenic abscess I+D, 2 brief runs of SVT post op  · Was seen by cardiology IP who recommended 24 hour event monitor however does not appear patient followed up

## 2022-04-12 NOTE — H&P
[address] [facility]iatry - H&P  Karely Kirk 46 y o  female MRN: [id number]  Unit/Bed#: -01 Encounter: [id number]  Admission Date: 04/11/22    ASSESSMENT:    Karely Kirk is a 46 y o  female with:    1  Right 2nd digit ulceration  2  T2DM with peripheral neuropathy    PLAN:    · Podiatry planning R second digit amputation, to be performed 4/12/22, pending final imaging  · XR from 04/07 reviewed: Possible cortical erosion noted at the R second distal phalanx consistent with osteomyelitis  · F/u R foot MRI  · F/u blood cultures  · Local wound care performed consisting of betadine paint, DSD  · Appreciate internal medicine consult for pre-operative clearance  · Will discuss this plan with my attending and update as needed  Antibiotics started: Ancef and metronidazole  Pharmacologic VTE Prophylaxis: Enoxaparin (Lovenox)   Mechanical VTE Prophylaxis: sequential compression device   Weight Bearing Status: WBAT    Disposition:  Patient requires >2 midnight stay for IV antibiotics and possible surgical intervention  Code Status: Level 1 - Full Code    SUBJECTIVE    History of Present Illness:    Karely Kirk is a 46 y o  female with pmh of T2DM, HLD, obesity,  who presents with a right 2nd digit ulceration  She states that the ulceration has been present for about one week and denies any injury to the area  She states that she was seen on 4/6 by Dr Addie Dutton who performed a nail avulsion  She was placed on Keflex at that time  Patient denies nausea, vomiting, chest pain, shortness of breath, chills, fever  Review of Systems:    Constitutional: Negative  HENT: Negative  Eyes: Negative  Respiratory: Negative  Cardiovascular: Negative  Gastrointestinal: Negative  Musculoskeletal: negative   Skin:right 2nd digit ulceration   Neurological: peripheral neuropathy  Psych: Negative       Past Medical and Surgical History:     Past Medical History:   Diagnosis Date    Cellulitis of left lower extremity 4/15/2020    COVID-19 12/22/2020    Diabetes mellitus ([facility] [facility] )     Exposure to SARS-associated coronavirus 4/15/2020    Fever 4/15/2020    Foot ulcer, right, with unspecified severity ([facility] [facility] ) 6/12/2019    Hyperlipidemia     Hypertension        Past Surgical History:   Procedure Laterality Date    INCISION AND DRAINAGE INTRA ORAL ABSCESS Left 10/14/2020    Procedure: INCISION AND DRAINAGE  (I&D) WOUND ORAL EXTRAORAL LEFT SUBMANDIBULAR SPACE, LEFT  SPACE SPACE, INTRAORAL LEFT LATERAL PHARYNGEAL SPACE AND LEFT SUBLINGUAL SPACE;  Surgeon: Tiana Garza DDS;  [facility];  Service: Maxillofacial    REMOVAL OF IMPACTED TOOTH - COMPLETELY BONY Left 10/14/2020    Procedure: EXTRACTION TEETH MULTIPLE (RETAINED ROOTS #1,3,14, TEETH #4,17,18,19,32;  Surgeon: Tiana Garza DDS;  [facility];  Service: Maxillofacial    TONSILLECTOMY AND ADENOIDECTOMY      WOUND DEBRIDEMENT Right 7/25/2020    Procedure: Excision of non healing diabetic right foot Ulcer, with closure of wound, excision of tibial sesamoid right foot;  Surgeon: Lexie Gonzalez DPM;  [facility];  Service: Podiatry       Meds/Allergies:    Medications Prior to Admission   Medication    cephalexin (KEFLEX) 500 mg capsule    Empagliflozin 25 MG TABS    glyBURIDE (DIABETA) 5 mg tablet    lisinopril-hydrochlorothiazide (PRINZIDE,ZESTORETIC) 10-12 5 MG per tablet    metFORMIN (GLUCOPHAGE) 1000 MG tablet    simvastatin (ZOCOR) 40 mg tablet       No Known Allergies    Social History:    Social History     Marital Status: /Civil Union    Substance Use History:   Social History     Substance and Sexual Activity   Alcohol Use Yes    Comment: weekends     Social History     Tobacco Use   Smoking Status Never Smoker   Smokeless Tobacco Never Used     Social History     Substance and Sexual Activity   Drug Use No       Family History:    Family History   Problem Relation Age of Onset    Heart disease Mother     Diabetes Mother     Hypertension Mother         Benign Essential     Coronary artery disease Mother     Cancer Father         lung     Diabetes Sister         mellitus     Diabetes Maternal Grandfather         mellitus     No Known Problems Maternal Grandmother     Breast cancer Paternal Grandmother         unknown age   Emaline Folds No Known Problems Paternal Grandfather     No Known Problems Maternal Aunt     Breast cancer Paternal Aunt          OBJECTIVE:    First Vitals:   Blood Pressure: 116/66 (04/11/22 1849)  Pulse: 89 (04/11/22 1849)  Temperature: 97 9 °F (36 6 °C) (04/11/22 1849)  Respirations: 18 (04/11/22 2218)  SpO2: 97 % (04/11/22 1849)    Current Vitals:   Blood Pressure: 124/68 (04/11/22 2218)  Pulse: 77 (04/11/22 2218)  Temperature: 97 9 °F (36 6 °C) (04/11/22 2218)  Respirations: 18 (04/11/22 2218)  SpO2: 99 % (04/11/22 2218)      Physical Exam:    General Appearance: Alert, cooperative, no distress  HEENT: Head normocephalic, atraumatic, without obvious abnormality  Heart: Normal rate and rhythm  Lungs: Non-labored breathing  No respiratory distress  Abdomen: Without distension  Psychiatric: AAOx3  Lower Extremity:  Vascular:   DP/PT pedal pulses on the left are present  DP/PT pedal pulses on the right are present  CRT brisk at the digits  Pedal hair is absent  trace edema noted at bilateral lower extremities  Skin temperature is within normal limits bilaterally  Musculoskeletal:  MMT is 5/5 in all muscle compartments bilaterally  Passive ROM at the 1st MPJ and ankle joint are Decreased bilaterally with the leg extended  Active ROM at the lesser digits is intact  No Pain on palpation of the right foot  No gross deformities noted       Dermatological:  Wound #: 1  Location: right 2nd digit  Length 0 3cm: Width 0 3cm: Depth 0 2cm:   Deepest Tissue Noted in Base: subcutaneous tissue  Probe to Bone: No  Peripheral Skin Description: Attached  Granulation: 100% Fibrotic Tissue: 0% Necrotic Tissue: 0% Drainage Amount: minimal, serosanguinous  Signs of Infection: No      Neurological:  Gross sensation is intact  Protective sensation is diminished  Patient Reports numbness and/or paresthesias  Lab Results:   Admission on 04/11/2022   Component Date Value    Sodium 04/11/2022 136     Potassium 04/11/2022 3 5     Chloride 04/11/2022 105     CO2 04/11/2022 28     ANION GAP 04/11/2022 3*    BUN 04/11/2022 24     Creatinine 04/11/2022 0 95     Glucose 04/11/2022 180*    Calcium 04/11/2022 9 6     AST 04/11/2022 19     ALT 04/11/2022 24     Alkaline Phosphatase 04/11/2022 124*    Total Protein 04/11/2022 7 1     Albumin 04/11/2022 3 7     Total Bilirubin 04/11/2022 0 18*    eGFR 04/11/2022 69     WBC 04/11/2022 9 96     RBC 04/11/2022 4 46     Hemoglobin 04/11/2022 12 2     Hematocrit 04/11/2022 38 5     MCV 04/11/2022 86     MCH 04/11/2022 27 4     MCHC 04/11/2022 31 7     RDW 04/11/2022 13 5     MPV 04/11/2022 9 3     Platelets 21/03/0193 298     nRBC 04/11/2022 0     Neutrophils Relative 04/11/2022 61     Immat GRANS % 04/11/2022 0     Lymphocytes Relative 04/11/2022 28     Monocytes Relative 04/11/2022 8     Eosinophils Relative 04/11/2022 2     Basophils Relative 04/11/2022 1     Neutrophils Absolute 04/11/2022 6 02     Immature Grans Absolute 04/11/2022 0 03     Lymphocytes Absolute 04/11/2022 2 77     Monocytes Absolute 04/11/2022 0 82     Eosinophils Absolute 04/11/2022 0 22     Basophils Absolute 04/11/2022 0 10     POC Glucose 04/11/2022 182*       Cultures:            Imaging: I have personally reviewed pertinent films in PACS  No results found  EKG, Pathology, and Other Studies: I have personally reviewed pertinent reports

## 2022-04-12 NOTE — QUICK NOTE
Patient hypoglycemic, she is NPO for procedure today    Start D5 half-normal saline at 75 cc for the next 5 hours until she is NPO  Hold evening dose of Lantus, continue sliding scale

## 2022-04-12 NOTE — ASSESSMENT & PLAN NOTE
Lab Results   Component Value Date    HGBA1C 7 1 (A) 02/23/2022       Recent Labs     04/11/22  2113 04/12/22  0530 04/12/22  0730   POCGLU 182* 74 77       Blood Sugar Average: Last 72 hrs:  (P) 111     · Hold home p o  Meds  · Sliding scale insulin  · Lantus q h s

## 2022-04-12 NOTE — ASSESSMENT & PLAN NOTE
· Please see attending attestation for preoperative risk stratification  · EKG NSR 73 bpm, qtc 446, no ischemic changes  · CXR pending

## 2022-04-12 NOTE — PLAN OF CARE
Problem: INFECTION - ADULT  Goal: Absence or prevention of progression during hospitalization  Description: INTERVENTIONS:  - Assess and monitor for signs and symptoms of infection  - Monitor lab/diagnostic results  - Monitor all insertion sites, i e  indwelling lines, tubes, and drains  - Monitor endotracheal if appropriate and nasal secretions for changes in amount and color  - [place] appropriate cooling/warming therapies per order  - Administer medications as ordered  - Instruct and encourage patient and family to use good hand hygiene technique  - Identify and instruct in appropriate isolation precautions for identified infection/condition  Outcome: Progressing

## 2022-04-12 NOTE — ANESTHESIA PREPROCEDURE EVALUATION
Procedure:  AMPUTATION R 2nd TOE (Right Toe)    Relevant Problems   CARDIO   (+) Benign essential hypertension   (+) Hyperlipidemia   (+) Paroxysmal SVT (supraventricular tachycardia) (HCC)        Physical Exam    Airway    Mallampati score: II  TM Distance: >3 FB  Neck ROM: full     Dental       Cardiovascular      Pulmonary      Other Findings        Anesthesia Plan  ASA Score- 3     Anesthesia Type- IV sedation with anesthesia with ASA Monitors  Additional Monitors:   Airway Plan:           Plan Factors-    Chart reviewed  Induction- intravenous  Postoperative Plan-     Informed Consent- Anesthetic plan and risks discussed with patient  I personally reviewed this patient with the CRNA  Discussed and agreed on the Anesthesia Plan with the CRNA  Nelda Mcnamara

## 2022-04-12 NOTE — ASSESSMENT & PLAN NOTE
· Has known R second toe diabetic ulcer, recently seen in office for nail contusion and removal and started on keflex  · Underwent XR R second toe which showed possible acute or chronic second distal phalanx osteomyelitis  · Admitted to podiatry service with plan for OR tomorrow  · Care per podiatry team

## 2022-04-12 NOTE — UTILIZATION REVIEW
Inpatient Admission Authorization Request   NOTIFICATION OF INPATIENT ADMISSION/INPATIENT AUTHORIZATION REQUEST   SERVICING FACILITY:   [facility]  Address: [address] [place] [address], [address] [place] [address]  Tax ID: [id number]  NPI: [id number]  Place of Service: Inpatient [facility]  [address]  Place of Service Code: 24     ATTENDING PROVIDER:  Attending Name and NPI#: Skye Bobby [[id number]]  Address: [address] [place] [address], [address] [place] [address]  Phone: [phone]     UTILIZATION REVIEW CONTACT:  [facility] Utilization   Network Utilization Review Department  Phone: [phone]  Fax: [phone]  Email: [email or web address]     PHYSICIAN ADVISORY SERVICES:  FOR [id number]-QU-[id number] REVIEW - MEDICAL NECESSITY DENIAL  Phone: [phone]  Fax: [phone]  Email: [email or web address]     TYPE OF REQUEST:  Inpatient Status     ADMISSION INFORMATION:  ADMISSION DATE/TIME: 4/11/22  6:39 PM  PATIENT DIAGNOSIS CODE/DESCRIPTION:  Acute osteomyelitis of toe of right foot ([facility] ) [M86 171]  DISCHARGE DATE/TIME: No discharge date for patient encounter  IMPORTANT INFORMATION:  Please contact the [facility] directly with any questions or concerns regarding this request  Department voicemails are confidential     Send requests for admission clinical reviews, concurrent reviews, approvals, and administrative denials due to lack of clinical to fax [phone]

## 2022-04-12 NOTE — CASE MANAGEMENT
Case Management Assessment & Discharge Planning Note    Patient name Dexter Blue  Location [address] [phone] MRN [id number]  : 1970 Date 2022       Current Admission Date: 2022  Current Admission Diagnosis:Ulcer of right second toe with necrosis of bone [facility])   Patient Active Problem List    Diagnosis Date Noted    Ulcer of right second toe with necrosis of bone ([facility] [address] ) 2022    Preoperative clearance 2022    Annual physical exam 2021    Encounter for screening mammogram for breast cancer 2021    Screening for colorectal cancer 2021    Paroxysmal SVT (supraventricular tachycardia) ([facility] ) 10/15/2020    Obesity 2020    Hyperlipidemia 2017    Diabetes mellitus with diabetic neuropathy ([facility] ) 2017    Benign essential hypertension 2012      LOS (days): 1  Geometric Mean LOS (GMLOS) (days):   Days to GMLOS:     OBJECTIVE:    Risk of Unplanned Readmission Score: 8    Current admission status: Inpatient       Preferred Pharmacy:   [address] [place], [address] [facility] [address] [facility]  Phone: [phone] Fax: [phone]    [facility] [address] [facility], [address] [facility] [address] [address]  Phone: [phone] Fax: [phone]    Primary Care Provider: Christa Tejada DO    Primary Insurance: CAPITAL  Secondary Insurance:     ASSESSMENT:  [address], [address] Representative - Spouse   Primary Phone: [phone] (Home)                Readmission Root Cause  30 Day Readmission: No    Patient Information  Admitted from[de-identified] Home  Mental Status: Alert  During Assessment patient was accompanied by: Not accompanied during assessment  Assessment information provided by[de-identified] Patient  Primary Caregiver: Self  Support Systems: Spouse/significant other,Son  [place] of Residence: [address] [place] [address] do you live in?: [address]  entry access options   Select all that apply : Stairs  Number of steps to enter home : 1  Type of Current Residence: Bi-level  Upon entering residence, is there a bedroom on the main floor (no further steps)?: No  A bedroom is located on the following floor levels of residence (select all that apply):: 2nd Floor  Upon entering residence, is there a bathroom on the main floor (no further steps)?: Yes  Number of steps to 2nd floor from main floor: 6  In the last 12 months, was there a time when you were not able to pay the mortgage or rent on time?: No  In the last 12 months, how many places have you lived?: 1  In the last 12 months, was there a time when you did not have a steady place to sleep or slept in a shelter (including now)?: No  Homeless/housing insecurity resource given?: No  Living Arrangements: Lives w/ Spouse/significant other,Lives w/ Son    Activities of Daily Living Prior to Admission  Functional Status: Independent  Completes ADLs independently?: Yes  Ambulates independently?: Yes  Does patient use assisted devices?: No  Does patient currently own DME?: Yes  What DME does the patient currently own?: Felix Mora  Does patient have a history of Outpatient Therapy (PT/OT)?: No  Does the patient have a history of Short-Term Rehab?: No  Does patient have a history of HHC?: No  [facility]: No    Patient Information Continued  Income Source: Employed  Does patient have prescription coverage?: Yes  Within the past 12 months, you worried that your food would run out before you got the money to buy more : Never true  Within the past 12 months, the food you bought just didnt last and you didnt have money to get more : Never true  Food insecurity resource given?: No  Does patient receive dialysis treatments?: No  Does patient have a history of substance abuse?: No  Does patient have a history of Mental Health Diagnosis?: No    Means of Transportation  Means of Transport to Appts[de-identified] Drives Self  In the past 12 months, has lack of transportation kept you from medical appointments or from getting medications?: No  In the past 12 months, has lack of transportation kept you from meetings, work, or from getting things needed for daily living?: No  Was application for public transport provided?: N/A    DISCHARGE DETAILS:    Discharge planning discussed with[de-identified] Patient  Freedom of Choice: Yes  Comments - Freedom of Choice: Pt states that if wound care is recommended at d/s she would be open to it    CM contacted family/caregiver?: No- see comments (Patient able to complete Open without difficulty)     Treatment Team Recommendation: Home  Discharge Destination Plan[de-identified] Home  Transport at Discharge : Family

## 2022-04-12 NOTE — PROGRESS NOTES
[facility] Podiatry - Progress Note  Patient: Flory Jorge 46 y o  female   MRN: [id number]  PCP: Daniel Echavarria DO  Unit/Bed#: -01 Encounter: [id number]  Date Of Visit: 22    ASSESSMENT:    Flory Jorge is a 46 y o  female with:    1  Right 2nd digit ulceration with distal phalanx osteomyelitis  2  T2DM with peripheral neuropathy      PLAN:    · Plan to go to OR today  with Dr Zen Ross for right partial 2nd digit amputation  · MRI reviewed  Right 2nd distal phalanx osteomyelitis without involvement of the middle phalanx  · Consent to be signed with surgeon prior to procedure  · Confirmed NPO status  · H&P, vitals, and current labs reviewed  No acute changes noted  · Alternatives, risks, and complications discussed with patient  · All questions answered  No guarantees given of outcome  · Appreciate consulting services for recommendations and management  Antibiotics: Ancef and metronidazole  Pharmacologic VTE Prophylaxis: Enoxaparin (Lovenox)   Mechanical VTE Prophylaxis: sequential compression device   Weightbearing status: WBAT    Disposition: Patient will require continued inpatient stay for the above    SUBJECTIVE:     The patient was seen, evaluated, and assessed at bedside today  The patient was awake, alert, and in no acute distress  No acute events overnight  The patient reports no pain in the right foot  Patient denies N/V/F/chills/SOB/CP  OBJECTIVE:     Vitals:   /68   Pulse 74   Temp (!) 97 4 °F (36 3 °C) (Oral)   Resp 18   SpO2 96%     Temp (24hrs), Av 7 °F (36 5 °C), Min:97 4 °F (36 3 °C), Max:97 9 °F (36 6 °C)      Physical Exam:     General:  Alert, cooperative, and in no distress  Lungs: Non labored breathing  Abdomen: Soft, non-tender  Lower extremity exam:  Cardiovascular status at baseline  Neurological status at baseline  Musculoskeletal status at baseline  No calf tenderness noted  Dressings left intact to OR      Additional Data: Labs:    Results from last 7 days   Lab Units 04/12/22  0532 04/11/22 2148 04/11/22  2148   WBC Thousand/uL 8 20   < > 9 96   HEMOGLOBIN g/dL 11 4*   < > 12 2   HEMATOCRIT % 36 4   < > 38 5   PLATELETS Thousands/uL 259   < > 298   NEUTROS PCT %  --   --  61   LYMPHS PCT %  --   --  28   MONOS PCT %  --   --  8   EOS PCT %  --   --  2    < > = values in this interval not displayed  Results from last 7 days   Lab Units 04/11/22 2148   POTASSIUM mmol/L 3 5   CHLORIDE mmol/L 105   CO2 mmol/L 28   BUN mg/dL 24   CREATININE mg/dL 0 95   CALCIUM mg/dL 9 6   ALK PHOS U/L 124*   ALT U/L 24   AST U/L 19           * I Have Reviewed All Lab Data Listed Above  Recent Cultures (last 7 days):     Results from last 7 days   Lab Units 04/11/22 2147 04/11/22 2146   BLOOD CULTURE  Received in Microbiology Lab  Culture in Progress  Received in Microbiology Lab  Culture in Progress  Imaging: I have personally reviewed pertinent films in PACS  EKG, Pathology, and Other Studies: I have personally reviewed pertinent reports  ** Please Note: Portions of the record may have been created with voice recognition software  Occasional wrong word or "sound a like" substitutions may have occurred due to the inherent limitations of voice recognition software  Read the chart carefully and recognize, using context, where substitutions have occurred   **

## 2022-04-12 NOTE — ASSESSMENT & PLAN NOTE
· Has known R second toe diabetic ulcer, recently seen in office for nail contusion and removal and started on keflex  · Underwent XR R second toe which showed possible acute or chronic second distal phalanx osteomyelitis  · Plan for surgery per Podiatry

## 2022-04-12 NOTE — PLAN OF CARE
Problem: INFECTION - ADULT  Goal: Absence or prevention of progression during hospitalization  Description: INTERVENTIONS:  - Assess and monitor for signs and symptoms of infection  - Monitor lab/diagnostic results  - Monitor all insertion sites, i e  indwelling lines, tubes, and drains  - Monitor endotracheal if appropriate and nasal secretions for changes in amount and color  - [place] appropriate cooling/warming therapies per order  - Administer medications as ordered  - Instruct and encourage patient and family to use good hand hygiene technique  - Identify and instruct in appropriate isolation precautions for identified infection/condition  4/11/2022 2019 by Kevin Veloz RN  Outcome: Progressing  4/11/2022 2018 by Kevin Veloz RN  Outcome: Progressing

## 2022-04-12 NOTE — ASSESSMENT & PLAN NOTE
Lab Results   Component Value Date    HGBA1C 7 1 (A) 02/23/2022       Recent Labs     04/11/22 2113   POCGLU 182*       Blood Sugar Average: Last 72 hrs:  (P) 182     · Hold home meds metformin, glyburide, and empagliflozin while inpatient  · Start SSI q6 while NPO, start lantus 15U qhs, will uptitrate as necessary

## 2022-04-12 NOTE — PROGRESS NOTES
Patient arrived has no complaints will continue to monitor notified podiatry that patient is here they will be here to see the patient

## 2022-04-12 NOTE — UTILIZATION REVIEW
Initial Clinical Review    Admission: Date/Time/Statement:   Admission Orders (From admission, onward)     Ordered        04/11/22 2017  Inpatient Admission  Once                      Orders Placed This Encounter   Procedures    Inpatient Admission     Standing Status:   Standing     Number of Occurrences:   1     Order Specific Question:   Level of Care     Answer:   Med Surg [16]     Order Specific Question:   Estimated length of stay     Answer:   More than 2 Midnights     Order Specific Question:   Certification     Answer:   I certify that inpatient services are medically necessary for this patient for a duration of greater than two midnights  See H&P and MD Progress Notes for additional information about the patient's course of treatment  Initial Presentation: 46 y o  female with PMHx of T2DM, HLD, obesity who presents to the hospital as a direct ADMISSION INPATIENT to M/S unit with R 2nd DIGIT ULCERATION/OSTEOMYELITIS under podiatry service with plan for R 2nd DIGIT AMPUTATION  X-ray from 4/7 with possible cortical erosion noted at the R second distal phalanx consistent with osteomyelitis  MRI foot ordered  IV abx  Blood cxs pending  Local wound care with betadine paint, DSD  Intern med consult for medical management  Continue home po meds, fingerstick glucose checks w/ ssi  Lovenox sq  SCD's  Cons carb diet  Date: 4/12  Day 2: Pt with no complaints of pain this AM on exam   MRI reviewed: Right 2nd distal phalanx osteomyelitis without involvement of the middle phalanx  Npo for OR today         Wt Readings from Last 1 Encounters:   04/11/22 105 kg (232 lb)     Vital Signs:   Date/Time Temp Pulse Resp BP MAP (mmHg) SpO2 O2 Device   04/12/22 07:47:21 97 4 °F (36 3 °C) Abnormal  74 18 -- -- 96 % --   04/12/22 07:45:42 -- 74 -- -- -- 96 % --   04/11/22 22:18:17 97 9 °F (36 6 °C) 77 18 124/68 87 99 % None (Room air)   04/11/22 18:49:55 97 9 °F (36 6 °C) 89 -- 116/66 83 97 % --           Pertinent Labs/Diagnostic Test Results:   EKG 4/11: NSR 73 bpm, qtc 446, no ischemic changes    MRI foot/forefoot toes right w wo contrast   Final Result by Vonda Bender MD (04/12 2343)      Associated with the tip of the 2nd toe ulcer, there is extensive bone destruction and abnormal confluent T1 marrow signal throughout the 2nd toe distal phalanx consistent with osteomyelitis  The middle phalanx is not involved (series 6 image 13, and    series 5 image 13 )      XR chest portable    (Results Pending)         Results from last 7 days   Lab Units 04/12/22  0532 04/11/22  2148   WBC Thousand/uL 8 20 9 96   HEMOGLOBIN g/dL 11 4* 12 2   HEMATOCRIT % 36 4 38 5   PLATELETS Thousands/uL 259 298   NEUTROS ABS Thousands/µL  --  6 02     Results from last 7 days   Lab Units 04/12/22  0531 04/11/22  2148   SODIUM mmol/L 139 136   POTASSIUM mmol/L 3 7 3 5   CHLORIDE mmol/L 110* 105   CO2 mmol/L 23 28   ANION GAP mmol/L 6 3*   BUN mg/dL 21 24   CREATININE mg/dL 0 75 0 95   EGFR ml/min/1 73sq m 92 69   CALCIUM mg/dL 8 8 9 6     Results from last 7 days   Lab Units 04/11/22  2148   AST U/L 19   ALT U/L 24   ALK PHOS U/L 124*   TOTAL PROTEIN g/dL 7 1   ALBUMIN g/dL 3 7   TOTAL BILIRUBIN mg/dL 0 18*     Results from last 7 days   Lab Units 04/12/22  0730 04/12/22  0530 04/11/22  2113   POC GLUCOSE mg/dl 77 74 182*     Results from last 7 days   Lab Units 04/12/22  0531 04/11/22  2148   GLUCOSE RANDOM mg/dL 70 180*     Results from last 7 days   Lab Units 04/11/22  2147 04/11/22  2146   BLOOD CULTURE  Received in Microbiology Lab  Culture in Progress  Received in Microbiology Lab  Culture in Progress         Past Medical History:   Diagnosis Date    Cellulitis of left lower extremity 4/15/2020    COVID-19 12/22/2020    Diabetes mellitus ([facility] )     Exposure to SARS-associated coronavirus 4/15/2020    Fever 4/15/2020    Foot ulcer, right, with unspecified severity ([address] ) 6/12/2019    Hyperlipidemia     Hypertension      Present on Admission:   Diabetes mellitus with diabetic neuropathy (HCC)   Hyperlipidemia   (Resolved) Peripheral neuropathy   Benign essential hypertension   Obesity   Paroxysmal SVT (supraventricular tachycardia) ([facility])      Admitting Diagnosis: Acute osteomyelitis of toe of right foot ([facility] ) [M86 171]  Age/Sex: 46 y o  female  Admission Orders:  Scheduled Medications:  cefazolin, 2,000 mg, Intravenous, Q8H  enoxaparin, 40 mg, Subcutaneous, Daily  insulin glargine, 15 Units, Subcutaneous, HS  insulin lispro, 1-6 Units, Subcutaneous, Q6H  [START ON 4/13/2022] lisinopril, 10 mg, Oral, Daily  metroNIDAZOLE, 500 mg, Intravenous, Q8H  pravastatin, 80 mg, Oral, Daily With Dinner    Continuous IV Infusions:  sodium chloride, 100 mL/hr, Intravenous, Continuous      PRN Meds:     Network Utilization Review Department  ATTENTION: Please call with any questions or concerns to [phone] and carefully listen to the prompts so that you are directed to the right person  All voicemails are confidential   Kev Byrd all requests for admission clinical reviews, approved or denied determinations and any other requests to dedicated fax number below belonging to the campus where the patient is receiving treatment   List of dedicated fax numbers for the Facilities:  [address] DENIALS (Administrative/Medical Necessity) [phone]   [address] (Maternity/NICU/Pediatrics) [phone]   [address] [place] [address] 150 Medical [place] Lacey Marvin Maranda [address]64 [address] [address]   Kim Dennison [address]  [place] [address] [address] [place] [address] [phone]

## 2022-04-13 PROBLEM — L97.514: Status: RESOLVED | Noted: 2022-04-11 | Resolved: 2022-04-13

## 2022-04-13 PROBLEM — Z01.818 PREOPERATIVE CLEARANCE: Status: RESOLVED | Noted: 2022-04-11 | Resolved: 2022-04-13

## 2022-04-13 LAB — GLUCOSE SERPL-MCNC: 97 MG/DL (ref 65–140)

## 2022-04-13 PROCEDURE — 97162 PT EVAL MOD COMPLEX 30 MIN: CPT

## 2022-04-13 PROCEDURE — 97166 OT EVAL MOD COMPLEX 45 MIN: CPT

## 2022-04-13 PROCEDURE — 82948 REAGENT STRIP/BLOOD GLUCOSE: CPT

## 2022-04-13 PROCEDURE — NC001 PR NO CHARGE: Performed by: PODIATRIST

## 2022-04-13 RX ADMIN — CEFAZOLIN SODIUM 2000 MG: 2 SOLUTION INTRAVENOUS at 05:44

## 2022-04-13 RX ADMIN — LISINOPRIL 10 MG: 10 TABLET ORAL at 08:26

## 2022-04-13 RX ADMIN — METRONIDAZOLE 500 MG: 500 INJECTION, SOLUTION INTRAVENOUS at 06:26

## 2022-04-13 RX ADMIN — ENOXAPARIN SODIUM 40 MG: 40 INJECTION SUBCUTANEOUS at 08:26

## 2022-04-13 NOTE — PLAN OF CARE
Problem: PAIN - ADULT  Goal: Verbalizes/displays adequate comfort level or baseline comfort level  Description: Interventions:  - Encourage patient to monitor pain and request assistance  - Assess pain using appropriate pain scale  - Administer analgesics based on type and severity of pain and evaluate response  - Implement non-pharmacological measures as appropriate and evaluate response  - Consider cultural and social influences on pain and pain management  - Notify physician/advanced practitioner if interventions unsuccessful or patient reports new pain  Outcome: Progressing     Problem: INFECTION - ADULT  Goal: Absence or prevention of progression during hospitalization  Description: INTERVENTIONS:  - Assess and monitor for signs and symptoms of infection  - Monitor lab/diagnostic results  - Monitor all insertion sites, i e  indwelling lines, tubes, and drains  - Monitor endotracheal if appropriate and nasal secretions for changes in amount and color  - [place] appropriate cooling/warming therapies per order  - Administer medications as ordered  - Instruct and encourage patient and family to use good hand hygiene technique  - Identify and instruct in appropriate isolation precautions for identified infection/condition  Outcome: Progressing     Problem: SAFETY ADULT  Goal: Patient will remain free of falls  Description: INTERVENTIONS:  - Educate patient/family on patient safety including physical limitations  - Instruct patient to call for assistance with activity   - Consult OT/PT to assist with strengthening/mobility   - Keep Call bell within reach  - Keep bed low and locked with side rails adjusted as appropriate  - Keep care items and personal belongings within reach  - Initiate and maintain comfort rounds  - Make Fall Risk Sign visible to staff  - Apply yellow socks and bracelet for high fall risk patients  - Consider moving patient to room near nurses station  Outcome: Progressing  Goal: Maintain or return to baseline ADL function  Description: INTERVENTIONS:  -  Assess patient's ability to carry out ADLs; assess patient's baseline for ADL function and identify physical deficits which impact ability to perform ADLs (bathing, care of mouth/teeth, toileting, grooming, dressing, etc )  - Assess/evaluate cause of self-care deficits   - Assess range of motion  - Assess patient's mobility; develop plan if impaired  - Assess patient's need for assistive devices and provide as appropriate  - Encourage maximum independence but intervene and supervise when necessary  - Involve family in performance of ADLs  - Assess for home care needs following discharge   - Consider OT consult to assist with ADL evaluation and planning for discharge  - Provide patient education as appropriate  Outcome: Progressing  Goal: Maintains/Returns to pre admission functional level  Description: INTERVENTIONS:  - Perform BMAT or MOVE assessment daily    - Set and communicate daily mobility goal to care team and patient/family/caregiver     - Collaborate with rehabilitation services on mobility goals if consulted  - Out of bed for toileting  - Record patient progress and toleration of activity level   Outcome: Progressing     Problem: DISCHARGE PLANNING  Goal: Discharge to home or other facility with appropriate resources  Description: INTERVENTIONS:  - Identify barriers to discharge w/patient and caregiver  - Arrange for needed discharge resources and transportation as appropriate  - Identify discharge learning needs (meds, wound care, etc )  - Arrange for interpretive services to assist at discharge as needed  - Refer to Case Management Department for coordinating discharge planning if the patient needs post-hospital services based on physician/advanced practitioner order or complex needs related to functional status, cognitive ability, or social support system  Outcome: Progressing     Problem: Knowledge Deficit  Goal: Patient/family/caregiver demonstrates understanding of disease process, treatment plan, medications, and discharge instructions  Description: Complete learning assessment and assess knowledge base    Interventions:  - Provide teaching at level of understanding  - Provide teaching via preferred learning methods  Outcome: Progressing

## 2022-04-13 NOTE — UTILIZATION REVIEW
4/12  OR - S/p AMPUTATION DISTAL PHALYNX RIGHT SECOND TOE (Right)  Operative Findings:  Consistent with Diagnosis  This procedure will provide surgical cure of osteomyelitis of right 2nd distal phalanx

## 2022-04-13 NOTE — OCCUPATIONAL THERAPY NOTE
Occupational Therapy Evaluation     Patient Name: Magaly KRUGER Date: 4/13/2022  Problem List  Active Problems:    Diabetes mellitus with diabetic neuropathy ([facility] )    Hyperlipidemia    Benign essential hypertension    Obesity    Paroxysmal SVT (supraventricular tachycardia) ([facility] )    Past Medical History  Past Medical History:   Diagnosis Date    Cellulitis of left lower extremity 4/15/2020    COVID-19 12/22/2020    Diabetes mellitus ([facility] )     Exposure to SARS-associated coronavirus 4/15/2020    Fever 4/15/2020    Foot ulcer, right, with unspecified severity ([facility] ) 6/12/2019    Hyperlipidemia     Hypertension      Past Surgical History  Past Surgical History:   Procedure Laterality Date    INCISION AND DRAINAGE INTRA ORAL ABSCESS Left 10/14/2020    Procedure: INCISION AND DRAINAGE  (I&D) WOUND ORAL EXTRAORAL LEFT SUBMANDIBULAR SPACE, LEFT  SPACE SPACE, INTRAORAL LEFT LATERAL PHARYNGEAL SPACE AND LEFT SUBLINGUAL SPACE;  Surgeon: Marge Dsouza DDS;  [facility]: [facility];  Service: Maxillofacial    REMOVAL OF IMPACTED TOOTH - COMPLETELY BONY Left 10/14/2020    Procedure: EXTRACTION TEETH MULTIPLE (RETAINED ROOTS #1,3,14, TEETH #4,17,18,19,32;  Surgeon: Marge Dsouza DDS;  Location: [facility];  Service: Maxillofacial    TONSILLECTOMY AND ADENOIDECTOMY      WOUND DEBRIDEMENT Right 7/25/2020    Procedure: Excision of non healing diabetic right foot Ulcer, with closure of wound, excision of tibial sesamoid right foot;  Surgeon: Mukesh Bond DPM;  Location: [facility];  Service: Podiatry        04/13/22 0915   OT Last Visit   OT Visit Date 04/13/22   Note Type   Note type Evaluation   Restrictions/Precautions   Weight Bearing Precautions Per Order Yes   RLE Weight Bearing Per Order WBAT  (to heel in toe off loading shoe)   Braces or Orthoses Other (Comment)  (toe off loading)   Pain Assessment   Pain Assessment Tool 0-10   Pain Score No Pain   Home Living   Type of [place] [address] Layout Two level;1/2 bath on main level;Bed/bath upstairs   Bathroom Shower/Tub Tub/shower unit   Bathroom Toilet Standard  (comfort height )   Bathroom Equipment Grab bars in shower   [address] Other (Comment)  (denies)   Prior Function   Level of [place] Independent with ADLs and functional mobility   Lives With Spouse   Receives Help From Family   ADL Assistance Independent   IADLs Independent   Falls in the last 6 months 0   Vocational Full time employment   Lifestyle   Autonomy pta, pt was I w ADL/IADL, no AD mobility, +    Reciprocal Relationships supportive spouse who can A as needed  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    Intrinsic Gratification spending time with family    Psychosocial   Psychosocial (WDL) WDL   Subjective   Subjective "I"'m doing well "   ADL   Where Assessed Edge of bed   Eating Assistance 7  Independent   Grooming Assistance 7  Independent   UB Bathing Assistance 7  Independent   LB Bathing Assistance 6  Modified Independent   UB Dressing Assistance 7  [address] 6  Modified independent   [address]  6  Modified independent   [address] 6  Modified independent   Bed Mobility   Additional Comments foud and left in chair w all needs in reach   Transfers   Sit to Stand 6  Modified independent   Stand to Sit 6  Modified independent   Additional Comments G safety awareness, 100% adherence to WBS w toe off loading shoe    Functional Mobility   Functional Mobility 5  Supervision   Additional Comments heel touch WBAT w toe off loading shoe  no AD   Balance   Static Sitting Normal   Dynamic Sitting Good   Static Standing Fair +   Dynamic Standing Fair +   Ambulatory Fair   Activity Tolerance   Activity Tolerance Patient tolerated treatment well   Medical Staff Made Aware DPT Elizabeth 2' pts med complexity, comorbidities and regression from baseline      Nurse Made Aware ok per RN   RUE Assessment   RUE Assessment WFL   LUE Assessment   LUE Assessment WFL   Hand Function   Gross Motor Coordination Functional   Fine Motor Coordination Functional   Cognition   Overall Cognitive Status WFL   Arousal/Participation Alert; Responsive; Cooperative   Attention Within functional limits   Orientation Level Oriented X4   Memory Within functional limits   Following Commands Follows all commands and directions without difficulty   Comments pleasant and cooperative, G safety awareness and insight to condition  100% adherence to WBS   Assessment   Prognosis Good   Assessment Pt is a 46 y o  female admitted 4/11/22 with 2nd digit ulceration  Pt underwent R second digit amputation 4/12/22, is POD #1 with active OT eval and treat orders  Pt is RLE WBAT w heel touch in toe off loading shoe  PMH includes  has a past medical history of Cellulitis of left lower extremity, COVID-19, Diabetes mellitus ([facility] [facility] ), Exposure to SARS-associated coronavirus, Fever, Foot ulcer, right, with unspecified severity ([facility] ), Hyperlipidemia, and Hypertension  Pt lives w spouse in a [address], 1 ABDIEL, reports tub shower with grab bars and standard height toilet upstairs  Pta, pt was independent w/ ADL/IADL and functional mobility, was driving and was not using any DME at baseline  Currently, pt is ind for UB ADL, mod I for LB ADL and completed transfers with/FM with supervision w/ toe off loading shoe  From OT standpoint, pt is functioning at baseline level and does not appear to require additional acute OT at this time  Discussed pt's comfort with d/c from OT and any concerns with returning to previous environment; pt does not report any at this time  The patient's raw score on the AM-PAC Daily Activity inpatient short form is 24, standardized score is 57 54, greater than 39 4  Patients at this level are likely to benefit from discharge to home  Please refer to the recommendation of the Occupational Therapist for safe discharge planning   From OT perspective, pt should D/C HOME when medically stable  Acute OT no longer rq at this time, D/C OT      Goals   Patient Goals to go home    Recommendation   OT Discharge Recommendation No rehabilitation needs   OT - OK to Discharge Yes   AM-PAC Daily Activity Inpatient   Lower Body Dressing 4   Bathing 4   Toileting 4   Upper Body Dressing 4   Grooming 4     Eating 4   Daily Activity Raw Score 24   Daily Activity Standardized Score (Calc for Raw Score >=11) 57 54   AM-PAC Applied Cognition Inpatient   Following a Speech/Presentation 4   Understanding Ordinary Conversation 4   Taking Medications 4   Remembering Where Things Are Placed or Put Away 4   Remembering List of 4-5 Errands 4   Taking Care of Complicated Tasks 4   Applied Cognition Raw Score 24   Applied Cognition Standardized Score 62 21   Modified Bee Scale   Modified Mayo Scale 2         Damari Bruce, ABELARDO, OTR/L

## 2022-04-13 NOTE — OP NOTE
OPERATIVE REPORT - Podiatry  PATIENT NAME: Umesh Parish    :  1970  MRN: [id number]  Pt Location: [facility] OR ROOM 07    SURGERY DATE: 2022    Surgeon(s) and Role:     * Nazia Alba DPM - Primary     * Ramakrishna Engel DPM - Assisting    Pre-op Diagnosis:  Ulcer of right second toe with necrosis of bone ([address] ) [L97 514]    Post-Op Diagnosis Codes:     * Ulcer of right second toe with necrosis of bone ([address] ) [L97 514]    Procedure(s) (LRB):  AMPUTATION DISTAL PHALYNX RIGHT SECOND TOE (Right)    Specimen(s):  ID Type Source Tests Collected by Time Destination   1 : DISTAL PHALANX RIGHT SECOND TOE Tissue Toe, Right TISSUE EXAM RUI[facility] 2022        Estimated Blood Loss:   Minimal    Drains:  * No LDAs found *    Anesthesia Type:   Choice with 10 ml of 1% Lidocaine plain    Hemostasis:  Pneumatic ankle tourniquet set at 250 mmHg for 16 mins    Materials:  * No implants in log *  4-0 Nylon    Injectables:  None    Operative Findings:  Consistent with Diagnosis  This procedure will provide surgical cure of osteomyelitis of right 2nd distal phalanx  Complications:   None    Procedure and Technique:     Under mild sedation, the patient was brought into the operating room and placed on the operating room table in the supine position  IV sedation was achieved by anesthesia team and a universal timeout was performed where all parties are in agreement of correct patient, correct procedure and correct site  A pneumatic tourniquet was then placed over the patient's right lower extremity with ample padding  A digital block was performed consisting of 10 ml of 1% Lidocaine plain  The foot was then prepped and draped in the usual aseptic manner  An esmarch bandage was used to exsangunate the foot and the pneumatic tourniquet was then inflated to 250 mmHg  Attention was directed to the right, 2nd toe where a fishmouth type of incision was made   Utilizing a sterile 15 blade, this incision was carried deep straight to bone  Soft tissue structures were then reflected off the distal phalanx  Utilizing a sterile 15 blade, all capsular structures were severed and the toe was then disarticulated at the level of the DIPJ and then placed on the back table  It was noted that all tissue margins were bleeding and viable  No deep sinus tracts or areas of purulence were visualized  The remaining bone on the proximal aspect of the joint was noted to be of hard and viable quality  Any remaining tendinous structures were identified and severed proximally to remove any nidus of infection  The surgical incision was irrigated with copious amounts of normal sterile saline  Skin edges were reapproximated and closure was obtained utilizing 4-0 nylon  The foot was then cleansed and dried  The incision site was dressed with betadine soaked adaptic, gauze  This was then covered with a [place] and Coban  The tourniquet was deflated at approximately 16 min and normal hyperemic response was noted to all digits  The patient tolerated the procedure and anesthesia well without immediate complications and transferred to PACU with vital signs stable  As with many limb salvage procedures, we contemplate the possibility of performing further stages to this procedure  Procedures may include debridements, delayed closure, plastic surgery techniques, or more proximal amputations  This procedure may be considered part of a multi-staged limb salvage treatment plan  Dr Adarsh Rodgers was present during the entire procedure and participated in all key aspects  SIGNATURE: Gustavo John DPM  DATE: April 12, 2022  TIME: 8:20 PM      Portions of the record may have been created with voice recognition software  Occasional wrong word or "sound a like" substitutions may have occurred due to the inherent limitations of voice recognition software  Read the chart carefully and recognize, using context, where substitutions have occurred

## 2022-04-13 NOTE — DISCHARGE SUMMARY
PODIATRY DISCHARGE SUMMARY     Patient Name: Dexter Blue   Age & Sex: 46 y o  female   MRN: [id number]  Unit/Bed#: -01   Encounter: [id number]  Length of Stay: 2 days    Principal Problem:    Ulcer of right second toe with necrosis of bone ([facility] )  Active Problems:    Diabetes mellitus with diabetic neuropathy ([facility] )    Hyperlipidemia    Benign essential hypertension    Obesity    Paroxysmal SVT (supraventricular tachycardia) (HCC)    Preoperative clearance      HPI from Admission:  Dexter Blue is a 46 y o  female with pmh of T2DM, HLD, obesity,  who presents with a right 2nd digit ulceration  She states that the ulceration has been present for about one week and denies any injury to the area  She states that she was seen on 4/6 by Dr Khadra Echavarria who performed a nail avulsion  She was placed on Keflex at that time  Patient denies nausea, vomiting, chest pain, shortness of breath, chills, fever  [address]     Dexter Blue is a 46 y o  female who was admitted on 4/11/2022 for right second digit ulcer  Xray from 4/7/22 showed possible cortical erosion at right second distal phalanx, consistent with osteomyelitis  MRI from 4/12/22 showed extensive bone destruction, abnormal T1 marrow signal throughout the 2nd distal phalanx consistent with osteomyelitis without involvement of middle phalanx  Patient underwent amputation of right 2nd toe distal phalanx on 4/12/22 with Dr Khadra Echavarria  Dressings were changed POD1 today with incision site stable, see progress note from 4/13/22  Patient is stable for discharge home with plans to follow-up with Dr Khadra Echavarria within 1 week in his office      DISCHARGE INFORMATION     PCP at Discharge: Christa Tejada DO    Admitting Provider: Dr Khadra Echavarria  Admission Date: 4/11/2022     Discharge Provider: Dr Lorrie Kessler  Discharge Date: 04/13/22    Discharge Disposition: Home  Discharge Condition: Stable  Discharge with Lines: No  Discharge Diet: Diabetic  Activity Restrictions: weight bearing to heel in toe offloading shoe  Test Results Pending at Discharge: None  Medications at Discharge: See after visit summary for reconciled discharge medications provided to patient and family  Discharge Diagnoses:  Principal Problem:    Ulcer of right second toe with necrosis of bone (HCC)  Active Problems:    Diabetes mellitus with diabetic neuropathy (HCC)    Hyperlipidemia    Benign essential hypertension    Obesity    Paroxysmal SVT (supraventricular tachycardia) (HCC)    Preoperative clearance      Consulting Providers:  Internal medicine    Diagnostic Imaging Performed:  XR chest portable    Result Date: 4/12/2022  Impression: No acute cardiopulmonary disease  Workstation performed: [id number]     MRI foot/forefoot toes right w wo contrast    Result Date: 4/12/2022  Impression: Associated with the tip of the 2nd toe ulcer, there is extensive bone destruction and abnormal confluent T1 marrow signal throughout the 2nd toe distal phalanx consistent with osteomyelitis  The middle phalanx is not involved (series 6 image 13, and series 5 image 13 ) Workstation performed: [id number]       Procedures Performed:  Procedure(s):  AMPUTATION DISTAL PHALANX RIGHT SECOND TOE      Code Status: Level 1 - Full Code  Advance Directive and Living Will:      Power of :    POLST:      FOLLOW-UP     Podiatry Outpatient Follow-up:  Yes, Dr Darren Baker within 1 week     Active Issues Requiring Follow-Up:  Postoperative care    Discharge Statement:  I spent 25 minutes discharging the patient  This time was spent on the day of discharge  I had direct contact with the patient on the day of discharge  Additional documentation is required if more than 30 minutes were spent on discharge  Portions of the record may have been created with voice recognition software  Occasional wrong word or "sound a like" substitutions may have occurred due to the inherent limitations of voice recognition software    Read the chart carefully and recognize, using context, where substitutions have occurred   ==  Yudith Cabrera, [address]  Podiatric Medicine & Surgery

## 2022-04-13 NOTE — DISCHARGE INSTRUCTIONS
Discharge Instructions - Podiatry    Weight Bearing Status: Weight bearing as tolerated to heel in toe offloading shoe                   Pain: Continue analgesics as directed  Follow-up appointment instructions: Please make an appointment within one week of discharge with Dr Ledezma Gentle  Contact sooner if any increase in pain, or signs of infection occur    Wound Care: Leave dressings clean, dry, and intact between professional dressing changes  Nursing Instructions: Please apply Xeroform  Then cover with Gauze and secure with Kerlix and tape  Please change dressing every Monday, Wednesday, and Friday

## 2022-04-13 NOTE — ANESTHESIA POSTPROCEDURE EVALUATION
Post-Op Assessment Note    CV Status:  Stable  Pain Score: 0    Pain management: adequate     Mental Status:  Alert and awake   Hydration Status:  Euvolemic   PONV Controlled:  Controlled   Airway Patency:  Patent      Post Op Vitals Reviewed: Yes      Staff: CRNA         No complications documented      BP   113/66   Temp   98 5   Pulse  75   Resp   16   SpO2   99 RA

## 2022-04-13 NOTE — PROGRESS NOTES
Podiatry - Progress Note  Patient: Flory Jorge 46 y o  female   MRN: [id number]  PCP: Daniel Echavarria DO  Unit/Bed#: -01 Encounter: [id number]  Date Of Visit: 22    ASSESSMENT:    Flory Jorge is a 46 y o  female with:    1  Right 2nd digit ulceration with distal phalanx osteomyelitis  -S/p right partial 2nd digit amputation (DOS 22)  2  T2DM with peripheral neuropathy      PLAN:    · POD1, post-surgical dressing changed today, incision site stable with all sutures intact  Patient stable for discharge from podiatric standpoint  Discharge pending PT/OT recommendations  No need for VNA as patient is comfortable with doing dressing changes herself  Follow up outpatient with Dr Zen Ross within 1 week of discharge  · Pain is well controlled  Continue current pain management regimen  · Elevation on green foam wedges or pillows when non-ambulatory  · Rest of care per primary team        Antibiotics: Ancef and metronidazole  Pharmacologic VTE Prophylaxis: Enoxaparin (Lovenox)   Mechanical VTE Prophylaxis: sequential compression device   Weightbearing status: WB to heel in toe offloading shoe     Disposition: Patient is stable for discharge home    SUBJECTIVE:     The patient was seen, evaluated, and assessed at bedside today  The patient was awake, alert, and in no acute distress  The patient reports no pain at this time  Pain is well controlled with current pain management regimen  Patient reports normal appetite and using the restroom postoperatively  Patient denies N/V/F/chills/SOB/CP  OBJECTIVE:     Vitals:   /58   Pulse 76   Temp 98 4 °F (36 9 °C)   Resp 18   SpO2 98%     Temp (24hrs), Av 9 °F (36 6 °C), Min:97 4 °F (36 3 °C), Max:98 4 °F (36 9 °C)      Physical Exam:     General:  Alert, cooperative, and in no distress  Lower extremity exam:  Cardiovascular status at baseline  Neurological status at baseline  Musculoskeletal status at baseline        Right 2nd digit incision site stable with all sutures intact, skin well aligned with coaptation  No signs of active infection: no purulence, no malodor, no ascending erythema, no crepitus, no fluctuance  Capillary refill <2 seconds at skin around incision site  R 2nd digit skin temperature WNL  Right     right        Additional Data:     Labs:    Results from last 7 days   Lab Units 04/12/22  0532 04/11/22 2148 04/11/22 2148   WBC Thousand/uL 8 20   < > 9 96   HEMOGLOBIN g/dL 11 4*   < > 12 2   HEMATOCRIT % 36 4   < > 38 5   PLATELETS Thousands/uL 259   < > 298   NEUTROS PCT %  --   --  61   LYMPHS PCT %  --   --  28   MONOS PCT %  --   --  8   EOS PCT %  --   --  2    < > = values in this interval not displayed  Results from last 7 days   Lab Units 04/12/22  0531 04/11/22 2148 04/11/22 2148   POTASSIUM mmol/L 3 7   < > 3 5   CHLORIDE mmol/L 110*   < > 105   CO2 mmol/L 23   < > 28   BUN mg/dL 21   < > 24   CREATININE mg/dL 0 75   < > 0 95   CALCIUM mg/dL 8 8   < > 9 6   ALK PHOS U/L  --   --  124*   ALT U/L  --   --  24   AST U/L  --   --  19    < > = values in this interval not displayed  * I Have Reviewed All Lab Data Listed Above  Recent Cultures (last 7 days):     Results from last 7 days   Lab Units 04/11/22 2147 04/11/22 2146   BLOOD CULTURE  No Growth at 24 hrs  No Growth at 24 hrs  Imaging: I have personally reviewed pertinent films in PACS  EKG, Pathology, and Other Studies: I have personally reviewed pertinent reports  ** Please Note: Portions of the record may have been created with voice recognition software  Occasional wrong word or "sound a like" substitutions may have occurred due to the inherent limitations of voice recognition software  Read the chart carefully and recognize, using context, where substitutions have occurred   **

## 2022-04-13 NOTE — PHYSICAL THERAPY NOTE
Physical Therapy Evaluation    Patient's Name: Cm Noland    Admitting Diagnosis  Acute osteomyelitis of toe of right foot ([facility] [facility] ) [place]    Problem List  Patient Active Problem List   Diagnosis    [facility] with [facility]    Hyperlipidemia    Benign essential hypertension    Obesity    Paroxysmal SVT (supraventricular tachycardia) [facility] )    Annual physical exam    Encounter for screening mammogram for breast cancer    Screening for colorectal cancer       Past Medical History  Past Medical History:   Diagnosis Date    Cellulitis of left lower extremity 4/15/2020    COVID-19 12/22/2020    Diabetes mellitus ([facility] )     Exposure to SARS-associated coronavirus 4/15/2020    Fever 4/15/2020    Foot ulcer, right, with unspecified severity ([facility] ) 6/12/2019    Hyperlipidemia     Hypertension        Past Surgical History  Past Surgical History:   Procedure Laterality Date    INCISION AND DRAINAGE INTRA ORAL ABSCESS Left 10/14/2020    Procedure: INCISION AND DRAINAGE  (I&D) WOUND ORAL EXTRAORAL LEFT SUBMANDIBULAR SPACE, LEFT  SPACE SPACE, INTRAORAL LEFT LATERAL PHARYNGEAL SPACE AND LEFT SUBLINGUAL SPACE;  Surgeon: Radha Brower DDS;  [facility]: [facility];  Service: Maxillofacial    REMOVAL OF IMPACTED TOOTH - COMPLETELY BONY Left 10/14/2020    Procedure: EXTRACTION TEETH MULTIPLE (RETAINED ROOTS #1,3,14, TEETH #4,17,18,19,32;  Surgeon: Radha Brower DDS;  [facility];  Service: Maxillofacial    TONSILLECTOMY AND ADENOIDECTOMY      WOUND DEBRIDEMENT Right 7/25/2020    Procedure: Excision of non healing diabetic right foot Ulcer, with closure of wound, excision of tibial sesamoid right foot;  Surgeon: Earl Hogue DPM;  [facility];  Service: Podiatry        04/13/22 0914   PT Last Visit   PT Visit Date 04/13/22   Note Type   Note type Evaluation   Pain Assessment   Pain Assessment Tool 0-10   Pain Score 2   Pain Location/Orientation Orientation: Right  (toe)   Restrictions/Precautions   Weight Bearing Precautions Per Order Yes   RLE Weight Bearing Per Order   (WB to heel in toe-offloading shoe)   Braces or Orthoses   (toe-offloading shoe RLE)   Home Living   Type of Home House   Home Layout Two level  (1 ABDIEL; FFSU if needed; 6 stairs to full bath + bedroom)   Bathroom Shower/Tub Tub/shower unit   [employer] Raised   Bathroom Equipment Grab bars in [facility]   Prior Function   Level of [place] Independent with ADLs and functional mobility   Lives With Spouse; Son   Reynaldo Help From Family   ADL Assistance Independent   IADLs Independent   Falls in the last 6 months 0   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[employer])   Comments PTA pt I + active  General   Family/Caregiver Present No   Cognition   Overall Cognitive Status WFL   Arousal/Participation Alert   Attention Within functional limits   Orientation Level Oriented X4   Memory Within functional limits   Following Commands Follows all commands and directions without difficulty   Comments pleasant + cooperative, good safety awareness   Subjective   Subjective Pt agreeable to mobilize, reports she's been taking herself to the bathroom  RLE Assessment   RLE Assessment WFL   LLE Assessment   LLE Assessment WFL   Coordination   Movements are Fluid and Coordinated 1   Bed Mobility   Supine to Sit Unable to assess   Sit to Supine Unable to assess   Additional Comments Pt greeted in chair     Transfers   Sit to Stand 6  Modified independent   Stand to Sit 6  Modified independent   Stand pivot 6  Modified independent   Additional Comments no AD   Ambulation/Elevation   Gait pattern Excessively slow  (heel-WB in toe-offloading shoe)   Gait Assistance 5  Supervision   Additional items Verbal cues  (for WB status)   Assistive Device None   Distance 80'   Stair Management Assistance 5  Supervision   Additional items Verbal cues   Stair Management Technique One rail L;Nonreciprocal  (educated in correct sequencing)   Number of Stairs 6  (limited by IV line)   Ambulation/Elevation Additional Comments Pt educated to use toe-offloading shoe at all times (even when going short distance to the bathroom) for wound protection; pt verbalized understanding  Balance   Static Sitting Normal   Dynamic Sitting Good   Static Standing Fair +   Dynamic Standing Fair +   Ambulatory Fair   Endurance Deficit   Endurance Deficit No   Activity Tolerance   Activity Tolerance Patient tolerated treatment well   Medical Staff Made Aware OT Ramón Carlos, Podiatry Resident Acosta Velez   Nurse Made Aware yes   Assessment   Assessment Pt is 46 y o  female seen for a high complexity PT evaluation s/p admit to [facility] on 4/11/2022  Pt presenting w/ R 2nd digit ulceration w/ distal phalanx osteomyelitis, s/p R partial 2nd digit amputation 4/12/22  See above for active hospital problem list / PMH  PT now consulted to assess functional mobility and needs for safe d/c planning  Prior to admission, pt was I + active, works as an ICU RN, lives w/ her spouse + son in a house w/ stairs  Currently pt is [place] for functional transfers; S for ambulation w/o S; S for stair negotiation  Pt w/ good demonstration of instructions to maintain heel-weight bearing in toe-offloading shoe  Pt w/o questions after PT  Recommend d/c home w/o therapy needs     Goals   Patient Goals go home   PT Treatment Day 0   Plan   PT Frequency   (eval only - d/c PT)   Recommendation   PT Discharge Recommendation No rehabilitation needs   AM-PAC Basic Mobility Inpatient   Turning in Bed Without Bedrails 4   Lying on Back to Sitting on Edge of Flat Bed 4   Moving Bed to Chair 4   Standing Up From Chair 4   Walk in Room 4   Climb 3-5 Stairs 4   Basic Mobility Inpatient Raw Score 24   Basic Mobility Standardized Score 57 68   Highest Level Of Mobility   -[facility] Goal 8: Walk 250 feet or more   End of Consult   Patient Position at End of Consult Bedside chair; All needs within reach     Ana Delacruz, PT, DPT

## 2022-04-13 NOTE — CASE MANAGEMENT
Case Management Discharge Planning Note    Patient name Karely Kirk  Location [address] [phone] MRN [id number]  : 1970 Date 2022       Current Admission Date: 2022  Current Admission Diagnosis:Diabetes mellitus with diabetic neuropathy [facility])   Patient Active Problem List    Diagnosis Date Noted    Annual physical exam 2021    Encounter for screening mammogram for breast cancer 2021    Screening for colorectal cancer 2021    Paroxysmal SVT (supraventricular tachycardia) ([facility] ) 10/15/2020    Obesity 2020    Hyperlipidemia 2017    Diabetes mellitus with diabetic neuropathy ([facility] ) 2017    Benign essential hypertension 2012      LOS (days): 2  Geometric Mean LOS (GMLOS) (days):   Days to GMLOS:     OBJECTIVE:  Risk of Unplanned Readmission Score: 8         Current admission status: Inpatient   Preferred Pharmacy:   [address] [place] [address] 38 210 [facility]  Phone: [phone] Fax: [phone]    [facility] [address] [address]  Phone: [phone] Fax: [phone]    Primary Care Provider: Clari Carrizales DO    Primary Insurance: CAPITAL  Secondary Insurance:     DISCHARGE DETAILS:    Discharge planning discussed with[de-identified] patient  Freedom of Choice: Yes  Comments - Freedom of Choice: Patient refused [facility] needs for wound care  Requested  [address]         Is the patient interested in [facility] at discharge?: No    Other Referral/Resources/Interventions Provided:  Referral Comments: Patient refused home health care needs for wound care  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

## 2022-04-14 ENCOUNTER — TRANSITIONAL CARE MANAGEMENT (OUTPATIENT)
Dept: FAMILY MEDICINE CLINIC | Facility: CLINIC | Age: 52
End: 2022-04-14

## 2022-04-14 NOTE — UTILIZATION REVIEW
Notification of Discharge   This is a Notification of Discharge from our facility [address]  Please be advised that this patient has been discharge from our facility  Below you will find the admission and discharge date and time including the patients disposition  UTILIZATION REVIEW CONTACT:  Isaiah Terrazas  Utilization   Network Utilization Review Department  Phone: [phone] x carefully listen to the prompts  All voicemails are confidential   Email: [email or web address]     PHYSICIAN ADVISORY SERVICES:  FOR [id number] REVIEW - MEDICAL NECESSITY DENIAL  Phone: [phone]  Fax: [phone]  Email: [email or web address]     PRESENTATION DATE: 4/11/2022  6:39 PM  OBERVATION ADMISSION DATE:   INPATIENT ADMISSION DATE: 4/11/22  6:39 PM   DISCHARGE DATE: 4/13/2022 12:18 PM  DISPOSITION: Home/Self Care Home/Self Care      IMPORTANT INFORMATION:  Send all requests for admission clinical reviews, approved or denied determinations and any other requests to dedicated fax number below belonging to the campus where the patient is receiving treatment   List of dedicated fax numbers:  [address] DENIALS (Administrative/Medical Necessity) [phone]   [address] (Maternity/NICU/Pediatrics) [phone]   [facility] [phone]   130 [facility] [phone]   [address] [place] [address] [phone]   2000 [facility] [address] [place] [address],[address] [phone]   [facility]  [phone]   220[address] [facility]  [address] [phone]

## 2022-04-17 LAB
BACTERIA BLD CULT: NORMAL
BACTERIA BLD CULT: NORMAL

## 2022-04-19 ENCOUNTER — OFFICE VISIT (OUTPATIENT)
Dept: PODIATRY | Facility: CLINIC | Age: 52
End: 2022-04-19
Payer: COMMERCIAL

## 2022-04-19 VITALS
SYSTOLIC BLOOD PRESSURE: 116 MMHG | DIASTOLIC BLOOD PRESSURE: 82 MMHG | WEIGHT: 232 LBS | BODY MASS INDEX: 35.16 KG/M2 | HEIGHT: 68 IN

## 2022-04-19 DIAGNOSIS — E11.40 TYPE 2 DIABETES MELLITUS WITH DIABETIC NEUROPATHY, UNSPECIFIED WHETHER LONG TERM INSULIN USE (HCC): ICD-10-CM

## 2022-04-19 DIAGNOSIS — M86.171 ACUTE OSTEOMYELITIS OF TOE, RIGHT (HCC): Primary | ICD-10-CM

## 2022-04-19 PROCEDURE — 99212 OFFICE O/P EST SF 10 MIN: CPT | Performed by: PODIATRIST

## 2022-04-19 NOTE — PROGRESS NOTES
PATIENT:  Clai Rucker      1970    ASSESSMENT     1  Acute osteomyelitis of toe, right ([address] )     2  Type 2 diabetes mellitus with diabetic neuropathy, unspecified whether long term insulin use (HCC)            PLAN  Sutures left intact  Incision was cleaned with betadine and DSD applied  Continue post-op care as instructed  Stressed on patient compliance about proper off-loading, staying off of feet, and proper dressing care  Call if any increase in pain, fevers, calf pain, shortness of breath, or general distress is noted  Patient instructed to go to ER if call is not returned immediately  RA in 2 weeks for suture removal         HISTORY OF PRESENT ILLNESS  Patient presents for post-op appointment  S/P partial right 2nd toe amp  No pain  She has been changing dressing at home  REVIEW OF SYSTEMS  GENERAL: No fever or chills  HEART: No chest pain, or palpitation  RESPIRATORY:  No SOB or cough  GI: No Nausea, vomit or diarrhea  NEUROLOGIC: No syncope or acute weakness  MUSCULOSKELETAL: No calf pain or edema  PHYSICAL EXAMINATION  GENERAL  The patient appears in NAD / non-toxic  Afebrile  VSS    VASCULAR EXAM  Pedal pulses and vascular status are intact  No cyanosis  DERMATOLOGIC EXAM  Incision is coapted and healing well  No signs of infection  No drainage  Normal post-op edema  No necrosis or dehiscence  NEUROLOGIC EXAM  AAO X 3  No focal neurologic deficit  Neurologic status is intact BLE  MUSCULOSKELETAL EXAM  Partial 2nd toe amp right foot  Normal post-op findings  ROM intact  No fluctuation or crepitus

## 2022-04-20 ENCOUNTER — OFFICE VISIT (OUTPATIENT)
Dept: FAMILY MEDICINE CLINIC | Facility: CLINIC | Age: 52
End: 2022-04-20
Payer: COMMERCIAL

## 2022-04-20 VITALS
BODY MASS INDEX: 35.28 KG/M2 | TEMPERATURE: 97.4 F | OXYGEN SATURATION: 98 % | WEIGHT: 232.8 LBS | SYSTOLIC BLOOD PRESSURE: 106 MMHG | HEIGHT: 68 IN | HEART RATE: 89 BPM | RESPIRATION RATE: 16 BRPM | DIASTOLIC BLOOD PRESSURE: 60 MMHG

## 2022-04-20 DIAGNOSIS — E11.40 TYPE 2 DIABETES MELLITUS WITH DIABETIC NEUROPATHY, WITHOUT LONG-TERM CURRENT USE OF INSULIN (HCC): Chronic | ICD-10-CM

## 2022-04-20 DIAGNOSIS — Z12.31 ENCOUNTER FOR SCREENING MAMMOGRAM FOR BREAST CANCER: ICD-10-CM

## 2022-04-20 DIAGNOSIS — M86.171 OTHER ACUTE OSTEOMYELITIS OF RIGHT FOOT (HCC): ICD-10-CM

## 2022-04-20 DIAGNOSIS — Z09 HOSPITAL DISCHARGE FOLLOW-UP: Primary | ICD-10-CM

## 2022-04-20 PROBLEM — M86.9 PYOGENIC INFLAMMATION OF BONE (HCC): Status: ACTIVE | Noted: 2022-04-20

## 2022-04-20 PROCEDURE — 99496 TRANSJ CARE MGMT HIGH F2F 7D: CPT | Performed by: FAMILY MEDICINE

## 2022-04-20 RX ORDER — BLOOD-GLUCOSE METER
KIT MISCELLANEOUS
Qty: 1 KIT | Refills: 0 | Status: SHIPPED | OUTPATIENT
Start: 2022-04-20

## 2022-04-20 RX ORDER — LANCETS 33 GAUGE
EACH MISCELLANEOUS
Qty: 200 EACH | Refills: 3 | Status: SHIPPED | OUTPATIENT
Start: 2022-04-20

## 2022-04-20 RX ORDER — BLOOD SUGAR DIAGNOSTIC
STRIP MISCELLANEOUS
Qty: 200 EACH | Refills: 3 | Status: SHIPPED | OUTPATIENT
Start: 2022-04-20

## 2022-04-20 NOTE — PROGRESS NOTES
Assessment/Plan:    1  Hospital discharge follow-up    2  Other acute osteomyelitis of right foot [facility])  Assessment & Plan:  S/p ampution  Healing well  Seeing podiatry      3  Type 2 diabetes mellitus with diabetic neuropathy, without long-term current use of insulin [facility])  Assessment & Plan:  New med jardiance  Will check blood sugars  Lab Results   Component Value Date    HGBA1C 7 1 (A) 02/23/2022       Orders:  -     Blood Glucose Monitoring Suppl (OneTouch Verio Reflect) w/Device KIT; Check blood sugars twice daily  Please substitute with appropriate alternative as covered by patient's insurance  Dx: E11 65  -     glucose blood (OneTouch Verio) test strip; Check blood sugars twice daily  Please substitute with appropriate alternative as covered by patient's insurance  Dx: E11 65  -     OneTouch Delica Lancets 44I MISC; Check blood sugars twice daily  Please substitute with appropriate alternative as covered by patient's insurance  Dx: E11 65    4  Encounter for screening mammogram for breast cancer  -     Mammo screening bilateral w 3d & cad; Future; Expected date: 04/20/2022      Depression Screening and Follow-up Plan: Patient was screened for depression during today's encounter  They screened negative with a PHQ-2 score of 0  There are no Patient Instructions on file for this visit  Return in about 2 months (around 6/20/2022) for Recheck  Subjective:      Patient ID: Gael Dia is a 46 y o  female      Chief Complaint   Patient presents with    Transition of Care Management     Patient here for TCM Discharged on 04/13/2022 [facility] right 2nd toe amputation        Right toe with initial infection-was treated with antbiotioc and mnail removal  Ulceration of second toe  Xray showed osteo  Admitted for toe amputation  Only tip removed  2 week for suture removal  No further meds needed  Some pressure -taking tylenol  Blood sugars good    TCM Call (since 3/20/2022)     Date and time call was made  4/14/2022  1:36 PM    Hospital care reviewed  Records not available        Patient was hospitialized at  [facility]        Date of Admission  04/11/22    Date of discharge  04/13/22    Diagnosis  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 No    Scheduled for follow up?   Yes    Did you obtain your prescribed medications  Yes    Do you need help managing your prescriptions or medications  No    Is transportation to your appointment needed  No    I have advised the patient to call PCP with any new or worsening symptoms  Marylou Tommata,     Living Arrangements  Spouse or Significiant other    Support System  None    Are you recieving any outpatient services  No    Are you recieving home care services  No    Are you using any community resources  No    Current waiver services  No    Have you fallen in the last 12 months  No    Interperter language line needed  No        The following portions of the patient's history were reviewed and updated as appropriate: allergies, current medications, past family history, past medical history, past social history, past surgical history and problem list     Review of Systems      Current Outpatient Medications   Medication Sig Dispense Refill    Empagliflozin 25 MG TABS Take 1 tablet (25 mg total) by mouth every morning 90 tablet 3    lisinopril-hydrochlorothiazide (PRINZIDE,ZESTORETIC) 10-12 5 MG per tablet TAKE ONE TABLET BY MOUTH EVERY DAY 90 tablet 3    metFORMIN (GLUCOPHAGE) 1000 MG tablet TAKE 1 TABLET BY MOUTH TWICE A  tablet 0    simvastatin (ZOCOR) 40 mg tablet Take 1 tablet (40 mg total) by mouth daily at bedtime 90 tablet 3    Blood Glucose Monitoring Suppl (OneTouch Verio Reflect) w/Device KIT Check blood sugars twice daily  Please substitute with appropriate alternative as covered by patient's insurance  Dx: E11 65 1 kit 0    glucose blood (OneTouch Verio) test strip Check blood sugars twice daily  Please substitute with appropriate alternative as covered by patient's insurance  Dx: E11 65 200 each 3    glyBURIDE (DIABETA) 5 mg tablet Take 1 tablet (5 mg total) by mouth 2 (two) times a day with meals 60 tablet 0    OneTouch Delica Lancets 96P MISC Check blood sugars twice daily  Please substitute with appropriate alternative as covered by patient's insurance  Dx: E11 65 200 each 3     No current facility-administered medications for this visit  Objective:    /60 (BP Location: Right arm, Patient Position: Sitting, Cuff Size: Large)   Pulse 89   Temp (!) 97 4 °F (36 3 °C)   Resp 16   Ht 5' 8" (1 727 m)   Wt 106 kg (232 lb 12 8 oz)   SpO2 98%   BMI 35 40 kg/m²        Physical Exam  Vitals and nursing note reviewed  Constitutional:       Appearance: Normal appearance  HENT:      Head: Normocephalic and atraumatic  Eyes:      Extraocular Movements: Extraocular movements intact  Pupils: Pupils are equal, round, and reactive to light  Cardiovascular:      Rate and Rhythm: Normal rate and regular rhythm  Pulses: Normal pulses  Heart sounds: Normal heart sounds  Pulmonary:      Effort: Pulmonary effort is normal       Breath sounds: Normal breath sounds  Abdominal:      General: Abdomen is flat  Musculoskeletal:      Cervical back: Normal range of motion and neck supple  Skin:     Capillary Refill: Capillary refill takes less than 2 seconds  Neurological:      General: No focal deficit present  Mental Status: She is alert and oriented to person, place, and time  Psychiatric:         Mood and Affect: Mood normal          Behavior: Behavior normal          Thought Content:  Thought content normal          Judgment: Judgment normal                 Cassie Davion Hung, DO

## 2022-04-20 NOTE — ASSESSMENT & PLAN NOTE
New med jardiance  Will check blood sugars  Lab Results   Component Value Date    HGBA1C 7 1 (A) 02/23/2022

## 2022-05-03 ENCOUNTER — OFFICE VISIT (OUTPATIENT)
Dept: PODIATRY | Facility: CLINIC | Age: 52
End: 2022-05-03
Payer: COMMERCIAL

## 2022-05-03 VITALS — BODY MASS INDEX: 35.31 KG/M2 | WEIGHT: 233 LBS | HEIGHT: 68 IN

## 2022-05-03 DIAGNOSIS — M86.171 ACUTE OSTEOMYELITIS OF TOE, RIGHT (HCC): Primary | ICD-10-CM

## 2022-05-03 PROCEDURE — 99212 OFFICE O/P EST SF 10 MIN: CPT | Performed by: PODIATRIST

## 2022-05-03 NOTE — PROGRESS NOTES
PATIENT:  Jian Owen      1970    ASSESSMENT     1  Acute osteomyelitis of toe, right ([address] )            PLAN  Sutures removed  Instructed skin care  Advance shoes as tolerated  Instructed daily foot exam   RA in 3 months for DM foot exam       HISTORY OF PRESENT ILLNESS  Patient presents for post-op appointment  S/P partial right 2nd toe amp  No pain  REVIEW OF SYSTEMS  GENERAL: No fever or chills  HEART: No chest pain, or palpitation  RESPIRATORY:  No SOB or cough  GI: No Nausea, vomit or diarrhea  NEUROLOGIC: No syncope or acute weakness  MUSCULOSKELETAL: No calf pain or edema  PHYSICAL EXAMINATION  GENERAL  The patient appears in NAD / non-toxic  Afebrile  VSS    VASCULAR EXAM  Pedal pulses and vascular status are intact  No cyanosis  DERMATOLOGIC EXAM  Incision is coapted and healed  No signs of infection  No drainage  No necrosis or dehiscence  NEUROLOGIC EXAM  AAO X 3  No focal neurologic deficit  Neurologic status is intact BLE  MUSCULOSKELETAL EXAM  Partial 2nd toe amp right foot  Normal post-op findings  ROM intact  No fluctuation or crepitus

## 2022-05-15 DIAGNOSIS — I10 BENIGN ESSENTIAL HYPERTENSION: ICD-10-CM

## 2022-05-15 RX ORDER — LISINOPRIL AND HYDROCHLOROTHIAZIDE 12.5; 1 MG/1; MG/1
TABLET ORAL
Qty: 90 TABLET | Refills: 0 | Status: SHIPPED | OUTPATIENT
Start: 2022-05-15

## 2022-05-25 DIAGNOSIS — Z01.84 IMMUNITY TO MUMPS DETERMINED BY SEROLOGIC TEST: Primary | ICD-10-CM

## 2022-05-27 ENCOUNTER — APPOINTMENT (OUTPATIENT)
Dept: LAB | Facility: HOSPITAL | Age: 52
End: 2022-05-27

## 2022-05-27 PROCEDURE — 86735 MUMPS ANTIBODY: CPT

## 2022-05-27 PROCEDURE — 36415 COLL VENOUS BLD VENIPUNCTURE: CPT

## 2022-05-30 LAB — MUV IGG SER QL: NORMAL

## 2022-06-27 ENCOUNTER — APPOINTMENT (OUTPATIENT)
Dept: LAB | Facility: HOSPITAL | Age: 52
End: 2022-06-27

## 2022-06-27 ENCOUNTER — APPOINTMENT (OUTPATIENT)
Dept: LAB | Facility: HOSPITAL | Age: 52
End: 2022-06-27
Payer: COMMERCIAL

## 2022-06-27 DIAGNOSIS — I10 BENIGN ESSENTIAL HYPERTENSION: ICD-10-CM

## 2022-06-27 DIAGNOSIS — E78.5 HYPERLIPIDEMIA, UNSPECIFIED HYPERLIPIDEMIA TYPE: Chronic | ICD-10-CM

## 2022-06-27 DIAGNOSIS — E11.40 TYPE 2 DIABETES MELLITUS WITH DIABETIC NEUROPATHY, WITHOUT LONG-TERM CURRENT USE OF INSULIN (HCC): Chronic | ICD-10-CM

## 2022-06-27 DIAGNOSIS — Z00.8 ENCOUNTER FOR OTHER GENERAL EXAMINATION: ICD-10-CM

## 2022-06-27 LAB
ALBUMIN SERPL BCP-MCNC: 3.2 G/DL (ref 3.5–5)
ALP SERPL-CCNC: 102 U/L (ref 46–116)
ALT SERPL W P-5'-P-CCNC: 23 U/L (ref 12–78)
ANION GAP SERPL CALCULATED.3IONS-SCNC: 6 MMOL/L (ref 4–13)
AST SERPL W P-5'-P-CCNC: 19 U/L (ref 5–45)
BILIRUB SERPL-MCNC: 0.34 MG/DL (ref 0.2–1)
BUN SERPL-MCNC: 18 MG/DL (ref 5–25)
CALCIUM ALBUM COR SERPL-MCNC: 9.6 MG/DL (ref 8.3–10.1)
CALCIUM SERPL-MCNC: 9 MG/DL (ref 8.3–10.1)
CHLORIDE SERPL-SCNC: 109 MMOL/L (ref 100–108)
CHOLEST SERPL-MCNC: 187 MG/DL
CO2 SERPL-SCNC: 25 MMOL/L (ref 21–32)
CREAT SERPL-MCNC: 0.9 MG/DL (ref 0.6–1.3)
CREAT UR-MCNC: 43.4 MG/DL
ERYTHROCYTE [DISTWIDTH] IN BLOOD BY AUTOMATED COUNT: 13.4 % (ref 11.6–15.1)
EST. AVERAGE GLUCOSE BLD GHB EST-MCNC: 151 MG/DL
GFR SERPL CREATININE-BSD FRML MDRD: 74 ML/MIN/1.73SQ M
GLUCOSE P FAST SERPL-MCNC: 153 MG/DL (ref 65–99)
HBA1C MFR BLD: 6.9 %
HCT VFR BLD AUTO: 39.5 % (ref 34.8–46.1)
HDLC SERPL-MCNC: 45 MG/DL
HGB BLD-MCNC: 12.5 G/DL (ref 11.5–15.4)
LDLC SERPL CALC-MCNC: 126 MG/DL (ref 0–100)
MCH RBC QN AUTO: 28.2 PG (ref 26.8–34.3)
MCHC RBC AUTO-ENTMCNC: 31.6 G/DL (ref 31.4–37.4)
MCV RBC AUTO: 89 FL (ref 82–98)
MICROALBUMIN UR-MCNC: <5 MG/L (ref 0–20)
MICROALBUMIN/CREAT 24H UR: <12 MG/G CREATININE (ref 0–30)
PLATELET # BLD AUTO: 255 THOUSANDS/UL (ref 149–390)
PMV BLD AUTO: 9.4 FL (ref 8.9–12.7)
POTASSIUM SERPL-SCNC: 4.3 MMOL/L (ref 3.5–5.3)
PROT SERPL-MCNC: 7 G/DL (ref 6.4–8.2)
RBC # BLD AUTO: 4.44 MILLION/UL (ref 3.81–5.12)
SODIUM SERPL-SCNC: 140 MMOL/L (ref 136–145)
TRIGL SERPL-MCNC: 82 MG/DL
TSH SERPL DL<=0.05 MIU/L-ACNC: 1.63 UIU/ML (ref 0.45–4.5)
WBC # BLD AUTO: 7.07 THOUSAND/UL (ref 4.31–10.16)

## 2022-06-27 PROCEDURE — 36415 COLL VENOUS BLD VENIPUNCTURE: CPT

## 2022-06-27 PROCEDURE — 82043 UR ALBUMIN QUANTITATIVE: CPT

## 2022-06-27 PROCEDURE — 83036 HEMOGLOBIN GLYCOSYLATED A1C: CPT

## 2022-06-27 PROCEDURE — 85027 COMPLETE CBC AUTOMATED: CPT

## 2022-06-27 PROCEDURE — 80061 LIPID PANEL: CPT

## 2022-06-27 PROCEDURE — 82570 ASSAY OF URINE CREATININE: CPT

## 2022-06-27 PROCEDURE — 84443 ASSAY THYROID STIM HORMONE: CPT

## 2022-06-27 PROCEDURE — 80053 COMPREHEN METABOLIC PANEL: CPT

## 2022-06-28 ENCOUNTER — OFFICE VISIT (OUTPATIENT)
Dept: FAMILY MEDICINE CLINIC | Facility: CLINIC | Age: 52
End: 2022-06-28
Payer: COMMERCIAL

## 2022-06-28 VITALS
WEIGHT: 235 LBS | HEART RATE: 87 BPM | DIASTOLIC BLOOD PRESSURE: 74 MMHG | SYSTOLIC BLOOD PRESSURE: 130 MMHG | BODY MASS INDEX: 35.61 KG/M2 | OXYGEN SATURATION: 99 % | HEIGHT: 68 IN | TEMPERATURE: 97.5 F

## 2022-06-28 DIAGNOSIS — I10 BENIGN ESSENTIAL HYPERTENSION: ICD-10-CM

## 2022-06-28 DIAGNOSIS — E78.5 HYPERLIPIDEMIA, UNSPECIFIED HYPERLIPIDEMIA TYPE: Chronic | ICD-10-CM

## 2022-06-28 DIAGNOSIS — E11.40 TYPE 2 DIABETES MELLITUS WITH DIABETIC NEUROPATHY, WITHOUT LONG-TERM CURRENT USE OF INSULIN (HCC): Primary | Chronic | ICD-10-CM

## 2022-06-28 DIAGNOSIS — Z12.11 SCREENING FOR COLON CANCER: ICD-10-CM

## 2022-06-28 PROBLEM — Z09 HOSPITAL DISCHARGE FOLLOW-UP: Status: RESOLVED | Noted: 2022-04-20 | Resolved: 2022-06-28

## 2022-06-28 PROBLEM — M86.9 PYOGENIC INFLAMMATION OF BONE (HCC): Status: RESOLVED | Noted: 2022-04-20 | Resolved: 2022-06-28

## 2022-06-28 PROCEDURE — 99214 OFFICE O/P EST MOD 30 MIN: CPT | Performed by: FAMILY MEDICINE

## 2022-06-28 NOTE — PROGRESS NOTES
Assessment/Plan:    1  Type 2 diabetes mellitus with diabetic neuropathy, without long-term current use of insulin (HCC)  Assessment & Plan:  Stable on current meds  Lab Results   Component Value Date    HGBA1C 6 9 (H) 06/27/2022       Orders:  -     Hemoglobin A1C; Future; Expected date: 10/01/2022    2  Benign essential hypertension  Assessment & Plan:  Stable on current meds      3  Hyperlipidemia, unspecified hyperlipidemia type  Assessment & Plan:  Stable on zocor      4  Screening for colon cancer  -     Ambulatory Referral to Colorectal Surgery; Future            There are no Patient Instructions on file for this visit  Return in about 4 months (around 10/28/2022)  Subjective:      Patient ID: Priyanka Saab is a 46 y o  female  Chief Complaint   Patient presents with    Follow-up     4 month       Here for follow  Labs reviewed  Discussed colonoscopy  Feet are good-seeing Dr Adarsh Rodgers    Hypertension  This is a chronic problem  The current episode started more than 1 year ago  The problem is unchanged  The problem is controlled  There are no associated agents to hypertension  Risk factors for coronary artery disease include diabetes mellitus and dyslipidemia  Past treatments include ACE inhibitors  The current treatment provides significant improvement  There are no compliance problems  Hyperlipidemia  This is a chronic problem  The current episode started more than 1 year ago  The problem is controlled  Recent lipid tests were reviewed and are normal  There are no known factors aggravating her hyperlipidemia  Current antihyperlipidemic treatment includes statins  The current treatment provides significant improvement of lipids  There are no compliance problems  Diabetes  She presents for her follow-up diabetic visit  She has type 2 diabetes mellitus  Her disease course has been stable  There are no hypoglycemic associated symptoms  There are no diabetic associated symptoms   There are no hypoglycemic complications  Symptoms are stable  Current diabetic treatment includes oral agent (triple therapy)  She is compliant with treatment all of the time  Her weight is stable  She is following a diabetic diet  When asked about meal planning, she reported none  She has not had a previous visit with a dietitian  She participates in exercise daily  An ACE inhibitor/angiotensin II receptor blocker is being taken  She sees a podiatrist Eye exam is current  The following portions of the patient's history were reviewed and updated as appropriate: allergies, current medications, past family history, past medical history, past social history, past surgical history and problem list     Review of Systems   Constitutional: Negative  HENT: Negative  Eyes: Negative  Respiratory: Negative  Cardiovascular: Negative  Gastrointestinal:        Food stuck sensation   Endocrine: Negative  Genitourinary: Negative  Musculoskeletal: Negative  Skin: Negative  Allergic/Immunologic: Negative  Neurological: Negative  Hematological: Negative  Psychiatric/Behavioral: Negative  Current Outpatient Medications   Medication Sig Dispense Refill    Blood Glucose Monitoring Suppl (OneTouch Verio Reflect) w/Device KIT Check blood sugars twice daily  Please substitute with appropriate alternative as covered by patient's insurance  Dx: E11 65 1 kit 0    Empagliflozin 25 MG TABS Take 1 tablet (25 mg total) by mouth every morning 90 tablet 3    glucose blood (OneTouch Verio) test strip Check blood sugars twice daily  Please substitute with appropriate alternative as covered by patient's insurance   Dx: E11 65 200 each 3    glyBURIDE (DIABETA) 5 mg tablet Take 1 tablet (5 mg total) by mouth 2 (two) times a day with meals 60 tablet 0    lisinopril-hydrochlorothiazide (PRINZIDE,ZESTORETIC) 10-12 5 MG per tablet TAKE ONE TABLET BY MOUTH EVERY DAY 90 tablet 0    metFORMIN (GLUCOPHAGE) 1000 MG tablet TAKE 1 TABLET BY MOUTH TWICE A  tablet 0    OneTouch Delica Lancets 01S MISC Check blood sugars twice daily  Please substitute with appropriate alternative as covered by patient's insurance  Dx: E11 65 200 each 3    simvastatin (ZOCOR) 40 mg tablet Take 1 tablet (40 mg total) by mouth daily at bedtime 90 tablet 3     No current facility-administered medications for this visit  Objective:    /74 (BP Location: Left arm, Patient Position: Sitting, Cuff Size: Large)   Pulse 87   Temp 97 5 °F (36 4 °C) (Skin)   Ht 5' 8" (1 727 m)   Wt 107 kg (235 lb)   SpO2 99%   BMI 35 73 kg/m²        Physical Exam  Vitals and nursing note reviewed  Constitutional:       Appearance: Normal appearance  HENT:      Head: Normocephalic and atraumatic  Eyes:      Extraocular Movements: Extraocular movements intact  Pupils: Pupils are equal, round, and reactive to light  Cardiovascular:      Rate and Rhythm: Normal rate and regular rhythm  Pulses: Normal pulses  Heart sounds: Normal heart sounds  Pulmonary:      Effort: Pulmonary effort is normal       Breath sounds: Normal breath sounds  Abdominal:      General: Abdomen is flat  Palpations: Abdomen is soft  Musculoskeletal:      Cervical back: Normal range of motion and neck supple  Skin:     General: Skin is warm  Capillary Refill: Capillary refill takes less than 2 seconds  Neurological:      General: No focal deficit present  Mental Status: She is alert and oriented to person, place, and time  Psychiatric:         Mood and Affect: Mood normal          Behavior: Behavior normal          Thought Content:  Thought content normal          Judgment: Judgment normal                 [place] Sabal, DO

## 2022-07-31 DIAGNOSIS — E11.40 CONTROLLED TYPE 2 DIABETES MELLITUS WITH NEUROPATHY (HCC): ICD-10-CM

## 2022-07-31 RX ORDER — GLYBURIDE 5 MG/1
TABLET ORAL
Qty: 180 TABLET | Refills: 0 | Status: SHIPPED | OUTPATIENT
Start: 2022-07-31

## 2022-08-04 ENCOUNTER — OFFICE VISIT (OUTPATIENT)
Dept: PODIATRY | Facility: CLINIC | Age: 52
End: 2022-08-04
Payer: COMMERCIAL

## 2022-08-04 VITALS
BODY MASS INDEX: 35.46 KG/M2 | DIASTOLIC BLOOD PRESSURE: 74 MMHG | WEIGHT: 234 LBS | SYSTOLIC BLOOD PRESSURE: 135 MMHG | HEART RATE: 81 BPM | HEIGHT: 68 IN

## 2022-08-04 DIAGNOSIS — E11.40 TYPE 2 DIABETES MELLITUS WITH DIABETIC NEUROPATHY, UNSPECIFIED WHETHER LONG TERM INSULIN USE (HCC): Primary | ICD-10-CM

## 2022-08-04 DIAGNOSIS — Z89.421 ACQUIRED ABSENCE OF OTHER RIGHT TOE(S) (HCC): ICD-10-CM

## 2022-08-04 PROCEDURE — 99213 OFFICE O/P EST LOW 20 MIN: CPT | Performed by: PODIATRIST

## 2022-08-04 NOTE — PROGRESS NOTES
PATIENT:  Kimberley River  1970     Assessment     1  Type 2 diabetes mellitus with diabetic neuropathy, unspecified whether long term insulin use (HCC)  Diabetic Shoe    Diabetic Shoe Inserts   2  Acquired absence of other right toe(s) ([facility] [address] )  Diabetic Shoe    Diabetic Shoe Inserts           Plan:  1  Patient was counseled on the condition and diagnosis  Educated disease prevention and risks related to diabetes  2  Educated proper daily foot care and exam   Instructed proper skin care / protection and footwear  Instructed to identify any signs of infection and related foot problem  3  The recent blood work was reviewed / discussed and the last HbA1c was 6 9  Discussed proper blood glucose control with diet and exercise  4  Referred her for DM shoes and inserts  5  The patient will return in 6 months for periodic diabetic foot exam     Subjective: The patient presents for diabetic foot exam   She has high risk diabetic foot with history of foot ulcer and partial amputation of right 2nd toe  No ulcer in her feet  She has numbness in her feet with neuropathy  BS is under control  The following portions of the patient's history were reviewed and updated as appropriate: allergies, current medications, past family history, past medical history, past social history, past surgical history and problem list   All pertinent labs and images were reviewed        Past Medical History  Past Medical History:   Diagnosis Date    Cellulitis of left lower extremity 4/15/2020    COVID-19 12/22/2020    Diabetes mellitus ([facility] [address] )     Exposure to SARS-associated coronavirus 4/15/2020    Fever 4/15/2020    Foot ulcer, right, with unspecified severity ([facility] [address] ) 6/12/2019   [facility] discharge follow-up 4/20/2022    Hyperlipidemia     Hypertension     Pyogenic inflammation of bone ([facility] [address] ) 4/20/2022       Past Surgical History  Past Surgical History:   Procedure Laterality Date    INCISION AND DRAINAGE INTRA ORAL ABSCESS Left 10/14/2020    Procedure: INCISION AND DRAINAGE  (I&D) WOUND ORAL EXTRAORAL LEFT SUBMANDIBULAR SPACE, LEFT  SPACE SPACE, INTRAORAL LEFT LATERAL PHARYNGEAL SPACE AND LEFT SUBLINGUAL SPACE;  Surgeon: Barbara Nolan DDS;  Location: [facility];  Service: Maxillofacial    REMOVAL OF IMPACTED TOOTH - COMPLETELY BONY Left 10/14/2020    Procedure: EXTRACTION TEETH MULTIPLE (RETAINED ROOTS #1,3,14, TEETH #4,17,18,19,32;  Surgeon: Barbara Nolan DDS;  Location: [facility];  Service: Maxillofacial    TOE AMPUTATION Right 4/12/2022    Procedure: AMPUTATION DISTAL PHALYNX RIGHT SECOND TOE;  Surgeon: Olga Call DPM;  Location: [facility];  Service: Podiatry   [address]  Right 7/25/2020    Procedure: Excision of non healing diabetic right foot Ulcer, with closure of wound, excision of tibial sesamoid right foot;  Surgeon: Olga Call DPM;  Location: [facility];  Service: Podiatry        Allergies:  Patient has no known allergies  Medications:  Current Outpatient Medications   Medication Sig Dispense Refill    Blood Glucose Monitoring Suppl (OneTouch Verio Reflect) w/Device KIT Check blood sugars twice daily  Please substitute with appropriate alternative as covered by patient's insurance  Dx: E11 65 1 kit 0    Empagliflozin 25 MG TABS Take 1 tablet (25 mg total) by mouth every morning 90 tablet 3    glucose blood (OneTouch Verio) test strip Check blood sugars twice daily  Please substitute with appropriate alternative as covered by patient's insurance   Dx: E11 65 200 each 3    glyBURIDE (DIABETA) 5 mg tablet TAKE ONE TABLET BY MOUTH 2 TIMES A DAY WITH MEALS 180 tablet 0    lisinopril-hydrochlorothiazide (PRINZIDE,ZESTORETIC) 10-12 5 MG per tablet TAKE ONE TABLET BY MOUTH EVERY DAY 90 tablet 0    metFORMIN (GLUCOPHAGE) 1000 MG tablet TAKE 1 TABLET BY MOUTH TWICE A  tablet 0    OneTouch Delica Lancets 48Y MISC Check blood sugars twice daily  Please substitute with appropriate alternative as covered by patient's insurance  Dx: E11 65 200 each 3    simvastatin (ZOCOR) 40 mg tablet Take 1 tablet (40 mg total) by mouth daily at bedtime 90 tablet 3     No current facility-administered medications for this visit  Social History:  Social History     Socioeconomic History    Marital status: /Civil Union     Spouse name: None    Number of children: None    Years of education: None    Highest education level: None   Occupational History    None   Tobacco Use    Smoking status: Never Smoker    Smokeless tobacco: Never Used   Vaping Use    Vaping Use: Never used   Substance and Sexual Activity    Alcohol use: Yes     Comment: weekends    Drug use: No    Sexual activity: None   Other Topics Concern    None   Social History Narrative    None     Social Determinants of Health     Financial Resource Strain: Not on file   Food Insecurity: No Food Insecurity    Worried About Running Out of Food in the Last Year: Never true    Sanam of Food in the Last Year: Never true   Transportation Needs: No Transportation Needs    Lack of Transportation (Medical): No    Lack of Transportation (Non-Medical): No   Physical Activity: Not on file   Stress: Not on file   Social Connections: Not on file   Intimate Partner Violence: Not on file   Housing Stability: Low Risk     Unable to Pay for Housing in the Last Year: No    Number of Places Lived in the Last Year: 1    Unstable Housing in the Last Year: No          Review of Systems   Constitutional: Negative for chills, fatigue and fever  Respiratory: Negative for cough and shortness of breath  Cardiovascular: Negative for chest pain and leg swelling  Gastrointestinal: Negative for diarrhea, nausea and vomiting  Skin: Negative for wound  Neurological: Positive for numbness         Objective:    /74   Pulse 81   Ht 5' 8" (1 727 m) Comment: verbal  Wt 106 kg (234 lb)   BMI 35 58 kg/m²       Physical Exam  Vitals reviewed  Constitutional:       General: She is not in acute distress  Appearance: She is well-developed  She is obese  She is not toxic-appearing or diaphoretic  Cardiovascular:      Rate and Rhythm: Normal rate and regular rhythm  Pulses:           Dorsalis pedis pulses are 2+ on the right side and 2+ on the left side  Posterior tibial pulses are 1+ on the right side and 1+ on the left side  Comments: No ischemia  Pulmonary:      Effort: Pulmonary effort is normal  No respiratory distress  Musculoskeletal:         General: Deformity present  No tenderness or signs of injury  Right lower leg: Edema present  Left lower leg: Edema present  Right foot: No Charcot foot or foot drop  Left foot: No Charcot foot or foot drop  Comments: Tight achilles tendon with equinus bilaterally  Hammertoe presents  Partial amp right 2nd toe  Feet:      Right foot:      Protective Sensation: 10 sites tested  3 sites sensed  Left foot:      Protective Sensation: 10 sites tested  3 sites sensed  Skin:     General: Skin is warm and dry  Capillary Refill: Capillary refill takes less than 2 seconds  Coloration: Skin is not cyanotic or mottled  Findings: No abscess, ecchymosis, erythema or wound  Nails: There is no clubbing  Neurological:      General: No focal deficit present  Mental Status: She is alert and oriented to person, place, and time  Cranial Nerves: No cranial nerve deficit  Sensory: Sensory deficit present  Motor: No abnormal muscle tone  Coordination: Coordination normal    Psychiatric:         Mood and Affect: Mood normal          Behavior: Behavior normal          Thought Content:  Thought content normal          Judgment: Judgment normal

## 2022-08-22 DIAGNOSIS — I10 BENIGN ESSENTIAL HYPERTENSION: ICD-10-CM

## 2022-08-22 RX ORDER — LISINOPRIL AND HYDROCHLOROTHIAZIDE 12.5; 1 MG/1; MG/1
TABLET ORAL
Qty: 90 TABLET | Refills: 0 | Status: SHIPPED | OUTPATIENT
Start: 2022-08-22

## 2022-09-18 DIAGNOSIS — E11.40 CONTROLLED TYPE 2 DIABETES MELLITUS WITH NEUROPATHY (HCC): ICD-10-CM

## 2022-10-11 PROBLEM — Z12.11 SCREENING FOR COLON CANCER: Status: RESOLVED | Noted: 2022-06-28 | Resolved: 2022-10-11

## 2022-10-12 PROBLEM — Z12.11 SCREENING FOR COLORECTAL CANCER: Status: RESOLVED | Noted: 2021-06-14 | Resolved: 2022-10-12

## 2022-10-12 PROBLEM — Z12.12 SCREENING FOR COLORECTAL CANCER: Status: RESOLVED | Noted: 2021-06-14 | Resolved: 2022-10-12

## 2022-10-27 ENCOUNTER — OFFICE VISIT (OUTPATIENT)
Dept: FAMILY MEDICINE CLINIC | Facility: CLINIC | Age: 52
End: 2022-10-27

## 2022-10-27 VITALS
WEIGHT: 236.4 LBS | SYSTOLIC BLOOD PRESSURE: 122 MMHG | TEMPERATURE: 97.7 F | HEIGHT: 67 IN | BODY MASS INDEX: 37.1 KG/M2 | HEART RATE: 90 BPM | OXYGEN SATURATION: 98 % | RESPIRATION RATE: 16 BRPM | DIASTOLIC BLOOD PRESSURE: 72 MMHG

## 2022-10-27 DIAGNOSIS — E78.5 HYPERLIPIDEMIA, UNSPECIFIED HYPERLIPIDEMIA TYPE: Chronic | ICD-10-CM

## 2022-10-27 DIAGNOSIS — Z12.12 SCREENING FOR COLORECTAL CANCER: ICD-10-CM

## 2022-10-27 DIAGNOSIS — Z12.11 SCREENING FOR COLORECTAL CANCER: ICD-10-CM

## 2022-10-27 DIAGNOSIS — E11.40 TYPE 2 DIABETES MELLITUS WITH DIABETIC NEUROPATHY, WITHOUT LONG-TERM CURRENT USE OF INSULIN (HCC): ICD-10-CM

## 2022-10-27 DIAGNOSIS — Z00.00 ANNUAL PHYSICAL EXAM: Primary | ICD-10-CM

## 2022-10-27 LAB — SL AMB POCT HEMOGLOBIN AIC: 6.9 (ref ?–6.5)

## 2022-10-27 RX ORDER — OMEPRAZOLE 20 MG/1
20 CAPSULE, DELAYED RELEASE ORAL DAILY
COMMUNITY

## 2022-10-27 NOTE — PROGRESS NOTES
[facility] PRACTICE    NAME: Trevon Mg  AGE: 46 y o  SEX: female  : 1970     DATE: 10/27/2022     Assessment and Plan:     Problem List Items Addressed This Visit        Endocrine    Diabetes mellitus with diabetic neuropathy ([address] ) (Chronic)     Good control  Will continue to monitor   Known retinopathy- will see her opthomologist  Lab Results   Component Value Date    HGBA1C 6 9 (A) 10/27/2022            Relevant Orders    POCT hemoglobin A1c (Completed)    CBC    Hemoglobin A1C    Microalbumin / creatinine urine ratio       Other    Hyperlipidemia (Chronic)    Relevant Orders    Comprehensive metabolic panel    Lipid Panel with Direct LDL reflex    TSH, 3rd generation with Free T4 reflex    Annual physical exam - Primary     Mammogram overdue  Will send cologuard            Other Visit Diagnoses     Screening for colorectal cancer        Relevant Orders    Cologuard          Immunizations and preventive care screenings were discussed with patient today  Appropriate education was printed on patient's after visit summary  Counseling:  · Dental Health: discussed importance of regular tooth brushing, flossing, and dental visits  Return in 6 months (on 2023) for Recheck  Chief Complaint:     Chief Complaint   Patient presents with   • Physical Exam     Patient is being seen for an annual physical        History of Present Illness:     Adult Annual Physical   Patient here for a comprehensive physical exam  The patient reports no problems  Diet and Physical Activity  · Diet/Nutrition: well balanced diet  · Exercise: walking  Depression Screening  PHQ-2/9 Depression Screening         General Health  · Sleep: sleeps well  · Hearing: normal - bilateral   · Vision: no vision problems  · Dental: brushes teeth twice daily         /GYN Health  · Patient is: perimenopausal  · Last menstrual period: irregualr for 18 month  · Contraceptive method: n/a  Review of Systems:     Review of Systems   Constitutional: Negative  HENT: Negative  Eyes: Negative  Respiratory: Negative  Cardiovascular: Negative  Gastrointestinal: Negative  Endocrine: Negative  Genitourinary: Positive for menstrual problem  Musculoskeletal: Negative  Allergic/Immunologic: Negative  Neurological: Negative  Hematological: Negative  Psychiatric/Behavioral: Negative         Past Medical History:     Past Medical History:   Diagnosis Date   • Cellulitis of left lower extremity 4/15/2020   • COVID-19 12/22/2020   • Diabetes mellitus ([facility] )    • Exposure to SARS-associated coronavirus 4/15/2020   • Fever 4/15/2020   • Foot ulcer, right, with unspecified severity ([facility] ) 6/12/2019   • Hospital discharge follow-up 4/20/2022   • Hyperlipidemia    • Hypertension    • Pyogenic inflammation of bone ([facility] ) 4/20/2022      Past Surgical History:     Past Surgical History:   Procedure Laterality Date   • INCISION AND DRAINAGE INTRA ORAL ABSCESS Left 10/14/2020    Procedure: INCISION AND DRAINAGE  (I&D) WOUND ORAL EXTRAORAL LEFT SUBMANDIBULAR SPACE, LEFT  SPACE SPACE, INTRAORAL LEFT LATERAL PHARYNGEAL SPACE AND LEFT SUBLINGUAL SPACE;  Surgeon: Alberto Krueger DDS;  [facility];  Service: Maxillofacial   • REMOVAL OF IMPACTED TOOTH - COMPLETELY BONY Left 10/14/2020    Procedure: EXTRACTION TEETH MULTIPLE (RETAINED ROOTS #1,3,14, TEETH #4,17,18,19,32;  Surgeon: Alberto Krueger DDS;  [facility];  Service: Maxillofacial   • TOE AMPUTATION Right 4/12/2022    Procedure: AMPUTATION DISTAL PHALYNX RIGHT SECOND TOE;  Surgeon: Kaleb Dwyer DPM;  [facility];  Service: Podiatry   • TONSILLECTOMY AND ADENOIDECTOMY     • WOUND DEBRIDEMENT Right 7/25/2020    Procedure: Excision of non healing diabetic right foot Ulcer, with closure of wound, excision of tibial sesamoid right foot;  Surgeon: Kaleb Dwyer DPM;  [facility];  Service: Podiatry      Social History:     Social History     Socioeconomic History   • Marital status: /Civil Union     Spouse name: None   • Number of children: None   • Years of education: None   • Highest education level: None   Occupational History   • None   Tobacco Use   • Smoking status: Never Smoker   • Smokeless tobacco: Never Used   Vaping Use   • Vaping Use: Never used   Substance and Sexual Activity   • Alcohol use: Yes     Comment: weekends   • Drug use: No   • Sexual activity: Not Currently     Partners: Male   Other Topics Concern   • None   Social History Narrative   • None     Social Determinants of Health     Financial Resource Strain: Not on file   Food Insecurity: No Food Insecurity   • Worried About Running Out of Food in the Last Year: Never true   • Ran Out of Food in the Last Year: Never true   Transportation Needs: No Transportation Needs   • Lack of Transportation (Medical): No   • Lack of Transportation (Non-Medical):  No   Physical Activity: Not on file   Stress: Not on file   Social Connections: Not on file   Intimate Partner Violence: Not on file   Housing Stability: Low Risk    • Unable to Pay for Housing in the Last Year: No   • Number of Places Lived in the Last Year: 1   • Unstable Housing in the Last Year: No      Family History:     Family History   Problem Relation Age of Onset   • Heart disease Mother    • Diabetes Mother    • Hypertension Mother         Benign Essential    • Coronary artery disease Mother    • Cancer Father         lung    • Diabetes Sister         mellitus    • Diabetes Maternal Grandfather         mellitus    • No Known Problems Maternal Grandmother    • Breast cancer Paternal Grandmother         unknown age   • No Known Problems Paternal Grandfather    • No Known Problems Maternal Aunt    • Breast cancer Paternal Aunt       Current Medications:     Current Outpatient Medications   Medication Sig Dispense Refill   • Blood Glucose Monitoring Suppl (OneTouch Verio Reflect) w/Device KIT Check blood sugars twice daily  Please substitute with appropriate alternative as covered by patient's insurance  Dx: E11 65 1 kit 0   • Empagliflozin 25 MG TABS Take 1 tablet (25 mg total) by mouth every morning 90 tablet 3   • glucose blood (OneTouch Verio) test strip Check blood sugars twice daily  Please substitute with appropriate alternative as covered by patient's insurance  Dx: E11 65 200 each 3   • glyBURIDE (DIABETA) 5 mg tablet TAKE ONE TABLET BY MOUTH 2 TIMES A DAY WITH MEALS 180 tablet 0   • lisinopril-hydrochlorothiazide (PRINZIDE,ZESTORETIC) 10-12 5 MG per tablet TAKE ONE TABLET BY MOUTH EVERY DAY 90 tablet 0   • metFORMIN (GLUCOPHAGE) 1000 MG tablet TAKE 1 TABLET BY MOUTH TWICE A  tablet 0   • omeprazole (PriLOSEC) 20 mg delayed release capsule Take 20 mg by mouth daily     • OneTouch Delica Lancets 63P MISC Check blood sugars twice daily  Please substitute with appropriate alternative as covered by patient's insurance  Dx: E11 65 200 each 3   • simvastatin (ZOCOR) 40 mg tablet Take 1 tablet (40 mg total) by mouth daily at bedtime 90 tablet 3     No current facility-administered medications for this visit  Allergies:     No Known Allergies   Physical Exam:     /72 (BP Location: Right arm, Patient Position: Sitting, Cuff Size: Large)   Pulse 90   Temp 97 7 °F (36 5 °C) (Temporal)   Resp 16   Ht 5' 7 28" (1 709 m)   Wt 107 kg (236 lb 6 4 oz)   SpO2 98%   BMI 36 71 kg/m²     Physical Exam  Vitals and nursing note reviewed  Constitutional:       Appearance: She is well-developed  HENT:      Head: Normocephalic and atraumatic  Right Ear: External ear normal       Left Ear: External ear normal       Nose: Nose normal    Eyes:      Conjunctiva/sclera: Conjunctivae normal       Pupils: Pupils are equal, round, and reactive to light  Cardiovascular:      Rate and Rhythm: Normal rate and regular rhythm        Heart sounds: Normal heart sounds  Pulmonary:      Effort: Pulmonary effort is normal       Breath sounds: Normal breath sounds  Abdominal:      General: Bowel sounds are normal       Palpations: Abdomen is soft  Musculoskeletal:         General: Normal range of motion  Cervical back: Normal range of motion and neck supple  Skin:     General: Skin is warm and dry  Capillary Refill: Capillary refill takes less than 2 seconds  Neurological:      Mental Status: She is alert and oriented to person, place, and time  Psychiatric:         Behavior: Behavior normal          Thought Content:  Thought content normal          Judgment: Judgment normal           Kenrick Harper DO  [facility]

## 2022-10-27 NOTE — PATIENT INSTRUCTIONS

## 2022-10-27 NOTE — ASSESSMENT & PLAN NOTE
Good control  Will continue to monitor   Known retinopathy- will see her opthomologist  Lab Results   Component Value Date    HGBA1C 6 9 (A) 10/27/2022

## 2022-11-28 DIAGNOSIS — I10 BENIGN ESSENTIAL HYPERTENSION: ICD-10-CM

## 2022-11-28 RX ORDER — LISINOPRIL AND HYDROCHLOROTHIAZIDE 12.5; 1 MG/1; MG/1
TABLET ORAL
Qty: 90 TABLET | Refills: 0 | Status: SHIPPED | OUTPATIENT
Start: 2022-11-28

## 2023-02-13 DIAGNOSIS — E11.40 CONTROLLED TYPE 2 DIABETES MELLITUS WITH NEUROPATHY (HCC): ICD-10-CM

## 2023-02-13 RX ORDER — GLYBURIDE 5 MG/1
TABLET ORAL
Qty: 180 TABLET | Refills: 0 | Status: SHIPPED | OUTPATIENT
Start: 2023-02-13

## 2023-03-11 DIAGNOSIS — I10 BENIGN ESSENTIAL HYPERTENSION: ICD-10-CM

## 2023-03-11 DIAGNOSIS — E11.40 TYPE 2 DIABETES MELLITUS WITH DIABETIC NEUROPATHY, WITHOUT LONG-TERM CURRENT USE OF INSULIN (HCC): ICD-10-CM

## 2023-03-11 DIAGNOSIS — E11.40 CONTROLLED TYPE 2 DIABETES MELLITUS WITH NEUROPATHY (HCC): ICD-10-CM

## 2023-03-11 RX ORDER — LISINOPRIL AND HYDROCHLOROTHIAZIDE 12.5; 1 MG/1; MG/1
TABLET ORAL
Qty: 90 TABLET | Refills: 0 | Status: SHIPPED | OUTPATIENT
Start: 2023-03-11

## 2023-03-11 RX ORDER — EMPAGLIFLOZIN 25 MG/1
TABLET, FILM COATED ORAL
Qty: 90 TABLET | Refills: 0 | Status: SHIPPED | OUTPATIENT
Start: 2023-03-11

## 2023-04-24 ENCOUNTER — APPOINTMENT (OUTPATIENT)
Dept: LAB | Facility: CLINIC | Age: 53
End: 2023-04-24

## 2023-04-24 ENCOUNTER — OFFICE VISIT (OUTPATIENT)
Dept: FAMILY MEDICINE CLINIC | Facility: CLINIC | Age: 53
End: 2023-04-24

## 2023-04-24 VITALS
SYSTOLIC BLOOD PRESSURE: 130 MMHG | TEMPERATURE: 96.1 F | WEIGHT: 239 LBS | DIASTOLIC BLOOD PRESSURE: 70 MMHG | BODY MASS INDEX: 37.51 KG/M2 | OXYGEN SATURATION: 99 % | RESPIRATION RATE: 16 BRPM | HEIGHT: 67 IN | HEART RATE: 89 BPM

## 2023-04-24 DIAGNOSIS — E78.5 HYPERLIPIDEMIA, UNSPECIFIED HYPERLIPIDEMIA TYPE: Chronic | ICD-10-CM

## 2023-04-24 DIAGNOSIS — E11.40 TYPE 2 DIABETES MELLITUS WITH DIABETIC NEUROPATHY, WITHOUT LONG-TERM CURRENT USE OF INSULIN (HCC): Primary | Chronic | ICD-10-CM

## 2023-04-24 DIAGNOSIS — I10 BENIGN ESSENTIAL HYPERTENSION: ICD-10-CM

## 2023-04-24 DIAGNOSIS — E11.40 TYPE 2 DIABETES MELLITUS WITH DIABETIC NEUROPATHY, WITHOUT LONG-TERM CURRENT USE OF INSULIN (HCC): Chronic | ICD-10-CM

## 2023-04-24 PROBLEM — E66.812 CLASS 2 SEVERE OBESITY DUE TO EXCESS CALORIES WITH SERIOUS COMORBIDITY IN ADULT (HCC): Status: ACTIVE | Noted: 2020-07-24

## 2023-04-24 PROBLEM — E66.01 CLASS 2 SEVERE OBESITY DUE TO EXCESS CALORIES WITH SERIOUS COMORBIDITY IN ADULT (HCC): Status: ACTIVE | Noted: 2020-07-24

## 2023-04-24 LAB
ALBUMIN SERPL BCP-MCNC: 3.4 G/DL (ref 3.5–5)
ALP SERPL-CCNC: 110 U/L (ref 46–116)
ALT SERPL W P-5'-P-CCNC: 23 U/L (ref 12–78)
ANION GAP SERPL CALCULATED.3IONS-SCNC: 2 MMOL/L (ref 4–13)
AST SERPL W P-5'-P-CCNC: 18 U/L (ref 5–45)
BILIRUB SERPL-MCNC: 0.45 MG/DL (ref 0.2–1)
BUN SERPL-MCNC: 17 MG/DL (ref 5–25)
CALCIUM ALBUM COR SERPL-MCNC: 9.3 MG/DL (ref 8.3–10.1)
CALCIUM SERPL-MCNC: 8.8 MG/DL (ref 8.3–10.1)
CHLORIDE SERPL-SCNC: 111 MMOL/L (ref 96–108)
CHOLEST SERPL-MCNC: 195 MG/DL
CO2 SERPL-SCNC: 24 MMOL/L (ref 21–32)
CREAT SERPL-MCNC: 0.87 MG/DL (ref 0.6–1.3)
CREAT UR-MCNC: 74.7 MG/DL
ERYTHROCYTE [DISTWIDTH] IN BLOOD BY AUTOMATED COUNT: 13.2 % (ref 11.6–15.1)
EST. AVERAGE GLUCOSE BLD GHB EST-MCNC: 186 MG/DL
GFR SERPL CREATININE-BSD FRML MDRD: 76 ML/MIN/1.73SQ M
GLUCOSE P FAST SERPL-MCNC: 134 MG/DL (ref 65–99)
HBA1C MFR BLD: 8.1 %
HCT VFR BLD AUTO: 42.3 % (ref 34.8–46.1)
HDLC SERPL-MCNC: 63 MG/DL
HGB BLD-MCNC: 13 G/DL (ref 11.5–15.4)
LDLC SERPL CALC-MCNC: 118 MG/DL (ref 0–100)
MCH RBC QN AUTO: 26.5 PG (ref 26.8–34.3)
MCHC RBC AUTO-ENTMCNC: 30.7 G/DL (ref 31.4–37.4)
MCV RBC AUTO: 86 FL (ref 82–98)
MICROALBUMIN UR-MCNC: 6.1 MG/L (ref 0–20)
MICROALBUMIN/CREAT 24H UR: 8 MG/G CREATININE (ref 0–30)
PLATELET # BLD AUTO: 304 THOUSANDS/UL (ref 149–390)
PMV BLD AUTO: 9.4 FL (ref 8.9–12.7)
POTASSIUM SERPL-SCNC: 4.4 MMOL/L (ref 3.5–5.3)
PROT SERPL-MCNC: 7.1 G/DL (ref 6.4–8.4)
RBC # BLD AUTO: 4.9 MILLION/UL (ref 3.81–5.12)
SODIUM SERPL-SCNC: 137 MMOL/L (ref 135–147)
TRIGL SERPL-MCNC: 70 MG/DL
TSH SERPL DL<=0.05 MIU/L-ACNC: 2.06 UIU/ML (ref 0.45–4.5)
WBC # BLD AUTO: 6.48 THOUSAND/UL (ref 4.31–10.16)

## 2023-04-24 NOTE — PROGRESS NOTES
Patient's shoes and socks removed  Right Foot/Ankle   Right Foot Inspection  Skin Exam: dry skin  Sensory   Monofilament testing: diminished    Vascular  Capillary refills: < 3 seconds          Left Foot/Ankle  Left Foot Inspection  Skin Exam: dry skin  Sensory   Monofilament testing: diminished    Vascular  Capillary refills: < 3 seconds        Assign Risk Category  No deformity present  Loss of protective sensation  No weak pulses  Risk: 1        Assessment/Plan:    1  Type 2 diabetes mellitus with diabetic neuropathy, without long-term current use of insulin ([facility])  Assessment & Plan:  Add rebelbus  Lab Results   Component Value Date    HGBA1C 8 1 (H) 04/24/2023       Orders:  -     semaglutide (Rybelsus) 3 MG tablet; Take 1 tablet (3 mg total) by mouth daily Take on an empty stomach with 4 oz water, and wait 30 minutes before eating  -     semaglutide (Rybelsus) 7 MG tablet; Take 1 tablet (7 mg total) by mouth daily Take on an empty stomach with 4 oz water, and wait 30 minutes before eating Do not start before May 24, 2023   -     Hemoglobin A1C; Future; Expected date: 07/24/2023    2  Benign essential hypertension  Assessment & Plan:  Stable on current meds      3  Hyperlipidemia, unspecified hyperlipidemia type  Assessment & Plan:  Stable on zocor        BMI Counseling: Body mass index is 37 12 kg/m²  The BMI is above normal  Nutrition recommendations include encouraging healthy choices of fruits and vegetables  Exercise recommendations include exercising 3-5 times per week  No pharmacotherapy was ordered  Rationale for BMI follow-up plan is due to patient being overweight or obese  Depression Screening and Follow-up Plan: Patient was screened for depression during today's encounter  They screened negative with a PHQ-2 score of 0  There are no Patient Instructions on file for this visit      Return in about 6 months (around 10/24/2023) for Annual physical     Subjective:      Patient ID: Alexander Tyler is a 46 y o  female  Chief Complaint   Patient presents with   • Follow-up     Patient here for 6 month follow up       Here for follow up  Labs reviewed  Mammogram not scheduled yet  Going to gym  Went to dentist  Needs eye exam    Hypertension  This is a chronic problem  The current episode started more than 1 year ago  The problem is unchanged  The problem is controlled  There are no associated agents to hypertension  Risk factors for coronary artery disease include dyslipidemia and diabetes mellitus  Past treatments include ACE inhibitors  The current treatment provides significant improvement  There are no compliance problems  Hyperlipidemia  This is a chronic problem  The current episode started more than 1 year ago  The problem is controlled  Recent lipid tests were reviewed and are normal  There are no known factors aggravating her hyperlipidemia  Current antihyperlipidemic treatment includes statins  The current treatment provides significant improvement of lipids  There are no compliance problems  Risk factors for coronary artery disease include diabetes mellitus, dyslipidemia and hypertension  Diabetes  She presents for her follow-up diabetic visit  She has type 2 diabetes mellitus  Her disease course has been worsening  There are no hypoglycemic associated symptoms  There are no diabetic associated symptoms  There are no hypoglycemic complications  Symptoms are worsening  Risk factors for coronary artery disease include hypertension, diabetes mellitus and dyslipidemia  She is following a diabetic diet  When asked about meal planning, she reported none  She participates in exercise three times a week  Her home blood glucose trend is fluctuating dramatically  An ACE inhibitor/angiotensin II receptor blocker is being taken  She sees a podiatrist Eye exam is not current         The following portions of the patient's history were reviewed and updated as appropriate: allergies, current medications, past family history, past medical history, past social history, past surgical history and problem list     Review of Systems   Constitutional: Negative  HENT: Negative  Eyes: Negative  Respiratory: Negative  Cardiovascular: Negative  Gastrointestinal: Negative  Endocrine: Negative  Genitourinary: Negative  Musculoskeletal: Negative  Skin: Negative  Allergic/Immunologic: Negative  Neurological: Negative  Hematological: Negative  Psychiatric/Behavioral: Negative  Current Outpatient Medications   Medication Sig Dispense Refill   • Blood Glucose Monitoring Suppl (OneTouch Verio Reflect) w/Device KIT Check blood sugars twice daily  Please substitute with appropriate alternative as covered by patient's insurance  Dx: E11 65 1 kit 0   • glucose blood (OneTouch Verio) test strip Check blood sugars twice daily  Please substitute with appropriate alternative as covered by patient's insurance  Dx: E11 65 200 each 3   • glyBURIDE (DIABETA) 5 mg tablet TAKE ONE TABLET BY MOUTH 2 TIMES A DAY WITH MEALS 180 tablet 0   • Jardiance 25 MG TABS TAKE ONE TABLET BY MOUTH EVERY MORNING 90 tablet 0   • lisinopril-hydrochlorothiazide (PRINZIDE,ZESTORETIC) 10-12 5 MG per tablet TAKE ONE TABLET BY MOUTH EVERY DAY 90 tablet 0   • metFORMIN (GLUCOPHAGE) 1000 MG tablet TAKE 1 TABLET BY MOUTH TWICE A  tablet 0   • omeprazole (PriLOSEC) 20 mg delayed release capsule Take 20 mg by mouth daily     • OneTouch Delica Lancets 97C MISC Check blood sugars twice daily  Please substitute with appropriate alternative as covered by patient's insurance   Dx: E11 65 200 each 3   • semaglutide (Rybelsus) 3 MG tablet Take 1 tablet (3 mg total) by mouth daily Take on an empty stomach with 4 oz water, and wait 30 minutes before eating 30 tablet 0   • [START ON 5/24/2023] semaglutide (Rybelsus) 7 MG tablet Take 1 tablet (7 mg total) by mouth daily Take on an empty stomach with 4 oz water, and wait "30 minutes before eating Do not start before May 24, 2023  90 tablet 1   • simvastatin (ZOCOR) 40 mg tablet Take 1 tablet (40 mg total) by mouth daily at bedtime 90 tablet 3     No current facility-administered medications for this visit  Objective:    /70 (BP Location: Left arm, Patient Position: Sitting, Cuff Size: Standard)   Pulse 89   Temp (!) 96 1 °F (35 6 °C) (Tympanic)   Resp 16   Ht 5' 7 28\" (1 709 m)   Wt 108 kg (239 lb)   SpO2 99%   BMI 37 12 kg/m²        Physical Exam  Vitals and nursing note reviewed  Constitutional:       Appearance: Normal appearance  She is well-developed  HENT:      Head: Normocephalic and atraumatic  Nose: Nose normal    Eyes:      General: Lids are normal       Conjunctiva/sclera: Conjunctivae normal       Pupils: Pupils are equal, round, and reactive to light  Cardiovascular:      Rate and Rhythm: Normal rate and regular rhythm  Pulses: no weak pulses     Heart sounds: Normal heart sounds, S1 normal and S2 normal    Pulmonary:      Effort: Pulmonary effort is normal       Breath sounds: Normal breath sounds  Abdominal:      General: Bowel sounds are normal       Palpations: Abdomen is soft  Musculoskeletal:         General: Normal range of motion  Cervical back: Normal range of motion and neck supple  Feet:    Feet:      Right foot:      Skin integrity: Dry skin present  Left foot:      Skin integrity: Dry skin present  Skin:     General: Skin is warm and dry  Capillary Refill: Capillary refill takes less than 2 seconds  Neurological:      General: No focal deficit present  Mental Status: She is alert and oriented to person, place, and time  Deep Tendon Reflexes: Reflexes are normal and symmetric  Psychiatric:         Mood and Affect: Mood normal          Speech: Speech normal          Behavior: Behavior normal          Thought Content:  Thought content normal          Judgment: Judgment normal        " Alexyne Barryo, DO

## 2023-05-09 ENCOUNTER — TELEMEDICINE (OUTPATIENT)
Dept: FAMILY MEDICINE CLINIC | Facility: CLINIC | Age: 53
End: 2023-05-09

## 2023-05-09 ENCOUNTER — HOSPITAL ENCOUNTER (OUTPATIENT)
Dept: RADIOLOGY | Age: 53
Discharge: HOME | End: 2023-05-09

## 2023-05-09 VITALS — WEIGHT: 239 LBS | HEIGHT: 67 IN | BODY MASS INDEX: 37.51 KG/M2

## 2023-05-09 DIAGNOSIS — J01.00 ACUTE NON-RECURRENT MAXILLARY SINUSITIS: Primary | ICD-10-CM

## 2023-05-09 DIAGNOSIS — Z12.31 ENCOUNTER FOR SCREENING MAMMOGRAM FOR BREAST CANCER: ICD-10-CM

## 2023-05-09 RX ORDER — AMOXICILLIN AND CLAVULANATE POTASSIUM 875; 125 MG/1; MG/1
1 TABLET, FILM COATED ORAL EVERY 12 HOURS SCHEDULED
Qty: 20 TABLET | Refills: 0 | Status: SHIPPED | OUTPATIENT
Start: 2023-05-09 | End: 2023-05-19

## 2023-05-09 NOTE — PROGRESS NOTES
Virtual Regular Visit    Verification of patient location:    Patient is located at Home in the following state in which I hold an active license [place]      Assessment/Plan:    Problem List Items Addressed This Visit    None  Visit Diagnoses     Acute non-recurrent maxillary sinusitis    -  Primary    Relevant Medications    amoxicillin-clavulanate (AUGMENTIN) 875-125 mg per tablet               Reason for visit is   Chief Complaint   Patient presents with   • Virtual Brief Visit   • Virtual Regular Visit        Encounter provider Hosie Meigs, CRNP    Provider located at [address] [address]      Recent Visits  No visits were found meeting these conditions  Showing recent visits within past 7 days and meeting all other requirements  Today's Visits  Date Type Provider Dept   05/09/23 Telemedicine Hosie Meigs, CRNP  [facility]   Showing today's visits and meeting all other requirements  Future Appointments  No visits were found meeting these conditions  Showing future appointments within next 150 days and meeting all other requirements       The patient was identified by name and date of birth  Hanna Hester was informed that this is a telemedicine visit and that the visit is being conducted through the [address] [employer] platform  She agrees to proceed     My office door was closed  No one else was in the room  She acknowledged consent and understanding of privacy and security of the video platform  The patient has agreed to participate and understands they can discontinue the visit at any time  Patient is aware this is a billable service  Subjective  Hanna Hester is a 46 y o  female          Sunday awoke nasal congestion  By afternoon body aches tired  Monday fever and coughing  Took covid negative  Now throat hurts as well  Headache  No ear pain  Took tylenol sinus otc with no relief         Past Medical History:   Diagnosis Date • Cellulitis of left lower extremity 4/15/2020   • COVID-19 12/22/2020   • Diabetes mellitus ([facility] )    • Exposure to SARS-associated coronavirus 4/15/2020   • Fever 4/15/2020   • Foot ulcer, right, with unspecified severity ([facility] ) 6/12/2019   • Hospital discharge follow-up 4/20/2022   • Hyperlipidemia    • Hypertension    • Pyogenic inflammation of bone ([facility] ) 4/20/2022       Past Surgical History:   Procedure Laterality Date   • INCISION AND DRAINAGE INTRA ORAL ABSCESS Left 10/14/2020    Procedure: INCISION AND DRAINAGE  (I&D) WOUND ORAL EXTRAORAL LEFT SUBMANDIBULAR SPACE, LEFT  SPACE SPACE, INTRAORAL LEFT LATERAL PHARYNGEAL SPACE AND LEFT SUBLINGUAL SPACE;  Surgeon: Katarina Jarrett DDS;  [facility];  Service: Maxillofacial   • REMOVAL OF IMPACTED TOOTH - COMPLETELY BONY Left 10/14/2020    Procedure: EXTRACTION TEETH MULTIPLE (RETAINED ROOTS #1,3,14, TEETH #4,17,18,19,32;  Surgeon: Katarina Jarrett DDS;  [facility];  Service: Maxillofacial   • TOE AMPUTATION Right 4/12/2022    Procedure: AMPUTATION DISTAL PHALYNX RIGHT SECOND TOE;  Surgeon: Flaquito Pino DPM;  [facility];  Service: Podiatry   • TONSILLECTOMY AND ADENOIDECTOMY     • WOUND DEBRIDEMENT Right 7/25/2020    Procedure: Excision of non healing diabetic right foot Ulcer, with closure of wound, excision of tibial sesamoid right foot;  Surgeon: Flaquito Pino DPM;  [facility];  Service: Podiatry       Current Outpatient Medications   Medication Sig Dispense Refill   • amoxicillin-clavulanate (AUGMENTIN) 875-125 mg per tablet Take 1 tablet by mouth every 12 (twelve) hours for 10 days 20 tablet 0   • Blood Glucose Monitoring Suppl (OneTouch Verio Reflect) w/Device KIT Check blood sugars twice daily  Please substitute with appropriate alternative as covered by patient's insurance  Dx: E11 65 1 kit 0   • glucose blood (OneTouch Verio) test strip Check blood sugars twice daily   Please substitute with appropriate alternative as covered by patient's insurance  Dx: E11 65 200 each 3   • glyBURIDE (DIABETA) 5 mg tablet TAKE ONE TABLET BY MOUTH 2 TIMES A DAY WITH MEALS 180 tablet 0   • Jardiance 25 MG TABS TAKE ONE TABLET BY MOUTH EVERY MORNING 90 tablet 0   • lisinopril-hydrochlorothiazide (PRINZIDE,ZESTORETIC) 10-12 5 MG per tablet TAKE ONE TABLET BY MOUTH EVERY DAY 90 tablet 0   • metFORMIN (GLUCOPHAGE) 1000 MG tablet TAKE 1 TABLET BY MOUTH TWICE A  tablet 0   • omeprazole (PriLOSEC) 20 mg delayed release capsule Take 20 mg by mouth daily     • OneTouch Delica Lancets 09V MISC Check blood sugars twice daily  Please substitute with appropriate alternative as covered by patient's insurance  Dx: E11 65 200 each 3   • semaglutide (Rybelsus) 3 MG tablet Take 1 tablet (3 mg total) by mouth daily Take on an empty stomach with 4 oz water, and wait 30 minutes before eating 30 tablet 0   • simvastatin (ZOCOR) 40 mg tablet Take 1 tablet (40 mg total) by mouth daily at bedtime 90 tablet 3   • [START ON 5/24/2023] semaglutide (Rybelsus) 7 MG tablet Take 1 tablet (7 mg total) by mouth daily Take on an empty stomach with 4 oz water, and wait 30 minutes before eating Do not start before May 24, 2023  (Patient not taking: Reported on 5/9/2023 Do not start before May 24, 2023 ) 90 tablet 1     No current facility-administered medications for this visit  No Known Allergies    Review of Systems   Constitutional: Positive for chills and fatigue  HENT: Positive for congestion, postnasal drip, rhinorrhea, sinus pressure, sinus pain and sore throat  Negative for ear discharge and ear pain  Eyes: Negative for photophobia and visual disturbance  Respiratory: Positive for cough  Negative for shortness of breath and wheezing  Cardiovascular: Negative for chest pain and palpitations  Gastrointestinal: Negative for constipation, diarrhea, nausea and vomiting  Musculoskeletal: Positive for myalgias  Negative for arthralgias     Skin: Negative for rash  Neurological: Positive for headaches  Negative for dizziness and light-headedness  Hematological: Negative for adenopathy  Psychiatric/Behavioral: Negative for dysphoric mood and sleep disturbance  The patient is not nervous/anxious  Video Exam    Vitals:       Physical Exam  Constitutional:       Appearance: Normal appearance  HENT:      Head: Normocephalic and atraumatic  Eyes:      Conjunctiva/sclera: Conjunctivae normal    Pulmonary:      Effort: Pulmonary effort is normal    Musculoskeletal:         General: Normal range of motion  Cervical back: Normal range of motion  Neurological:      Mental Status: She is alert and oriented to person, place, and time     Psychiatric:         Mood and Affect: Mood normal          Behavior: Behavior normal           Visit Time  Total Visit Duration: 15

## 2023-07-05 ENCOUNTER — TELEPHONE (OUTPATIENT)
Dept: FAMILY MEDICINE CLINIC | Facility: CLINIC | Age: 53
End: 2023-07-05

## 2023-07-05 NOTE — TELEPHONE ENCOUNTER
Patient left a voice message that she finshed the prescription for the Rybelsus 30 mg now she should be going on to the next dosage which is the 7 mg. I left the patient a message the 7 mg were sent to the pharmacy on 05/09/23. The pharmacy should have it for her she should contact the pharmacy.

## 2023-07-28 ENCOUNTER — APPOINTMENT (OUTPATIENT)
Dept: LAB | Facility: CLINIC | Age: 53
End: 2023-07-28
Payer: COMMERCIAL

## 2023-07-28 DIAGNOSIS — E11.40 TYPE 2 DIABETES MELLITUS WITH DIABETIC NEUROPATHY, WITHOUT LONG-TERM CURRENT USE OF INSULIN (HCC): Chronic | ICD-10-CM

## 2023-07-28 LAB
EST. AVERAGE GLUCOSE BLD GHB EST-MCNC: 252 MG/DL
HBA1C MFR BLD: 10.4 %

## 2023-07-28 PROCEDURE — 83036 HEMOGLOBIN GLYCOSYLATED A1C: CPT

## 2023-07-28 PROCEDURE — 36415 COLL VENOUS BLD VENIPUNCTURE: CPT

## 2023-08-08 ENCOUNTER — OFFICE VISIT (OUTPATIENT)
Dept: FAMILY MEDICINE CLINIC | Facility: CLINIC | Age: 53
End: 2023-08-08
Payer: COMMERCIAL

## 2023-08-08 VITALS
SYSTOLIC BLOOD PRESSURE: 110 MMHG | HEART RATE: 84 BPM | HEIGHT: 67 IN | WEIGHT: 228.2 LBS | DIASTOLIC BLOOD PRESSURE: 50 MMHG | OXYGEN SATURATION: 100 % | RESPIRATION RATE: 16 BRPM | TEMPERATURE: 96.6 F | BODY MASS INDEX: 35.82 KG/M2

## 2023-08-08 DIAGNOSIS — E11.40 TYPE 2 DIABETES MELLITUS WITH DIABETIC NEUROPATHY, WITHOUT LONG-TERM CURRENT USE OF INSULIN (HCC): Chronic | ICD-10-CM

## 2023-08-08 PROCEDURE — 99213 OFFICE O/P EST LOW 20 MIN: CPT | Performed by: FAMILY MEDICINE

## 2023-08-08 NOTE — PROGRESS NOTES
Assessment/Plan:    1. Type 2 diabetes mellitus with diabetic neuropathy, without long-term current use of insulin ([facility])  Assessment & Plan:  Increase rebelsus  Lab Results   Component Value Date    HGBA1C 10.4 (H) 07/28/2023       Orders:  -     Hemoglobin A1C; Future; Expected date: 11/08/2023  -     semaglutide (Rybelsus) 14 MG tablet; Take 1 tablet (14 mg total) by mouth daily Take on an empty stomach with 4 oz water, and wait 30 minutes before eating            There are no Patient Instructions on file for this visit. Return in about 3 months (around 11/8/2023) for Annual physical.    Subjective:      Patient ID: Dmitriy Juan is a 46 y.o. female. Chief Complaint   Patient presents with   • Follow-up     Patient here to discuss lab results. Here for follow up  a1c high  Has lost weight  Eye exam -needs appt    Diabetes  She has type 2 diabetes mellitus. No MedicAlert identification noted. The initial diagnosis of diabetes was made 30 years ago. Hypoglycemia symptoms include headaches and sleepiness. Pertinent negatives for hypoglycemia include no confusion, dizziness, hunger, mood changes, nervousness/anxiousness, pallor, seizures, speech difficulty, sweats or tremors. Associated symptoms include fatigue, foot paresthesias and polydipsia. Pertinent negatives for diabetes include no blurred vision, no chest pain, no foot ulcerations, no polyphagia, no polyuria, no visual change, no weakness and no weight loss. Pertinent negatives for hypoglycemia complications include no blackouts, no hospitalization, no nocturnal hypoglycemia, no required assistance and no required glucagon injection. Symptoms are worsening. Pertinent negatives for diabetic complications include no CVA, heart disease, impotence, nephropathy, peripheral neuropathy, PVD or retinopathy. Risk factors for coronary artery disease include family history, hypertension and obesity.  Current diabetic treatment includes diet and oral agent (triple therapy). She is compliant with treatment most of the time. Her weight is fluctuating minimally. She is following a diabetic, generally healthy and low salt diet. Meal planning includes ADA exchanges. She has not had a previous visit with a dietitian. She participates in exercise daily. She monitors blood glucose at home 1-2 x per day. Blood glucose monitoring compliance is adequate. Her home blood glucose trend is increasing steadily. Her breakfast blood glucose range is generally 180-200 mg/dl. Her highest blood glucose is >200 mg/dl. Her overall blood glucose range is >200 mg/dl. She sees a podiatrist.Eye exam is not current. The following portions of the patient's history were reviewed and updated as appropriate: allergies, current medications, past family history, past medical history, past social history, past surgical history and problem list.    Review of Systems   Constitutional: Positive for fatigue. Negative for weight loss. Eyes: Negative for blurred vision. Cardiovascular: Negative for chest pain. Endocrine: Positive for polydipsia. Negative for polyphagia and polyuria. Genitourinary: Negative for impotence. Skin: Negative for pallor. Neurological: Positive for headaches. Negative for dizziness, tremors, seizures, speech difficulty and weakness. Psychiatric/Behavioral: Negative for confusion. The patient is not nervous/anxious. Current Outpatient Medications   Medication Sig Dispense Refill   • Blood Glucose Monitoring Suppl (OneTouch Verio Reflect) w/Device KIT Check blood sugars twice daily. Please substitute with appropriate alternative as covered by patient's insurance. Dx: E11.65 1 kit 0   • glucose blood (OneTouch Verio) test strip Check blood sugars twice daily. Please substitute with appropriate alternative as covered by patient's insurance.  Dx: E11.65 200 each 3   • Jardiance 25 MG TABS TAKE ONE TABLET BY MOUTH EVERY MORNING 90 tablet 0   • lisinopril-hydrochlorothiazide (PRINZIDE,ZESTORETIC) 10-12.5 MG per tablet TAKE ONE TABLET BY MOUTH EVERY DAY 90 tablet 0   • metFORMIN (GLUCOPHAGE) 1000 MG tablet TAKE 1 TABLET BY MOUTH TWICE A  tablet 0   • omeprazole (PriLOSEC) 20 mg delayed release capsule Take 20 mg by mouth daily     • OneTouch Delica Lancets 37B MISC Check blood sugars twice daily. Please substitute with appropriate alternative as covered by patient's insurance. Dx: E11.65 200 each 3   • semaglutide (Rybelsus) 14 MG tablet Take 1 tablet (14 mg total) by mouth daily Take on an empty stomach with 4 oz water, and wait 30 minutes before eating 90 tablet 3   • simvastatin (ZOCOR) 40 mg tablet Take 1 tablet (40 mg total) by mouth daily at bedtime 90 tablet 3     No current facility-administered medications for this visit. Objective:    /50 (BP Location: Left arm, Patient Position: Sitting, Cuff Size: Large)   Pulse 84   Temp (!) 96.6 °F (35.9 °C) (Tympanic)   Resp 16   Ht 5' 7" (1.702 m)   Wt 104 kg (228 lb 3.2 oz)   SpO2 100%   BMI 35.74 kg/m²        Physical Exam  Vitals and nursing note reviewed. Constitutional:       Appearance: Normal appearance. HENT:      Head: Normocephalic and atraumatic. Eyes:      Extraocular Movements: Extraocular movements intact. Pupils: Pupils are equal, round, and reactive to light. Cardiovascular:      Rate and Rhythm: Normal rate and regular rhythm. Pulses: Normal pulses. Heart sounds: Normal heart sounds. Pulmonary:      Effort: Pulmonary effort is normal.      Breath sounds: Normal breath sounds. Neurological:      General: No focal deficit present. Mental Status: She is alert and oriented to person, place, and time. Psychiatric:         Mood and Affect: Mood normal.         Behavior: Behavior normal.         Thought Content:  Thought content normal.         Judgment: Judgment normal.                Abram Dalal DO

## 2023-08-16 ENCOUNTER — APPOINTMENT (EMERGENCY)
Dept: RADIOLOGY | Facility: HOSPITAL | Age: 53
End: 2023-08-16
Payer: COMMERCIAL

## 2023-08-16 ENCOUNTER — HOSPITAL ENCOUNTER (EMERGENCY)
Facility: HOSPITAL | Age: 53
Discharge: HOME | End: 2023-08-16
Attending: EMERGENCY MEDICINE
Payer: COMMERCIAL

## 2023-08-16 VITALS
HEART RATE: 92 BPM | DIASTOLIC BLOOD PRESSURE: 63 MMHG | RESPIRATION RATE: 24 BRPM | TEMPERATURE: 98.2 F | OXYGEN SATURATION: 98 % | SYSTOLIC BLOOD PRESSURE: 115 MMHG

## 2023-08-16 DIAGNOSIS — E86.0 DEHYDRATION: Primary | ICD-10-CM

## 2023-08-16 DIAGNOSIS — U07.1 COVID-19: Primary | ICD-10-CM

## 2023-08-16 DIAGNOSIS — R06.02 SOB (SHORTNESS OF BREATH): ICD-10-CM

## 2023-08-16 LAB
2HR DELTA HS TROPONIN: 0 NG/L
ALBUMIN SERPL BCP-MCNC: 3.7 G/DL (ref 3.5–5)
ALP SERPL-CCNC: 133 U/L (ref 46–116)
ALT SERPL W P-5'-P-CCNC: 31 U/L (ref 12–78)
ANION GAP SERPL CALCULATED.3IONS-SCNC: 8 MMOL/L
AST SERPL W P-5'-P-CCNC: 29 U/L (ref 5–45)
ATRIAL RATE: 111 BPM
BACTERIA UR QL AUTO: ABNORMAL /HPF
BASOPHILS # BLD AUTO: 0.08 THOUSANDS/ÂΜL (ref 0–0.1)
BASOPHILS NFR BLD AUTO: 1 % (ref 0–1)
BETA-HYDROXYBUTYRATE: 1.4 MMOL/L
BILIRUB SERPL-MCNC: 0.66 MG/DL (ref 0.2–1)
BILIRUB UR QL STRIP: NEGATIVE
BUN SERPL-MCNC: 19 MG/DL (ref 5–25)
CALCIUM SERPL-MCNC: 9.8 MG/DL (ref 8.3–10.1)
CARDIAC TROPONIN I PNL SERPL HS: 6 NG/L
CARDIAC TROPONIN I PNL SERPL HS: 6 NG/L
CHLORIDE SERPL-SCNC: 102 MMOL/L (ref 96–108)
CLARITY UR: CLEAR
CO2 SERPL-SCNC: 24 MMOL/L (ref 21–32)
COLOR UR: YELLOW
COLOR, POC: YELLOW
CREAT SERPL-MCNC: 1.14 MG/DL (ref 0.6–1.3)
D DIMER PPP FEU-MCNC: <0.27 UG/ML FEU
EOSINOPHIL # BLD AUTO: 0.09 THOUSAND/ÂΜL (ref 0–0.61)
EOSINOPHIL NFR BLD AUTO: 1 % (ref 0–6)
ERYTHROCYTE [DISTWIDTH] IN BLOOD BY AUTOMATED COUNT: 13.6 % (ref 11.6–15.1)
FLUAV RNA RESP QL NAA+PROBE: NEGATIVE
FLUBV RNA RESP QL NAA+PROBE: NEGATIVE
GFR SERPL CREATININE-BSD FRML MDRD: 55 ML/MIN/1.73SQ M
GLUCOSE SERPL-MCNC: 144 MG/DL (ref 65–140)
GLUCOSE SERPL-MCNC: 242 MG/DL (ref 65–140)
GLUCOSE SERPL-MCNC: 263 MG/DL (ref 65–140)
GLUCOSE UR STRIP-MCNC: ABNORMAL MG/DL
HCT VFR BLD AUTO: 42.7 % (ref 34.8–46.1)
HGB BLD-MCNC: 13.7 G/DL (ref 11.5–15.4)
HGB UR QL STRIP.AUTO: ABNORMAL
IMM GRANULOCYTES # BLD AUTO: 0.02 THOUSAND/UL (ref 0–0.2)
IMM GRANULOCYTES NFR BLD AUTO: 0 % (ref 0–2)
KETONES UR STRIP-MCNC: ABNORMAL MG/DL
LEUKOCYTE ESTERASE UR QL STRIP: NEGATIVE
LIPASE SERPL-CCNC: 142 U/L (ref 73–393)
LYMPHOCYTES # BLD AUTO: 0.66 THOUSANDS/ÂΜL (ref 0.6–4.47)
LYMPHOCYTES NFR BLD AUTO: 8 % (ref 14–44)
MCH RBC QN AUTO: 27.4 PG (ref 26.8–34.3)
MCHC RBC AUTO-ENTMCNC: 32.1 G/DL (ref 31.4–37.4)
MCV RBC AUTO: 85 FL (ref 82–98)
MONOCYTES # BLD AUTO: 1.08 THOUSAND/ÂΜL (ref 0.17–1.22)
MONOCYTES NFR BLD AUTO: 14 % (ref 4–12)
NEUTROPHILS # BLD AUTO: 5.98 THOUSANDS/ÂΜL (ref 1.85–7.62)
NEUTS SEG NFR BLD AUTO: 76 % (ref 43–75)
NITRITE UR QL STRIP: NEGATIVE
NON-SQ EPI CELLS URNS QL MICRO: ABNORMAL /HPF
NRBC BLD AUTO-RTO: 0 /100 WBCS
P AXIS: 19 DEGREES
PH UR STRIP.AUTO: 5.5 [PH] (ref 4.5–8)
PLATELET # BLD AUTO: 262 THOUSANDS/UL (ref 149–390)
PMV BLD AUTO: 9.5 FL (ref 8.9–12.7)
POTASSIUM SERPL-SCNC: 3.9 MMOL/L (ref 3.5–5.3)
PR INTERVAL: 128 MS
PROT SERPL-MCNC: 7.7 G/DL (ref 6.4–8.4)
PROT UR STRIP-MCNC: NEGATIVE MG/DL
QRS AXIS: 56 DEGREES
QRSD INTERVAL: 84 MS
QT INTERVAL: 308 MS
QTC INTERVAL: 418 MS
RBC # BLD AUTO: 5 MILLION/UL (ref 3.81–5.12)
RBC #/AREA URNS AUTO: ABNORMAL /HPF
RSV RNA RESP QL NAA+PROBE: NEGATIVE
SARS-COV-2 RNA RESP QL NAA+PROBE: POSITIVE
SODIUM SERPL-SCNC: 134 MMOL/L (ref 135–147)
SP GR UR STRIP.AUTO: 1.01 (ref 1–1.03)
T WAVE AXIS: 18 DEGREES
UROBILINOGEN UR QL STRIP.AUTO: 0.2 E.U./DL
VENTRICULAR RATE: 111 BPM
WBC # BLD AUTO: 7.91 THOUSAND/UL (ref 4.31–10.16)
WBC #/AREA URNS AUTO: ABNORMAL /HPF

## 2023-08-16 PROCEDURE — 84484 ASSAY OF TROPONIN QUANT: CPT

## 2023-08-16 PROCEDURE — 82948 REAGENT STRIP/BLOOD GLUCOSE: CPT

## 2023-08-16 PROCEDURE — 96361 HYDRATE IV INFUSION ADD-ON: CPT

## 2023-08-16 PROCEDURE — 36415 COLL VENOUS BLD VENIPUNCTURE: CPT

## 2023-08-16 PROCEDURE — 83690 ASSAY OF LIPASE: CPT

## 2023-08-16 PROCEDURE — 82010 KETONE BODYS QUAN: CPT

## 2023-08-16 PROCEDURE — 96374 THER/PROPH/DIAG INJ IV PUSH: CPT

## 2023-08-16 PROCEDURE — 85025 COMPLETE CBC W/AUTO DIFF WBC: CPT

## 2023-08-16 PROCEDURE — 0241U HB NFCT DS VIR RESP RNA 4 TRGT: CPT

## 2023-08-16 PROCEDURE — 93010 ELECTROCARDIOGRAM REPORT: CPT | Performed by: INTERNAL MEDICINE

## 2023-08-16 PROCEDURE — 71046 X-RAY EXAM CHEST 2 VIEWS: CPT

## 2023-08-16 PROCEDURE — 80053 COMPREHEN METABOLIC PANEL: CPT

## 2023-08-16 PROCEDURE — 99285 EMERGENCY DEPT VISIT HI MDM: CPT | Performed by: EMERGENCY MEDICINE

## 2023-08-16 PROCEDURE — 93005 ELECTROCARDIOGRAM TRACING: CPT

## 2023-08-16 PROCEDURE — 99285 EMERGENCY DEPT VISIT HI MDM: CPT

## 2023-08-16 PROCEDURE — 81001 URINALYSIS AUTO W/SCOPE: CPT

## 2023-08-16 PROCEDURE — 85379 FIBRIN DEGRADATION QUANT: CPT

## 2023-08-16 RX ORDER — ACETAMINOPHEN 325 MG/1
650 TABLET ORAL ONCE
Status: COMPLETED | OUTPATIENT
Start: 2023-08-16 | End: 2023-08-16

## 2023-08-16 RX ORDER — ONDANSETRON 2 MG/ML
4 INJECTION INTRAMUSCULAR; INTRAVENOUS ONCE
Status: COMPLETED | OUTPATIENT
Start: 2023-08-16 | End: 2023-08-16

## 2023-08-16 RX ORDER — ONDANSETRON 4 MG/1
4 TABLET, FILM COATED ORAL EVERY 6 HOURS
Qty: 12 TABLET | Refills: 0 | Status: SHIPPED | OUTPATIENT
Start: 2023-08-16

## 2023-08-16 RX ORDER — IBUPROFEN 600 MG/1
600 TABLET ORAL ONCE
Status: COMPLETED | OUTPATIENT
Start: 2023-08-16 | End: 2023-08-16

## 2023-08-16 RX ADMIN — ACETAMINOPHEN 650 MG: 325 TABLET, FILM COATED ORAL at 14:12

## 2023-08-16 RX ADMIN — IBUPROFEN 600 MG: 600 TABLET ORAL at 14:19

## 2023-08-16 RX ADMIN — SODIUM CHLORIDE 1000 ML: 0.9 INJECTION, SOLUTION INTRAVENOUS at 12:16

## 2023-08-16 RX ADMIN — SODIUM CHLORIDE 1000 ML: 0.9 INJECTION, SOLUTION INTRAVENOUS at 14:21

## 2023-08-16 RX ADMIN — ONDANSETRON 4 MG: 2 INJECTION INTRAMUSCULAR; INTRAVENOUS at 11:50

## 2023-08-16 NOTE — ED PROVIDER NOTES
History  Chief Complaint   Patient presents with   • Shortness of Breath     Pt reports SOB becoming increasingly worse for the past 2 days. Pt also c/o HA, nausea, and V/D. Pt has hx of DM      HPI     Connie Blank is a 59-year-old female past medical history of type 2 diabetes melitis, hypertension, hyperlipidemia not on statins. She is here complaining of headache for the last 3 days, fatigue and shortness of breath for 2 days, nausea, vomiting, and headache. She is typically able to walk 2 miles 3-4 times a week, but has limited exercise tolerance with palpitations starting today. She is concerned her symptoms may be related to her new diabetes medication. Typically her HbA1c is 6.8, and has been 8 recently. Her doctor discontinued glyburide, and she is now on metformin and semaglutide. Since this change in medications her HbA1c went up to 10 in July. She has had decreased appetite since on her new medications and poor p.o. intake, but has been trying to increase her p.o. fluid intake since she has been feeling ill. She was not able to take her diabetes medication this morning secondary to nausea and vomiting. She is ill-appearing. She denies any chest pain, calf swelling or calf tenderness, or history of cardiac disease. 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  Point-of-care glucose 240. Prior to Admission Medications   Prescriptions Last Dose Informant Patient Reported? Taking? Blood Glucose Monitoring Suppl (OneTouch Verio Reflect) w/Device KIT   No No   Sig: Check blood sugars twice daily. Please substitute with appropriate alternative as covered by patient's insurance. Dx: E11.65   Jardiance 25 MG TABS   No No   Sig: TAKE ONE TABLET BY MOUTH EVERY MORNING   OneTouch Delica Lancets 67X MISC   No No   Sig: Check blood sugars twice daily. Please substitute with appropriate alternative as covered by patient's insurance.  Dx: E11.65   glucose blood (OneTouch Verio) test strip No No   Sig: Check blood sugars twice daily. Please substitute with appropriate alternative as covered by patient's insurance.  Dx: E11.65   lisinopril-hydrochlorothiazide (PRINZIDE,ZESTORETIC) 10-12.5 MG per tablet   No No   Sig: TAKE ONE TABLET BY MOUTH EVERY DAY   metFORMIN (GLUCOPHAGE) 1000 MG tablet   No No   Sig: TAKE 1 TABLET BY MOUTH TWICE A DAY   omeprazole (PriLOSEC) 20 mg delayed release capsule  Self Yes No   Sig: Take 20 mg by mouth daily   semaglutide (Rybelsus) 14 MG tablet   No No   Sig: Take 1 tablet (14 mg total) by mouth daily Take on an empty stomach with 4 oz water, and wait 30 minutes before eating   simvastatin (ZOCOR) 40 mg tablet   No No   Sig: Take 1 tablet (40 mg total) by mouth daily at bedtime      Facility-Administered Medications: None       Past Medical History:   Diagnosis Date   • Cellulitis of left lower extremity 4/15/2020   • COVID-19 12/22/2020   • Diabetes mellitus ([address])    • Exposure to SARS-associated coronavirus 4/15/2020   • Fever 4/15/2020   • Foot ulcer, right, with unspecified severity ([address]) 6/12/2019   • Hospital discharge follow-up 4/20/2022   • Hyperlipidemia    • Hypertension    • Pyogenic inflammation of bone ([address]) 4/20/2022       Past Surgical History:   Procedure Laterality Date   • INCISION AND DRAINAGE INTRA ORAL ABSCESS Left 10/14/2020    Procedure: INCISION AND DRAINAGE  (I&D) WOUND ORAL EXTRAORAL LEFT SUBMANDIBULAR SPACE, LEFT  SPACE SPACE, INTRAORAL LEFT LATERAL PHARYNGEAL SPACE AND LEFT SUBLINGUAL SPACE;  Surgeon: Zaid Holloway DDS;  Location: [facility];  Service: Maxillofacial   • REMOVAL OF IMPACTED TOOTH - COMPLETELY BONY Left 10/14/2020    Procedure: EXTRACTION TEETH MULTIPLE (RETAINED ROOTS #1,3,14, TEETH #4,17,18,19,32;  Surgeon: Zaid Holloway DDS;  Location: [facility];  Service: Maxillofacial   • TOE AMPUTATION Right 4/12/2022    Procedure: AMPUTATION DISTAL PHALYNX RIGHT SECOND TOE;  Surgeon: Jose Victoria DPM;  Location: [facility]; Service: Podiatry   • TONSILLECTOMY AND ADENOIDECTOMY     • WOUND DEBRIDEMENT Right 7/25/2020    Procedure: Excision of non healing diabetic right foot Ulcer, with closure of wound, excision of tibial sesamoid right foot;  Surgeon: Sander Yoder DPM;  Location: [facility];  Service: Podiatry       Family History   Problem Relation Age of Onset   • Heart disease Mother    • Diabetes Mother    • Hypertension Mother         Benign Essential    • Coronary artery disease Mother    • Cancer Father         lung    • Diabetes Sister         mellitus    • Diabetes Maternal Grandfather         mellitus    • No Known Problems Maternal Grandmother    • Breast cancer Paternal Grandmother         unknown age   • No Known Problems Paternal Grandfather    • No Known Problems Maternal Aunt    • Breast cancer Paternal Aunt      I have reviewed and agree with the history as documented. E-Cigarette/Vaping   • E-Cigarette Use Never User      E-Cigarette/Vaping Substances   • Nicotine No    • THC No    • CBD No    • Flavoring No    • Other No    • Unknown No      Social History     Tobacco Use   • Smoking status: Never     Passive exposure: Never   • Smokeless tobacco: Never   Vaping Use   • Vaping Use: Never used   Substance Use Topics   • Alcohol use: Yes     Comment: weekends   • Drug use: No        Review of Systems   Constitutional: Positive for activity change, appetite change and fatigue. Negative for chills, diaphoresis and fever. HENT: Negative for congestion. Respiratory: Positive for shortness of breath. Negative for apnea, cough, choking, chest tightness and stridor. Cardiovascular: Positive for palpitations. Negative for chest pain and leg swelling. Gastrointestinal: Positive for nausea and vomiting. Negative for abdominal distention, abdominal pain, constipation and diarrhea. Musculoskeletal: Positive for back pain, myalgias and neck pain. Negative for arthralgias.    Skin: Negative for color change, pallor and rash.   Neurological: Positive for weakness. Negative for dizziness and light-headedness. Physical Exam  ED Triage Vitals   Temperature Pulse Respirations Blood Pressure SpO2   08/16/23 1112 08/16/23 1110 08/16/23 1110 08/16/23 1110 08/16/23 1110   98.2 °F (36.8 °C) (!) 119 19 136/63 98 %      Temp Source Heart Rate Source Patient Position - Orthostatic VS BP Location FiO2 (%)   08/16/23 1112 08/16/23 1110 08/16/23 1110 08/16/23 1110 --   Oral Monitor Lying Right arm       Pain Score       08/16/23 1110       5             Orthostatic Vital Signs  Vitals:    08/16/23 1110 08/16/23 1130 08/16/23 1300 08/16/23 1430   BP: 136/63 131/68 137/63 115/63   Pulse: (!) 119 (!) 106 92 92   Patient Position - Orthostatic VS: Lying   Lying       Physical Exam  Constitutional:       General: She is not in acute distress. Appearance: She is well-developed. She is ill-appearing. She is not toxic-appearing or diaphoretic. HENT:      Head: Normocephalic and atraumatic. Mouth/Throat:      Mouth: Mucous membranes are moist.   Eyes:      Extraocular Movements: Extraocular movements intact. Pupils: Pupils are equal, round, and reactive to light. Cardiovascular:      Rate and Rhythm: Regular rhythm. Tachycardia present. No extrasystoles are present. Heart sounds: No murmur heard. No gallop. Pulmonary:      Effort: Tachypnea present. No accessory muscle usage. Breath sounds: Examination of the right-lower field reveals decreased breath sounds. Examination of the left-lower field reveals decreased breath sounds. Decreased breath sounds present. No wheezing or rhonchi. Chest:      Chest wall: Tenderness present. Abdominal:      Palpations: Abdomen is soft. There is no hepatomegaly or mass. Tenderness: There is no guarding. Musculoskeletal:      Cervical back: Normal range of motion and neck supple. Right lower leg: No tenderness. No edema. Left lower leg: No tenderness. No edema. Skin:     General: Skin is warm and dry. Capillary Refill: Capillary refill takes 2 to 3 seconds. Neurological:      General: No focal deficit present. Mental Status: She is alert and oriented to person, place, and time.          ED Medications  Medications   ondansetron (ZOFRAN) injection 4 mg (4 mg Intravenous Given 8/16/23 1150)   sodium chloride 0.9 % bolus 1,000 mL (0 mL Intravenous Stopped 8/16/23 1332)   acetaminophen (TYLENOL) tablet 650 mg (650 mg Oral Given 8/16/23 1412)   ibuprofen (MOTRIN) tablet 600 mg (600 mg Oral Given 8/16/23 1419)   sodium chloride 0.9 % bolus 1,000 mL (0 mL Intravenous Stopped 8/16/23 1521)       Diagnostic Studies  Results Reviewed     Procedure Component Value Units Date/Time    Fingerstick Glucose (POCT) [[id number]]  (Abnormal) Collected: 08/16/23 1522    Lab Status: Final result Updated: 08/16/23 1524     POC Glucose 144 mg/dl     HS Troponin I 2hr [[id number]]  (Normal) Collected: 08/16/23 1416    Lab Status: Final result Specimen: Blood from Arm, Left Updated: 08/16/23 1458     hs TnI 2hr 6 ng/L      Delta 2hr hsTnI 0 ng/L     Urine Microscopic [[id number]]  (Abnormal) Collected: 08/16/23 1338    Lab Status: Final result Specimen: Urine, Clean Catch Updated: 08/16/23 1416     RBC, UA 4-10 /hpf      WBC, UA 1-2 /hpf      Epithelial Cells Occasional /hpf      Bacteria, UA None Seen /hpf     POCT urinalysis dipstick [[id number]]  (Normal) Resulted: 08/16/23 1341    Lab Status: Final result Specimen: Urine Updated: 08/16/23 1341     Color, UA Yellow     Clarity, UA --     EXT Leukocytes, UA --     Nitrite, UA --     Protein, UA -- mg/dl      Glucose, UA --     Ketones, UA -- mg/dl      EXT Urobilinogen, UA --      Bilirubin, UA --     Blood, UA --    Urine Macroscopic, POC [[id number]]  (Abnormal) Collected: 08/16/23 1338    Lab Status: Final result Specimen: Urine Updated: 08/16/23 1339     Color, UA Yellow     Clarity, UA Clear     pH, UA 5.5     Leukocytes, UA Negative     Nitrite, UA Negative     Protein, UA Negative mg/dl      Glucose,  (1/2%) mg/dl      Ketones, UA 15 (1+) mg/dl      Urobilinogen, UA 0.2 E.U./dl      Bilirubin, UA Negative     Occult Blood, UA Trace     Specific Gravity, UA 1.015    Narrative:      CLINITEK RESULT    COVID/FLU/RSV [[id number]]  (Abnormal) Collected: 08/16/23 1213    Lab Status: Final result Specimen: Nares from Nose Updated: 08/16/23 1334     SARS-CoV-2 Positive     INFLUENZA A PCR Negative     INFLUENZA B PCR Negative     RSV PCR Negative    Narrative:      FOR PEDIATRIC PATIENTS - copy/paste COVID Guidelines URL to browser: [email or web address]    SARS-CoV-2 assay is a Nucleic Acid Amplification assay intended for the  qualitative detection of nucleic acid from SARS-CoV-2 in nasopharyngeal  swabs. Results are for the presumptive identification of SARS-CoV-2 RNA. Positive results are indicative of infection with SARS-CoV-2, the virus  causing COVID-19, but do not rule out bacterial infection or co-infection  with other viruses. Laboratories within the [facility] and its  territories are required to report all positive results to the appropriate  public health authorities. Negative results do not preclude SARS-CoV-2  infection and should not be used as the sole basis for treatment or other  patient management decisions. Negative results must be combined with  clinical observations, patient history, and epidemiological information. This test has not been FDA cleared or approved. This test has been authorized by FDA under an Emergency Use Authorization  (EUA). This test is only authorized for the duration of time the  declaration that circumstances exist justifying the authorization of the  emergency use of an in vitro diagnostic tests for detection of SARS-CoV-2  virus and/or diagnosis of COVID-19 infection under section 564(b)(1) of  the Act, 21 U. S.C. 628EYT-0(T)(9), unless the authorization is terminated  or revoked sooner. The test has been validated but independent review by FDA  and CLIA is pending. Test performed using [employer] GeneXpert: This RT-PCR assay targets N2,  a region unique to SARS-CoV-2. A conserved region in the E-gene was chosen  for pan-Sarbecovirus detection which includes SARS-CoV-2. According to CMS-2020-01-R, this platform meets the definition of high-throughput technology.     Comprehensive metabolic panel [[id number]]  (Abnormal) Collected: 08/16/23 1213    Lab Status: Final result Specimen: Blood from Arm, Left Updated: 08/16/23 1256     Sodium 134 mmol/L      Potassium 3.9 mmol/L      Chloride 102 mmol/L      CO2 24 mmol/L      ANION GAP 8 mmol/L      BUN 19 mg/dL      Creatinine 1.14 mg/dL      Glucose 263 mg/dL      Calcium 9.8 mg/dL      AST 29 U/L      ALT 31 U/L      Alkaline Phosphatase 133 U/L      Total Protein 7.7 g/dL      Albumin 3.7 g/dL      Total Bilirubin 0.66 mg/dL      eGFR 55 ml/min/1.73sq m     Narrative:      [place] guidelines for Chronic Kidney Disease (CKD):   •  Stage 1 with normal or high GFR (GFR > 90 mL/min/1.73 square meters)  •  Stage 2 Mild CKD (GFR = 60-89 mL/min/1.73 square meters)  •  Stage 3A Moderate CKD (GFR = 45-59 mL/min/1.73 square meters)  •  Stage 3B Moderate CKD (GFR = 30-44 mL/min/1.73 square meters)  •  Stage 4 Severe CKD (GFR = 15-29 mL/min/1.73 square meters)  •  Stage 5 End Stage CKD (GFR <15 mL/min/1.73 square meters)  Note: GFR calculation is accurate only with a steady state creatinine    Lipase [[id number]]  (Normal) Collected: 08/16/23 1213    Lab Status: Final result Specimen: Blood from Arm, Left Updated: 08/16/23 1256     Lipase 142 u/L     HS Troponin 0hr (reflex protocol) [[id number]]  (Normal) Collected: 08/16/23 1213    Lab Status: Final result Specimen: Blood from Arm, Left Updated: 08/16/23 1255     hs TnI 0hr 6 ng/L     Beta Hydroxybutyrate [[id number]] (Abnormal) Collected: 08/16/23 1213    Lab Status: Final result Specimen: Blood from Arm, Left Updated: 08/16/23 1249     BETA-HYDROXYBUTYRATE 1.4 mmol/L     D-dimer, quantitative [[id number]]  (Normal) Collected: 08/16/23 1213    Lab Status: Final result Specimen: Blood from Arm, Left Updated: 08/16/23 1245     D-Dimer, Quant <0.27 ug/ml FEU     Narrative: In the evaluation for possible pulmonary embolism, in the appropriate (Well's Score of 4 or less) patient, the age adjusted d-dimer cutoff for this patient can be calculated as:    Age x 0.01 (in ug/mL) for Age-adjusted D-dimer exclusion threshold for a patient over 50 years. CBC and differential [[id number]]  (Abnormal) Collected: 08/16/23 1213    Lab Status: Final result Specimen: Blood from Arm, Left Updated: 08/16/23 1235     WBC 7.91 Thousand/uL      RBC 5.00 Million/uL      Hemoglobin 13.7 g/dL      Hematocrit 42.7 %      MCV 85 fL      MCH 27.4 pg      MCHC 32.1 g/dL      RDW 13.6 %      MPV 9.5 fL      Platelets 269 Thousands/uL      nRBC 0 /100 WBCs      Neutrophils Relative 76 %      Immat GRANS % 0 %      Lymphocytes Relative 8 %      Monocytes Relative 14 %      Eosinophils Relative 1 %      Basophils Relative 1 %      Neutrophils Absolute 5.98 Thousands/µL      Immature Grans Absolute 0.02 Thousand/uL      Lymphocytes Absolute 0.66 Thousands/µL      Monocytes Absolute 1.08 Thousand/µL      Eosinophils Absolute 0.09 Thousand/µL      Basophils Absolute 0.08 Thousands/µL     Fingerstick Glucose (POCT) [[id number]]  (Abnormal) Collected: 08/16/23 1136    Lab Status: Final result Updated: 08/16/23 1137     POC Glucose 242 mg/dl                  XR chest 2 views   Final Result by Joel Vásquez MD (08/16 1229)      No acute cardiopulmonary disease.                   Workstation performed: [id number]               Procedures  ECG 12 Lead Documentation Only    Date/Time: 8/18/2023 1:15 AM    Performed by: Maryann Escoto MD  Authorized by: Maryann Escoto MD ECG reviewed by me, the ED Provider: yes    Patient location:  ED  Previous ECG:     Comparison to cardiac monitor: Yes    Interpretation:     Interpretation: normal    Rate:     ECG rate assessment: tachycardic    Rhythm:     Rhythm: sinus rhythm    Ectopy:     Ectopy: none    QRS:     QRS axis:  Normal  Conduction:     Conduction: normal    ST segments:     ST segments:  Normal  T waves:     T waves: normal            ED Course  ED Course as of 08/18/23 0119   Wed Aug 16, 2023   1145 POC Glucose(!): 242  Last HbA1c 10. Patient did not take PO diabetes medications this morning 2/2 nausea    1242 WBC: 7.91   1259 Lipase: 142   1300 BETA-HYDROXYBUTYRATE(!): 1.4   1310 Sodium(!): 134   1310 Glucose, Random(!): 263   1310 Alkaline Phosphatase(!): 133   1310 hs TnI 0hr: 6   1310 D-Dimer, Quant: <0.27   1406 SARS-COV-2(!): Positive   1413 Patient reevaluated. Giving tylenol and motrin for headache and body ache. Discussed test results and suspicion that her symptoms are due to COVID. Will reevaluated after delta troponin results. If normal delta, will ambulate and see if any ambulatory hypoxemia present.     1434 RBC, UA(!): 4-10   1506 Delta 2hr hsTnI: 0   1519 SpO2 remained at 98% while ambulating                                  Wells' Criteria for PE    Flowsheet Row Most Recent Value   Wells' Criteria for PE    Clinical signs and symptoms of DVT 0 Filed at: 08/16/2023 1148   PE is primary diagnosis or equally likely 0 Filed at: 08/16/2023 1148   HR >100 1.5 Filed at: 08/16/2023 1148   Immobilization at least 3 days or Surgery in the previous 4 weeks 0 Filed at: 08/16/2023 1148   Previous, objectively diagnosed PE or DVT 0 Filed at: 08/16/2023 1148   Hemoptysis 0 Filed at: 08/16/2023 1148   Malignancy with treatment within 6 months or palliative 0 Filed at: 08/16/2023 1148   Wells' Criteria Total 1.5 Filed at: 08/16/2023 1148            MDM     Patient is a 46 y.o. female  who presents to the ED with nausea, vomiting, fatigue, SOB, malaise. Vital signs significant for tachycardia. Exam as listed above    Differential diagnosis includes but is not limited to PE vs COVID vs Viral illness vs DKA vs ACS     Plan CXR, EKG, troponin, d-dimer, CBC, CMP, POC glucose, UA with reflex, B-hydroxybutyrate, IV fluids. View ED course above for further discussion on patient workup. On review of previous records patient saw family medicine August 8, 2023 for follow-up on diabetes. Blood glucose is typically 180-200. All labs reviewed and utilized in the medical decision making process  All radiology studies independently viewed by me and interpreted by the radiologist.  I reviewed all testing with the patient. Upon re-evaluation patient patient improved with IV fluids. Tachycardia resolved. Patient comfortable going home with return precautions. If she can not tolerated PO fluids or if blood glucose rises again, instructed to return to the ED. Disposition  Final diagnoses:   COVID-19   SOB (shortness of breath)     Time reflects when diagnosis was documented in both MDM as applicable and the Disposition within this note     Time User Action Codes Description Comment    8/16/2023  4:43 PM Alexsander Jeter Add [U07.1] COVID-19     8/16/2023  4:44 PM Alexsander Jeter Add [R06.02] SOB (shortness of breath)       ED Disposition     ED Disposition   Discharge    Condition   Stable    Date/Time   Wed Aug 16, 2023  4:43 PM    Comment   Rachel Deras discharge to home/self care.                Follow-up Information     Follow up With Specialties Details Why Contact Jose Grayson, DO Family Medicine  As needed [address]  [phone]            Discharge Medication List as of 8/16/2023  5:12 PM      START taking these medications    Details   ondansetron (ZOFRAN) 4 mg tablet Take 1 tablet (4 mg total) by mouth every 6 (six) hours, Starting Wed 8/16/2023, Normal         CONTINUE these medications which have NOT CHANGED    Details   Blood Glucose Monitoring Suppl (OneTouch Verio Reflect) w/Device KIT Check blood sugars twice daily. Please substitute with appropriate alternative as covered by patient's insurance. Dx: E11.65, Normal      glucose blood (OneTouch Verio) test strip Check blood sugars twice daily. Please substitute with appropriate alternative as covered by patient's insurance. Dx: E11.65, Normal      Jardiance 25 MG TABS TAKE ONE TABLET BY MOUTH EVERY MORNING, Normal      lisinopril-hydrochlorothiazide (PRINZIDE,ZESTORETIC) 10-12.5 MG per tablet TAKE ONE TABLET BY MOUTH EVERY DAY, Normal      metFORMIN (GLUCOPHAGE) 1000 MG tablet TAKE 1 TABLET BY MOUTH TWICE A DAY, Normal      omeprazole (PriLOSEC) 20 mg delayed release capsule Take 20 mg by mouth daily, Historical Med      OneTouch Delica Lancets 08F MISC Check blood sugars twice daily. Please substitute with appropriate alternative as covered by patient's insurance. Dx: E11.65, Normal      semaglutide (Rybelsus) 14 MG tablet Take 1 tablet (14 mg total) by mouth daily Take on an empty stomach with 4 oz water, and wait 30 minutes before eating, Starting Tue 8/8/2023, Until Mon 11/6/2023, Normal      simvastatin (ZOCOR) 40 mg tablet Take 1 tablet (40 mg total) by mouth daily at bedtime, Starting Wed 2/23/2022, Normal           No discharge procedures on file. PDMP Review     None           ED Provider  Attending physically available and evaluated Ariadna Frias. I managed the patient along with the ED Attending.     Electronically Signed by         Get Brooks MD  08/18/23 4868 COPD exacerbation

## 2023-08-16 NOTE — ED ATTENDING ATTESTATION
8/16/2023  I, Tianna Kathleen DO, saw and evaluated the patient. I have discussed the patient with the resident/non-physician practitioner and agree with the resident's/non-physician practitioner's findings, Plan of Care, and MDM as documented in the resident's/non-physician practitioner's note, except where noted. All available labs and Radiology studies were reviewed. I was present for key portions of any procedure(s) performed by the resident/non-physician practitioner and I was immediately available to provide assistance. At this point I agree with the current assessment done in the Emergency Department. I have conducted an independent evaluation of this patient a history and physical is as follows:    59-year-old female presents with shortness of breath, generalized weakness, vomiting, diarrhea, headache. Patient has been having symptoms since Friday. Has had some breath that started yesterday. No fevers. Reports some chest discomfort on the left side that feels like a pressure. No lower extremity swelling, no abdominal pain. Does report nausea and was unable to tolerate her meds today. Decreased p.o. intake secondary to nausea and vomiting. Denies URI symptoms, no cough. Has general fatigue. Since medication changes for her diabetes she has been having frequent episodes of diarrhea. On exam-no acute distress but appears ill, mucous membranes are dry, heart tachycardic, lungs clear, slightly decreased in the bases, abdomen soft and nontender, no lower extremity edema or calf tenderness. Plan- concern for possible viral syndrome but given the chest discomfort will also evaluate for cardiac disease and with the shortness of breath and tachycardia we will do a D-dimer to rule out PE. We will check viral panel, give IV fluids, check beta hydroxybutyrate to rule out DKA.     ED Course         Critical Care Time  Procedures

## 2023-08-16 NOTE — DISCHARGE INSTRUCTIONS
You were diagnosed with COVID. You were also  very dehydrated when you presented to the ER and received 2L IV fluids. I prescribed Zofran for you to take at home. Please take this every six hours and drink plenty of fluids. If you are struggling to keep up with PO fluid intake or cannot tolerate you diabetes medication, please return to the ER.

## 2023-10-03 ENCOUNTER — HOSPITAL ENCOUNTER (OUTPATIENT)
Dept: RADIOLOGY | Facility: HOSPITAL | Age: 53
Discharge: HOME | End: 2023-10-03
Payer: COMMERCIAL

## 2023-10-03 ENCOUNTER — OFFICE VISIT (OUTPATIENT)
Dept: FAMILY MEDICINE CLINIC | Facility: CLINIC | Age: 53
End: 2023-10-03
Payer: COMMERCIAL

## 2023-10-03 VITALS
TEMPERATURE: 96.6 F | DIASTOLIC BLOOD PRESSURE: 70 MMHG | OXYGEN SATURATION: 99 % | RESPIRATION RATE: 22 BRPM | WEIGHT: 216 LBS | SYSTOLIC BLOOD PRESSURE: 128 MMHG | HEART RATE: 84 BPM | BODY MASS INDEX: 33.9 KG/M2 | HEIGHT: 67 IN

## 2023-10-03 DIAGNOSIS — M79.89 SWELLING OF RIGHT FOOT: ICD-10-CM

## 2023-10-03 DIAGNOSIS — M79.89 SWELLING OF RIGHT FOOT: Primary | ICD-10-CM

## 2023-10-03 PROCEDURE — 99213 OFFICE O/P EST LOW 20 MIN: CPT | Performed by: NURSE PRACTITIONER

## 2023-10-03 PROCEDURE — 73630 X-RAY EXAM OF FOOT: CPT

## 2023-10-03 NOTE — ASSESSMENT & PLAN NOTE
Both feet with mounded appearance and callus formation on the lateral plantar aspects, R>L. Pressure with ambulation and palpation but no pain. No erythema or warmth, normal color and baseline sensation. No skin breakdown. Scheduled to see her podiatrist tomorrow, will get x-ray of foot.

## 2023-10-03 NOTE — PROGRESS NOTES
Name: Gabriel Delong      : 1970      MRN: [id number]  Encounter Provider: AMBER Romano  Encounter Date: 10/3/2023   Encounter department: [facility]     1. Swelling of right foot  Assessment & Plan: Both feet with mounded appearance and callus formation on the lateral plantar aspects, R>L. Pressure with ambulation and palpation but no pain. No erythema or warmth, normal color and baseline sensation. No skin breakdown. Scheduled to see her podiatrist tomorrow, will get x-ray of foot. Orders:  -     XR foot 3+ vw right; Future; Expected date: 10/03/2023         Subjective      Pt is a 48 y.o. y/o female who is seen today for evaluation of foot swelling. Past medical history of DM with neuropathy, HTN, paroxysmal SVT, hyperlipidemia, obesity. She states she woke this morning with swelling on the bottom lateral aspect of the R foot. She feels pressure but no pain, she has history of neuropathy. No known injury or trauma. She denies fever, chills. Review of Systems   Constitutional: Negative for chills and fever. Cardiovascular: Negative for leg swelling. Musculoskeletal:        Left foot swelling   Skin: Negative for color change, rash and wound. Neurological: Negative for weakness and numbness (baseline neuropathy). Current Outpatient Medications on File Prior to Visit   Medication Sig   • Blood Glucose Monitoring Suppl (OneTouch Verio Reflect) w/Device KIT Check blood sugars twice daily. Please substitute with appropriate alternative as covered by patient's insurance. Dx: E11.65   • glucose blood (OneTouch Verio) test strip Check blood sugars twice daily. Please substitute with appropriate alternative as covered by patient's insurance.  Dx: E11.65   • Jardiance 25 MG TABS TAKE ONE TABLET BY MOUTH EVERY MORNING   • lisinopril-hydrochlorothiazide (PRINZIDE,ZESTORETIC) 10-12.5 MG per tablet TAKE ONE TABLET BY MOUTH EVERY DAY • metFORMIN (GLUCOPHAGE) 1000 MG tablet TAKE 1 TABLET BY MOUTH TWICE A DAY   • omeprazole (PriLOSEC) 20 mg delayed release capsule Take 20 mg by mouth daily   • OneTouch Delica Lancets 38T MISC Check blood sugars twice daily. Please substitute with appropriate alternative as covered by patient's insurance. Dx: E11.65   • semaglutide (Rybelsus) 14 MG tablet Take 1 tablet (14 mg total) by mouth daily Take on an empty stomach with 4 oz water, and wait 30 minutes before eating   • simvastatin (ZOCOR) 40 mg tablet Take 1 tablet (40 mg total) by mouth daily at bedtime   • [DISCONTINUED] ondansetron (ZOFRAN) 4 mg tablet Take 1 tablet (4 mg total) by mouth every 6 (six) hours       Objective     /70 (BP Location: Left arm, Patient Position: Sitting, Cuff Size: Adult)   Pulse 84   Temp (!) 96.6 °F (35.9 °C) (Tympanic)   Resp 22   Ht 5' 7" (1.702 m)   Wt 98 kg (216 lb)   SpO2 99%   BMI 33.83 kg/m²     Physical Exam  Vitals reviewed. Constitutional:       General: She is awake. She is not in acute distress. Appearance: Normal appearance. She is well-developed and well-groomed. She is not ill-appearing or diaphoretic. Eyes:      General: Lids are normal.      Conjunctiva/sclera: Conjunctivae normal.   Cardiovascular:      Rate and Rhythm: Normal rate. Pulses: Normal pulses. Pulmonary:      Effort: Pulmonary effort is normal.   Musculoskeletal:      Right lower leg: No edema. Left lower leg: No edema. Right foot: Deformity present. Left foot: Deformity present. Feet:    Feet:      Right foot:      Skin integrity: Callus present. No ulcer, blister, skin breakdown, erythema, warmth, dry skin or fissure. Toenail Condition: Right toenails are abnormally thick. Fungal disease present. Left foot:      Skin integrity: Callus present. No skin breakdown, erythema or warmth. Toenail Condition: Left toenails are abnormally thick. Fungal disease present.      Comments: Lateral plantar aspects of both feet with mounded appearance, R>L. No warmth, erythema, skin breakdown. Pressure to palpation but no pain. As marked above with "x" there are two areas of brown pigmentation below the surface of the skin, possible small collections of old blood, less likely foreign body as there is no surrounding erythema or tenderness, overlying skin is intact. Skin:     General: Skin is dry. Coloration: Skin is not pale. Findings: No erythema or wound. Neurological:      Mental Status: She is alert and oriented to person, place, and time. Sensory: Sensory deficit (baseline neuropathy B feet) present. Psychiatric:         Attention and Perception: Attention normal.         Mood and Affect: Mood normal.         Speech: Speech normal.         Behavior: Behavior normal. Behavior is cooperative. Thought Content:  Thought content normal.         Cognition and Memory: Cognition normal.         Judgment: Judgment normal.       AMBER Machado

## 2023-10-04 ENCOUNTER — OFFICE VISIT (OUTPATIENT)
Dept: PODIATRY | Facility: CLINIC | Age: 53
End: 2023-10-04
Payer: COMMERCIAL

## 2023-10-04 VITALS
WEIGHT: 216 LBS | SYSTOLIC BLOOD PRESSURE: 109 MMHG | HEIGHT: 67 IN | BODY MASS INDEX: 33.9 KG/M2 | DIASTOLIC BLOOD PRESSURE: 67 MMHG | HEART RATE: 92 BPM

## 2023-10-04 DIAGNOSIS — M21.6X1 OTHER ACQUIRED DEFORMITIES OF RIGHT FOOT: ICD-10-CM

## 2023-10-04 DIAGNOSIS — Z89.421 ACQUIRED ABSENCE OF OTHER RIGHT TOE(S) (HCC): ICD-10-CM

## 2023-10-04 DIAGNOSIS — E11.40 TYPE 2 DIABETES MELLITUS WITH DIABETIC NEUROPATHY, UNSPECIFIED WHETHER LONG TERM INSULIN USE (HCC): Primary | ICD-10-CM

## 2023-10-04 PROCEDURE — 99214 OFFICE O/P EST MOD 30 MIN: CPT | Performed by: PODIATRIST

## 2023-10-04 NOTE — PROGRESS NOTES
PATIENT:  Isaac Casper  1970     Assessment     1. Type 2 diabetes mellitus with diabetic neuropathy, unspecified whether long term insulin use ([address])  Diabetic Shoe Inserts    Diabetic Shoe      2. Acquired absence of other right toe(s) ([address])  Diabetic Shoe Inserts    Diabetic Shoe      3. Other acquired deformities of right foot  Diabetic Shoe Inserts    Diabetic Shoe    CANCELED: XR foot 3+ vw right              Plan:  1. Patient was counseled on the condition and diagnosis. Educated disease prevention and risks related to diabetes. 2. Educated proper daily foot care and exam.  Instructed proper skin care / protection and footwear. Instructed to identify any signs of infection and related foot problem. 3. The recent blood work was reviewed / discussed and the last HbA1c was 10.4. Discussed proper blood glucose control with diet and exercise. 4. X-ray was reviewed and findings discussed. No acute change in midtarsal joint/ TMTJ indicating acute Charcot arthropathy. She still has mild collapsing of arch with prominent cuboid plantarly. Possible mild Charcot arthropathy that is resolved at this time. 5. Repeat X-ray in 6 weeks. Instructed to monitor for any swelling, pain, redness, or warmth to her foot. 6. Referred her for DM shoes and inserts. 7. The patient will return in 6 weeks. Subjective: The patient presents for evaluation of right foot. She noticed a bump on right plantar foot. No pain. No redness or swelling. Denied any injury. She talked to her PCP yesterday and had X-ray. Her BS has been running high. She is not compliant about periodic follow-up in our office. She has high risk diabetic foot with neuropathy, DFU, and history of partial toe amp.       The following portions of the patient's history were reviewed and updated as appropriate: allergies, current medications, past family history, past medical history, past social history, past surgical history and problem list.  All pertinent labs and images were reviewed. Past Medical History  Past Medical History:   Diagnosis Date   • Cellulitis of left lower extremity 04/15/2020   • COVID-19 12/22/2020   • Diabetes mellitus ([address])    • Exposure to SARS-associated coronavirus 04/15/2020   • Fever 04/15/2020   • Foot ulcer, right, with unspecified severity ([address]) 06/12/2019   • Hospital discharge follow-up 04/20/2022   • Hyperlipidemia    • Hypertension    • Obesity    • Pyogenic inflammation of bone ([address]) 04/20/2022       Past Surgical History  Past Surgical History:   Procedure Laterality Date   • INCISION AND DRAINAGE INTRA ORAL ABSCESS Left 10/14/2020    Procedure: INCISION AND DRAINAGE  (I&D) WOUND ORAL EXTRAORAL LEFT SUBMANDIBULAR SPACE, LEFT  SPACE SPACE, INTRAORAL LEFT LATERAL PHARYNGEAL SPACE AND LEFT SUBLINGUAL SPACE;  Surgeon: Shubham Nair DDS;  Location: [facility];  Service: Maxillofacial   • REMOVAL OF IMPACTED TOOTH - COMPLETELY BONY Left 10/14/2020    Procedure: EXTRACTION TEETH MULTIPLE (RETAINED ROOTS #1,3,14, TEETH #4,17,18,19,32;  Surgeon: Shubham Nair DDS;  Location: [facility];  Service: Maxillofacial   • TOE AMPUTATION Right 4/12/2022    Procedure: AMPUTATION DISTAL PHALYNX RIGHT SECOND TOE;  Surgeon: Claudia Phillips DPM;  Location: [facility];  Service: Podiatry   • TONSILLECTOMY AND ADENOIDECTOMY     • WOUND DEBRIDEMENT Right 7/25/2020    Procedure: Excision of non healing diabetic right foot Ulcer, with closure of wound, excision of tibial sesamoid right foot;  Surgeon: Claudia Phillips DPM;  Location: [facility];  Service: Podiatry        Allergies:  Patient has no known allergies. Medications:  Current Outpatient Medications   Medication Sig Dispense Refill   • Blood Glucose Monitoring Suppl (OneTouch Verio Reflect) w/Device KIT Check blood sugars twice daily. Please substitute with appropriate alternative as covered by patient's insurance.  Dx: E11.65 1 kit 0   • glucose blood (OneTouch Verio) test strip Check blood sugars twice daily. Please substitute with appropriate alternative as covered by patient's insurance. Dx: E11.65 200 each 3   • Jardiance 25 MG TABS TAKE ONE TABLET BY MOUTH EVERY MORNING 90 tablet 3   • lisinopril-hydrochlorothiazide (PRINZIDE,ZESTORETIC) 10-12.5 MG per tablet TAKE ONE TABLET BY MOUTH EVERY DAY 90 tablet 3   • metFORMIN (GLUCOPHAGE) 1000 MG tablet TAKE 1 TABLET BY MOUTH TWICE A  tablet 0   • omeprazole (PriLOSEC) 20 mg delayed release capsule Take 20 mg by mouth daily     • OneTouch Delica Lancets 66J MISC Check blood sugars twice daily. Please substitute with appropriate alternative as covered by patient's insurance. Dx: E11.65 200 each 3   • semaglutide (Rybelsus) 14 MG tablet Take 1 tablet (14 mg total) by mouth daily Take on an empty stomach with 4 oz water, and wait 30 minutes before eating 90 tablet 3   • simvastatin (ZOCOR) 40 mg tablet Take 1 tablet (40 mg total) by mouth daily at bedtime 90 tablet 3     No current facility-administered medications for this visit. Social History:  Social History     Socioeconomic History   • Marital status: /Civil Union     Spouse name: None   • Number of children: None   • Years of education: None   • Highest education level: None   Occupational History   • None   Tobacco Use   • Smoking status: Never     Passive exposure: Never   • Smokeless tobacco: Never   Vaping Use   • Vaping Use: Never used   Substance and Sexual Activity   • Alcohol use:  Yes     Alcohol/week: 4.0 standard drinks of alcohol     Types: 4 Standard drinks or equivalent per week     Comment: 4 drinks in a week   • Drug use: No   • Sexual activity: Yes     Partners: Male     Birth control/protection: None   Other Topics Concern   • None   Social History Narrative   • None     Social Determinants of Health     Financial Resource Strain: Not on file   Food Insecurity: No Food Insecurity (4/12/2022)    Hunger Vital Sign    • Worried About Running Out of Food in the Last Year: Never true    • Ran Out of Food in the Last Year: Never true   Transportation Needs: No Transportation Needs (4/12/2022)    PRAPARE - Transportation    • Lack of Transportation (Medical): No    • Lack of Transportation (Non-Medical): No   Physical Activity: Not on file   Stress: Not on file   Social Connections: Not on file   Intimate Partner Violence: Not on file   Housing Stability: Low Risk  (4/12/2022)    Housing Stability Vital Sign    • Unable to Pay for Housing in the Last Year: No    • Number of Places Lived in the Last Year: 1    • Unstable Housing in the Last Year: No          Review of Systems   Constitutional: Negative for chills, fatigue and fever. Respiratory: Negative for cough and shortness of breath. Cardiovascular: Negative for chest pain and leg swelling. Gastrointestinal: Negative for diarrhea, nausea and vomiting. Skin: Negative for wound. Neurological: Positive for numbness. Objective:    /67   Pulse 92   Ht 5' 7" (1.702 m)   Wt 98 kg (216 lb)   BMI 33.83 kg/m²       Physical Exam  Vitals reviewed. Constitutional:       General: She is not in acute distress. Appearance: She is well-developed. She is obese. She is not toxic-appearing or diaphoretic. Cardiovascular:      Rate and Rhythm: Normal rate and regular rhythm. Pulses:           Dorsalis pedis pulses are 2+ on the right side and 2+ on the left side. Posterior tibial pulses are 1+ on the right side and 1+ on the left side. Comments: No ischemia. Pulmonary:      Effort: Pulmonary effort is normal. No respiratory distress. Musculoskeletal:         General: Deformity present. No tenderness or signs of injury. Right lower leg: Edema present. Left lower leg: Edema present. Right foot: No foot drop. Left foot: No foot drop. Comments: Tight achilles tendon with equinus bilaterally. Hammertoe presents.   Partial amp right 2nd toe.  Prominent cuboid right plantar foot. No warmth, swelling, redness, or pain in right foot. Skin:     General: Skin is warm and dry. Capillary Refill: Capillary refill takes less than 2 seconds. Coloration: Skin is not cyanotic or mottled. Findings: No abscess, ecchymosis, erythema or wound. Nails: There is no clubbing. Neurological:      General: No focal deficit present. Mental Status: She is alert and oriented to person, place, and time. Cranial Nerves: No cranial nerve deficit. Sensory: Sensory deficit present. Motor: No abnormal muscle tone. Coordination: Coordination normal.   Psychiatric:         Mood and Affect: Mood normal.         Behavior: Behavior normal.         Thought Content:  Thought content normal.         Judgment: Judgment normal.

## 2023-10-12 DIAGNOSIS — M79.89 SWELLING OF RIGHT FOOT: Primary | ICD-10-CM

## 2023-11-22 ENCOUNTER — APPOINTMENT (OUTPATIENT)
Dept: LAB | Facility: CLINIC | Age: 53
End: 2023-11-22
Payer: COMMERCIAL

## 2023-11-22 DIAGNOSIS — E86.0 DEHYDRATION: ICD-10-CM

## 2023-11-22 DIAGNOSIS — E11.40 TYPE 2 DIABETES MELLITUS WITH DIABETIC NEUROPATHY, WITHOUT LONG-TERM CURRENT USE OF INSULIN (HCC): Chronic | ICD-10-CM

## 2023-11-22 LAB
ALBUMIN SERPL BCP-MCNC: 3.7 G/DL (ref 3.5–5)
ALP SERPL-CCNC: 99 U/L (ref 34–104)
ALT SERPL W P-5'-P-CCNC: 12 U/L (ref 7–52)
ANION GAP SERPL CALCULATED.3IONS-SCNC: 12 MMOL/L
AST SERPL W P-5'-P-CCNC: 17 U/L (ref 13–39)
BILIRUB SERPL-MCNC: 0.55 MG/DL (ref 0.2–1)
BUN SERPL-MCNC: 22 MG/DL (ref 5–25)
CALCIUM SERPL-MCNC: 9.1 MG/DL (ref 8.4–10.2)
CHLORIDE SERPL-SCNC: 105 MMOL/L (ref 96–108)
CO2 SERPL-SCNC: 20 MMOL/L (ref 21–32)
CREAT SERPL-MCNC: 0.79 MG/DL (ref 0.6–1.3)
EST. AVERAGE GLUCOSE BLD GHB EST-MCNC: 260 MG/DL
GFR SERPL CREATININE-BSD FRML MDRD: 85 ML/MIN/1.73SQ M
GLUCOSE P FAST SERPL-MCNC: 178 MG/DL (ref 65–99)
HBA1C MFR BLD: 10.7 %
POTASSIUM SERPL-SCNC: 4.1 MMOL/L (ref 3.5–5.3)
PROT SERPL-MCNC: 6.4 G/DL (ref 6.4–8.4)
SODIUM SERPL-SCNC: 137 MMOL/L (ref 135–147)

## 2023-11-22 PROCEDURE — 36415 COLL VENOUS BLD VENIPUNCTURE: CPT

## 2023-11-22 PROCEDURE — 80053 COMPREHEN METABOLIC PANEL: CPT

## 2023-11-22 PROCEDURE — 83036 HEMOGLOBIN GLYCOSYLATED A1C: CPT

## 2023-11-24 DIAGNOSIS — E11.40 TYPE 2 DIABETES MELLITUS WITH DIABETIC NEUROPATHY, WITHOUT LONG-TERM CURRENT USE OF INSULIN (HCC): Primary | Chronic | ICD-10-CM

## 2023-11-24 NOTE — PROGRESS NOTES
Completed prior auth for [facility]. Approved. PA Case: [id number], Status: Approved, Coverage Starts on: 11/23/2023 7:00 PM, Coverage Ends on: 11/23/2024 7:00 PM. Questions? Contact [id number].        Pharmacist Tracking Tool  Reason For Outreach: Embedded Pharmacist  Demographics:  Intervention Method: Chart Review  Type of Intervention: New  Topics Addressed: Diabetes  Pharmacologic Interventions: Med Rec  Non-Pharmacologic Interventions: Care coordination, Cost, and Medication/Device education  Time:  Direct Patient Care:  5  mins  Care Coordination:  15  mins  Recommendation Recipient: Patient/Caregiver  Outcome: Accepted

## 2023-11-27 ENCOUNTER — TELEPHONE (OUTPATIENT)
Age: 53
End: 2023-11-27

## 2023-11-27 DIAGNOSIS — M21.6X1 OTHER ACQUIRED DEFORMITIES OF RIGHT FOOT: Primary | ICD-10-CM

## 2023-11-27 NOTE — TELEPHONE ENCOUNTER
Caller: John Paul [place]    Doctor/Office: Dr. Osei Rothman    #: [phone]    Escalation: Care Patient needs a new script for an x ray to be put in. The one that was in is now canceled. Please return call with questions.  Thank you

## 2023-12-03 ENCOUNTER — HOSPITAL ENCOUNTER (OUTPATIENT)
Dept: RADIOLOGY | Facility: HOSPITAL | Age: 53
Discharge: HOME | End: 2023-12-03
Payer: COMMERCIAL

## 2023-12-03 DIAGNOSIS — M21.6X1 OTHER ACQUIRED DEFORMITIES OF RIGHT FOOT: ICD-10-CM

## 2023-12-03 PROCEDURE — 73630 X-RAY EXAM OF FOOT: CPT

## 2023-12-11 ENCOUNTER — CLINICAL SUPPORT (OUTPATIENT)
Dept: FAMILY MEDICINE CLINIC | Facility: CLINIC | Age: 53
End: 2023-12-11

## 2023-12-11 DIAGNOSIS — E11.40 TYPE 2 DIABETES MELLITUS WITH DIABETIC NEUROPATHY, WITHOUT LONG-TERM CURRENT USE OF INSULIN (HCC): Primary | Chronic | ICD-10-CM

## 2023-12-11 NOTE — PROGRESS NOTES
[facility]  Jensen Christopher    Faye Nagy is a 48 y.o. female who was referred to the pharmacist for diabetes education and management, referred by Brina Adhikari DO . Telemedicine consent  The patient was identified by name and date of birth. Faye Nagy was informed that this is a telemedicine visit and that the visit is being conducted through Telephone. My office door was closed. No one else was in the room. She acknowledged consent and understanding of privacy and security of the video platform. The patient has agreed to participate and understands they can discontinue the visit at any time. Assessment/ Plan       Type 2 Diabetes:  Goal A1c <7 based on ADA/AACE/ACE guidelines   Most recent A1c above goal but not reflective of current treatment as patient just started Mounjaro approx. 3 weeks ago. Reported Home SMBG  Not taken regularly  Complications:  Microvascular complications: neuropathy  Macrovascular complications: none noted  Current Diabetes Regimen:  Mounjaro 2.5mg once weekly  Metformin 1000mg BID  Jardiance 25mg daily    Changes to Medication Regimen: If PCP is in agreement with plan  Will titrate to Mounjaro 5mg once weekly after completion of 4 doses of 2.5mg, approx. 12/25    Most recent labs and diabetes goals discussed   Materials Provided: none  Labs Ordered: none      2. Need for Additional Therapies:  Statin: on simvastatin  ACEI/ARB: on lisinopril  ASA: not indicated    3. Health Maintenance:  Vaccine recommendations: utd - flu received through employee health  Eye Exam: overdue  Foot Exam: utd  Urine Microalbumin: utd    4. Medication Reconciliation:   Medication list reviewed with pt at today's visit. Discrepancies as discussed below. Medication list updated to reflect medications pt is currently taking  Renewal request sent to prescribing provider for: none       5.  Hyperlipidemia without clinical ASCVD event: 2018 ACC/AHA lipid guidelines recommend moderate intensity statin. Patient currently on moderate intensity and tolerating well without side effects. Lipid panel/LFTs acceptable. Medications: Continue  as prescribed. 6. HTN:   BP goal less than 130/80 mmHg. In office BP below goal, HR acceptable. Caffeine may be contributing to elevated BP readings today. Home BP readings avg below goal. HR acceptable  Patient with room for lifestyle modifications. Serum K+ WNL. Monitoring: not required    Referrals placed at this visit: none    Follow-up: approx. 4 weeks for [facility] [place] in[place]titration        Subjective   Patient doing well with start of Mounjaro. Diabetes  She presents for her follow-up diabetic visit. She has type 2 diabetes mellitus. Her disease course has been worsening. There are no hypoglycemic associated symptoms. Associated symptoms include foot paresthesias. There are no hypoglycemic complications. Symptoms are stable. Diabetic complications include peripheral neuropathy. Current diabetic treatment includes oral agent (dual therapy). She is compliant with treatment all of the time. Her weight is stable. She is following a generally healthy diet. She has not had a previous visit with a dietitian. She participates in exercise daily. An ACE inhibitor/angiotensin II receptor blocker is being taken. She sees a podiatrist.Eye exam is not current. Medication Adherence/ Tolerability:  Doing well, no missed doses since starting, first week felt "pukey" but not since  The patient reports missing 0 doses of medication in the past 2 weeks. Medications Tried in Past:  - glyburide  - rybelsus  - invokana    2. Lifestyle:   Last visit with dietician: n/a  Previously attended Living Well with Diabetes Class: no  Diet Recall:   Breakfast:   Lunch:   Dinner:   Snacks:   Beverages:   Physical Activity: daily    3.  Home monitoring devices  Glucometer: Yes, Brand: onetouch  CGM: No, Brand: n/a  BP monitor: Yes, Brand: unknown        Objective       Current Outpatient Medications   Medication Instructions   • Blood Glucose Monitoring Suppl (OneTouch Verio Reflect) w/Device KIT Check blood sugars twice daily. Please substitute with appropriate alternative as covered by patient's insurance. Dx: E11.65   • glucose blood (OneTouch Verio) test strip Check blood sugars twice daily. Please substitute with appropriate alternative as covered by patient's insurance. Dx: E11.65   • Jardiance 25 MG TABS TAKE ONE TABLET BY MOUTH EVERY MORNING   • lisinopril-hydrochlorothiazide (PRINZIDE,ZESTORETIC) 10-12.5 MG per tablet TAKE ONE TABLET BY MOUTH EVERY DAY   • metFORMIN (GLUCOPHAGE) 1000 MG tablet TAKE 1 TABLET BY MOUTH TWICE A DAY   • omeprazole (PRILOSEC) 20 mg, Oral, Daily   • OneTouch Delica Lancets 40D MISC Check blood sugars twice daily. Please substitute with appropriate alternative as covered by patient's insurance. Dx: E11.65   • simvastatin (ZOCOR) 40 mg, Oral, Daily at bedtime   • tirzepatide [facility]) 2.5 mg, Subcutaneous, Every 7 days   • [START ON 12/24/2023] tirzepatide [facility]) 5 mg, Subcutaneous, Every 7 days     No Known Allergies    Immunization History   Administered Date(s) Administered   • COVID-19 PFIZER VACCINE 0.3 ML IM 12/19/2020, 03/03/2021   • INFLUENZA 10/15/2015, 10/09/2017, 10/12/2018, 10/15/2019, 10/09/2020, 11/05/2021, 10/05/2022   • Influenza, seasonal, injectable 10/15/2015, 10/09/2017   • Pneumococcal Conjugate 13-Valent 11/05/2018   • Pneumococcal Polysaccharide PPV23 11/11/2015, 08/16/2017   • Tdap 05/27/2019       I have reviewed the patient's allergies, medications and history as noted in the electronic medical record and updated as necessary. Lab Results   Component Value Date    HGBA1C 10.7 (H) 11/22/2023    HGBA1C 10.4 (H) 07/28/2023    HGBA1C 8.1 (H) 04/24/2023       Blood Glucose Readings  The patient is currently checking blood glucose 0 times per day.  Patient does not report with SMBG logs. Date AM Post-Breakfast Lunch Post-Lunch Dinner Post-Dinner Comments                                                                                                                           Avg            Hypoglycemia: The patient had 0 episodes/symptoms of hypoglycemia. Hyperglycemia: The patient had 0 symptoms of hyperglycemia. ASCVD Risk:  The 10-year ASCVD risk score (Tigist FERNANDO, et al., 2019) is: 2.6%    Values used to calculate the score:      Age: 48 years      Sex: Female      Is Non- : No      Diabetic: Yes      Tobacco smoker: No      Systolic Blood Pressure: 983 mmHg      Is BP treated: Yes      HDL Cholesterol: 63 mg/dL      Total Cholesterol: 195 mg/dL     Vitals: There were no vitals filed for this visit.     Labs:    Lab Results   Component Value Date    SODIUM 137 11/22/2023    K 4.1 11/22/2023    EGFR 85 11/22/2023    CREATININE 0.79 11/22/2023    GLUF 178 (H) 11/22/2023    [id number] 736 01/03/2018    MICROALBCRE 8 04/24/2023         Pharmacist Tracking Tool     Pharmacist Tracking Tool  Reason For Outreach: Embedded Pharmacist  Demographics:  Intervention Method: Phone  Type of Intervention: Follow-Up  Topics Addressed: Diabetes, Dyslipidemia, and Hypertension  Pharmacologic Interventions: Dose or Frequency Adjusted, Prevent or Manage JESSICA, and Med Rec  Non-Pharmacologic Interventions: Adherence addressed, Care coordination, Cost, Home Monitoring, and Medication/Device education  Time:  Direct Patient Care:  10  mins  Care Coordination:  10  mins  Recommendation Recipient: Patient/Caregiver  Outcome: Accepted

## 2023-12-26 ENCOUNTER — TELEPHONE (OUTPATIENT)
Dept: FAMILY MEDICINE CLINIC | Facility: CLINIC | Age: 53
End: 2023-12-26

## 2023-12-26 NOTE — TELEPHONE ENCOUNTER
[facility] called regarding patients medication-- Mounjaro and Rybelsus. At this time they are unable to fill Mounjaro because it would need to have an over ride done if patient is to be on both medications. Reviewed note from 12/11/23, not clear if patient is to discontinue Rybelsus. Patient did speak with [facility] as well and told them she will be on both.     Please advise

## 2024-01-04 NOTE — TELEPHONE ENCOUNTER
Called the insurance. Per notes in chart from Karishma, patient is on Mounjaro, metformin and januvia. Not on Rybelsus. Gave this information to the insurance as they can not authorizae for both medications. The insurance had approved the Mounjaro in November. Patient can not have both medications as they are similar per the insurance. The Pharmacist at the insurance if putting in an override in the system to have the Mounjaro fill and the rybelsus discontinued.

## 2024-01-04 NOTE — TELEPHONE ENCOUNTER
Machelle from [facility] called again. She states the pt is not due for a refill yet on the Rybelsus and as the Mounjaro is the same thing, they need a reason for the medication change to approve the Mounjaro. Please call Machelle back at [phone], case # [id number].

## 2024-01-08 ENCOUNTER — CLINICAL SUPPORT (OUTPATIENT)
Dept: FAMILY MEDICINE CLINIC | Facility: CLINIC | Age: 54
End: 2024-01-08

## 2024-01-08 DIAGNOSIS — E11.40 TYPE 2 DIABETES MELLITUS WITH DIABETIC NEUROPATHY, WITHOUT LONG-TERM CURRENT USE OF INSULIN (HCC): Primary | Chronic | ICD-10-CM

## 2024-01-08 NOTE — PROGRESS NOTES
[facility]  Karishma Colbert, Pharmacist    Denisa Millan is a 53 y.o. female who was referred to the pharmacist for diabetes education and management, referred by Cassie Nickerson DO .      Telemedicine consent  The patient was identified by name and date of birth. Denisa Millan was informed that this is a telemedicine visit and that the visit is being conducted through Telephone.  My office door was closed. No one else was in the room.  She acknowledged consent and understanding of privacy and security of the video platform. The patient has agreed to participate and understands they can discontinue the visit at any time.    Assessment/ Plan       Type 2 Diabetes:  Goal A1c <7 based on ADA/AACE/ACE guidelines   Most recent A1c above goal but not reflective of current treatment as patient just started Mounjaro approx. 5 weeks ago.   Reported Home SMBG  Not taken regularly  Complications:  Microvascular complications: neuropathy  Macrovascular complications: none noted  Current Diabetes Regimen:  Mounjaro 5mg once weekly  Metformin 1000mg BID  Jardiance 25mg daily    Changes to Medication Regimen:   If PCP is in agreement with plan  Stay on Mounjaro 5mg once weekly x 6 weeks, then recheck A1C to determine further dose escalation/titration  Pharmacy dispensed 3 months to patient 1/4/24    Most recent labs and diabetes goals discussed   Materials Provided: none  Labs Ordered: none      2. Need for Additional Therapies:  Statin: on simvastatin  ACEI/ARB: on lisinopril  ASA: not indicated    3. Health Maintenance:  Vaccine recommendations: utd - flu received through employee health  Eye Exam: overdue  Foot Exam: utd  Urine Microalbumin: utd    4. Medication Reconciliation:   Medication list reviewed with pt at today's visit. Discrepancies as discussed below.  Medication list updated to reflect medications pt is currently taking  Renewal request sent to prescribing provider for:  "none       5. Hyperlipidemia without clinical ASCVD event:   2018 ACC/AHA lipid guidelines recommend moderate intensity statin.   Patient currently on moderate intensity and tolerating well without side effects.   Lipid panel/LFTs acceptable.  Medications: Continue  as prescribed.    6. HTN:   BP goal less than 130/80 mmHg.   In office BP below goal, HR acceptable.   Caffeine may be contributing to elevated BP readings today.   Home BP readings avg below goal. HR acceptable  Patient with room for lifestyle modifications.   Serum K+ WNL.     Monitoring: not required    Referrals placed at this visit: none    Follow-up: approx. 6 weeks after A1C        Subjective   Dispensed 5mg 1/4/24. Clarified with insurance mounjaro replaces rybelsus, not both.     Diabetes  She presents for her follow-up diabetic visit. She has type 2 diabetes mellitus. Her disease course has been worsening. There are no hypoglycemic associated symptoms. Associated symptoms include foot paresthesias. There are no hypoglycemic complications. Symptoms are stable. Diabetic complications include peripheral neuropathy. Current diabetic treatment includes oral agent (dual therapy). She is compliant with treatment all of the time. Her weight is stable. She is following a generally healthy diet. She has not had a previous visit with a dietitian. She participates in exercise daily. An ACE inhibitor/angiotensin II receptor blocker is being taken. She sees a podiatrist.Eye exam is not current.       Medication Adherence/ Tolerability:  Doing well, no missed doses since starting, first week felt \"pukey\" but not since  The patient reports missing 0 doses of medication in the past 2 weeks.       Medications Tried in Past:  - glyburide  - rybelsus  - invokana    2. Lifestyle:   Last visit with dietician: n/a  Previously attended Living Well with Diabetes Class: no  Diet Recall:   Breakfast:   Lunch:   Dinner:   Snacks:   Beverages:   Physical Activity: " daily    3. Home monitoring devices  Glucometer: Yes, Brand: onetouch  CGM: No, Brand: n/a  BP monitor: Yes, Brand: unknown        Objective       Current Outpatient Medications   Medication Instructions   • Blood Glucose Monitoring Suppl (OneTouch Verio Reflect) w/Device KIT Check blood sugars twice daily. Please substitute with appropriate alternative as covered by patient's insurance. Dx: E11.65   • glucose blood (OneTouch Verio) test strip Check blood sugars twice daily. Please substitute with appropriate alternative as covered by patient's insurance. Dx: E11.65   • Jardiance 25 MG TABS TAKE ONE TABLET BY MOUTH EVERY MORNING   • lisinopril-hydrochlorothiazide (PRINZIDE,ZESTORETIC) 10-12.5 MG per tablet TAKE ONE TABLET BY MOUTH EVERY DAY   • metFORMIN (GLUCOPHAGE) 1000 MG tablet TAKE 1 TABLET BY MOUTH TWICE A DAY   • omeprazole (PRILOSEC) 20 mg, Oral, Daily   • OneTouch Delica Lancets 33G MISC Check blood sugars twice daily. Please substitute with appropriate alternative as covered by patient's insurance. Dx: E11.65   • simvastatin (ZOCOR) 40 mg, Oral, Daily at bedtime   • tirzepatide (MOUNJARO) 5 mg, Subcutaneous, Every 7 days     No Known Allergies    Immunization History   Administered Date(s) Administered   • COVID-19 PFIZER VACCINE 0.3 ML IM 12/19/2020, 03/03/2021   • INFLUENZA 10/15/2015, 10/09/2017, 10/12/2018, 10/15/2019, 10/09/2020, 11/05/2021, 10/05/2022   • Influenza, seasonal, injectable 10/15/2015, 10/09/2017   • Pneumococcal Conjugate 13-Valent 11/05/2018   • Pneumococcal Polysaccharide PPV23 11/11/2015, 08/16/2017   • Tdap 05/27/2019       I have reviewed the patient's allergies, medications and history as noted in the electronic medical record and updated as necessary.    Lab Results   Component Value Date    HGBA1C 10.7 (H) 11/22/2023    HGBA1C 10.4 (H) 07/28/2023    HGBA1C 8.1 (H) 04/24/2023       Blood Glucose Readings  The patient is currently checking blood glucose 0 times per day. Patient  does not report with SMBG logs.    Date AM Post-Breakfast Lunch Post-Lunch Dinner Post-Dinner Comments                                                                                                                           Avg            Hypoglycemia: The patient had 0 episodes/symptoms of hypoglycemia.   Hyperglycemia: The patient had 0 symptoms of hyperglycemia.     ASCVD Risk:  The 10-year ASCVD risk score (Tigist FERNANDO, et al., 2019) is: 2.6%    Values used to calculate the score:      Age: 53 years      Sex: Female      Is Non- : No      Diabetic: Yes      Tobacco smoker: No      Systolic Blood Pressure: 109 mmHg      Is BP treated: Yes      HDL Cholesterol: 63 mg/dL      Total Cholesterol: 195 mg/dL     Vitals:  There were no vitals filed for this visit.    Labs:    Lab Results   Component Value Date    SODIUM 137 11/22/2023    K 4.1 11/22/2023    EGFR 85 11/22/2023    CREATININE 0.79 11/22/2023    GLUF 178 (H) 11/22/2023    [id number] 736 01/03/2018    MICROALBCRE 8 04/24/2023         Pharmacist Tracking Tool     Pharmacist Tracking Tool  Reason For Outreach: Embedded Pharmacist  Demographics:  Intervention Method: Phone  Type of Intervention: Follow-Up  Topics Addressed: Diabetes, Dyslipidemia, and Hypertension  Pharmacologic Interventions: Dose or Frequency Adjusted, Prevent or Manage JESSICA, and Med Rec  Non-Pharmacologic Interventions: Adherence addressed, Care coordination, Cost, Home Monitoring, and Medication/Device education  Time:  Direct Patient Care:  10  mins  Care Coordination:  10  mins  Recommendation Recipient: Patient/Caregiver  Outcome: Accepted

## 2024-01-18 ENCOUNTER — VBI (OUTPATIENT)
Dept: ADMINISTRATIVE | Facility: OTHER | Age: 54
End: 2024-01-18

## 2024-01-18 LAB
LEFT EYE DIABETIC RETINOPATHY: POSITIVE
RIGHT EYE DIABETIC RETINOPATHY: POSITIVE
SEVERITY (EYE EXAM): NORMAL

## 2024-02-02 ENCOUNTER — APPOINTMENT (OUTPATIENT)
Dept: LAB | Facility: CLINIC | Age: 54
End: 2024-02-02
Payer: COMMERCIAL

## 2024-02-02 DIAGNOSIS — E11.40 TYPE 2 DIABETES MELLITUS WITH DIABETIC NEUROPATHY, WITHOUT LONG-TERM CURRENT USE OF INSULIN (HCC): Chronic | ICD-10-CM

## 2024-02-02 PROCEDURE — 83036 HEMOGLOBIN GLYCOSYLATED A1C: CPT

## 2024-02-02 PROCEDURE — 36415 COLL VENOUS BLD VENIPUNCTURE: CPT

## 2024-02-03 LAB
EST. AVERAGE GLUCOSE BLD GHB EST-MCNC: 240 MG/DL
HBA1C MFR BLD: 10 %

## 2024-02-05 ENCOUNTER — TELEPHONE (OUTPATIENT)
Age: 54
End: 2024-02-05

## 2024-02-05 DIAGNOSIS — E11.40 TYPE 2 DIABETES MELLITUS WITH DIABETIC NEUROPATHY, WITHOUT LONG-TERM CURRENT USE OF INSULIN (HCC): Primary | Chronic | ICD-10-CM

## 2024-02-05 NOTE — TELEPHONE ENCOUNTER
PA for tirzepatide (Mounjaro) 7.5 MG/0.5ML    Submitted via  []CMM-KEY   [x][employer]-Case ID # [id number]  []Faxed to plan   []Other website   []Phone call Case ID #     Office notes sent, clinical questions answered. Awaiting determination

## 2024-02-07 NOTE — TELEPHONE ENCOUNTER
PA for tirzepatide (Mounjaro) 7.5 MG/0.5ML already approved     Prior authorization approved  Payer: [employer] Case ID: [id number]  Note from payer: PA Case: [id number], Status: Closed, Closed Reason Code: CF Prior Authorization duplicate/approved, Closed Rationale: The requested medication does not require a prior authorization review. A refill is payable on or after 06/07/2024.. Questions? Contact [phone].

## 2024-02-08 ENCOUNTER — OFFICE VISIT (OUTPATIENT)
Dept: PODIATRY | Facility: CLINIC | Age: 54
End: 2024-02-08
Payer: COMMERCIAL

## 2024-02-08 VITALS
HEART RATE: 93 BPM | SYSTOLIC BLOOD PRESSURE: 115 MMHG | DIASTOLIC BLOOD PRESSURE: 70 MMHG | HEIGHT: 67 IN | BODY MASS INDEX: 33.83 KG/M2

## 2024-02-08 DIAGNOSIS — Z89.421 ACQUIRED ABSENCE OF OTHER RIGHT TOE(S) (HCC): ICD-10-CM

## 2024-02-08 DIAGNOSIS — E11.40 TYPE 2 DIABETES MELLITUS WITH DIABETIC NEUROPATHY, UNSPECIFIED WHETHER LONG TERM INSULIN USE (HCC): Primary | ICD-10-CM

## 2024-02-08 DIAGNOSIS — M79.89 SWELLING OF RIGHT FOOT: ICD-10-CM

## 2024-02-08 PROCEDURE — 99213 OFFICE O/P EST LOW 20 MIN: CPT | Performed by: PODIATRIST

## 2024-02-08 NOTE — PROGRESS NOTES
PATIENT:  Denisa Millan  1970     Assessment     1. Type 2 diabetes mellitus with diabetic neuropathy, unspecified whether long term insulin use ([facility])        2. Acquired absence of other right toe(s) ([facility])        3. Swelling of right foot  Ambulatory Referral to Podiatry              Plan:  1. Patient was counseled on the condition and diagnosis.  Educated disease prevention and risks related to diabetes.    2. Educated proper daily foot care and exam.  Instructed proper skin care / protection and footwear.  Instructed to identify any signs of infection and related foot problem.   3. The recent blood work was reviewed / discussed and the last HbA1c was 10.0.  Discussed proper blood glucose control with diet and exercise.    4. X-ray was reviewed and findings discussed.  No change from previous X-ray.  No acute change in midtarsal joint/ TMTJ indicating acute Charcot arthropathy.    5. Continue to monitor her feet.  Recommended her to wear diabetic shoes.  6. She will return in 6 months for diabetic foot exam.        Subjective:   The patient presents for evaluation of right foot.  Her BS has been little better.  No redness, warmth, or swelling.  Still notices a bump on right plantar foot.  No pain.  She has high risk diabetic foot with neuropathy, DFU, and history of partial toe amp.      The following portions of the patient's history were reviewed and updated as appropriate: allergies, current medications, past family history, past medical history, past social history, past surgical history and problem list.  All pertinent labs and images were reviewed.      Past Medical History  Past Medical History:   Diagnosis Date    Cellulitis of left lower extremity 04/15/2020    COVID-19 12/22/2020    Diabetes mellitus (HCC)     Exposure to SARS-associated coronavirus 04/15/2020    Fever 04/15/2020    Foot ulcer, right, with unspecified severity ([facility]) 06/12/2019    Hospital discharge follow-up  04/20/2022    Hyperlipidemia     Hypertension     Obesity     Pyogenic inflammation of bone (HCC) 04/20/2022       Past Surgical History  Past Surgical History:   Procedure Laterality Date    INCISION AND DRAINAGE INTRA ORAL ABSCESS Left 10/14/2020    Procedure: INCISION AND DRAINAGE  (I&D) WOUND ORAL EXTRAORAL LEFT SUBMANDIBULAR SPACE, LEFT  SPACE SPACE, INTRAORAL LEFT LATERAL PHARYNGEAL SPACE AND LEFT SUBLINGUAL SPACE;  Surgeon: Mitchell Booker DDS;  Location: [facility];  Service: Maxillofacial    REMOVAL OF IMPACTED TOOTH - COMPLETELY BONY Left 10/14/2020    Procedure: EXTRACTION TEETH MULTIPLE (RETAINED ROOTS #1,3,14, TEETH #4,17,18,19,32;  Surgeon: Mitchell Booker DDS;  Location: [facility];  Service: Maxillofacial    TOE AMPUTATION Right 4/12/2022    Procedure: AMPUTATION DISTAL PHALYNX RIGHT SECOND TOE;  Surgeon: Luis Carlos Urban DPM;  Location: [facility];  Service: Podiatry    TONSILLECTOMY AND ADENOIDECTOMY      WOUND DEBRIDEMENT Right 7/25/2020    Procedure: Excision of non healing diabetic right foot Ulcer, with closure of wound, excision of tibial sesamoid right foot;  Surgeon: Luis Carlos Urban DPM;  Location: [facility];  Service: Podiatry        Allergies:  Patient has no known allergies.    Medications:  Current Outpatient Medications   Medication Sig Dispense Refill    Blood Glucose Monitoring Suppl (OneTouch Verio Reflect) w/Device KIT Check blood sugars twice daily. Please substitute with appropriate alternative as covered by patient's insurance. Dx: E11.65 1 kit 0    glucose blood (OneTouch Verio) test strip Check blood sugars twice daily. Please substitute with appropriate alternative as covered by patient's insurance. Dx: E11.65 200 each 3    Jardiance 25 MG TABS TAKE ONE TABLET BY MOUTH EVERY MORNING 90 tablet 3    lisinopril-hydrochlorothiazide (PRINZIDE,ZESTORETIC) 10-12.5 MG per tablet TAKE ONE TABLET BY MOUTH EVERY DAY 90 tablet 3    metFORMIN (GLUCOPHAGE) 1000 MG tablet TAKE 1 TABLET BY  MOUTH TWICE A  tablet 0    omeprazole (PriLOSEC) 20 mg delayed release capsule Take 20 mg by mouth daily      OneTouch Delica Lancets 33G MISC Check blood sugars twice daily. Please substitute with appropriate alternative as covered by patient's insurance. Dx: E11.65 200 each 3    simvastatin (ZOCOR) 40 mg tablet Take 1 tablet (40 mg total) by mouth daily at bedtime 90 tablet 3    tirzepatide (Mounjaro) 7.5 MG/0.5ML Inject 0.5 mL (7.5 mg total) under the skin every 7 days 2 mL 0     No current facility-administered medications for this visit.       Social History:  Social History     Socioeconomic History    Marital status: /Civil Union     Spouse name: None    Number of children: None    Years of education: None    Highest education level: None   Occupational History    None   Tobacco Use    Smoking status: Never     Passive exposure: Never    Smokeless tobacco: Never   Vaping Use    Vaping status: Never Used   Substance and Sexual Activity    Alcohol use: Yes     Alcohol/week: 4.0 standard drinks of alcohol     Types: 4 Standard drinks or equivalent per week     Comment: 4 drinks in a week    Drug use: No    Sexual activity: Yes     Partners: Male     Birth control/protection: None   Other Topics Concern    None   Social History Narrative    None     Social Determinants of Health     Financial Resource Strain: Not on file   Food Insecurity: No Food Insecurity (4/12/2022)    Hunger Vital Sign     Worried About Running Out of Food in the Last Year: Never true     Ran Out of Food in the Last Year: Never true   Transportation Needs: No Transportation Needs (4/12/2022)    PRAPARE - Transportation     Lack of Transportation (Medical): No     Lack of Transportation (Non-Medical): No   Physical Activity: Not on file   Stress: Not on file   Social Connections: Not on file   Intimate Partner Violence: Not on file   Housing Stability: Low Risk  (4/12/2022)    Housing Stability Vital Sign     Unable to Pay for  "Housing in the Last Year: No     Number of Places Lived in the Last Year: 1     Unstable Housing in the Last Year: No          Review of Systems   Constitutional:  Negative for chills, fatigue and fever.   Respiratory:  Negative for cough and shortness of breath.    Cardiovascular:  Negative for chest pain and leg swelling.   Gastrointestinal:  Negative for diarrhea, nausea and vomiting.   Skin:  Negative for wound.   Neurological:  Positive for numbness.       Objective:    /70   Pulse 93   Ht 5' 7\" (1.702 m)   BMI 33.83 kg/m²       Physical Exam  Vitals reviewed.   Constitutional:       General: She is not in acute distress.     Appearance: She is well-developed. She is obese. She is not toxic-appearing or diaphoretic.   Cardiovascular:      Rate and Rhythm: Normal rate and regular rhythm.      Pulses:           Dorsalis pedis pulses are 2+ on the right side and 2+ on the left side.        Posterior tibial pulses are 1+ on the right side and 1+ on the left side.      Comments: No ischemia.  Pulmonary:      Effort: Pulmonary effort is normal. No respiratory distress.   Musculoskeletal:         General: Deformity present. No swelling, tenderness or signs of injury.      Right foot: No foot drop.      Left foot: No foot drop.      Comments: Tight achilles tendon with equinus bilaterally.  Hammertoe presents.  Partial amp right 2nd toe.  Prominent cuboid right worse than left.  No warmth, swelling, redness, or pain in right foot.     Skin:     General: Skin is warm and dry.      Capillary Refill: Capillary refill takes less than 2 seconds.      Coloration: Skin is not cyanotic or mottled.      Findings: No abscess, ecchymosis, erythema or wound.      Nails: There is no clubbing.   Neurological:      General: No focal deficit present.      Mental Status: She is alert and oriented to person, place, and time.      Cranial Nerves: No cranial nerve deficit.      Sensory: Sensory deficit present.      Motor: No " abnormal muscle tone.      Coordination: Coordination normal.   Psychiatric:         Mood and Affect: Mood normal.         Behavior: Behavior normal.         Thought Content: Thought content normal.         Judgment: Judgment normal.

## 2024-02-14 ENCOUNTER — TELEPHONE (OUTPATIENT)
Age: 54
End: 2024-02-14

## 2024-02-14 NOTE — TELEPHONE ENCOUNTER
Patient called and states just got her Mounjaro prescription and only got enough for a month and no refills. Is she only to have a month or is she gonna be on it longer than a month, if so needs refills. Please call patient at [phone]

## 2024-02-15 NOTE — TELEPHONE ENCOUNTER
Patient returned call to schedule Physical appointment as message on her voicemail stated.  First available with Dr. Nickerson was Thursday May 30th.  Patient scheduled for that date but would like to know if her refills will be sent since this is so far out for an appointment.  Or if she can be seen sooner?    Please advise and call patient back.  Thank you

## 2024-02-15 NOTE — TELEPHONE ENCOUNTER
Pt scheduled for physical in May. Is pt ok to wait or do you want her to be seen sooner? Pt asked if her meds would be refilled if she was booked out that far    Please advise

## 2024-02-15 NOTE — TELEPHONE ENCOUNTER
Patient called in reference to a message left on her voicemail. Checked with Cielo to find out what was going on. She state patient has not been seen provider since 08/2023. Reviewed chart and patient cancelled last 2 appointments with provider but saw pharmacist in Jan.    Called and left a detailed message for the patient to call and schedule an appointment. Advised on voicemail to send a message or call and ask for me

## 2024-03-08 DIAGNOSIS — E11.40 TYPE 2 DIABETES MELLITUS WITH DIABETIC NEUROPATHY, WITHOUT LONG-TERM CURRENT USE OF INSULIN (HCC): Primary | Chronic | ICD-10-CM

## 2024-04-03 DIAGNOSIS — E11.40 CONTROLLED TYPE 2 DIABETES MELLITUS WITH NEUROPATHY (HCC): ICD-10-CM

## 2024-05-03 DIAGNOSIS — E66.01 CLASS 2 SEVERE OBESITY DUE TO EXCESS CALORIES WITH SERIOUS COMORBIDITY AND BODY MASS INDEX (BMI) OF 36.0 TO 36.9 IN ADULT (HCC): ICD-10-CM

## 2024-05-03 DIAGNOSIS — E11.40 TYPE 2 DIABETES MELLITUS WITH DIABETIC NEUROPATHY, WITHOUT LONG-TERM CURRENT USE OF INSULIN (HCC): Primary | Chronic | ICD-10-CM

## 2024-05-06 ENCOUNTER — TELEPHONE (OUTPATIENT)
Dept: FAMILY MEDICINE CLINIC | Facility: CLINIC | Age: 54
End: 2024-05-06

## 2024-05-06 NOTE — TELEPHONE ENCOUNTER
Fax request received for a Prior Auth for Mounjaro 15MG/0.5ML.    KEY: [id number]    Please initiate prior auth.

## 2024-05-09 NOTE — TELEPHONE ENCOUNTER
PA Already Approved for Mounjaro 15 mg/0.5 mL     Note from payer: The [employer] Prior Authorization Team is unable to review this request for prior authorization as the medication has been previously approved. This approval will  2024. No further action is needed at this time.   Payer: [employer]

## 2024-05-24 DIAGNOSIS — E11.40 TYPE 2 DIABETES MELLITUS WITH DIABETIC NEUROPATHY, WITHOUT LONG-TERM CURRENT USE OF INSULIN (HCC): Primary | Chronic | ICD-10-CM

## 2024-05-24 DIAGNOSIS — E78.5 HYPERLIPIDEMIA, UNSPECIFIED HYPERLIPIDEMIA TYPE: Chronic | ICD-10-CM

## 2024-05-24 DIAGNOSIS — I10 BENIGN ESSENTIAL HYPERTENSION: ICD-10-CM

## 2024-05-29 ENCOUNTER — APPOINTMENT (OUTPATIENT)
Dept: LAB | Facility: CLINIC | Age: 54
End: 2024-05-29
Payer: COMMERCIAL

## 2024-05-29 DIAGNOSIS — I10 BENIGN ESSENTIAL HYPERTENSION: ICD-10-CM

## 2024-05-29 DIAGNOSIS — E78.5 HYPERLIPIDEMIA, UNSPECIFIED HYPERLIPIDEMIA TYPE: Chronic | ICD-10-CM

## 2024-05-29 DIAGNOSIS — E11.40 TYPE 2 DIABETES MELLITUS WITH DIABETIC NEUROPATHY, WITHOUT LONG-TERM CURRENT USE OF INSULIN (HCC): Chronic | ICD-10-CM

## 2024-05-29 LAB
ALBUMIN SERPL BCP-MCNC: 4 G/DL (ref 3.5–5)
ALP SERPL-CCNC: 100 U/L (ref 34–104)
ALT SERPL W P-5'-P-CCNC: 13 U/L (ref 7–52)
ANION GAP SERPL CALCULATED.3IONS-SCNC: 16 MMOL/L (ref 4–13)
AST SERPL W P-5'-P-CCNC: 20 U/L (ref 13–39)
BILIRUB SERPL-MCNC: 0.45 MG/DL (ref 0.2–1)
BUN SERPL-MCNC: 21 MG/DL (ref 5–25)
CALCIUM SERPL-MCNC: 9.2 MG/DL (ref 8.4–10.2)
CHLORIDE SERPL-SCNC: 102 MMOL/L (ref 96–108)
CHOLEST SERPL-MCNC: 199 MG/DL
CO2 SERPL-SCNC: 20 MMOL/L (ref 21–32)
CREAT SERPL-MCNC: 0.88 MG/DL (ref 0.6–1.3)
CREAT UR-MCNC: 53.6 MG/DL
EST. AVERAGE GLUCOSE BLD GHB EST-MCNC: 189 MG/DL
GFR SERPL CREATININE-BSD FRML MDRD: 75 ML/MIN/1.73SQ M
GLUCOSE P FAST SERPL-MCNC: 125 MG/DL (ref 65–99)
HBA1C MFR BLD: 8.2 %
HDLC SERPL-MCNC: 58 MG/DL
LDLC SERPL CALC-MCNC: 125 MG/DL (ref 0–100)
MICROALBUMIN UR-MCNC: <7 MG/L
MICROALBUMIN/CREAT 24H UR: <13 MG/G CREATININE (ref 0–30)
POTASSIUM SERPL-SCNC: 4.4 MMOL/L (ref 3.5–5.3)
PROT SERPL-MCNC: 6.7 G/DL (ref 6.4–8.4)
SODIUM SERPL-SCNC: 138 MMOL/L (ref 135–147)
TRIGL SERPL-MCNC: 78 MG/DL
TSH SERPL DL<=0.05 MIU/L-ACNC: 1.83 UIU/ML (ref 0.45–4.5)

## 2024-05-29 PROCEDURE — 84443 ASSAY THYROID STIM HORMONE: CPT

## 2024-05-29 PROCEDURE — 82043 UR ALBUMIN QUANTITATIVE: CPT

## 2024-05-29 PROCEDURE — 36415 COLL VENOUS BLD VENIPUNCTURE: CPT

## 2024-05-29 PROCEDURE — 82570 ASSAY OF URINE CREATININE: CPT

## 2024-05-29 PROCEDURE — 83036 HEMOGLOBIN GLYCOSYLATED A1C: CPT

## 2024-05-29 PROCEDURE — 80053 COMPREHEN METABOLIC PANEL: CPT

## 2024-05-29 PROCEDURE — 80061 LIPID PANEL: CPT

## 2024-05-30 ENCOUNTER — OFFICE VISIT (OUTPATIENT)
Dept: FAMILY MEDICINE CLINIC | Facility: CLINIC | Age: 54
End: 2024-05-30
Payer: COMMERCIAL

## 2024-05-30 VITALS
WEIGHT: 202.4 LBS | BODY MASS INDEX: 31.77 KG/M2 | SYSTOLIC BLOOD PRESSURE: 136 MMHG | RESPIRATION RATE: 16 BRPM | DIASTOLIC BLOOD PRESSURE: 80 MMHG | TEMPERATURE: 96.9 F | HEART RATE: 88 BPM | HEIGHT: 67 IN

## 2024-05-30 DIAGNOSIS — Z23 ENCOUNTER FOR IMMUNIZATION: ICD-10-CM

## 2024-05-30 DIAGNOSIS — E66.9 OBESITY, CLASS I, BMI 30.0-34.9 (SEE ACTUAL BMI): ICD-10-CM

## 2024-05-30 DIAGNOSIS — Z12.11 SCREENING FOR COLON CANCER: ICD-10-CM

## 2024-05-30 DIAGNOSIS — Z12.31 ENCOUNTER FOR SCREENING MAMMOGRAM FOR BREAST CANCER: ICD-10-CM

## 2024-05-30 DIAGNOSIS — E78.5 HYPERLIPIDEMIA, UNSPECIFIED HYPERLIPIDEMIA TYPE: Chronic | ICD-10-CM

## 2024-05-30 DIAGNOSIS — E11.40 TYPE 2 DIABETES MELLITUS WITH DIABETIC NEUROPATHY, WITHOUT LONG-TERM CURRENT USE OF INSULIN (HCC): Chronic | ICD-10-CM

## 2024-05-30 DIAGNOSIS — Z00.00 ANNUAL PHYSICAL EXAM: Primary | ICD-10-CM

## 2024-05-30 DIAGNOSIS — I10 BENIGN ESSENTIAL HYPERTENSION: ICD-10-CM

## 2024-05-30 DIAGNOSIS — Z89.421 ACQUIRED ABSENCE OF OTHER RIGHT TOE(S) (HCC): ICD-10-CM

## 2024-05-30 PROBLEM — E66.811 OBESITY, CLASS I, BMI 30.0-34.9 (SEE ACTUAL BMI): Status: ACTIVE | Noted: 2020-07-24

## 2024-05-30 PROCEDURE — 99396 PREV VISIT EST AGE 40-64: CPT | Performed by: FAMILY MEDICINE

## 2024-05-30 PROCEDURE — 90471 IMMUNIZATION ADMIN: CPT

## 2024-05-30 PROCEDURE — 90677 PCV20 VACCINE IM: CPT

## 2024-05-30 NOTE — ASSESSMENT & PLAN NOTE
On sylvia  Lab Results   Component Value Date    HGBA1C 8.2 (H) 05/29/2024      Patient : Annika Arevalo Age: 62 year old Sex: female   MRN: [id number] Encounter Date: 3/19/2024    History     Chief Complaint   Patient presents with    Knee Pain       The history was provided by the patient. No  was used    HPI  62 year old female with hypercholesterolemia and prediabetes presents to the ER for evaluation of left knee pain.  Patient notes that she was walking out of a clients warehouse when she had her left knee locked up and had pain with any weightbearing.  She notes that she has been gardening recently, after gardening she noticed some soreness but no significant pain.  No injury or trauma or fall otherwise.  She notes the pain is to the lateral aspect of the left knee and posterior aspect of the lower leg. He was initially concerned that this was related to sciatica but she has not had any back pain, hip pain or pain that radiates down the leg.  No weakness or numbness to the extremities.   No chest pain or shortness of breath.  No history of malignancy.  No personal or family history of VTE.  Patient is a non-smoker.  No fevers.    Review of Systems  See above for pertinent constitutional, skin, eye, HEENT, cardiac, pulmonary, GI, , neurologic, hematologic, psychiatric.     Allergies   Allergen Reactions    Alcohol   (Food Or Med) Nausea & Vomiting    Amoxicillin RASH    Nsaids Other (See Comments)     Bleeding in urine    Seasonal Other (See Comments)    Penicillins PRURITUS       Past Medical History  Past Medical History:   Diagnosis Date    Encounter for Papanicolaou smear for cervical cancer screening     Visit for screening mammogram        Past Surgical History  Past Surgical History:   Procedure Laterality Date     section, low transverse         Family History  Family History   Problem Relation Age of Onset    Diabetes Mother     Hypertension Mother     Hypertension Father        Social History  Social History     Tobacco Use    Smoking status: Never     Smokeless tobacco: Never   Vaping Use    Vaping Use: never used   Substance Use Topics    Alcohol use: No       Medications  Current Discharge Medication List        Prior to Admission Medications    Details   atorvastatin (LIPITOR) 10 MG tablet TAKE 1 TABLET DAILY  Qty: 90 tablet, Refills: 3             Current Discharge Medication List        New Prescriptions    Details   diclofenac (VOLTAREN) 1 % gel Apply 4 g topically 4 times daily as needed (pain).  Qty: 150 g, Refills: 1             Physical Exam   Visit Vitals  BP (!) 143/76 (BP Location: LUE - Left upper extremity, Patient Position: Sitting/High-Alcocer's)   Pulse 80   Temp 98.2 °F (36.8 °C) (Oral)   Resp 18   Ht 5' 1\" (1.549 m)   SpO2 97%   BMI 27.53 kg/m²        Physical Exam  Vitals and nursing note reviewed.   Constitutional:       General: She is not in acute distress.     Appearance: Normal appearance. She is normal weight. She is not ill-appearing, toxic-appearing or diaphoretic.   HENT:      Head: Normocephalic and atraumatic.      Mouth/Throat:      Mouth: Mucous membranes are moist.      Neck: Neck supple.   Eyes:      Extraocular Movements: Extraocular movements intact.      Conjunctiva/sclera: Conjunctivae normal.   Cardiovascular:      Rate and Rhythm: Normal rate and regular rhythm.      Pulses: Normal pulses.      Heart sounds: Normal heart sounds.   Pulmonary:      Effort: Pulmonary effort is normal. No respiratory distress.      Breath sounds: Normal breath sounds. No stridor. No wheezing, rhonchi or rales.   Musculoskeletal:      Comments: DP/PT pulses 2+.  Sensation on the lower extremities intact.  Plantar and dorsiflexion strength 5/5.  ROM of the knees, hips, ankles intact.  Patient has tenderness with lateral posterior left knee and left calf.  There is no calf or knee erythema, warmth, swelling or ecchymosis.  No tenderness to palpation of the feet, ankles, tib-fib. Compartments are soft bilaterally.  No tenderness with rocking of  the hips.    Skin:     General: Skin is warm and dry.      Capillary Refill: Capillary refill takes less than 2 seconds.   Neurological:      Mental Status: She is alert and oriented to person, place, and time.   Psychiatric:         Mood and Affect: Mood normal.         Behavior: Behavior normal.         Thought Content: Thought content normal.         Lab Results     No results found for this visit on 03/19/24.    Radiology Results     Imaging Results              US VASC LOWER EXTREMITY VENOUS DUPLEX LEFT (Final result)  Result time 03/19/24 15:10:26      Final result                   Impression:      1. No DVT within the left leg.                    Electronically Signed by: DIOR CRUZ M.D.   Signed on: 3/19/2024 3:10 PM   Workstation ID: [id number]               Narrative:    EXAM: US VASC LOWER EXTREMITY VENOUS DUPLEX LEFT    CLINICAL INDICATION: Left knee and calf pain.    COMPARISON: None.    FINDINGS:    Grayscale, color Doppler, and spectral ultrasound of the major veins of the  left leg as well as the contralateral right femoral vein.    Scattered areas of slow venous flow within the left leg deep veins. The  same within the left leg superficial vein great saphenous vein.    The bilateral common femoral veins are patent.    Left great saphenous, femoral, popliteal, and calf veins are patent.                                       XR KNEE 3 VIEW LEFT (Final result)  Result time 03/19/24 14:28:50      Final result                   Impression:    1. Mild left knee osteoarthritis.  2. No acute fracture or traumatic malalignment.        Electronically Signed by: JENNI BERNARD MD   Signed on: 3/19/2024 2:28 PM   Workstation ID: [id number]               Narrative:    EXAM: XR KNEE 3 VIEW LEFT    CLINICAL INDICATION: Pain.    COMPARISON: None.    FINDINGS:    No acute fracture or traumatic malalignment. Mild osteoarthritis  characterized by medial predominant joint space narrowing. Os fabella  is  noted. No osseous lesion. There is no large joint effusion.                                      Procedures     Procedures    Medical Decision Making        Medical Decision Making  62-year-old female presents to the ER for evaluation of left knee pain that began today.  Patient is stable, nontoxic-appearing and in no acute distress.  Oxygen saturation 97% on room air.  Cardiac and pulmonary examination unremarkable.  Patient has no complaints of back pain, weakness or numbness to the extremities.  She has normal range of motion of the left knee.  Tenderness noted to the lateral posterior aspect of the knee, mild calf tenderness.  No erythema, warmth, ecchymosis.  Multiple diagnoses considered.  No clinical suspicion for infectious etiology, cellulitis, septic joint, gout or pseudogout.  With calf pain and age will obtain ultrasound to rule out DVT although suspicion is low.  Will obtain x-ray to further evaluate for arthritic or bone abnormality.  Patient given Tylenol for pain.  She is agreeable to this plan.    Amount and/or Complexity of Data Reviewed  Radiology: ordered and independent interpretation performed. Decision-making details documented in ED Course.    Risk  OTC drugs.        ED Course as of 03/19/24 1531   Tue Mar 19, 2024   1450 Per independent interpretation, no evidence of fracture on left knee x-ray.  Possible arthritic changes seen [SW]   1513 Ultrasound without evidence of DVT [SW]   1529 Results were discussed with the patient.  She was able to ambulate here with assistance from ER technician.  Will give Voltaren gel.  Discussed follow-up with orthopedics, primary care doctor, supportive care and management. [SW]      ED Course User Index  [SW] Frida Aaron PA-C       This case discussed with Dr. Duran who agreed with work-up, plan of care    Clinical Impression     ED Diagnosis   1. Acute pain of left knee        2. Primary osteoarthritis of left knee            Disposition         Discharge 3/19/2024  3:30 PM  Annika Arevalo discharge to home/self care.        SIGNATURE  Frida Aaron PA-C  3/19/2024        The 21st Century Cures Act makes medical notes like these available to patients in the interest of transparency. Please be advised that this is a medical document. Medical documents are intended to carry relevant information and the clinical opinion of the practitioner. The medical note is intended as medical provider to provider communication, and may appear blunt or direct. It is written in medical language, and may contain abbreviations or verbiage that are unfamiliar.         Frida Aaron PA-C  03/19/24 1531

## 2024-05-30 NOTE — PROGRESS NOTES
Diabetic Foot Exam    Patient's shoes and socks removed.    Right Foot/Ankle   Right Foot Inspection  Skin Exam: skin normal and skin intact. No dry skin, no warmth, no callus, no erythema, no maceration, no abnormal color, no pre-ulcer, no ulcer and no callus.     Toe Exam: ROM and strength within normal limits.     Sensory   Monofilament testing: absent    Vascular  Capillary refills: < 3 seconds  The right DP pulse is 2+. The right PT pulse is 2+.     Left Foot/Ankle  Left Foot Inspection  Skin Exam: skin normal and skin intact. No dry skin, no warmth, no erythema, no maceration, normal color, no pre-ulcer, no ulcer and no callus.     Toe Exam: ROM and strength within normal limits.     Sensory   Monofilament testing: absent    Vascular  Capillary refills: < 3 seconds  The left DP pulse is 2+. The left PT pulse is 2+.     Assign Risk Category  No deformity present  Loss of protective sensation  No weak pulses  Risk: 1      Adult Annual Physical  Name: Denisa Millan      : 1970      MRN: [id number]  Encounter Provider: Cassie Nickerson DO  Encounter Date: 2024   Encounter department: [facility]    Assessment & Plan   1. Annual physical exam  Assessment & Plan:  Mammogram ordered  2. Encounter for screening mammogram for breast cancer  -     Mammo screening bilateral w 3d & cad; Future; Expected date: 2024  3. Screening for colon cancer  -     Cologuard  4. Type 2 diabetes mellitus with diabetic neuropathy, without long-term current use of insulin (HCC)  [facility]  [facility] Results   Component Value Date    HGBA1C 8.2 (H) 2024     Orders:  -     Hemoglobin A1C; Future; Expected date: 2024  5. Benign essential hypertension  Assessment & Plan:  Stable on current med  6. Acquired absence of other right toe(s) (HCC)  Assessment & Plan:  Seeing podiatry  7. Hyperlipidemia, unspecified hyperlipidemia type  Assessment & Plan:  On zocor  8. Obesity, Class  "I, BMI 30.0-34.9 (see actual BMI)  Assessment & Plan:  Lost 26 pounds  9. Encounter for immunization  -     Pneumococcal Conjugate Vaccine 20-valent (Pcv20)    Immunizations and preventive care screenings were discussed with patient today. Appropriate education was printed on patient's after visit summary.    Counseling:  Dental Health: discussed importance of regular tooth brushing, flossing, and dental visits.      Depression Screening and Follow-up Plan: Patient was screened for depression during today's encounter. They screened negative with a PHQ-2 score of 0.        History of Present Illness     Adult Annual Physical:  Patient presents for annual physical. Here for wellness  Labs revieewed  A1c improved  On moujaro.     Diet and Physical Activity:  - Diet/Nutrition: diabetic diet.  - Exercise: walking and strength training exercises.    Depression Screening:  - PHQ-2 Score: 0    General Health:  - Sleep: sleeps well.  - Hearing: normal hearing right ear and normal hearing left ear.  - Vision: no vision problems.  - Dental: regular dental visits.    /GYN Health:  - Follows with GYN: no.   - Last menstrual cycle: 12/1/2023.   - History of STDs: no    Review of Systems   Constitutional: Negative.    HENT: Negative.     Eyes: Negative.    Respiratory: Negative.     Cardiovascular: Negative.    Gastrointestinal: Negative.    Endocrine: Negative.    Genitourinary: Negative.    Musculoskeletal: Negative.    Skin: Negative.    Allergic/Immunologic: Negative.    Neurological: Negative.    Hematological: Negative.    Psychiatric/Behavioral: Negative.       Medical History Reviewed by provider this encounter:         Objective     /80 (BP Location: Left arm, Patient Position: Sitting, Cuff Size: Standard)   Pulse 88   Temp (!) 96.9 °F (36.1 °C) (Tympanic)   Resp 16   Ht 5' 7.4\" (1.712 m)   Wt 91.8 kg (202 lb 6.4 oz)   BMI 31.32 kg/m²     Physical Exam  Vitals and nursing note reviewed.   Constitutional:  "      Appearance: Normal appearance. She is well-developed.   HENT:      Head: Normocephalic and atraumatic.      Right Ear: External ear normal.      Left Ear: External ear normal.      Nose: Nose normal.   Eyes:      Extraocular Movements: Extraocular movements intact.      Conjunctiva/sclera: Conjunctivae normal.      Pupils: Pupils are equal, round, and reactive to light.   Cardiovascular:      Rate and Rhythm: Normal rate and regular rhythm.      Pulses: no weak pulses.           Dorsalis pedis pulses are 2+ on the right side and 2+ on the left side.        Posterior tibial pulses are 2+ on the right side and 2+ on the left side.      Heart sounds: Normal heart sounds.   Pulmonary:      Effort: Pulmonary effort is normal.      Breath sounds: Normal breath sounds.   Abdominal:      General: Abdomen is flat. Bowel sounds are normal.      Palpations: Abdomen is soft.   Musculoskeletal:         General: Normal range of motion.      Cervical back: Normal range of motion and neck supple.        Feet:    Feet:      Right foot:      Skin integrity: No ulcer, skin breakdown, erythema, warmth, callus or dry skin.      Left foot:      Skin integrity: No ulcer, skin breakdown, erythema, warmth, callus or dry skin.   Skin:     General: Skin is warm and dry.      Capillary Refill: Capillary refill takes less than 2 seconds.   Neurological:      General: No focal deficit present.      Mental Status: She is alert and oriented to person, place, and time.   Psychiatric:         Mood and Affect: Mood normal.         Behavior: Behavior normal.         Thought Content: Thought content normal.         Judgment: Judgment normal.       Administrative Statements   I have spent a total time of 15 minutes on 05/30/24 In caring for this patient including Prognosis.

## 2024-06-04 DIAGNOSIS — E11.40 TYPE 2 DIABETES MELLITUS WITH DIABETIC NEUROPATHY, WITHOUT LONG-TERM CURRENT USE OF INSULIN (HCC): Primary | Chronic | ICD-10-CM

## 2024-06-04 DIAGNOSIS — E66.9 OBESITY, CLASS I, BMI 30.0-34.9 (SEE ACTUAL BMI): ICD-10-CM

## 2024-06-06 ENCOUNTER — TELEPHONE (OUTPATIENT)
Dept: FAMILY MEDICINE CLINIC | Facility: CLINIC | Age: 54
End: 2024-06-06

## 2024-06-06 NOTE — TELEPHONE ENCOUNTER
Fax request received for a prior auth for Ozempic 8MG/3ML.    Key: [id number]    Please initiate prior auth.

## 2024-06-07 DIAGNOSIS — E66.9 OBESITY, CLASS I, BMI 30.0-34.9 (SEE ACTUAL BMI): ICD-10-CM

## 2024-06-07 DIAGNOSIS — E11.40 TYPE 2 DIABETES MELLITUS WITH DIABETIC NEUROPATHY, WITHOUT LONG-TERM CURRENT USE OF INSULIN (HCC): Primary | Chronic | ICD-10-CM

## 2024-06-12 NOTE — TELEPHONE ENCOUNTER
PA for Ozempic    Submitted via    []CMM-KEY   [x]Surescripts-Case ID # [id number]  []Faxed to plan   []Other website   []Phone call Case ID #     Office notes sent, clinical questions answered. Awaiting determination    Turnaround time for your insurance to make a decision on your Prior Authorization can take 7-21 business days.

## 2024-06-19 NOTE — TELEPHONE ENCOUNTER
PA for ozempic Approved     Date(s) approved  June 12, 2024 to June 12, 2025         Patient advised by          [] [employer] Interactive[employer] Message  [] Phone call   [x]LMOM  []L/M to call office as no active Communication consent on file  []Unable to leave detailed message as VM not approved on Communication consent       Pharmacy advised by    [x]Fax  []Phone call    Approval letter scanned into Media No

## 2024-07-03 DIAGNOSIS — E11.40 TYPE 2 DIABETES MELLITUS WITH DIABETIC NEUROPATHY, WITHOUT LONG-TERM CURRENT USE OF INSULIN (HCC): Chronic | ICD-10-CM

## 2024-07-03 DIAGNOSIS — E66.9 OBESITY, CLASS I, BMI 30.0-34.9 (SEE ACTUAL BMI): ICD-10-CM

## 2024-07-24 ENCOUNTER — HOSPITAL ENCOUNTER (OUTPATIENT)
Dept: MAMMOGRAPHY | Facility: MEDICAL CENTER | Age: 54
Discharge: HOME | End: 2024-07-24
Payer: COMMERCIAL

## 2024-07-24 VITALS — BODY MASS INDEX: 31.71 KG/M2 | WEIGHT: 202 LBS | HEIGHT: 67 IN

## 2024-07-24 DIAGNOSIS — Z12.31 ENCOUNTER FOR SCREENING MAMMOGRAM FOR BREAST CANCER: ICD-10-CM

## 2024-07-24 PROCEDURE — 77067 SCR MAMMO BI INCL CAD: CPT

## 2024-07-24 PROCEDURE — 77063 BREAST TOMOSYNTHESIS BI: CPT

## 2024-08-02 DIAGNOSIS — E66.9 OBESITY, CLASS I, BMI 30.0-34.9 (SEE ACTUAL BMI): ICD-10-CM

## 2024-08-02 DIAGNOSIS — E11.40 TYPE 2 DIABETES MELLITUS WITH DIABETIC NEUROPATHY, WITHOUT LONG-TERM CURRENT USE OF INSULIN (HCC): Chronic | ICD-10-CM

## 2024-08-08 ENCOUNTER — OFFICE VISIT (OUTPATIENT)
Dept: PODIATRY | Facility: CLINIC | Age: 54
End: 2024-08-08
Payer: COMMERCIAL

## 2024-08-08 VITALS
WEIGHT: 203 LBS | DIASTOLIC BLOOD PRESSURE: 69 MMHG | SYSTOLIC BLOOD PRESSURE: 113 MMHG | HEART RATE: 101 BPM | BODY MASS INDEX: 31.86 KG/M2 | HEIGHT: 67 IN

## 2024-08-08 DIAGNOSIS — E11.40 TYPE 2 DIABETES MELLITUS WITH DIABETIC NEUROPATHY, UNSPECIFIED WHETHER LONG TERM INSULIN USE (HCC): Primary | ICD-10-CM

## 2024-08-08 DIAGNOSIS — E11.40 TYPE 2 DIABETES MELLITUS WITH DIABETIC NEUROPATHY, WITHOUT LONG-TERM CURRENT USE OF INSULIN (HCC): Primary | Chronic | ICD-10-CM

## 2024-08-08 DIAGNOSIS — Z89.421 ACQUIRED ABSENCE OF OTHER RIGHT TOE(S) (HCC): ICD-10-CM

## 2024-08-08 PROCEDURE — 99213 OFFICE O/P EST LOW 20 MIN: CPT | Performed by: PODIATRIST

## 2024-08-08 NOTE — PROGRESS NOTES
PATIENT:  Denisa Millan  1970     Assessment     1. Type 2 diabetes mellitus with diabetic neuropathy, unspecified whether long term insulin use ([facility])        2. Acquired absence of other right toe(s) ([facility])                Plan:  1. Patient was counseled on the condition and diagnosis.  Educated disease prevention and risks related to diabetes.    2. Educated proper daily foot care and exam.  Instructed proper skin care / protection and footwear.  Instructed to identify any signs of infection and related foot problem.   3. The recent blood work was reviewed / discussed and the last HbA1c was 8.2.  Discussed proper blood glucose control with diet and exercise.    4. Continue to monitor her feet.  Continue to wear diabetic shoes.  5. She will return in 6 months for diabetic foot exam.        Subjective:   The patient presents for evaluation of right foot.  Her BS has been little better.  No redness, warmth, or swelling.  No pain.  No new complaint.  She has high risk diabetic foot with neuropathy, DFU, and history of partial toe amp.      The following portions of the patient's history were reviewed and updated as appropriate: allergies, current medications, past family history, past medical history, past social history, past surgical history and problem list.  All pertinent labs and images were reviewed.      Past Medical History  Past Medical History:   Diagnosis Date    Cellulitis of left lower extremity 04/15/2020    COVID-19 12/22/2020    Diabetes mellitus (HCC)     Exposure to SARS-associated coronavirus 04/15/2020    Fever 04/15/2020    Foot ulcer, right, with unspecified severity ([facility]) 06/12/2019    Hospital discharge follow-up 04/20/2022    Hyperlipidemia     Hypertension     Obesity     Pyogenic inflammation of bone (HCC) 04/20/2022       Past Surgical History  Past Surgical History:   Procedure Laterality Date    INCISION AND DRAINAGE INTRA ORAL ABSCESS Left 10/14/2020    Procedure:  INCISION AND DRAINAGE  (I&D) WOUND ORAL EXTRAORAL LEFT SUBMANDIBULAR SPACE, LEFT  SPACE SPACE, INTRAORAL LEFT LATERAL PHARYNGEAL SPACE AND LEFT SUBLINGUAL SPACE;  Surgeon: Micthell Booker DDS;  Location: [facility];  Service: Maxillofacial    REMOVAL OF IMPACTED TOOTH - COMPLETELY BONY Left 10/14/2020    Procedure: EXTRACTION TEETH MULTIPLE (RETAINED ROOTS #1,3,14, TEETH #4,17,18,19,32;  Surgeon: Mitchell Booker DDS;  Location: [facility];  Service: Maxillofacial    TOE AMPUTATION Right 4/12/2022    Procedure: AMPUTATION DISTAL PHALYNX RIGHT SECOND TOE;  Surgeon: Luis Carlos Urban DPM;  Location: [facility];  Service: Podiatry    TONSILLECTOMY AND ADENOIDECTOMY      WOUND DEBRIDEMENT Right 7/25/2020    Procedure: Excision of non healing diabetic right foot Ulcer, with closure of wound, excision of tibial sesamoid right foot;  Surgeon: Luis Carlos Urban DPM;  Location: [facility];  Service: Podiatry        Allergies:  Patient has no known allergies.    Medications:  Current Outpatient Medications   Medication Sig Dispense Refill    Blood Glucose Monitoring Suppl (OneTouch Verio Reflect) w/Device KIT Check blood sugars twice daily. Please substitute with appropriate alternative as covered by patient's insurance. Dx: E11.65 1 kit 0    glucose blood (OneTouch Verio) test strip Check blood sugars twice daily. Please substitute with appropriate alternative as covered by patient's insurance. Dx: E11.65 200 each 3    Jardiance 25 MG TABS TAKE ONE TABLET BY MOUTH EVERY MORNING 90 tablet 3    lisinopril-hydrochlorothiazide (PRINZIDE,ZESTORETIC) 10-12.5 MG per tablet TAKE ONE TABLET BY MOUTH EVERY DAY 90 tablet 3    metFORMIN (GLUCOPHAGE) 1000 MG tablet TAKE 1 TABLET BY MOUTH TWICE A  tablet 0    omeprazole (PriLOSEC) 20 mg delayed release capsule Take 20 mg by mouth daily      OneTouch Delica Lancets 33G MISC Check blood sugars twice daily. Please substitute with appropriate alternative as covered by patient's insurance. Dx:  E11.65 200 each 3    simvastatin (ZOCOR) 40 mg tablet Take 1 tablet (40 mg total) by mouth daily at bedtime 90 tablet 3    tirzepatide (Mounjaro) 15 MG/0.5ML Inject 0.5 mL (15 mg total) under the skin every 7 days 2 mL 3     No current facility-administered medications for this visit.       Social History:  Social History     Socioeconomic History    Marital status: /Civil Union     Spouse name: None    Number of children: None    Years of education: None    Highest education level: None   Occupational History    None   Tobacco Use    Smoking status: Never     Passive exposure: Never    Smokeless tobacco: Never   Vaping Use    Vaping status: Never Used   Substance and Sexual Activity    Alcohol use: Yes     Alcohol/week: 4.0 standard drinks of alcohol     Types: 4 Standard drinks or equivalent per week     Comment: 4 drinks in a week    Drug use: No    Sexual activity: Yes     Partners: Male     Birth control/protection: None   Other Topics Concern    None   Social History Narrative    None     Social Determinants of Health     Financial Resource Strain: Not on file   Food Insecurity: No Food Insecurity (4/12/2022)    Hunger Vital Sign     Worried About Running Out of Food in the Last Year: Never true     Ran Out of Food in the Last Year: Never true   Transportation Needs: No Transportation Needs (4/12/2022)    PRAPARE - Transportation     Lack of Transportation (Medical): No     Lack of Transportation (Non-Medical): No   Physical Activity: Not on file   Stress: Not on file   Social Connections: Not on file   Intimate Partner Violence: Not on file   Housing Stability: Low Risk  (4/12/2022)    Housing Stability Vital Sign     Unable to Pay for Housing in the Last Year: No     Number of Places Lived in the Last Year: 1     Unstable Housing in the Last Year: No          Review of Systems   Constitutional:  Negative for chills, fatigue and fever.   Respiratory:  Negative for cough and shortness of breath.   "  Cardiovascular:  Negative for chest pain and leg swelling.   Gastrointestinal:  Negative for diarrhea, nausea and vomiting.   Skin:  Negative for wound.   Neurological:  Positive for numbness.       Objective:    /69   Pulse 101   Ht 5' 7\" (1.702 m)   Wt 92.1 kg (203 lb)   LMP 05/06/2024 (Approximate) Comment: perimenopausal  BMI 31.79 kg/m²       Physical Exam  Vitals reviewed.   Constitutional:       General: She is not in acute distress.     Appearance: She is well-developed. She is obese. She is not toxic-appearing or diaphoretic.   Cardiovascular:      Rate and Rhythm: Normal rate and regular rhythm.      Pulses:           Dorsalis pedis pulses are 2+ on the right side and 2+ on the left side.        Posterior tibial pulses are 1+ on the right side and 1+ on the left side.      Comments: No ischemia.  Pulmonary:      Effort: Pulmonary effort is normal. No respiratory distress.   Musculoskeletal:         General: Deformity present. No swelling, tenderness or signs of injury.      Right foot: No foot drop.      Left foot: No foot drop.      Comments: Tight achilles tendon with equinus bilaterally.  Hammertoe presents.  Partial amp right 2nd toe.  Prominent cuboid right worse than left.  No warmth, swelling, redness, or pain in right foot.     Skin:     General: Skin is warm and dry.      Capillary Refill: Capillary refill takes less than 2 seconds.      Coloration: Skin is not cyanotic or mottled.      Findings: No abscess, ecchymosis, erythema or wound.      Nails: There is no clubbing.   Neurological:      General: No focal deficit present.      Mental Status: She is alert and oriented to person, place, and time.      Cranial Nerves: No cranial nerve deficit.      Sensory: Sensory deficit present.      Motor: No abnormal muscle tone.      Coordination: Coordination normal.   Psychiatric:         Mood and Affect: Mood normal.         Behavior: Behavior normal.         Thought Content: Thought " content normal.         Judgment: Judgment normal.

## 2024-08-28 ENCOUNTER — APPOINTMENT (OUTPATIENT)
Dept: LAB | Facility: CLINIC | Age: 54
End: 2024-08-28
Payer: COMMERCIAL

## 2024-08-28 DIAGNOSIS — E11.40 TYPE 2 DIABETES MELLITUS WITH DIABETIC NEUROPATHY, WITHOUT LONG-TERM CURRENT USE OF INSULIN (HCC): Chronic | ICD-10-CM

## 2024-08-28 LAB
EST. AVERAGE GLUCOSE BLD GHB EST-MCNC: 163 MG/DL
HBA1C MFR BLD: 7.3 %

## 2024-08-28 PROCEDURE — 83036 HEMOGLOBIN GLYCOSYLATED A1C: CPT

## 2024-08-28 PROCEDURE — 36415 COLL VENOUS BLD VENIPUNCTURE: CPT

## 2024-10-14 ENCOUNTER — HOSPITAL ENCOUNTER (EMERGENCY)
Facility: HOSPITAL | Age: 54
Discharge: HOME | End: 2024-10-14
Attending: EMERGENCY MEDICINE
Payer: COMMERCIAL

## 2024-10-14 VITALS
DIASTOLIC BLOOD PRESSURE: 79 MMHG | TEMPERATURE: 98.6 F | RESPIRATION RATE: 17 BRPM | HEART RATE: 92 BPM | SYSTOLIC BLOOD PRESSURE: 135 MMHG | OXYGEN SATURATION: 100 %

## 2024-10-14 DIAGNOSIS — Z86.39 HISTORY OF DIABETES MELLITUS: ICD-10-CM

## 2024-10-14 DIAGNOSIS — R23.8 BLISTERS OF MULTIPLE SITES: Primary | ICD-10-CM

## 2024-10-14 PROCEDURE — 99283 EMERGENCY DEPT VISIT LOW MDM: CPT

## 2024-10-14 PROCEDURE — 99284 EMERGENCY DEPT VISIT MOD MDM: CPT | Performed by: EMERGENCY MEDICINE
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
## 2024-10-14 NOTE — DISCHARGE INSTRUCTIONS
Please follow up with podiatry Dr. Urban as scheduled in two days.   Please return to the Emergency Department if you experience worsening of your current symptoms or any other concerning symptoms.
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
## 2024-10-16 ENCOUNTER — TELEPHONE (OUTPATIENT)
Age: 54
End: 2024-10-16

## 2024-10-16 ENCOUNTER — TELEPHONE (OUTPATIENT)
Dept: PODIATRY | Facility: CLINIC | Age: 54
End: 2024-10-16

## 2024-10-16 ENCOUNTER — OFFICE VISIT (OUTPATIENT)
Dept: PODIATRY | Facility: CLINIC | Age: 54
End: 2024-10-16
Payer: COMMERCIAL

## 2024-10-16 VITALS
BODY MASS INDEX: 31.86 KG/M2 | WEIGHT: 203 LBS | HEIGHT: 67 IN | SYSTOLIC BLOOD PRESSURE: 105 MMHG | HEART RATE: 93 BPM | DIASTOLIC BLOOD PRESSURE: 66 MMHG

## 2024-10-16 DIAGNOSIS — E11.40 TYPE 2 DIABETES MELLITUS WITH DIABETIC NEUROPATHY, WITHOUT LONG-TERM CURRENT USE OF INSULIN (HCC): ICD-10-CM

## 2024-10-16 DIAGNOSIS — L97.511 ULCER OF TOE OF RIGHT FOOT, LIMITED TO BREAKDOWN OF SKIN (HCC): Primary | ICD-10-CM

## 2024-10-16 PROCEDURE — 99214 OFFICE O/P EST MOD 30 MIN: CPT | Performed by: PODIATRIST

## 2024-10-16 RX ORDER — DOXYCYCLINE 100 MG/1
100 TABLET ORAL 2 TIMES DAILY
Qty: 14 TABLET | Refills: 0 | Status: SHIPPED | OUTPATIENT
Start: 2024-10-16 | End: 2024-10-23
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
## 2024-10-16 NOTE — TELEPHONE ENCOUNTER
Caller: Denisa Millan    Doctor: Wilmar / Alis    Reason for call: Denisa needs a note for work saying that she was at the dr.  Also it needs to state that he does not want her to work today or tomorrow.  Thank you.     Call back#: [phone]     Can it be faxed to:  [phone]

## 2024-10-17 DIAGNOSIS — E11.40 TYPE 2 DIABETES MELLITUS WITH DIABETIC NEUROPATHY, WITHOUT LONG-TERM CURRENT USE OF INSULIN (HCC): ICD-10-CM

## 2024-10-17 DIAGNOSIS — I10 BENIGN ESSENTIAL HYPERTENSION: ICD-10-CM

## 2024-10-17 RX ORDER — LISINOPRIL AND HYDROCHLOROTHIAZIDE 10; 12.5 MG/1; MG/1
TABLET ORAL
Qty: 90 TABLET | Refills: 1 | Status: SHIPPED | OUTPATIENT
Start: 2024-10-17

## 2024-10-17 RX ORDER — EMPAGLIFLOZIN 25 MG/1
TABLET, FILM COATED ORAL
Qty: 90 TABLET | Refills: 1 | Status: SHIPPED | OUTPATIENT
Start: 2024-10-17

## 2024-10-23 ENCOUNTER — OFFICE VISIT (OUTPATIENT)
Dept: PODIATRY | Facility: CLINIC | Age: 54
End: 2024-10-23
Payer: COMMERCIAL

## 2024-10-23 VITALS
HEART RATE: 73 BPM | SYSTOLIC BLOOD PRESSURE: 115 MMHG | WEIGHT: 203 LBS | HEIGHT: 67 IN | BODY MASS INDEX: 31.86 KG/M2 | DIASTOLIC BLOOD PRESSURE: 73 MMHG

## 2024-10-23 DIAGNOSIS — E11.40 TYPE 2 DIABETES MELLITUS WITH DIABETIC NEUROPATHY, WITHOUT LONG-TERM CURRENT USE OF INSULIN (HCC): ICD-10-CM

## 2024-10-23 DIAGNOSIS — L97.511 ULCER OF TOE OF RIGHT FOOT, LIMITED TO BREAKDOWN OF SKIN (HCC): Primary | ICD-10-CM

## 2024-10-23 PROCEDURE — 99213 OFFICE O/P EST LOW 20 MIN: CPT | Performed by: PODIATRIST

## 2024-10-23 NOTE — PROGRESS NOTES
PATIENT:  Denisa Millan  1970     Assessment     1. Ulcer of toe of right foot, limited to breakdown of skin ([facility])        2. Type 2 diabetes mellitus with diabetic neuropathy, without long-term current use of insulin ([facility])                Plan:  1. Patient was counseled on the condition and diagnosis.  Educated disease prevention and risks related to diabetes.    2. Wounds stable without infection.  Loose skin removed from right great toe.  Wound is partial thick.  Continue local care and off-loading.  Discussed proper off-loading and staying off of her feet in order for proper healing and prevention of infection.   3. Finish Doxycycline  4. RA in 2 week for follow-up.    Subjective:   The patient presents for wound care.  She feels well.  No pain or redness.  Tolerates antibiotics well.  No new complaint.  Her BS is under control.        The following portions of the patient's history were reviewed and updated as appropriate: allergies, current medications, past family history, past medical history, past social history, past surgical history and problem list.  All pertinent labs and images were reviewed.      Past Medical History  Past Medical History:   Diagnosis Date    Cellulitis of left lower extremity 04/15/2020    COVID-19 12/22/2020    Diabetes mellitus (HCC)     Exposure to SARS-associated coronavirus 04/15/2020    Fever 04/15/2020    Foot ulcer, right, with unspecified severity (HCC) 06/12/2019    Hospital discharge follow-up 04/20/2022    Hyperlipidemia     Hypertension     Obesity     Pyogenic inflammation of bone (HCC) 04/20/2022       Past Surgical History  Past Surgical History:   Procedure Laterality Date    INCISION AND DRAINAGE INTRA ORAL ABSCESS Left 10/14/2020    Procedure: INCISION AND DRAINAGE  (I&D) WOUND ORAL EXTRAORAL LEFT SUBMANDIBULAR SPACE, LEFT  SPACE SPACE, INTRAORAL LEFT LATERAL PHARYNGEAL SPACE AND LEFT SUBLINGUAL SPACE;  Surgeon: Mitchell Booker DDS;   [facility];  Service: Maxillofacial    REMOVAL OF IMPACTED TOOTH - COMPLETELY BONY Left 10/14/2020    Procedure: EXTRACTION TEETH MULTIPLE (RETAINED ROOTS #1,3,14, TEETH #4,17,18,19,32;  Surgeon: Mitchell Booker DDS;  [facility];  Service: Maxillofacial    TOE AMPUTATION Right 4/12/2022    Procedure: AMPUTATION DISTAL PHALYNX RIGHT SECOND TOE;  Surgeon: Luis Carlos Urban DPM;  [facility];  Service: Podiatry    TONSILLECTOMY AND ADENOIDECTOMY      WOUND DEBRIDEMENT Right 7/25/2020    Procedure: Excision of non healing diabetic right foot Ulcer, with closure of wound, excision of tibial sesamoid right foot;  Surgeon: Luis Carlos Urban DPM;  [facility];  Service: Podiatry        Allergies:  Patient has no known allergies.    Medications:  Current Outpatient Medications   Medication Sig Dispense Refill    Blood Glucose Monitoring Suppl (OneTouch Verio Reflect) w/Device KIT Check blood sugars twice daily. Please substitute with appropriate alternative as covered by patient's insurance. Dx: E11.65 1 kit 0    doxycycline (ADOXA) 100 MG tablet Take 1 tablet (100 mg total) by mouth 2 (two) times a day for 7 days 14 tablet 0    Empagliflozin (Jardiance) 25 MG TABS TAKE ONE TABLET BY MOUTH EVERY MORNING 90 tablet 1    glucose blood (OneTouch Verio) test strip Check blood sugars twice daily. Please substitute with appropriate alternative as covered by patient's insurance. Dx: E11.65 200 each 3    lisinopril-hydrochlorothiazide (PRINZIDE,ZESTORETIC) 10-12.5 MG per tablet TAKE ONE TABLET BY MOUTH EVERY DAY 90 tablet 1    metFORMIN (GLUCOPHAGE) 1000 MG tablet TAKE 1 TABLET BY MOUTH TWICE A  tablet 0    omeprazole (PriLOSEC) 20 mg delayed release capsule Take 20 mg by mouth daily      OneTouch Delica Lancets 33G MISC Check blood sugars twice daily. Please substitute with appropriate alternative as covered by patient's insurance. Dx: E11.65 200 each 3    simvastatin (ZOCOR) 40 mg tablet Take 1 tablet  (40 mg total) by mouth daily at bedtime 90 tablet 3    tirzepatide (Mounjaro) 15 MG/0.5ML Inject 0.5 mL (15 mg total) under the skin every 7 days 2 mL 3     No current facility-administered medications for this visit.       Social History:  Social History     Socioeconomic History    Marital status: /Civil Union     Spouse name: None    Number of children: None    Years of education: None    Highest education level: None   Occupational History    None   Tobacco Use    Smoking status: Never     Passive exposure: Never    Smokeless tobacco: Never   Vaping Use    Vaping status: Never Used   Substance and Sexual Activity    Alcohol use: Yes     Alcohol/week: 4.0 standard drinks of alcohol     Types: 4 Standard drinks or equivalent per week     Comment: 4 drinks in a week    Drug use: No    Sexual activity: Yes     Partners: Male     Birth control/protection: None   Other Topics Concern    None   Social History Narrative    None     Social Determinants of Health     Financial Resource Strain: Not on file   Food Insecurity: No Food Insecurity (4/12/2022)    Hunger Vital Sign     Worried About Running Out of Food in the Last Year: Never true     Ran Out of Food in the Last Year: Never true   Transportation Needs: No Transportation Needs (4/12/2022)    PRAPARE - Transportation     Lack of Transportation (Medical): No     Lack of Transportation (Non-Medical): No   Physical Activity: Not on file   Stress: Not on file   Social Connections: Not on file   Intimate Partner Violence: Not on file   Housing Stability: Low Risk  (4/12/2022)    Housing Stability Vital Sign     Unable to Pay for Housing in the Last Year: No     Number of Places Lived in the Last Year: 1     Unstable Housing in the Last Year: No          Review of Systems   Constitutional:  Negative for chills, fatigue and fever.   Respiratory:  Negative for cough and shortness of breath.    Cardiovascular:  Negative for chest pain and leg swelling.  "  Gastrointestinal:  Negative for diarrhea, nausea and vomiting.   Musculoskeletal:  Negative for gait problem.   Skin:  Positive for wound.   Neurological:  Positive for numbness.       Objective:    /73   Pulse 73   Ht 5' 7\" (1.702 m)   Wt 92.1 kg (203 lb)   BMI 31.79 kg/m²       Physical Exam  Vitals reviewed.   Constitutional:       General: She is not in acute distress.     Appearance: She is well-developed. She is not toxic-appearing or diaphoretic.   Cardiovascular:      Rate and Rhythm: Normal rate and regular rhythm.      Pulses:           Dorsalis pedis pulses are 2+ on the right side and 2+ on the left side.        Posterior tibial pulses are 1+ on the right side and 1+ on the left side.      Comments: No ischemia.  Pulmonary:      Effort: Pulmonary effort is normal. No respiratory distress.   Musculoskeletal:         General: Deformity present. No swelling, tenderness or signs of injury.      Right lower leg: Edema present.      Left lower leg: Edema present.      Right foot: No foot drop.      Left foot: No foot drop.      Comments: Tight achilles tendon with equinus bilaterally.  Hammertoe presents.  Partial amp right 2nd toe.  Prominent cuboid right worse than left.  No warmth, swelling, redness, or pain in right foot.     Skin:     General: Skin is warm and dry.      Capillary Refill: Capillary refill takes less than 2 seconds.      Coloration: Skin is not cyanotic or mottled.      Findings: No ecchymosis.      Nails: There is no clubbing.      Comments: Blister on right 3rd toe is almost healed.  Mild epidermolysis of right great toe without signs of infection.  Wound bed is partial thick.  No purulence.    Neurological:      General: No focal deficit present.      Mental Status: She is alert and oriented to person, place, and time.      Cranial Nerves: No cranial nerve deficit.      Sensory: Sensory deficit present.      Motor: No abnormal muscle tone.      Coordination: Coordination " normal.   Psychiatric:         Mood and Affect: Mood normal.         Behavior: Behavior normal.         Thought Content: Thought content normal.         Judgment: Judgment normal.

## 2024-11-06 ENCOUNTER — OFFICE VISIT (OUTPATIENT)
Dept: PODIATRY | Facility: CLINIC | Age: 54
End: 2024-11-06
Payer: COMMERCIAL

## 2024-11-06 VITALS
HEIGHT: 67 IN | WEIGHT: 203 LBS | HEART RATE: 92 BPM | DIASTOLIC BLOOD PRESSURE: 72 MMHG | BODY MASS INDEX: 31.86 KG/M2 | SYSTOLIC BLOOD PRESSURE: 113 MMHG

## 2024-11-06 DIAGNOSIS — E11.40 TYPE 2 DIABETES MELLITUS WITH DIABETIC NEUROPATHY, WITHOUT LONG-TERM CURRENT USE OF INSULIN (HCC): ICD-10-CM

## 2024-11-06 DIAGNOSIS — L97.511 ULCER OF TOE OF RIGHT FOOT, LIMITED TO BREAKDOWN OF SKIN (HCC): Primary | ICD-10-CM

## 2024-11-06 PROCEDURE — 99213 OFFICE O/P EST LOW 20 MIN: CPT | Performed by: PODIATRIST

## 2024-11-06 NOTE — PROGRESS NOTES
PATIENT:  Denisa Millan  1970     Assessment     1. Ulcer of toe of right foot, limited to breakdown of skin ([facility])  Orthowedge Shoe      2. Type 2 diabetes mellitus with diabetic neuropathy, without long-term current use of insulin ([facility])                Plan:  1. Patient was counseled on the condition and diagnosis.  Educated disease prevention and risks related to diabetes.    2. Wounds stable without infection, but no significant improvement.  Keratosis removed.  Will use Acticoat.  Discussed proper off-loading and staying off of her feet in order for proper healing and prevention of infection.   3. Will use Orthowedge shoe for off-loading.  Possible TCC or football dressing at [facility] depending on the progress.    4. RA in 2 week for follow-up.    Subjective:   The patient presents for wound care.  She feels well.  No pain or redness.  No new complaint.  Her BS is under control.  She presents with surgical shoe.      The following portions of the patient's history were reviewed and updated as appropriate: allergies, current medications, past family history, past medical history, past social history, past surgical history and problem list.  All pertinent labs and images were reviewed.      Past Medical History  Past Medical History:   Diagnosis Date   • Cellulitis of left lower extremity 04/15/2020   • COVID-19 12/22/2020   • Diabetes mellitus ([facility])    • Exposure to SARS-associated coronavirus 04/15/2020   • Fever 04/15/2020   • Foot ulcer, right, with unspecified severity ([facility]) 06/12/2019   • Hospital discharge follow-up 04/20/2022   • Hyperlipidemia    • Hypertension    • Neuropathy in diabetes ([facility])    • Obesity    • Pyogenic inflammation of bone ([facility]) 04/20/2022       Past Surgical History  Past Surgical History:   Procedure Laterality Date   • AMPUTATION     • FOOT SURGERY     • INCISION AND DRAINAGE INTRA ORAL ABSCESS Left 10/14/2020    Procedure: INCISION AND DRAINAGE  (I&D) WOUND ORAL  EXTRAORAL LEFT SUBMANDIBULAR SPACE, LEFT  SPACE SPACE, INTRAORAL LEFT LATERAL PHARYNGEAL SPACE AND LEFT SUBLINGUAL SPACE;  Surgeon: Mitchell Booker DDS;  [facility];  Service: Maxillofacial   • REMOVAL OF IMPACTED TOOTH - COMPLETELY BONY Left 10/14/2020    Procedure: EXTRACTION TEETH MULTIPLE (RETAINED ROOTS #1,3,14, TEETH #4,17,18,19,32;  Surgeon: Mitchell Booker DDS;  [facility];  Service: Maxillofacial   • TOE AMPUTATION Right 04/12/2022    Procedure: AMPUTATION DISTAL PHALYNX RIGHT SECOND TOE;  Surgeon: Luis Carlos Urban DPM;  [facility];  Service: Podiatry   • TONSILLECTOMY AND ADENOIDECTOMY     • WOUND DEBRIDEMENT Right 07/25/2020    Procedure: Excision of non healing diabetic right foot Ulcer, with closure of wound, excision of tibial sesamoid right foot;  Surgeon: Luis Carlos Urban DPM;  [facility];  Service: Podiatry        Allergies:  Patient has no known allergies.    Medications:  Current Outpatient Medications   Medication Sig Dispense Refill   • Blood Glucose Monitoring Suppl (OneTouch Verio Reflect) w/Device KIT Check blood sugars twice daily. Please substitute with appropriate alternative as covered by patient's insurance. Dx: E11.65 1 kit 0   • Empagliflozin (Jardiance) 25 MG TABS TAKE ONE TABLET BY MOUTH EVERY MORNING 90 tablet 1   • glucose blood (OneTouch Verio) test strip Check blood sugars twice daily. Please substitute with appropriate alternative as covered by patient's insurance. Dx: E11.65 200 each 3   • lisinopril-hydrochlorothiazide (PRINZIDE,ZESTORETIC) 10-12.5 MG per tablet TAKE ONE TABLET BY MOUTH EVERY DAY 90 tablet 1   • metFORMIN (GLUCOPHAGE) 1000 MG tablet TAKE 1 TABLET BY MOUTH TWICE A  tablet 0   • omeprazole (PriLOSEC) 20 mg delayed release capsule Take 20 mg by mouth daily     • OneTouch Delica Lancets 33G MISC Check blood sugars twice daily. Please substitute with appropriate alternative as covered by patient's insurance. Dx: E11.65 200  each 3   • simvastatin (ZOCOR) 40 mg tablet Take 1 tablet (40 mg total) by mouth daily at bedtime 90 tablet 3   • tirzepatide (Mounjaro) 15 MG/0.5ML Inject 0.5 mL (15 mg total) under the skin every 7 days 2 mL 3     No current facility-administered medications for this visit.       Social History:  Social History     Socioeconomic History   • Marital status: /Civil Union     Spouse name: None   • Number of children: None   • Years of education: None   • Highest education level: None   Occupational History   • None   Tobacco Use   • Smoking status: Never     Passive exposure: Never   • Smokeless tobacco: Never   Vaping Use   • Vaping status: Never Used   Substance and Sexual Activity   • Alcohol use: Yes     Alcohol/week: 4.0 standard drinks of alcohol     Types: 4 Standard drinks or equivalent per week     Comment: 4 drinks in a week   • Drug use: Never   • Sexual activity: Yes     Partners: Male     Birth control/protection: None   Other Topics Concern   • None   Social History Narrative   • None     Social Determinants of Health     Financial Resource Strain: Not on file   Food Insecurity: No Food Insecurity (4/12/2022)    Hunger Vital Sign    • Worried About Running Out of Food in the Last Year: Never true    • Ran Out of Food in the Last Year: Never true   Transportation Needs: No Transportation Needs (4/12/2022)    PRAPARE - Transportation    • Lack of Transportation (Medical): No    • Lack of Transportation (Non-Medical): No   Physical Activity: Not on file   Stress: Not on file   Social Connections: Not on file   Intimate Partner Violence: Not on file   Housing Stability: Low Risk  (4/12/2022)    Housing Stability Vital Sign    • Unable to Pay for Housing in the Last Year: No    • Number of Places Lived in the Last Year: 1    • Unstable Housing in the Last Year: No          Review of Systems   Constitutional:  Negative for chills, fatigue and fever.   Respiratory:  Negative for cough and shortness of  "breath.    Cardiovascular:  Negative for chest pain and leg swelling.   Gastrointestinal:  Negative for diarrhea, nausea and vomiting.   Musculoskeletal:  Negative for gait problem.   Neurological:  Positive for numbness.       Objective:    /72 (BP Location: Left arm, Patient Position: Sitting, Cuff Size: Adult)   Pulse 92   Ht 5' 7\" (1.702 m) Comment: stated  Wt 92.1 kg (203 lb)   BMI 31.79 kg/m²       Physical Exam  Vitals reviewed.   Constitutional:       General: She is not in acute distress.     Appearance: She is well-developed. She is not toxic-appearing or diaphoretic.   Cardiovascular:      Rate and Rhythm: Normal rate and regular rhythm.      Pulses:           Dorsalis pedis pulses are 2+ on the right side and 2+ on the left side.        Posterior tibial pulses are 1+ on the right side and 1+ on the left side.      Comments: No ischemia.  Pulmonary:      Effort: Pulmonary effort is normal. No respiratory distress.   Musculoskeletal:         General: Deformity present. No swelling, tenderness or signs of injury.      Right lower leg: Edema present.      Left lower leg: Edema present.      Right foot: No foot drop.      Left foot: No foot drop.      Comments: Tight achilles tendon with equinus bilaterally.  Hammertoe presents.  Partial amp right 2nd toe.  Prominent cuboid right worse than left.  No warmth, swelling, redness, or pain in right foot.     Skin:     General: Skin is warm and dry.      Capillary Refill: Capillary refill takes less than 2 seconds.      Coloration: Skin is not cyanotic or mottled.      Findings: No ecchymosis.      Nails: There is no clubbing.      Comments: Partial thickness wound with mild keratosis on right great toe.  It is 1.0 X 0.8 cm.  Depth is minimal.  No purulence or signs of infection.  Wound does not probe to deep tissues.   Neurological:      General: No focal deficit present.      Mental Status: She is alert and oriented to person, place, and time.      " Cranial Nerves: No cranial nerve deficit.      Sensory: Sensory deficit present.      Motor: No abnormal muscle tone.      Coordination: Coordination normal.   Psychiatric:         Mood and Affect: Mood normal.         Behavior: Behavior normal.         Thought Content: Thought content normal.         Judgment: Judgment normal.

## 2024-11-20 ENCOUNTER — OFFICE VISIT (OUTPATIENT)
Dept: PODIATRY | Facility: CLINIC | Age: 54
End: 2024-11-20
Payer: COMMERCIAL

## 2024-11-20 VITALS
OXYGEN SATURATION: 97 % | BODY MASS INDEX: 31.86 KG/M2 | HEIGHT: 67 IN | HEART RATE: 85 BPM | WEIGHT: 203 LBS | DIASTOLIC BLOOD PRESSURE: 78 MMHG | SYSTOLIC BLOOD PRESSURE: 128 MMHG

## 2024-11-20 DIAGNOSIS — L97.511 ULCER OF TOE OF RIGHT FOOT, LIMITED TO BREAKDOWN OF SKIN (HCC): Primary | ICD-10-CM

## 2024-11-20 DIAGNOSIS — E11.40 TYPE 2 DIABETES MELLITUS WITH DIABETIC NEUROPATHY, WITHOUT LONG-TERM CURRENT USE OF INSULIN (HCC): ICD-10-CM

## 2024-11-20 PROCEDURE — 99213 OFFICE O/P EST LOW 20 MIN: CPT | Performed by: PODIATRIST

## 2024-11-20 NOTE — PROGRESS NOTES
PATIENT:  Denisa Millan  1970     Assessment     1. Ulcer of toe of right foot, limited to breakdown of skin ([facility])        2. Type 2 diabetes mellitus with diabetic neuropathy, without long-term current use of insulin ([facility])                Plan:  1. Patient was counseled on the condition and diagnosis.  Educated disease prevention and risks related to diabetes.    2. Wounds much smaller and stable without infection.  Keratosis removed.  Continue Acticoat.  Discussed proper off-loading and staying off of her feet in order for proper healing and prevention of infection.   3. Continue Orthowedge shoe for off-loading.  Possible TCC or football dressing at [facility] depending on the progress.    4. RA in 2 week for follow-up.    Subjective:   The patient presents for wound care.  She feels well.  No pain or redness.  No new complaint.  Her BS is under control.  She presents with orthowedge shoe.    The following portions of the patient's history were reviewed and updated as appropriate: allergies, current medications, past family history, past medical history, past social history, past surgical history and problem list.  All pertinent labs and images were reviewed.      Past Medical History  Past Medical History:   Diagnosis Date    Cellulitis of left lower extremity 04/15/2020    COVID-19 12/22/2020    Diabetes mellitus ([facility])     Exposure to SARS-associated coronavirus 04/15/2020    Fever 04/15/2020    Foot ulcer, right, with unspecified severity ([facility]) 06/12/2019    Hospital discharge follow-up 04/20/2022    Hyperlipidemia     Hypertension     Neuropathy in diabetes ([facility])     Obesity     Pyogenic inflammation of bone ([facility]) 04/20/2022       Past Surgical History  Past Surgical History:   Procedure Laterality Date    AMPUTATION      FOOT SURGERY      INCISION AND DRAINAGE INTRA ORAL ABSCESS Left 10/14/2020    Procedure: INCISION AND DRAINAGE  (I&D) WOUND ORAL EXTRAORAL LEFT SUBMANDIBULAR SPACE, LEFT   SPACE SPACE, INTRAORAL LEFT LATERAL PHARYNGEAL SPACE AND LEFT SUBLINGUAL SPACE;  Surgeon: Mitchell Booker DDS;  [facility];  Service: Maxillofacial    REMOVAL OF IMPACTED TOOTH - COMPLETELY BONY Left 10/14/2020    Procedure: EXTRACTION TEETH MULTIPLE (RETAINED ROOTS #1,3,14, TEETH #4,17,18,19,32;  Surgeon: Mitchell Booker DDS;  [facility];  Service: Maxillofacial    TOE AMPUTATION Right 04/12/2022    Procedure: AMPUTATION DISTAL PHALYNX RIGHT SECOND TOE;  Surgeon: Luis Carlos Urban DPM;  [facility];  Service: Podiatry    TONSILLECTOMY AND ADENOIDECTOMY      WOUND DEBRIDEMENT Right 07/25/2020    Procedure: Excision of non healing diabetic right foot Ulcer, with closure of wound, excision of tibial sesamoid right foot;  Surgeon: Luis Carlos Urban DPM;  [facility];  Service: Podiatry        Allergies:  Patient has no known allergies.    Medications:  Current Outpatient Medications   Medication Sig Dispense Refill    Blood Glucose Monitoring Suppl (OneTouch Verio Reflect) w/Device KIT Check blood sugars twice daily. Please substitute with appropriate alternative as covered by patient's insurance. Dx: E11.65 1 kit 0    Empagliflozin (Jardiance) 25 MG TABS TAKE ONE TABLET BY MOUTH EVERY MORNING 90 tablet 1    glucose blood (OneTouch Verio) test strip Check blood sugars twice daily. Please substitute with appropriate alternative as covered by patient's insurance. Dx: E11.65 200 each 3    lisinopril-hydrochlorothiazide (PRINZIDE,ZESTORETIC) 10-12.5 MG per tablet TAKE ONE TABLET BY MOUTH EVERY DAY 90 tablet 1    metFORMIN (GLUCOPHAGE) 1000 MG tablet TAKE 1 TABLET BY MOUTH TWICE A  tablet 0    omeprazole (PriLOSEC) 20 mg delayed release capsule Take 20 mg by mouth daily      OneTouch Delica Lancets 33G MISC Check blood sugars twice daily. Please substitute with appropriate alternative as covered by patient's insurance. Dx: E11.65 200 each 3    simvastatin (ZOCOR) 40 mg tablet Take 1  tablet (40 mg total) by mouth daily at bedtime 90 tablet 3    tirzepatide (Mounjaro) 15 MG/0.5ML Inject 0.5 mL (15 mg total) under the skin every 7 days 2 mL 3     No current facility-administered medications for this visit.       Social History:  Social History     Socioeconomic History    Marital status: /Civil Union     Spouse name: None    Number of children: None    Years of education: None    Highest education level: None   Occupational History    None   Tobacco Use    Smoking status: Never     Passive exposure: Never    Smokeless tobacco: Never   Vaping Use    Vaping status: Never Used   Substance and Sexual Activity    Alcohol use: Yes     Alcohol/week: 4.0 standard drinks of alcohol     Types: 4 Standard drinks or equivalent per week     Comment: 4 drinks in a week    Drug use: Never    Sexual activity: Yes     Partners: Male     Birth control/protection: None   Other Topics Concern    None   Social History Narrative    None     Social Drivers of Health     Financial Resource Strain: Not on file   Food Insecurity: No Food Insecurity (4/12/2022)    Hunger Vital Sign     Worried About Running Out of Food in the Last Year: Never true     Ran Out of Food in the Last Year: Never true   Transportation Needs: No Transportation Needs (4/12/2022)    PRAPARE - Transportation     Lack of Transportation (Medical): No     Lack of Transportation (Non-Medical): No   Physical Activity: Not on file   Stress: Not on file   Social Connections: Not on file   Intimate Partner Violence: Not on file   Housing Stability: Low Risk  (4/12/2022)    Housing Stability Vital Sign     Unable to Pay for Housing in the Last Year: No     Number of Places Lived in the Last Year: 1     Unstable Housing in the Last Year: No          Review of Systems   Constitutional:  Negative for chills, fatigue and fever.   Respiratory:  Negative for cough and shortness of breath.    Cardiovascular:  Negative for chest pain.   Gastrointestinal:   "Negative for diarrhea, nausea and vomiting.   Musculoskeletal:  Negative for gait problem.   Neurological:  Positive for numbness.       Objective:    /78 (BP Location: Left arm, Patient Position: Sitting, Cuff Size: Adult)   Pulse 85   Ht 5' 7\" (1.702 m) Comment: stated  Wt 92.1 kg (203 lb)   SpO2 97%   BMI 31.79 kg/m²       Physical Exam  Vitals reviewed.   Constitutional:       General: She is not in acute distress.     Appearance: She is well-developed. She is not toxic-appearing or diaphoretic.   Cardiovascular:      Rate and Rhythm: Normal rate and regular rhythm.      Pulses:           Dorsalis pedis pulses are 2+ on the right side and 2+ on the left side.        Posterior tibial pulses are 1+ on the right side and 1+ on the left side.      Comments: No ischemia.  Pulmonary:      Effort: Pulmonary effort is normal. No respiratory distress.   Musculoskeletal:         General: Deformity present. No swelling, tenderness or signs of injury.      Right lower leg: Edema present.      Left lower leg: Edema present.      Right foot: No foot drop.      Left foot: No foot drop.      Comments: Tight achilles tendon with equinus bilaterally.  Hammertoe presents.  Partial amp right 2nd toe.  Prominent cuboid right worse than left.  No warmth, swelling, redness, or pain in right foot.     Skin:     General: Skin is warm and dry.      Capillary Refill: Capillary refill takes less than 2 seconds.      Coloration: Skin is not cyanotic or mottled.      Findings: No ecchymosis.      Nails: There is no clubbing.      Comments: Ulcer with mild keratosis on right great toe.  Wound is smaller.  It is 0.7 X 0.4 cm.  Depth is minimal.  Wound bed is granular.  No purulence or signs of infection.  Wound does not probe to deep tissues.   Neurological:      General: No focal deficit present.      Mental Status: She is alert and oriented to person, place, and time.      Cranial Nerves: No cranial nerve deficit.      Sensory: " Sensory deficit present.      Motor: No abnormal muscle tone.      Coordination: Coordination normal.   Psychiatric:         Mood and Affect: Mood normal.         Behavior: Behavior normal.         Thought Content: Thought content normal.         Judgment: Judgment normal.

## 2024-11-29 ENCOUNTER — APPOINTMENT (OUTPATIENT)
Dept: LAB | Facility: CLINIC | Age: 54
End: 2024-11-29
Payer: COMMERCIAL

## 2024-11-29 DIAGNOSIS — E11.40 TYPE 2 DIABETES MELLITUS WITH DIABETIC NEUROPATHY, WITHOUT LONG-TERM CURRENT USE OF INSULIN (HCC): Chronic | ICD-10-CM

## 2024-11-29 LAB
EST. AVERAGE GLUCOSE BLD GHB EST-MCNC: 194 MG/DL
HBA1C MFR BLD: 8.4 %

## 2024-11-29 PROCEDURE — 36415 COLL VENOUS BLD VENIPUNCTURE: CPT

## 2024-11-29 PROCEDURE — 83036 HEMOGLOBIN GLYCOSYLATED A1C: CPT

## 2024-12-02 ENCOUNTER — RESULTS FOLLOW-UP (OUTPATIENT)
Dept: FAMILY MEDICINE CLINIC | Facility: CLINIC | Age: 54
End: 2024-12-02

## 2024-12-03 ENCOUNTER — OFFICE VISIT (OUTPATIENT)
Dept: FAMILY MEDICINE CLINIC | Facility: CLINIC | Age: 54
End: 2024-12-03
Payer: COMMERCIAL

## 2024-12-03 VITALS
BODY MASS INDEX: 31.64 KG/M2 | SYSTOLIC BLOOD PRESSURE: 120 MMHG | DIASTOLIC BLOOD PRESSURE: 74 MMHG | WEIGHT: 201.6 LBS | HEIGHT: 67 IN | OXYGEN SATURATION: 99 % | HEART RATE: 86 BPM | TEMPERATURE: 96.8 F | RESPIRATION RATE: 16 BRPM

## 2024-12-03 DIAGNOSIS — L97.511 ULCER OF TOE OF RIGHT FOOT, LIMITED TO BREAKDOWN OF SKIN (HCC): ICD-10-CM

## 2024-12-03 DIAGNOSIS — E11.40 TYPE 2 DIABETES MELLITUS WITH DIABETIC NEUROPATHY, WITHOUT LONG-TERM CURRENT USE OF INSULIN (HCC): Primary | Chronic | ICD-10-CM

## 2024-12-03 DIAGNOSIS — E11.40 TYPE 2 DIABETES MELLITUS WITH DIABETIC NEUROPATHY, WITHOUT LONG-TERM CURRENT USE OF INSULIN (HCC): Chronic | ICD-10-CM

## 2024-12-03 DIAGNOSIS — I10 BENIGN ESSENTIAL HYPERTENSION: Primary | ICD-10-CM

## 2024-12-03 DIAGNOSIS — E78.5 HYPERLIPIDEMIA, UNSPECIFIED HYPERLIPIDEMIA TYPE: Chronic | ICD-10-CM

## 2024-12-03 PROCEDURE — 99214 OFFICE O/P EST MOD 30 MIN: CPT | Performed by: FAMILY MEDICINE

## 2024-12-03 RX ORDER — INSULIN DEGLUDEC 100 U/ML
10 INJECTION, SOLUTION SUBCUTANEOUS
Qty: 9 ML | Refills: 1 | Status: SHIPPED | OUTPATIENT
Start: 2024-12-03
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
## 2024-12-03 NOTE — ASSESSMENT & PLAN NOTE
Discussed adding insulin and dropping glucophage  Lab Results   Component Value Date    HGBA1C 8.4 (H) 11/29/2024       Orders:    Ambulatory referral to clinical pharmacy; Future    insulin degludec (Tresiba FlexTouch) 100 units/mL injection pen; Inject 10 Units under the skin daily at bedtime    Albumin / creatinine urine ratio; Future    Hemoglobin A1C; Future

## 2024-12-03 NOTE — ASSESSMENT & PLAN NOTE
Stable on zocor  Orders:    TSH, 3rd generation with Free T4 reflex; Future    Lipid Panel with Direct LDL reflex; Future

## 2024-12-04 ENCOUNTER — OFFICE VISIT (OUTPATIENT)
Dept: PODIATRY | Facility: CLINIC | Age: 54
End: 2024-12-04
Payer: COMMERCIAL

## 2024-12-04 VITALS
WEIGHT: 201 LBS | DIASTOLIC BLOOD PRESSURE: 60 MMHG | HEIGHT: 67 IN | BODY MASS INDEX: 31.55 KG/M2 | SYSTOLIC BLOOD PRESSURE: 118 MMHG

## 2024-12-04 DIAGNOSIS — E11.40 TYPE 2 DIABETES MELLITUS WITH DIABETIC NEUROPATHY, WITHOUT LONG-TERM CURRENT USE OF INSULIN (HCC): ICD-10-CM

## 2024-12-04 DIAGNOSIS — L97.511 ULCER OF TOE OF RIGHT FOOT, LIMITED TO BREAKDOWN OF SKIN (HCC): Primary | ICD-10-CM

## 2024-12-04 PROCEDURE — 99213 OFFICE O/P EST LOW 20 MIN: CPT | Performed by: PODIATRIST

## 2024-12-04 RX ORDER — DOXYCYCLINE 100 MG/1
100 TABLET ORAL 2 TIMES DAILY
Qty: 20 TABLET | Refills: 0 | Status: SHIPPED | OUTPATIENT
Start: 2024-12-04 | End: 2024-12-14

## 2024-12-04 NOTE — PROGRESS NOTES
PATIENT:  Denisa Millan  1970     Assessment     1. Ulcer of toe of right foot, limited to breakdown of skin ([facility])  doxycycline (ADOXA) 100 MG tablet      2. Type 2 diabetes mellitus with diabetic neuropathy, without long-term current use of insulin ([facility])                Plan:  1. Patient was counseled on the condition and diagnosis.  Educated disease prevention and risks related to diabetes.    2. She still has small wound on right great toe.  Keratosis removed.  Instructed local care.  Use silver gel daily.  Discussed proper off-loading and staying off of her feet in order for proper healing and prevention of infection.   3. Continue Orthowedge shoe for off-loading.  Will keep her out of work.  Possible TCC or football dressing at [facility] depending on the progress.    4. RA in 2 week for follow-up.    Subjective:   The patient presents for wound care.  She feels well.  No pain or redness.  No drainage.  No new complaint.  Her BS is under control.        The following portions of the patient's history were reviewed and updated as appropriate: allergies, current medications, past family history, past medical history, past social history, past surgical history and problem list.  All pertinent labs and images were reviewed.      Past Medical History  Past Medical History:   Diagnosis Date    Cellulitis of left lower extremity 04/15/2020    COVID-19 12/22/2020    Diabetes mellitus ([facility])     Exposure to SARS-associated coronavirus 04/15/2020    Fever 04/15/2020    Foot ulcer, right, with unspecified severity ([facility]) 06/12/2019    Hospital discharge follow-up 04/20/2022    Hyperlipidemia     Hypertension     Neuropathy in diabetes ([facility])     Obesity     Pyogenic inflammation of bone ([facility]) 04/20/2022       Past Surgical History  Past Surgical History:   Procedure Laterality Date    AMPUTATION      FOOT SURGERY      INCISION AND DRAINAGE INTRA ORAL ABSCESS Left 10/14/2020    Procedure: INCISION AND DRAINAGE   (I&D) WOUND ORAL EXTRAORAL LEFT SUBMANDIBULAR SPACE, LEFT  SPACE SPACE, INTRAORAL LEFT LATERAL PHARYNGEAL SPACE AND LEFT SUBLINGUAL SPACE;  Surgeon: Mitchell Booker DDS;  [facility];  Service: Maxillofacial    REMOVAL OF IMPACTED TOOTH - COMPLETELY BONY Left 10/14/2020    Procedure: EXTRACTION TEETH MULTIPLE (RETAINED ROOTS #1,3,14, TEETH #4,17,18,19,32;  Surgeon: Mitchell Booker DDS;  [facility];  Service: Maxillofacial    TOE AMPUTATION Right 04/12/2022    Procedure: AMPUTATION DISTAL PHALYNX RIGHT SECOND TOE;  Surgeon: Luis Carlos Urban DPM;  [facility];  Service: Podiatry    TONSILLECTOMY AND ADENOIDECTOMY      WOUND DEBRIDEMENT Right 07/25/2020    Procedure: Excision of non healing diabetic right foot Ulcer, with closure of wound, excision of tibial sesamoid right foot;  Surgeon: Luis Carlos Urban DPM;  [facility];  Service: Podiatry        Allergies:  Patient has no known allergies.    Medications:  Current Outpatient Medications   Medication Sig Dispense Refill    doxycycline (ADOXA) 100 MG tablet Take 1 tablet (100 mg total) by mouth 2 (two) times a day for 10 days 20 tablet 0    Blood Glucose Monitoring Suppl (OneTouch Verio Reflect) w/Device KIT Check blood sugars twice daily. Please substitute with appropriate alternative as covered by patient's insurance. Dx: E11.65 1 kit 0    Empagliflozin (Jardiance) 25 MG TABS TAKE ONE TABLET BY MOUTH EVERY MORNING 90 tablet 1    glucose blood (OneTouch Verio) test strip Check blood sugars twice daily. Please substitute with appropriate alternative as covered by patient's insurance. Dx: E11.65 200 each 3    insulin degludec (Tresiba FlexTouch) 100 units/mL injection pen Inject 10 Units under the skin daily at bedtime 9 mL 1    Insulin Pen Needle 32G X 4 MM MISC Use daily 90 each 2    lisinopril-hydrochlorothiazide (PRINZIDE,ZESTORETIC) 10-12.5 MG per tablet TAKE ONE TABLET BY MOUTH EVERY DAY 90 tablet 1    metFORMIN (GLUCOPHAGE) 1000  MG tablet TAKE 1 TABLET BY MOUTH TWICE A  tablet 0    omeprazole (PriLOSEC) 20 mg delayed release capsule Take 20 mg by mouth daily      OneTouch Delica Lancets 33G MISC Check blood sugars twice daily. Please substitute with appropriate alternative as covered by patient's insurance. Dx: E11.65 200 each 3    simvastatin (ZOCOR) 40 mg tablet Take 1 tablet (40 mg total) by mouth daily at bedtime 90 tablet 3    tirzepatide (Mounjaro) 15 MG/0.5ML Inject 0.5 mL (15 mg total) under the skin every 7 days 2 mL 3     No current facility-administered medications for this visit.       Social History:  Social History     Socioeconomic History    Marital status: /Civil Union     Spouse name: None    Number of children: None    Years of education: None    Highest education level: None   Occupational History    None   Tobacco Use    Smoking status: Never     Passive exposure: Never    Smokeless tobacco: Never   Vaping Use    Vaping status: Never Used   Substance and Sexual Activity    Alcohol use: Yes     Alcohol/week: 4.0 standard drinks of alcohol     Types: 4 Standard drinks or equivalent per week     Comment: 4 drinks in a week    Drug use: Never    Sexual activity: Yes     Partners: Male     Birth control/protection: None   Other Topics Concern    None   Social History Narrative    None     Social Drivers of Health     Financial Resource Strain: Not on file   Food Insecurity: No Food Insecurity (4/12/2022)    Hunger Vital Sign     Worried About Running Out of Food in the Last Year: Never true     Ran Out of Food in the Last Year: Never true   Transportation Needs: No Transportation Needs (4/12/2022)    PRAPARE - Transportation     Lack of Transportation (Medical): No     Lack of Transportation (Non-Medical): No   Physical Activity: Not on file   Stress: Not on file   Social Connections: Not on file   Intimate Partner Violence: Not on file   Housing Stability: Low Risk  (4/12/2022)    Housing Stability Vital Sign  "    Unable to Pay for Housing in the Last Year: No     Number of Places Lived in the Last Year: 1     Unstable Housing in the Last Year: No          Review of Systems   Constitutional:  Negative for chills, fatigue and fever.   Respiratory:  Negative for cough and shortness of breath.    Cardiovascular:  Negative for chest pain.   Gastrointestinal:  Negative for diarrhea, nausea and vomiting.   Musculoskeletal:  Negative for gait problem.   Neurological:  Positive for numbness.       Objective:    /60 (BP Location: Left arm, Patient Position: Sitting, Cuff Size: Large)   Ht 5' 7\" (1.702 m)   Wt 91.2 kg (201 lb) Comment: verbal  BMI 31.48 kg/m²       Physical Exam  Vitals reviewed.   Constitutional:       General: She is not in acute distress.     Appearance: She is well-developed. She is not toxic-appearing or diaphoretic.   Cardiovascular:      Rate and Rhythm: Normal rate and regular rhythm.      Pulses:           Dorsalis pedis pulses are 2+ on the right side and 2+ on the left side.        Posterior tibial pulses are 1+ on the right side and 1+ on the left side.      Comments: No ischemia.  Pulmonary:      Effort: Pulmonary effort is normal. No respiratory distress.   Musculoskeletal:         General: Deformity present. No swelling, tenderness or signs of injury.      Right lower leg: Edema present.      Left lower leg: Edema present.      Right foot: No foot drop.      Left foot: No foot drop.      Comments: Tight achilles tendon with equinus bilaterally.  Hammertoe presents.  Partial amp right 2nd toe.  Prominent cuboid right worse than left.  No warmth, swelling, redness, or pain in right foot.     Skin:     General: Skin is warm and dry.      Capillary Refill: Capillary refill takes less than 2 seconds.      Coloration: Skin is not cyanotic or mottled.      Findings: No ecchymosis.      Nails: There is no clubbing.      Comments: Ulcer with mild keratosis on right great toe.  Wound is smaller.  It " is 0.2 X 0.2 cm.  Depth is minimal.  Wound bed is granular.  No purulence or signs of infection.  Wound does not probe to deep tissues.   Neurological:      General: No focal deficit present.      Mental Status: She is alert and oriented to person, place, and time.      Cranial Nerves: No cranial nerve deficit.      Sensory: Sensory deficit present.      Motor: No abnormal muscle tone.      Coordination: Coordination normal.   Psychiatric:         Mood and Affect: Mood normal.         Behavior: Behavior normal.         Thought Content: Thought content normal.         Judgment: Judgment normal.

## 2024-12-04 NOTE — LETTER
December 4, 2024     Patient: Denisa Millan  YOB: 1970  Date of Visit: 12/4/2024      To Whom it May Concern:    Denisa Mlilan is under my professional care. Denisa was seen in my office on 12/4/2024. Denisa is not allowed to work from 12/4/24 - 12/8/24.    If you have any questions or concerns, please don't hesitate to call.         Sincerely,          Luis Carlos Urban DPM        CC: No Recipients

## 2024-12-11 ENCOUNTER — TELEPHONE (OUTPATIENT)
Dept: INTERNAL MEDICINE CLINIC | Facility: CLINIC | Age: 54
End: 2024-12-11

## 2024-12-11 NOTE — TELEPHONE ENCOUNTER
Please contact patient to schedule Clinical Pharmacist Appointment     Reason for appointment: diabetes  When to schedule with Pharmacist: December or January   What should the patient bring to the appointment: blood sugars    Appointment Department:  [facility]  Pharmacist patient will be seeing: Jensen Noel  Visit Type Preference (ie Phone, Video, In-person): in person please  In-person visits will be at:  [facility]  Virtual visits will be completed via [employer] or Phone - please clarify patient preference in appointment notes    Please respond to this note to keep track of the number of patient outreaches.     Please try to reach out to patient on 2 separate days

## 2024-12-18 ENCOUNTER — OFFICE VISIT (OUTPATIENT)
Dept: PODIATRY | Facility: CLINIC | Age: 54
End: 2024-12-18
Payer: COMMERCIAL

## 2024-12-18 VITALS
BODY MASS INDEX: 31.55 KG/M2 | DIASTOLIC BLOOD PRESSURE: 76 MMHG | WEIGHT: 201 LBS | HEIGHT: 67 IN | HEART RATE: 89 BPM | SYSTOLIC BLOOD PRESSURE: 127 MMHG

## 2024-12-18 DIAGNOSIS — E11.40 TYPE 2 DIABETES MELLITUS WITH DIABETIC NEUROPATHY, WITHOUT LONG-TERM CURRENT USE OF INSULIN (HCC): ICD-10-CM

## 2024-12-18 DIAGNOSIS — L97.511 ULCER OF TOE OF RIGHT FOOT, LIMITED TO BREAKDOWN OF SKIN (HCC): Primary | ICD-10-CM

## 2024-12-18 PROCEDURE — 99213 OFFICE O/P EST LOW 20 MIN: CPT | Performed by: PODIATRIST

## 2024-12-18 NOTE — PROGRESS NOTES
PATIENT:  Denisa Millan  1970     Assessment     1. Ulcer of toe of right foot, limited to breakdown of skin ([facility])        2. Type 2 diabetes mellitus with diabetic neuropathy, without long-term current use of insulin ([facility])                Plan:  1. Patient was counseled on the condition and diagnosis.  Educated disease prevention and risks related to diabetes.    2. She still has small wound on right great toe.  Keratosis removed.  Instructed local care.  Discussed proper off-loading and staying off of her feet in order for proper healing and prevention of infection.   3. Continue Orthowedge shoe for off-loading.  Possible TCC or football dressing at [facility] depending on the progress.    4. RA in 2 week for follow-up.    Subjective:   The patient presents for wound care.  She was in [facility] since the last visit.  No worsening of wound.  No pain or redness.  Her BS is under control.        The following portions of the patient's history were reviewed and updated as appropriate: allergies, current medications, past family history, past medical history, past social history, past surgical history and problem list.  All pertinent labs and images were reviewed.      Past Medical History  Past Medical History:   Diagnosis Date    Cellulitis of left lower extremity 04/15/2020    COVID-19 12/22/2020    Diabetes mellitus ([facility])     Exposure to SARS-associated coronavirus 04/15/2020    Fever 04/15/2020    Foot ulcer, right, with unspecified severity ([facility]) 06/12/2019    Hospital discharge follow-up 04/20/2022    Hyperlipidemia     Hypertension     Neuropathy in diabetes ([facility])     Obesity     Pyogenic inflammation of bone ([facility]) 04/20/2022       Past Surgical History  Past Surgical History:   Procedure Laterality Date    AMPUTATION      FOOT SURGERY      INCISION AND DRAINAGE INTRA ORAL ABSCESS Left 10/14/2020    Procedure: INCISION AND DRAINAGE  (I&D) WOUND ORAL EXTRAORAL LEFT SUBMANDIBULAR SPACE, LEFT   SPACE SPACE, INTRAORAL LEFT LATERAL PHARYNGEAL SPACE AND LEFT SUBLINGUAL SPACE;  Surgeon: Mitchell Booker DDS;  [facility];  Service: Maxillofacial    REMOVAL OF IMPACTED TOOTH - COMPLETELY BONY Left 10/14/2020    Procedure: EXTRACTION TEETH MULTIPLE (RETAINED ROOTS #1,3,14, TEETH #4,17,18,19,32;  Surgeon: Mitchell Booker DDS;  [facility];  Service: Maxillofacial    TOE AMPUTATION Right 04/12/2022    Procedure: AMPUTATION DISTAL PHALYNX RIGHT SECOND TOE;  Surgeon: Luis Carlos Urban DPM;  [facility];  Service: Podiatry    TONSILLECTOMY AND ADENOIDECTOMY      WOUND DEBRIDEMENT Right 07/25/2020    Procedure: Excision of non healing diabetic right foot Ulcer, with closure of wound, excision of tibial sesamoid right foot;  Surgeon: Luis Carlos Urban DPM;  [facility];  Service: Podiatry        Allergies:  Patient has no known allergies.    Medications:  Current Outpatient Medications   Medication Sig Dispense Refill    Blood Glucose Monitoring Suppl (OneTouch Verio Reflect) w/Device KIT Check blood sugars twice daily. Please substitute with appropriate alternative as covered by patient's insurance. Dx: E11.65 1 kit 0    Empagliflozin (Jardiance) 25 MG TABS TAKE ONE TABLET BY MOUTH EVERY MORNING 90 tablet 1    insulin degludec (Tresiba FlexTouch) 100 units/mL injection pen Inject 10 Units under the skin daily at bedtime 9 mL 1    Insulin Pen Needle 32G X 4 MM MISC Use daily 90 each 2    lisinopril-hydrochlorothiazide (PRINZIDE,ZESTORETIC) 10-12.5 MG per tablet TAKE ONE TABLET BY MOUTH EVERY DAY 90 tablet 1    metFORMIN (GLUCOPHAGE) 1000 MG tablet TAKE 1 TABLET BY MOUTH TWICE A  tablet 0    omeprazole (PriLOSEC) 20 mg delayed release capsule Take 20 mg by mouth daily      OneTouch Delica Lancets 33G MISC Check blood sugars twice daily. Please substitute with appropriate alternative as covered by patient's insurance. Dx: E11.65 200 each 3    simvastatin (ZOCOR) 40 mg tablet Take 1  tablet (40 mg total) by mouth daily at bedtime 90 tablet 3    tirzepatide (Mounjaro) 15 MG/0.5ML Inject 0.5 mL (15 mg total) under the skin every 7 days 2 mL 3    glucose blood (OneTouch Verio) test strip Check blood sugars twice daily. Please substitute with appropriate alternative as covered by patient's insurance. Dx: E11.65 200 each 3     No current facility-administered medications for this visit.       Social History:  Social History     Socioeconomic History    Marital status: /Civil Union     Spouse name: None    Number of children: None    Years of education: None    Highest education level: None   Occupational History    None   Tobacco Use    Smoking status: Never     Passive exposure: Never    Smokeless tobacco: Never   Vaping Use    Vaping status: Never Used   Substance and Sexual Activity    Alcohol use: Yes     Alcohol/week: 4.0 standard drinks of alcohol     Types: 4 Standard drinks or equivalent per week     Comment: 4 drinks in a week    Drug use: Never    Sexual activity: Yes     Partners: Male     Birth control/protection: None   Other Topics Concern    None   Social History Narrative    None     Social Drivers of Health     Financial Resource Strain: Not on file   Food Insecurity: No Food Insecurity (4/12/2022)    Hunger Vital Sign     Worried About Running Out of Food in the Last Year: Never true     Ran Out of Food in the Last Year: Never true   Transportation Needs: No Transportation Needs (4/12/2022)    PRAPARE - Transportation     Lack of Transportation (Medical): No     Lack of Transportation (Non-Medical): No   Physical Activity: Not on file   Stress: Not on file   Social Connections: Not on file   Intimate Partner Violence: Not on file   Housing Stability: Low Risk  (4/12/2022)    Housing Stability Vital Sign     Unable to Pay for Housing in the Last Year: No     Number of Places Lived in the Last Year: 1     Unstable Housing in the Last Year: No          Review of Systems  "  Constitutional:  Negative for chills and fever.   Respiratory:  Negative for cough and shortness of breath.    Cardiovascular:  Negative for chest pain.   Gastrointestinal:  Negative for diarrhea, nausea and vomiting.   Musculoskeletal:  Negative for gait problem.   Neurological:  Positive for numbness.       Objective:    /76 (BP Location: Left arm, Patient Position: Sitting, Cuff Size: Adult)   Pulse 89   Ht 5' 7\" (1.702 m) Comment: stated  Wt 91.2 kg (201 lb) Comment: stated in post op shoe  BMI 31.48 kg/m²       Physical Exam  Vitals reviewed.   Constitutional:       General: She is not in acute distress.     Appearance: She is well-developed. She is not toxic-appearing or diaphoretic.   Cardiovascular:      Rate and Rhythm: Normal rate and regular rhythm.      Pulses:           Dorsalis pedis pulses are 2+ on the right side and 2+ on the left side.        Posterior tibial pulses are 1+ on the right side and 1+ on the left side.      Comments: No ischemia.  Pulmonary:      Effort: Pulmonary effort is normal. No respiratory distress.   Musculoskeletal:         General: Deformity present. No swelling, tenderness or signs of injury.      Right lower leg: Edema present.      Left lower leg: Edema present.      Right foot: No foot drop.      Left foot: No foot drop.      Comments: Tight achilles tendon with equinus bilaterally.  Hammertoe presents.  Partial amp right 2nd toe.  Prominent cuboid right worse than left.  No warmth, swelling, redness, or pain in right foot.     Skin:     General: Skin is warm and dry.      Capillary Refill: Capillary refill takes less than 2 seconds.      Coloration: Skin is not cyanotic or mottled.      Findings: No ecchymosis.      Nails: There is no clubbing.      Comments: Ulcer with mild keratosis on right great toe. It is 0.3 X 0.3 cm.  Depth is minimal.  Wound bed is granular.  No purulence or signs of infection.  Wound does not probe to deep tissues.  Healed blood " blister in distal lateral and distal medial right great toe.     Neurological:      General: No focal deficit present.      Mental Status: She is alert and oriented to person, place, and time.      Cranial Nerves: No cranial nerve deficit.      Sensory: Sensory deficit present.      Motor: No abnormal muscle tone.      Coordination: Coordination normal.   Psychiatric:         Mood and Affect: Mood normal.         Behavior: Behavior normal.         Thought Content: Thought content normal.         Judgment: Judgment normal.

## 2024-12-30 ENCOUNTER — TELEPHONE (OUTPATIENT)
Dept: FAMILY MEDICINE CLINIC | Facility: CLINIC | Age: 54
End: 2024-12-30

## 2024-12-30 NOTE — TELEPHONE ENCOUNTER
PA for Mounjaro 15MG/0.5ML SUBMITTED     to [employer]     via    [x]CMM-KEY: [id number]   []Surescripts-Case ID #    []Availity-Auth ID #  NDC #    []Faxed to plan    []Other website    []Phone call Case ID #      []PA sent as URGENT    All office notes, labs and other pertaining documents and studies sent. Clinical questions answered. Awaiting determination from insurance company.     Turnaround time for your insurance to make a decision on your Prior Authorization can take 7-21 business days.

## 2024-12-31 NOTE — TELEPHONE ENCOUNTER
PA Mounjaro 15MG/0.5ML APPROVED     Date(s) approved 12/30/24 - 12/30/25    Case # [id number]    Patient advised by          []MyChart Message  [x]Phone call   []LMOM  []L/M to call office as no active Communication consent on file  []Unable to leave detailed message as VM not approved on Communication consent       Pharmacy advised by    [x]Fax  []Phone call

## 2025-01-01 ENCOUNTER — HOSPITAL ENCOUNTER (EMERGENCY)
Facility: HOSPITAL | Age: 55
Discharge: HOME | End: 2025-01-01
Attending: EMERGENCY MEDICINE
Payer: OTHER MISCELLANEOUS

## 2025-01-01 DIAGNOSIS — Z91.89: Primary | ICD-10-CM

## 2025-01-01 PROCEDURE — 99284 EMERGENCY DEPT VISIT MOD MDM: CPT | Performed by: EMERGENCY MEDICINE

## 2025-01-01 PROCEDURE — 99283 EMERGENCY DEPT VISIT LOW MDM: CPT

## 2025-01-01 RX ORDER — CIPROFLOXACIN 500 MG/1
500 TABLET, FILM COATED ORAL ONCE
Status: COMPLETED | OUTPATIENT
Start: 2025-01-01 | End: 2025-01-01

## 2025-01-01 RX ADMIN — CIPROFLOXACIN 500 MG: 500 TABLET ORAL at 17:25

## 2025-01-01 NOTE — ED PROVIDER NOTES
Time reflects when diagnosis was documented in both MDM as applicable and the Disposition within this note       Time User Action Codes Description Comment    1/1/2025  5:21 PM Shey Moon Add [Z91.89] At increased risk for exposure to Neisseria meningitidis           ED Disposition       ED Disposition   Discharge    Condition   Stable    Date/Time   Wed Jan 1, 2025  5:20 PM    Comment   Denisa Millan discharge to home/self care.                   Assessment & Plan       Medical Decision Making  Risk  Prescription drug management.    ASSESSMENT: Patient is a 54 y.o. 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  PLAN: Treated with 1 time dose of ciprofloxacin. Follow up with employee health.           Medications   ciprofloxacin (CIPRO) tablet 500 mg (500 mg Oral Given 1/1/25 1725)       ED Risk Strat Scores                                              History of Present Illness       Chief Complaint   Patient presents with    Medical Problem     Pt was exposed to meningitis.       Past Medical History:   Diagnosis Date    Cellulitis of left lower extremity 04/15/2020    COVID-19 12/22/2020    Diabetes mellitus (HCC)     Exposure to SARS-associated coronavirus 04/15/2020    Fever 04/15/2020    Foot ulcer, right, with unspecified severity (HCC) 06/12/2019    Hospital discharge follow-up 04/20/2022    Hyperlipidemia     Hypertension     Neuropathy in diabetes (HCC)     Obesity     Pyogenic inflammation of bone (HCC) 04/20/2022      Past Surgical History:   Procedure Laterality Date    AMPUTATION      FOOT SURGERY      INCISION AND DRAINAGE INTRA ORAL ABSCESS Left 10/14/2020    Procedure: INCISION AND DRAINAGE  (I&D) WOUND ORAL EXTRAORAL LEFT SUBMANDIBULAR SPACE, LEFT  SPACE SPACE, INTRAORAL LEFT LATERAL PHARYNGEAL SPACE AND LEFT SUBLINGUAL SPACE;  Surgeon: Mitchell Booker DDS;  [facility];  Service: Maxillofacial    REMOVAL OF IMPACTED TOOTH - COMPLETELY  BONY Left 10/14/2020    Procedure: EXTRACTION TEETH MULTIPLE (RETAINED ROOTS #1,3,14, TEETH #4,17,18,19,32;  Surgeon: Mitchell Booker DDS;  [facility];  Service: Maxillofacial    TOE AMPUTATION Right 04/12/2022    Procedure: AMPUTATION DISTAL PHALYNX RIGHT SECOND TOE;  Surgeon: Luis Carlos Urban DPM;  [facility];  Service: Podiatry    TONSILLECTOMY AND ADENOIDECTOMY      WOUND DEBRIDEMENT Right 07/25/2020    Procedure: Excision of non healing diabetic right foot Ulcer, with closure of wound, excision of tibial sesamoid right foot;  Surgeon: Luis Carlos Urban DPM;  [facility];  Service: Podiatry      Family History   Problem Relation Age of Onset    Heart disease Mother     Diabetes Mother     Hypertension Mother         Benign Essential     Coronary artery disease Mother     Clotting disorder Mother     Cancer Father         lung     Diabetes Sister         mellitus     No Known Problems Maternal Grandmother     Diabetes Maternal Grandfather         mellitus     Breast cancer Paternal Grandmother         unknown age    No Known Problems Paternal Grandfather     No Known Problems Maternal Aunt     Breast cancer Paternal Aunt         unknown      Social History     Tobacco Use    Smoking status: Never     Passive exposure: Never    Smokeless tobacco: Never   Vaping Use    Vaping status: Never Used   Substance Use Topics    Alcohol use: Yes     Alcohol/week: 4.0 standard drinks of alcohol     Types: 4 Standard drinks or equivalent per week     Comment: 4 drinks in a week    Drug use: Never      E-Cigarette/Vaping    E-Cigarette Use Never User       E-Cigarette/Vaping Substances    Nicotine No     THC No     CBD No     Flavoring No     Other No     Unknown No       I have reviewed and agree with the history as documented.     HPI  Patient is a 54 y.o. 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 Patient presents to ED to obtain prophylaxis medication. Patient offers  no complaints.    Review of Systems   Constitutional:  Negative for chills and fever.   Respiratory:  Negative for shortness of breath.    Cardiovascular:  Negative for chest pain.   Neurological:  Negative for headaches.           Objective       ED Triage Vitals   Temp Pulse BP Resp SpO2 Patient Position - Orthostatic VS   -- -- -- -- -- --      Temp src Heart Rate Source BP Location FiO2 (%) Pain Score    -- -- -- -- --      Vitals    None         Physical Exam  Vitals and nursing note reviewed.   Constitutional:       General: She is not in acute distress.     Appearance: Normal appearance. She is well-developed. She is not ill-appearing, toxic-appearing or diaphoretic.   HENT:      Head: Normocephalic and atraumatic.   Eyes:      General:         Right eye: No discharge.         Left eye: No discharge.      Conjunctiva/sclera: Conjunctivae normal.   Cardiovascular:      Rate and Rhythm: Normal rate.   Pulmonary:      Effort: Pulmonary effort is normal. No respiratory distress.   Musculoskeletal:         General: No swelling or deformity. Normal range of motion.      Cervical back: Normal range of motion and neck supple. No rigidity.   Skin:     General: Skin is warm and dry.      Capillary Refill: Capillary refill takes less than 2 seconds.   Neurological:      General: No focal deficit present.      Mental Status: She is alert and oriented to person, place, and time.   Psychiatric:         Mood and Affect: Mood normal.         Results Reviewed       None            No orders to display       Procedures    ED Medication and Procedure Management   Prior to Admission Medications   Prescriptions Last Dose Informant Patient Reported? Taking?   Blood Glucose Monitoring Suppl (OneTouch Verio Reflect) w/Device KIT  Self No No   Sig: Check blood sugars twice daily. Please substitute with appropriate alternative as covered by patient's insurance. Dx: E11.65   Empagliflozin (Jardiance) 25 MG TABS  Self No No   Sig: TAKE  ONE TABLET BY MOUTH EVERY MORNING   Insulin Pen Needle 32G X 4 MM MISC  Self No No   Sig: Use daily   OneTouch Delica Lancets 33G MISC  Self No No   Sig: Check blood sugars twice daily. Please substitute with appropriate alternative as covered by patient's insurance. Dx: E11.65   glucose blood (OneTouch Verio) test strip  Self No No   Sig: Check blood sugars twice daily. Please substitute with appropriate alternative as covered by patient's insurance. Dx: E11.65   insulin degludec (Tresiba FlexTouch) 100 units/mL injection pen  Self No No   Sig: Inject 10 Units under the skin daily at bedtime   lisinopril-hydrochlorothiazide (PRINZIDE,ZESTORETIC) 10-12.5 MG per tablet  Self No No   Sig: TAKE ONE TABLET BY MOUTH EVERY DAY   metFORMIN (GLUCOPHAGE) 1000 MG tablet  Self No No   Sig: TAKE 1 TABLET BY MOUTH TWICE A DAY   omeprazole (PriLOSEC) 20 mg delayed release capsule  Self Yes No   Sig: Take 20 mg by mouth daily   simvastatin (ZOCOR) 40 mg tablet  Self No No   Sig: Take 1 tablet (40 mg total) by mouth daily at bedtime   tirzepatide (Mounjaro) 15 MG/0.5ML  Self No No   Sig: Inject 0.5 mL (15 mg total) under the skin every 7 days      Facility-Administered Medications: None     Discharge Medication List as of 1/1/2025  5:22 PM        CONTINUE these medications which have NOT CHANGED    Details   Blood Glucose Monitoring Suppl (OneTouch Verio Reflect) w/Device KIT Check blood sugars twice daily. Please substitute with appropriate alternative as covered by patient's insurance. Dx: E11.65, Normal      Empagliflozin (Jardiance) 25 MG TABS TAKE ONE TABLET BY MOUTH EVERY MORNING, Normal      glucose blood (OneTouch Verio) test strip Check blood sugars twice daily. Please substitute with appropriate alternative as covered by patient's insurance. Dx: E11.65, Normal      insulin degludec (Tresiba FlexTouch) 100 units/mL injection pen Inject 10 Units under the skin daily at bedtime, Starting Tue 12/3/2024, Normal      Insulin  Pen Needle 32G X 4 MM MISC Use daily, Starting Tue 12/3/2024, Normal      lisinopril-hydrochlorothiazide (PRINZIDE,ZESTORETIC) 10-12.5 MG per tablet TAKE ONE TABLET BY MOUTH EVERY DAY, Normal      metFORMIN (GLUCOPHAGE) 1000 MG tablet TAKE 1 TABLET BY MOUTH TWICE A DAY, Normal      omeprazole (PriLOSEC) 20 mg delayed release capsule Take 20 mg by mouth daily, Historical Med      OneTouch Delica Lancets 33G MISC Check blood sugars twice daily. Please substitute with appropriate alternative as covered by patient's insurance. Dx: E11.65, Normal      simvastatin (ZOCOR) 40 mg tablet Take 1 tablet (40 mg total) by mouth daily at bedtime, Starting Wed 2/23/2022, Normal      tirzepatide (Mounjaro) 15 MG/0.5ML Inject 0.5 mL (15 mg total) under the skin every 7 days, Starting Fri 8/2/2024, Normal           No discharge procedures on file.  ED SEPSIS DOCUMENTATION   Time reflects when diagnosis was documented in both MDM as applicable and the Disposition within this note       Time User Action Codes Description Comment    1/1/2025  5:21 PM Shey Moon Add [Z91.89] At increased risk for exposure to Neisseria meningitidis                  Shey Moon MD  01/01/25 7831

## 2025-01-08 ENCOUNTER — OFFICE VISIT (OUTPATIENT)
Dept: PODIATRY | Facility: CLINIC | Age: 55
End: 2025-01-08
Payer: COMMERCIAL

## 2025-01-08 VITALS — BODY MASS INDEX: 31.55 KG/M2 | WEIGHT: 201 LBS | HEIGHT: 67 IN

## 2025-01-08 DIAGNOSIS — E11.40 TYPE 2 DIABETES MELLITUS WITH DIABETIC NEUROPATHY, WITHOUT LONG-TERM CURRENT USE OF INSULIN (HCC): ICD-10-CM

## 2025-01-08 DIAGNOSIS — L97.511 ULCER OF TOE OF RIGHT FOOT, LIMITED TO BREAKDOWN OF SKIN (HCC): Primary | ICD-10-CM

## 2025-01-08 PROCEDURE — 99213 OFFICE O/P EST LOW 20 MIN: CPT | Performed by: PODIATRIST

## 2025-01-08 NOTE — ED ATTENDING ATTESTATION
1/1/2025  I, Mya Hoang MD, saw and evaluated the patient. I have discussed the patient with the resident/non-physician practitioner and agree with the resident's/non-physician practitioner's findings, Plan of Care, and MDM as documented in the resident's/non-physician practitioner's note, except where noted. All available labs and Radiology studies were reviewed.  I was present for key portions of any procedure(s) performed by the resident/non-physician practitioner and I was immediately available to provide assistance.       At this point I agree with the current assessment done in the Emergency Department.  I have conducted an independent evaluation of this patient and history and physical is as follows: Patient with confirmed exposure to patient with neisseria meningitis. No currently symptoms. Sent to ED for antibiotic prophylaxis per established policy. Plan: antibiotic prophylaxis f/u with [facility].     ED Course         Critical Care Time  Procedures

## 2025-01-08 NOTE — PROGRESS NOTES
PATIENT:  Denisa Millan  1970     Assessment     1. Ulcer of toe of right foot, limited to breakdown of skin ([facility])  Ambulatory Referral to Wound Care      2. Type 2 diabetes mellitus with diabetic neuropathy, without long-term current use of insulin ([facility])  Ambulatory Referral to Wound Care              Plan:  1. Patient was counseled on the condition and diagnosis.  Educated disease prevention and risks related to diabetes.    2. She still has small wound on right great toe.  Keratosis removed.  Instructed local care.  New wound in right submet 1 is healing and superficial.  Discussed proper off-loading and staying off of her feet in order for proper healing and prevention of infection.   3. Referred her to [facility] for football dressing or TCC.      Subjective:   The patient presents for wound care.  She has new wound in right submet 1.  No redness or pain.  Still has wound in right great toe.  Her BS is under control.        The following portions of the patient's history were reviewed and updated as appropriate: allergies, current medications, past family history, past medical history, past social history, past surgical history and problem list.  All pertinent labs and images were reviewed.      Past Medical History  Past Medical History:   Diagnosis Date    Cellulitis of left lower extremity 04/15/2020    COVID-19 12/22/2020    Diabetes mellitus ([facility])     Exposure to SARS-associated coronavirus 04/15/2020    Fever 04/15/2020    Foot ulcer, right, with unspecified severity ([facility]) 06/12/2019    Hospital discharge follow-up 04/20/2022    Hyperlipidemia     Hypertension     Neuropathy in diabetes ([facility])     Obesity     Pyogenic inflammation of bone ([facility]) 04/20/2022       Past Surgical History  Past Surgical History:   Procedure Laterality Date    AMPUTATION      FOOT SURGERY      INCISION AND DRAINAGE INTRA ORAL ABSCESS Left 10/14/2020    Procedure: INCISION AND DRAINAGE  (I&D) WOUND ORAL EXTRAORAL  LEFT SUBMANDIBULAR SPACE, LEFT  SPACE SPACE, INTRAORAL LEFT LATERAL PHARYNGEAL SPACE AND LEFT SUBLINGUAL SPACE;  Surgeon: Mitchell Booker DDS;  [facility];  Service: Maxillofacial    REMOVAL OF IMPACTED TOOTH - COMPLETELY BONY Left 10/14/2020    Procedure: EXTRACTION TEETH MULTIPLE (RETAINED ROOTS #1,3,14, TEETH #4,17,18,19,32;  Surgeon: Mitchell Booker DDS;  [facility];  Service: Maxillofacial    TOE AMPUTATION Right 04/12/2022    Procedure: AMPUTATION DISTAL PHALYNX RIGHT SECOND TOE;  Surgeon: Luis Carlos Urban DPM;  [facility];  Service: Podiatry    TONSILLECTOMY AND ADENOIDECTOMY      WOUND DEBRIDEMENT Right 07/25/2020    Procedure: Excision of non healing diabetic right foot Ulcer, with closure of wound, excision of tibial sesamoid right foot;  Surgeon: Luis Carlos Urban DPM;  [facility];  Service: Podiatry        Allergies:  Patient has no known allergies.    Medications:  Current Outpatient Medications   Medication Sig Dispense Refill    Blood Glucose Monitoring Suppl (OneTouch Verio Reflect) w/Device KIT Check blood sugars twice daily. Please substitute with appropriate alternative as covered by patient's insurance. Dx: E11.65 1 kit 0    Empagliflozin (Jardiance) 25 MG TABS TAKE ONE TABLET BY MOUTH EVERY MORNING 90 tablet 1    glucose blood (OneTouch Verio) test strip Check blood sugars twice daily. Please substitute with appropriate alternative as covered by patient's insurance. Dx: E11.65 200 each 3    insulin degludec (Tresiba FlexTouch) 100 units/mL injection pen Inject 10 Units under the skin daily at bedtime 9 mL 1    Insulin Pen Needle 32G X 4 MM MISC Use daily 90 each 2    lisinopril-hydrochlorothiazide (PRINZIDE,ZESTORETIC) 10-12.5 MG per tablet TAKE ONE TABLET BY MOUTH EVERY DAY 90 tablet 1    metFORMIN (GLUCOPHAGE) 1000 MG tablet TAKE 1 TABLET BY MOUTH TWICE A  tablet 0    omeprazole (PriLOSEC) 20 mg delayed release capsule Take 20 mg by mouth daily       RigoTouch Delica Lancets 33G MISC Check blood sugars twice daily. Please substitute with appropriate alternative as covered by patient's insurance. Dx: E11.65 200 each 3    simvastatin (ZOCOR) 40 mg tablet Take 1 tablet (40 mg total) by mouth daily at bedtime 90 tablet 3    tirzepatide (Mounjaro) 15 MG/0.5ML Inject 0.5 mL (15 mg total) under the skin every 7 days 2 mL 3     No current facility-administered medications for this visit.       Social History:  Social History     Socioeconomic History    Marital status: /Civil Union     Spouse name: None    Number of children: None    Years of education: None    Highest education level: None   Occupational History    None   Tobacco Use    Smoking status: Never     Passive exposure: Never    Smokeless tobacco: Never   Vaping Use    Vaping status: Never Used   Substance and Sexual Activity    Alcohol use: Yes     Alcohol/week: 4.0 standard drinks of alcohol     Types: 4 Standard drinks or equivalent per week     Comment: 4 drinks in a week    Drug use: Never    Sexual activity: Yes     Partners: Male     Birth control/protection: None   Other Topics Concern    None   Social History Narrative    None     Social Drivers of Health     Financial Resource Strain: Not on file   Food Insecurity: No Food Insecurity (4/12/2022)    Hunger Vital Sign     Worried About Running Out of Food in the Last Year: Never true     Ran Out of Food in the Last Year: Never true   Transportation Needs: No Transportation Needs (4/12/2022)    PRAPARE - Transportation     Lack of Transportation (Medical): No     Lack of Transportation (Non-Medical): No   Physical Activity: Not on file   Stress: Not on file   Social Connections: Not on file   Intimate Partner Violence: Not on file   Housing Stability: Low Risk  (4/12/2022)    Housing Stability Vital Sign     Unable to Pay for Housing in the Last Year: No     Number of Places Lived in the Last Year: 1     Unstable Housing in the Last Year: No     "      Review of Systems   Constitutional:  Negative for chills and fever.   Respiratory:  Negative for cough and shortness of breath.    Cardiovascular:  Negative for chest pain.   Gastrointestinal:  Negative for diarrhea, nausea and vomiting.   Musculoskeletal:  Negative for gait problem.   Neurological:  Positive for numbness.       Objective:    Ht 5' 7\" (1.702 m)   Wt 91.2 kg (201 lb)   BMI 31.48 kg/m²       Physical Exam  Vitals reviewed.   Constitutional:       General: She is not in acute distress.     Appearance: She is well-developed. She is not toxic-appearing or diaphoretic.   Cardiovascular:      Rate and Rhythm: Normal rate and regular rhythm.      Pulses:           Dorsalis pedis pulses are 2+ on the right side and 2+ on the left side.        Posterior tibial pulses are 1+ on the right side and 1+ on the left side.      Comments: No ischemia.  Pulmonary:      Effort: Pulmonary effort is normal. No respiratory distress.   Musculoskeletal:         General: Deformity present. No swelling, tenderness or signs of injury.      Right lower leg: Edema present.      Left lower leg: Edema present.      Right foot: No foot drop.      Left foot: No foot drop.      Comments: Tight achilles tendon with equinus bilaterally.  Hammertoe presents.  Partial amp right 2nd toe.  Prominent cuboid right worse than left.  No warmth, swelling, redness, or pain in right foot.     Skin:     General: Skin is warm and dry.      Capillary Refill: Capillary refill takes less than 2 seconds.      Coloration: Skin is not cyanotic or mottled.      Findings: No ecchymosis.      Nails: There is no clubbing.      Comments: Ulcer with mild keratosis on right great toe. It is 0.3 X 0.3 cm.  Depth is minimal.  Wound bed is granular.  No purulence or signs of infection.  Wound does not probe to deep tissues.  Superficial, healing ulcer in right submet 1.     Neurological:      General: No focal deficit present.      Mental Status: She is " alert and oriented to person, place, and time.      Cranial Nerves: No cranial nerve deficit.      Sensory: Sensory deficit present.      Motor: No abnormal muscle tone.      Coordination: Coordination normal.   Psychiatric:         Mood and Affect: Mood normal.         Behavior: Behavior normal.         Thought Content: Thought content normal.         Judgment: Judgment normal.

## 2025-01-14 ENCOUNTER — OFFICE VISIT (OUTPATIENT)
Dept: WOUND CARE | Facility: CLINIC | Age: 55
End: 2025-01-14
Payer: COMMERCIAL

## 2025-01-14 ENCOUNTER — TELEPHONE (OUTPATIENT)
Dept: PODIATRY | Facility: CLINIC | Age: 55
End: 2025-01-14

## 2025-01-14 VITALS
BODY MASS INDEX: 31.71 KG/M2 | DIASTOLIC BLOOD PRESSURE: 62 MMHG | TEMPERATURE: 97.1 F | HEART RATE: 88 BPM | WEIGHT: 202 LBS | SYSTOLIC BLOOD PRESSURE: 122 MMHG | RESPIRATION RATE: 16 BRPM | HEIGHT: 67 IN

## 2025-01-14 DIAGNOSIS — E11.40 CONTROLLED TYPE 2 DIABETES MELLITUS WITH NEUROPATHY (HCC): ICD-10-CM

## 2025-01-14 DIAGNOSIS — L97.512 DIABETIC ULCER OF TOE OF RIGHT FOOT ASSOCIATED WITH TYPE 2 DIABETES MELLITUS, WITH FAT LAYER EXPOSED (HCC): Primary | ICD-10-CM

## 2025-01-14 DIAGNOSIS — E11.621 DIABETIC ULCER OF TOE OF RIGHT FOOT ASSOCIATED WITH TYPE 2 DIABETES MELLITUS, WITH FAT LAYER EXPOSED (HCC): Primary | ICD-10-CM

## 2025-01-14 DIAGNOSIS — E11.42 DIABETIC POLYNEUROPATHY ASSOCIATED WITH TYPE 2 DIABETES MELLITUS (HCC): ICD-10-CM

## 2025-01-14 PROCEDURE — 99213 OFFICE O/P EST LOW 20 MIN: CPT | Performed by: PODIATRIST

## 2025-01-14 PROCEDURE — 11042 DBRDMT SUBQ TIS 1ST 20SQCM/<: CPT | Performed by: PODIATRIST

## 2025-01-14 RX ORDER — LIDOCAINE 40 MG/G
CREAM TOPICAL ONCE
Status: COMPLETED | OUTPATIENT
Start: 2025-01-14 | End: 2025-01-14

## 2025-01-14 RX ADMIN — LIDOCAINE: 40 CREAM TOPICAL at 09:01

## 2025-01-14 NOTE — TELEPHONE ENCOUNTER
Caller: Denisa Yana    Doctor and/or Office: Zac Peters DPM & Luis Carlos Urban DPM    CB#: [phone]    Escalation: Denisa was seen in Wound Care this morning by Dr. Peters.  She needs a cam boot, size M and she wears a size 10 shoe.  She was told by Wound Care to go to any of our offices to get the cam boot.    She is on her way to [facility].

## 2025-01-14 NOTE — PATIENT INSTRUCTIONS
Orders Placed This Encounter   Procedures    Wound cleansing and dressings Diabetic Ulcer Right;Plantar Toe D1, great     Wash your hands with soap and water.    Remove old dressing, discard into plastic bag and place in trash.    Cleanse the wound with Mild Soap (like Dove) & Water prior to applying a clean dressing.   Do not use tissue or cotton balls. Do not scrub the wound. Pat dry using gauze.    Shower only with protection.  You can use a cast cover or sponge bathe at this time.     Apply Acticoat 3 to the wound.  Cover & secure with gauze & tape.  Change dressing 3 x weekly & as needed for soilage or dislodgement.    Please try to consume 3-4 servings of protein (30g) each day.    Follow up in [facility] in 1 week     Standing Status:   Future     Expiration Date:   1/21/2025    Wound Procedure Treatment Diabetic Ulcer Right;Plantar Toe D1, great     This order was created via procedure documentation    Cam Boot     Size:   Medium     Type:   Low Tide

## 2025-01-14 NOTE — PROGRESS NOTES
Patient ID: Denisa Millan is a 54 y.o. female Date of Birth 1970     Diagnosis:  1. Diabetic ulcer of toe of right foot associated with type 2 diabetes mellitus, with fat layer exposed (HCC)  -     Wound cleansing and dressings Diabetic Ulcer Right;Plantar Toe D1, great; Future  -     Cam Boot  -     Wound Procedure Treatment Diabetic Ulcer Right;Plantar Toe D1, great  -     Debridement Diabetic Ulcer Right;Plantar Toe D1, great  2. Diabetic polyneuropathy associated with type 2 diabetes mellitus ([facility])  -     lidocaine (LMX) 4 % cream  -     Wound cleansing and dressings Diabetic Ulcer Right;Plantar Toe D1, great; Future  -     Wound Procedure Treatment Diabetic Ulcer Right;Plantar Toe D1, great  -     Debridement Diabetic Ulcer Right;Plantar Toe D1, great      Diagnosis ICD-10-CM Associated Orders   1. Diabetic ulcer of toe of right foot associated with type 2 diabetes mellitus, with fat layer exposed ([facility])  E11.621 Wound cleansing and dressings Diabetic Ulcer Right;Plantar Toe D1, great    L97.512 Cam Boot     Wound Procedure Treatment Diabetic Ulcer Right;Plantar Toe D1, great     Debridement Diabetic Ulcer Right;Plantar Toe D1, great      2. Diabetic polyneuropathy associated with type 2 diabetes mellitus ([facility])  E11.42 lidocaine (LMX) 4 % cream     Wound cleansing and dressings Diabetic Ulcer Right;Plantar Toe D1, great     Wound Procedure Treatment Diabetic Ulcer Right;Plantar Toe D1, great     Debridement Diabetic Ulcer Right;Plantar Toe D1, great             Assessment & Plan:  Diagnosis and options discussed with patient  Patient agreeable to the plan as stated below  Reviewed multiple recent notes with Dr. Urban (podiatry)  The wound is not offloaded enough with the surgical shoe. Clear evidence of pressure and trauma to the toe. I am recommending either a TCC or CAM boot, she'd like to try the CAM first. Dicussed keeping weight on the heel EVEN IN THE BOOT  If note improved next week we may need to  take her out of work  A1C poorly controlled. In the past 2 years only once has it been below 8. I reviewed her PCP visit 12/3/2024, her insulin was increased at night. Stressed importance of increased protein and reduction of carbohydrates in her diet.       Chief Complaint   Patient presents with   • New Patient Visit     New patient visit for wound to right great toe.  Pt reports that wound has been present since October.  Pt states wound was nearly closed in early Dec.  Patient travelled to  & wound worsened. Pt was taking oral doxy while traveling. Reports she finished doxy 12/20/24.         Subjective:   Patient presents for initial wound care visit.  She has recently been seeing Dr. Urban in his office.  She has history of second toe amputation from diabetic neuropathy and foot infection.  She has been struggling with an ulcer to her great toe on the right foot for many months.  She has been in a wedge shoe and surgical shoe but the wound is not healing.  She is using Acticoat as a primary dressing.  Her A1c is 8.4.  She does not smoke.  She has trouble controlling her day-to-day sugar.  Recently her family doctor increased her prandial insulin.  She denies nausea fever vomiting or chills        The following portions of the patient's history were reviewed and updated as appropriate:   Patient Active Problem List   Diagnosis   • Diabetes mellitus with diabetic neuropathy (HCC)   • Hyperlipidemia   • Benign essential hypertension   • Obesity, Class I, BMI 30.0-34.9 (see actual BMI)   • Paroxysmal SVT (supraventricular tachycardia) ([facility])   • Annual physical exam   • Encounter for screening mammogram for breast cancer   • Swelling of right foot   • Other acquired deformities of right foot   • Acquired absence of other right toe(s) (HCC)   • Ulcer of toe of right foot, limited to breakdown of skin (HCC)   • Diabetic ulcer of toe of right foot associated with type 2 diabetes mellitus, with fat layer exposed (HCC)      Past Medical History:   Diagnosis Date   • Cellulitis of left lower extremity 04/15/2020   • COVID-19 12/22/2020   • Diabetes mellitus (HCC)    • Exposure to SARS-associated coronavirus 04/15/2020   • Fever 04/15/2020   • Foot ulcer, right, with unspecified severity (HCC) 06/12/2019   • Hospital discharge follow-up 04/20/2022   • Hyperlipidemia    • Hypertension    • Neuropathy in diabetes (HCC)    • Obesity    • Pyogenic inflammation of bone (HCC) 04/20/2022     Past Surgical History:   Procedure Laterality Date   • AMPUTATION     • FOOT SURGERY     • INCISION AND DRAINAGE INTRA ORAL ABSCESS Left 10/14/2020    Procedure: INCISION AND DRAINAGE  (I&D) WOUND ORAL EXTRAORAL LEFT SUBMANDIBULAR SPACE, LEFT  SPACE SPACE, INTRAORAL LEFT LATERAL PHARYNGEAL SPACE AND LEFT SUBLINGUAL SPACE;  Surgeon: Mitchell Booker DDS;  Location: [facility];  Service: Maxillofacial   • REMOVAL OF IMPACTED TOOTH - COMPLETELY BONY Left 10/14/2020    Procedure: EXTRACTION TEETH MULTIPLE (RETAINED ROOTS #1,3,14, TEETH #4,17,18,19,32;  Surgeon: Mitchell Booker DDS;  Location: [facility];  Service: Maxillofacial   • TOE AMPUTATION Right 04/12/2022    Procedure: AMPUTATION DISTAL PHALYNX RIGHT SECOND TOE;  Surgeon: Luis Carlos Urban DPM;  Location: [facility];  Service: Podiatry   • TONSILLECTOMY AND ADENOIDECTOMY     • WOUND DEBRIDEMENT Right 07/25/2020    Procedure: Excision of non healing diabetic right foot Ulcer, with closure of wound, excision of tibial sesamoid right foot;  Surgeon: Luis Carlos Urban DPM;  Location: [facility];  Service: Podiatry     Social History     Socioeconomic History   • Marital status: /Civil Union     Spouse name: Not on file   • Number of children: Not on file   • Years of education: Not on file   • Highest education level: Not on file   Occupational History   • Not on file   Tobacco Use   • Smoking status: Never     Passive exposure: Never   • Smokeless tobacco: Never   Vaping Use   • Vaping status: Never  Used   Substance and Sexual Activity   • Alcohol use: Yes     Alcohol/week: 4.0 standard drinks of alcohol     Types: 4 Standard drinks or equivalent per week     Comment: 4 drinks in a week   • Drug use: Never   • Sexual activity: Yes     Partners: Male     Birth control/protection: None   Other Topics Concern   • Not on file   Social History Narrative   • Not on file     Social Drivers of Health     Financial Resource Strain: Not on file   Food Insecurity: No Food Insecurity (4/12/2022)    Hunger Vital Sign    • Worried About Running Out of Food in the Last Year: Never true    • Ran Out of Food in the Last Year: Never true   Transportation Needs: No Transportation Needs (4/12/2022)    PRAPARE - Transportation    • Lack of Transportation (Medical): No    • Lack of Transportation (Non-Medical): No   Physical Activity: Not on file   Stress: Not on file   Social Connections: Not on file   Intimate Partner Violence: Not on file   Housing Stability: Low Risk  (4/12/2022)    Housing Stability Vital Sign    • Unable to Pay for Housing in the Last Year: No    • Number of Places Lived in the Last Year: 1    • Unstable Housing in the Last Year: No        Current Outpatient Medications:   •  Empagliflozin (Jardiance) 25 MG TABS, TAKE ONE TABLET BY MOUTH EVERY MORNING, Disp: 90 tablet, Rfl: 1  •  insulin degludec (Tresiba FlexTouch) 100 units/mL injection pen, Inject 10 Units under the skin daily at bedtime, Disp: 9 mL, Rfl: 1  •  lisinopril-hydrochlorothiazide (PRINZIDE,ZESTORETIC) 10-12.5 MG per tablet, TAKE ONE TABLET BY MOUTH EVERY DAY, Disp: 90 tablet, Rfl: 1  •  metFORMIN (GLUCOPHAGE) 1000 MG tablet, TAKE 1 TABLET BY MOUTH TWICE A DAY, Disp: 180 tablet, Rfl: 0  •  omeprazole (PriLOSEC) 20 mg delayed release capsule, Take 20 mg by mouth daily, Disp: , Rfl:   •  simvastatin (ZOCOR) 40 mg tablet, Take 1 tablet (40 mg total) by mouth daily at bedtime, Disp: 90 tablet, Rfl: 3  •  tirzepatide (Mounjaro) 15 MG/0.5ML, Inject 0.5  "mL (15 mg total) under the skin every 7 days, Disp: 2 mL, Rfl: 3  •  Blood Glucose Monitoring Suppl (OneTouch Verio Reflect) w/Device KIT, Check blood sugars twice daily. Please substitute with appropriate alternative as covered by patient's insurance. Dx: E11.65, Disp: 1 kit, Rfl: 0  •  glucose blood (OneTouch Verio) test strip, Check blood sugars twice daily. Please substitute with appropriate alternative as covered by patient's insurance. Dx: E11.65, Disp: 200 each, Rfl: 3  •  Insulin Pen Needle 32G X 4 MM MISC, Use daily, Disp: 90 each, Rfl: 2  •  OneTouch Delica Lancets 33G MISC, Check blood sugars twice daily. Please substitute with appropriate alternative as covered by patient's insurance. Dx: E11.65, Disp: 200 each, Rfl: 3  No current facility-administered medications for this visit.  Family History   Problem Relation Age of Onset   • Heart disease Mother    • Diabetes Mother    • Hypertension Mother         Benign Essential    • Coronary artery disease Mother    • Clotting disorder Mother    • Cancer Father         lung    • Diabetes Sister         mellitus    • No Known Problems Maternal Grandmother    • Diabetes Maternal Grandfather         mellitus    • Breast cancer Paternal Grandmother         unknown age   • No Known Problems Paternal Grandfather    • No Known Problems Maternal Aunt    • Breast cancer Paternal Aunt         unknown      Review of Systems   Constitutional: Negative.    Respiratory:  Negative for shortness of breath.    Cardiovascular:  Negative for leg swelling.   Gastrointestinal:  Negative for diarrhea, nausea and vomiting.   Musculoskeletal:  Negative for arthralgias.   Skin:  Positive for color change and wound.   Neurological:  Positive for numbness. Negative for weakness.       Allergies  Patient has no known allergies.    Objective:  /62   Pulse 88   Temp (!) 97.1 °F (36.2 °C) (Tympanic)   Resp 16   Ht 5' 7\" (1.702 m)   Wt 91.6 kg (202 lb)   BMI 31.64 kg/m² " "    Physical Exam  Vitals reviewed.   Constitutional:       Appearance: She is obese. She is not ill-appearing.   Cardiovascular:      Pulses: Normal pulses.           Dorsalis pedis pulses are 2+ on the right side.        Posterior tibial pulses are 2+ on the right side.   Musculoskeletal:         General: Deformity (Mallet toe deformity right great toe.  Previous partial second toe amputation on the right is stable) present.      Right foot: Decreased range of motion. Deformity present.   Feet:      Right foot:      Protective Sensation:   0 sites sensed.      Skin integrity: Ulcer and skin breakdown present.   Skin:     Capillary Refill: Capillary refill takes less than 2 seconds.      Findings: No erythema.   Neurological:      Mental Status: She is alert.      Sensory: Sensory deficit present.             Wound 01/14/25 Diabetic Ulcer Toe D1, great Right;Plantar (Active)   Wound Image   01/14/25 0854   Wound Length (cm) 1 cm 01/14/25 0822   Wound Width (cm) 4 cm 01/14/25 0822   Wound Depth (cm) 0.1 cm 01/14/25 0822   Wound Surface Area (cm^2) 4 cm^2 01/14/25 0822   Wound Volume (cm^3) 0.4 cm^3 01/14/25 0822   Calculated Wound Volume (cm^3) 0.4 cm^3 01/14/25 0822   Number of tunnels 1 01/14/25 0822   Tunneling 1 4 cm 01/14/25 0822   Tunneling 1 in depth located at from 3 o'clock to 9 o'clock.  Extends width of wound. Toes @ 12 o'clock. 01/14/25 0822   Number of underminings 1 01/14/25 0822   Drainage Amount Moderate 01/14/25 0822   Drainage Description Serosanguineous;Yellow 01/14/25 0822   Non-staged Wound Description Full thickness 01/14/25 0822   Dressing Status Removed 01/14/25 0822                             Debridement   Wound 01/14/25 Diabetic Ulcer Toe D1, great Right;Plantar    Universal Protocol:  Consent: Verbal consent obtained.  Risks and benefits: risks, benefits and alternatives were discussed  Consent given by: patient  Time out: Immediately prior to procedure a \"time out\" was called to verify " the correct patient, procedure, equipment, support staff and site/side marked as required.  Timeout called at: 1/14/2025 8:45 AM.  Patient understanding: patient states understanding of the procedure being performed  Patient identity confirmed: verbally with patient    Debridement Details  Performed by: physician  Debridement type: surgical  Level of debridement: subcutaneous tissue  Pain control: lidocaine 4%      Post-debridement measurements  Length (cm): 1.4  Width (cm): 4.4  Depth (cm): 0.2  Percent debrided: 100%  Surface Area (cm^2): 6.16  Area Debrided (cm^2): 6.16  Volume (cm^3): 1.23    Tissue and other material debrided: dermis, epidermis and subcutaneous tissue  Devitalized tissue debrided: biofilm, callus, clots, exudate, fibrin, necrotic debris, slough and eschar  Instrument(s) utilized: blade and forceps  Technique utilized: excisionalBleeding: medium  Hemostasis obtained with: pressure  Procedural pain (0-10): 0  Post-procedural pain: 0   Response to treatment: procedure was tolerated well                     Wound Instructions:  Orders Placed This Encounter   Procedures   • Wound cleansing and dressings Diabetic Ulcer Right;Plantar Toe D1, great     Wash your hands with soap and water.    Remove old dressing, discard into plastic bag and place in trash.    Cleanse the wound with Mild Soap (like Dove) & Water prior to applying a clean dressing.   Do not use tissue or cotton balls. Do not scrub the wound. Pat dry using gauze.    Shower only with protection.  You can use a cast cover or sponge bathe at this time.     Apply Acticoat 3 to the wound.  Cover & secure with gauze & tape.  Change dressing 3 x weekly & as needed for soilage or dislodgement.    Please try to consume 3-4 servings of protein (30g) each day.    [facility] up [facility] in 1 week     Standing Status:   Future     Expiration Date:   1/21/2025   • Wound Procedure Treatment Diabetic Ulcer Right;Plantar Toe D1, great      "This order was created via procedure documentation   • Debridement Diabetic Ulcer Right;Plantar Toe D1, great     This order was created via procedure documentation   • Cam Boot     Size:   Medium     Type:   Low Tide         Zac Peters DPM    Portions of the record may have been created with voice recognition software. Occasional wrong word or \"sound a like\" substitutions may have occurred due to the inherent limitations of voice recognition software. Read the chart carefully and recognize, using context, where substitutions have occurred.      "

## 2025-01-21 ENCOUNTER — OFFICE VISIT (OUTPATIENT)
Dept: WOUND CARE | Facility: CLINIC | Age: 55
End: 2025-01-21
Payer: COMMERCIAL

## 2025-01-21 VITALS
TEMPERATURE: 97 F | RESPIRATION RATE: 14 BRPM | HEART RATE: 88 BPM | DIASTOLIC BLOOD PRESSURE: 62 MMHG | SYSTOLIC BLOOD PRESSURE: 118 MMHG

## 2025-01-21 DIAGNOSIS — L97.512 DIABETIC ULCER OF TOE OF RIGHT FOOT ASSOCIATED WITH TYPE 2 DIABETES MELLITUS, WITH FAT LAYER EXPOSED (HCC): Primary | ICD-10-CM

## 2025-01-21 DIAGNOSIS — E11.621 DIABETIC ULCER OF TOE OF RIGHT FOOT ASSOCIATED WITH TYPE 2 DIABETES MELLITUS, WITH FAT LAYER EXPOSED (HCC): Primary | ICD-10-CM

## 2025-01-21 PROCEDURE — 99213 OFFICE O/P EST LOW 20 MIN: CPT | Performed by: PODIATRIST

## 2025-01-21 PROCEDURE — 97597 DBRDMT OPN WND 1ST 20 CM/<: CPT | Performed by: PODIATRIST

## 2025-01-21 RX ORDER — LIDOCAINE 40 MG/G
CREAM TOPICAL ONCE
Status: COMPLETED | OUTPATIENT
Start: 2025-01-21 | End: 2025-01-21

## 2025-01-21 RX ADMIN — LIDOCAINE: 40 CREAM TOPICAL at 10:01

## 2025-01-21 NOTE — PATIENT INSTRUCTIONS
Orders Placed This Encounter   Procedures    Wound cleansing and dressings Diabetic Ulcer Right;Plantar Toe D1, great     Wash your hands with soap and water.    Remove old dressing, discard into plastic bag and place in trash.    Cleanse the wound with Mild Soap (like Dove) & Water prior to applying a clean dressing.   Do not use tissue or cotton balls. Do not scrub the wound. Pat dry using gauze.     Shower only with protection.  You can use a cast cover or sponge bathe at this time.       Right great toe wound:  Apply Acticoat 3 to the wound.  Cover & secure with gauze & tape.  Change dressing 3 x weekly & as needed for soilage or dislodgement.     Please try to consume 3-4 servings of protein (30g) each day.      Wear CAM boot at all times. Remove for sleeping.        Follow up in [facility] in 2 weeks.     Standing Status:   Future     Expiration Date:   1/28/2025

## 2025-01-21 NOTE — PROGRESS NOTES
Wound Procedure Treatment Diabetic Ulcer Right;Plantar Toe D1, great    Performed by: Bhavin Coppola RN  Authorized by: Zac Peters DPM    Associated wounds:   Wound 01/14/25 Diabetic Ulcer Toe D1, great Right;Plantar  Wound cleansed with:  NSS  Applied primary dressing:  Acticoat  Applied secondary dressing:  Gauze  Dressing secured with:  Tape  Offloading device appllied:  CAM  Comments:  Acticoat 3

## 2025-01-21 NOTE — PROGRESS NOTES
Patient ID: Denisa Millan is a 54 y.o. female Date of Birth 1970     Diagnosis:  1. Diabetic ulcer of toe of right foot associated with type 2 diabetes mellitus, with fat layer exposed (HCC)  -     Wound cleansing and dressings Diabetic Ulcer Right;Plantar Toe D1, great; Future  -     lidocaine (LMX) 4 % cream  -     Wound Procedure Treatment Diabetic Ulcer Right;Plantar Toe D1, great  -     Debridement Diabetic Ulcer Right;Plantar Toe D1, great     Diagnosis ICD-10-CM Associated Orders   1. Diabetic ulcer of toe of right foot associated with type 2 diabetes mellitus, with fat layer exposed (HCC)  E11.621 Wound cleansing and dressings Diabetic Ulcer Right;Plantar Toe D1, great    L97.512 lidocaine (LMX) 4 % cream     Wound Procedure Treatment Diabetic Ulcer Right;Plantar Toe D1, great     Debridement Diabetic Ulcer Right;Plantar Toe D1, great           Assessment & Plan:  Wound significantly improved in 1 week due to better offloading and daily wound care.  She is also taking more effort for nutrition and blood sugar control.  Selective debridement today.  Continue with Dermagran gauze and cam boot.  Return to clinic 2 weeks    Chief Complaint   Patient presents with   • Follow Up Wound Care Visit     Right great toe wound           Subjective:   Follow-up great toe diabetic ulcer on the right foot.  She did get her cam boot and states she has made much more effort to keep weight on her heel and slow down the gait.  She is trying to monitor her diet as well in order to maintain better blood sugar.  She states over the week the wound has looked much better.        The following portions of the patient's history were reviewed and updated as appropriate:   Patient Active Problem List   Diagnosis   • Diabetes mellitus with diabetic neuropathy (HCC)   • Hyperlipidemia   • Benign essential hypertension   • Obesity, Class I, BMI 30.0-34.9 (see actual BMI)   • Paroxysmal SVT (supraventricular tachycardia) ([facility])   •  Annual physical exam   • Encounter for screening mammogram for breast cancer   • Swelling of right foot   • Other acquired deformities of right foot   • Acquired absence of other right toe(s) ([facility])   • Ulcer of toe of right foot, limited to breakdown of skin ([facility])   • Diabetic ulcer of toe of right foot associated with type 2 diabetes mellitus, with fat layer exposed ([facility])     Past Medical History:   Diagnosis Date   • Cellulitis of left lower extremity 04/15/2020   • COVID-19 12/22/2020   • Diabetes mellitus ([facility])    • Exposure to SARS-associated coronavirus 04/15/2020   • Fever 04/15/2020   • Foot ulcer, right, with unspecified severity ([facility]) 06/12/2019   • Hospital discharge follow-up 04/20/2022   • Hyperlipidemia    • Hypertension    • Neuropathy in diabetes ([facility])    • Obesity    • Pyogenic inflammation of bone ([facility]) 04/20/2022     Past Surgical History:   Procedure Laterality Date   • AMPUTATION     • FOOT SURGERY     • INCISION AND DRAINAGE INTRA ORAL ABSCESS Left 10/14/2020    Procedure: INCISION AND DRAINAGE  (I&D) WOUND ORAL EXTRAORAL LEFT SUBMANDIBULAR SPACE, LEFT  SPACE SPACE, INTRAORAL LEFT LATERAL PHARYNGEAL SPACE AND LEFT SUBLINGUAL SPACE;  Surgeon: Mitchell Booker DDS;  Location: [facility];  Service: Maxillofacial   • REMOVAL OF IMPACTED TOOTH - COMPLETELY BONY Left 10/14/2020    Procedure: EXTRACTION TEETH MULTIPLE (RETAINED ROOTS #1,3,14, TEETH #4,17,18,19,32;  Surgeon: Mitchell Booker DDS;  Location: [facility];  Service: Maxillofacial   • TOE AMPUTATION Right 04/12/2022    Procedure: AMPUTATION DISTAL PHALYNX RIGHT SECOND TOE;  Surgeon: Luis Carlos Urban DPM;  Location: [facility];  Service: Podiatry   • TONSILLECTOMY AND ADENOIDECTOMY     • WOUND DEBRIDEMENT Right 07/25/2020    Procedure: Excision of non healing diabetic right foot Ulcer, with closure of wound, excision of tibial sesamoid right foot;  Surgeon: Luis Carlos Urban DPM;  Location: [facility];  Service: Podiatry     Social History      Socioeconomic History   • Marital status: /Civil Union     Spouse name: None   • Number of children: None   • Years of education: None   • Highest education level: None   Occupational History   • None   Tobacco Use   • Smoking status: Never     Passive exposure: Never   • Smokeless tobacco: Never   Vaping Use   • Vaping status: Never Used   Substance and Sexual Activity   • Alcohol use: Yes     Alcohol/week: 4.0 standard drinks of alcohol     Types: 4 Standard drinks or equivalent per week     Comment: 4 drinks in a week   • Drug use: Never   • Sexual activity: Yes     Partners: Male     Birth control/protection: None   Other Topics Concern   • None   Social History Narrative   • None     Social Drivers of Health     Financial Resource Strain: Not on file   Food Insecurity: No Food Insecurity (4/12/2022)    Hunger Vital Sign    • Worried About Running Out of Food in the Last Year: Never true    • Ran Out of Food in the Last Year: Never true   Transportation Needs: No Transportation Needs (4/12/2022)    PRAPARE - Transportation    • Lack of Transportation (Medical): No    • Lack of Transportation (Non-Medical): No   Physical Activity: Not on file   Stress: Not on file   Social Connections: Not on file   Intimate Partner Violence: Not on file   Housing Stability: Low Risk  (4/12/2022)    Housing Stability Vital Sign    • Unable to Pay for Housing in the Last Year: No    • Number of Places Lived in the Last Year: 1    • Unstable Housing in the Last Year: No        Current Outpatient Medications:   •  Blood Glucose Monitoring Suppl (OneTouch Verio Reflect) w/Device KIT, Check blood sugars twice daily. Please substitute with appropriate alternative as covered by patient's insurance. Dx: E11.65, Disp: 1 kit, Rfl: 0  •  Empagliflozin (Jardiance) 25 MG TABS, TAKE ONE TABLET BY MOUTH EVERY MORNING, Disp: 90 tablet, Rfl: 1  •  glucose blood (OneTouch Verio) test strip, Check blood sugars twice daily. Please  substitute with appropriate alternative as covered by patient's insurance. Dx: E11.65, Disp: 200 each, Rfl: 3  •  insulin degludec (Tresiba FlexTouch) 100 units/mL injection pen, Inject 10 Units under the skin daily at bedtime, Disp: 9 mL, Rfl: 1  •  Insulin Pen Needle 32G X 4 MM MISC, Use daily, Disp: 90 each, Rfl: 2  •  lisinopril-hydrochlorothiazide (PRINZIDE,ZESTORETIC) 10-12.5 MG per tablet, TAKE ONE TABLET BY MOUTH EVERY DAY, Disp: 90 tablet, Rfl: 1  •  metFORMIN (GLUCOPHAGE) 1000 MG tablet, TAKE 1 TABLET BY MOUTH TWICE A DAY, Disp: 180 tablet, Rfl: 0  •  omeprazole (PriLOSEC) 20 mg delayed release capsule, Take 20 mg by mouth daily, Disp: , Rfl:   •  OneTouch Delica Lancets 33G MISC, Check blood sugars twice daily. Please substitute with appropriate alternative as covered by patient's insurance. Dx: E11.65, Disp: 200 each, Rfl: 3  •  simvastatin (ZOCOR) 40 mg tablet, Take 1 tablet (40 mg total) by mouth daily at bedtime, Disp: 90 tablet, Rfl: 3  •  tirzepatide (Mounjaro) 15 MG/0.5ML, Inject 0.5 mL (15 mg total) under the skin every 7 days, Disp: 2 mL, Rfl: 3  No current facility-administered medications for this visit.  Family History   Problem Relation Age of Onset   • Heart disease Mother    • Diabetes Mother    • Hypertension Mother         Benign Essential    • Coronary artery disease Mother    • Clotting disorder Mother    • Cancer Father         lung    • Diabetes Sister         mellitus    • No Known Problems Maternal Grandmother    • Diabetes Maternal Grandfather         mellitus    • Breast cancer Paternal Grandmother         unknown age   • No Known Problems Paternal Grandfather    • No Known Problems Maternal Aunt    • Breast cancer Paternal Aunt         unknown      Review of Systems   Constitutional: Negative.    Skin:  Positive for wound.     Allergies:  Patient has no known allergies.      Objective:  /62   Pulse 88   Temp (!) 97 °F (36.1 °C)   Resp 14     Physical  "Exam  Cardiovascular:      Pulses:           Dorsalis pedis pulses are 2+ on the right side.        Posterior tibial pulses are 2+ on the right side.   Musculoskeletal:         General: Deformity (Severe mallet toe contracture great toe right foot) present.      Right foot: Deformity present.   Feet:      Right foot:      Skin integrity: Ulcer present.             Wound 01/14/25 Diabetic Ulcer Toe D1, great Right;Plantar (Active)   Enter Singleton score: Singleton Grade 1: Partial or full-thickness ulcer (superficial) 01/21/25 0950   Wound Image   01/21/25 0951   Wound Description Yellow;Pink;Granulation tissue;Epithelialization 01/21/25 0950   Shae-wound Assessment Callus;Intact 01/21/25 0950   Wound Length (cm) 1.1 cm 01/21/25 0950   Wound Width (cm) 1.96 cm 01/21/25 0950   Wound Depth (cm) 0.2 cm 01/21/25 0950   Wound Surface Area (cm^2) 2.156 cm^2 01/21/25 0950   Wound Volume (cm^3) 0.4312 cm^3 01/21/25 0950   Calculated Wound Volume (cm^3) 0.43 cm^3 01/21/25 0950   Change in Wound Size % -7.5 01/21/25 0950   Number of tunnels 1 01/14/25 0822   Tunneling 1 4 cm 01/14/25 0822   Tunneling 1 in depth located at from 3 o'clock to 9 o'clock.  Extends width of wound. Toes @ 12 o'clock. 01/14/25 0822   Number of underminings 1 01/14/25 0822   Drainage Amount Moderate 01/21/25 0950   Drainage Description Yellow;Tan 01/21/25 0950   Non-staged Wound Description Full thickness 01/21/25 0950   Wound packed? No 01/21/25 0950   Dressing Status Removed 01/14/25 0822                         Debridement   Wound 01/14/25 Diabetic Ulcer Toe D1, great Right;Plantar    Universal Protocol:  Consent: Verbal consent obtained.  Risks and benefits: risks, benefits and alternatives were discussed  Consent given by: patient  Time out: Immediately prior to procedure a \"time out\" was called to verify the correct patient, procedure, equipment, support staff and site/side marked as required.  Timeout called at: 1/21/2025 10:03 AM.  Patient " understanding: patient states understanding of the procedure being performed  Patient identity confirmed: verbally with patient    Debridement Details  Performed by: physician  Debridement type: selective  Pain control: lidocaine 4%      Post-debridement measurements  Length (cm): 1.1  Width (cm): 1.9  Depth (cm): 0.2  Percent debrided: 100%  Surface Area (cm^2): 2.09  Area Debrided (cm^2): 2.09  Volume (cm^3): 0.42    Tissue and other material debrided: dermis, epidermis and subcutaneous tissue  Devitalized tissue debrided: biofilm, callus and slough  Instrument(s) utilized: blade  Bleeding: small  Hemostasis obtained with: pressure  Procedural pain (0-10): 0  Post-procedural pain: 0   Response to treatment: procedure was tolerated well                 Wound Instructions:  Orders Placed This Encounter   Procedures   • Wound cleansing and dressings Diabetic Ulcer Right;Plantar Toe D1, great     Wash your hands with soap and water.    Remove old dressing, discard into plastic bag and place in trash.    Cleanse the wound with Mild Soap (like Dove) & Water prior to applying a clean dressing.   Do not use tissue or cotton balls. Do not scrub the wound. Pat dry using gauze.     Shower only with protection.  You can use a cast cover or sponge bathe at this time.       Right great toe wound:  Apply Acticoat 3 to the wound.  Cover & secure with gauze & tape.  Change dressing 3 x weekly & as needed for soilage or dislodgement.     Please try to consume 3-4 servings of protein (30g) each day.      Wear CAM boot at all times. Remove for sleeping.        [facility] in [facility] weeks.     Standing Status:   Future     Expiration Date:   1/28/2025   • Wound Procedure Treatment Diabetic Ulcer Right;Plantar Toe D1, great     This order was created via procedure documentation   • Debridement Diabetic Ulcer Right;Plantar Toe D1, great     This order was created via procedure documentation         Zac  "CASEY Peters      Portions of the record may have been created with voice recognition software. Occasional wrong word or \"sound a like\" substitutions may have occurred due to the inherent limitations of voice recognition software. Read the chart carefully and recognize, using context, where substitutions have occurred.      "

## 2025-02-04 ENCOUNTER — OFFICE VISIT (OUTPATIENT)
Dept: WOUND CARE | Facility: CLINIC | Age: 55
End: 2025-02-04
Payer: COMMERCIAL

## 2025-02-04 VITALS
DIASTOLIC BLOOD PRESSURE: 68 MMHG | TEMPERATURE: 96.6 F | SYSTOLIC BLOOD PRESSURE: 118 MMHG | HEART RATE: 100 BPM | RESPIRATION RATE: 18 BRPM

## 2025-02-04 DIAGNOSIS — L97.512 DIABETIC ULCER OF TOE OF RIGHT FOOT ASSOCIATED WITH TYPE 2 DIABETES MELLITUS, WITH FAT LAYER EXPOSED (HCC): Primary | ICD-10-CM

## 2025-02-04 DIAGNOSIS — E11.621 DIABETIC ULCER OF TOE OF RIGHT FOOT ASSOCIATED WITH TYPE 2 DIABETES MELLITUS, WITH FAT LAYER EXPOSED (HCC): Primary | ICD-10-CM

## 2025-02-04 PROCEDURE — 97597 DBRDMT OPN WND 1ST 20 CM/<: CPT | Performed by: PODIATRIST

## 2025-02-04 RX ORDER — LIDOCAINE 40 MG/G
CREAM TOPICAL ONCE
Status: COMPLETED | OUTPATIENT
Start: 2025-02-04 | End: 2025-02-04

## 2025-02-04 RX ADMIN — LIDOCAINE 1 APPLICATION: 40 CREAM TOPICAL at 09:35

## 2025-02-04 NOTE — PATIENT INSTRUCTIONS
Orders Placed This Encounter   Procedures    Wound cleansing and dressings Diabetic Ulcer Right;Plantar Toe D1, great     Wash your hands with soap and water.    Remove old dressing, discard into plastic bag and place in trash.    Cleanse the wound with Mild Soap (like Dove) & Water prior to applying a clean dressing.   Do not use tissue or cotton balls. Do not scrub the wound. Pat dry using gauze.     Shower only with protection.  You can use a cast cover or sponge bathe at this time.      Right great toe wound:  Apply dermagran to the wound.  Cover & secure with gauze & tape.  Change dressing 3 x weekly & as needed for soilage or dislodgement.     Please try to consume 3-4 servings of protein (30g) each day.  Wear CAM boot at all times. Remove for sleeping.     [facility] in 2 weeks.     Standing Status:   Future     Expected Date:   2/4/2025     Expiration Date:   2/11/2025    Wound Procedure Treatment Diabetic Ulcer Right;Plantar Toe D1, great     This order was created via procedure documentation    Debridement Diabetic Ulcer Right;Plantar Toe D1, great     This order was created via procedure documentation

## 2025-02-04 NOTE — PROGRESS NOTES
Wound Procedure Treatment Diabetic Ulcer Right;Plantar Toe D1, great    Performed by: Herlinda Marquez RN  Authorized by: Zac Peters DPM    Associated wounds:   Wound 01/14/25 Diabetic Ulcer Toe D1, great Right;Plantar  Wound cleansed with:  NSS  Applied primary dressing:  Dermagran  Applied secondary dressing:  Gauze  Dressing secured with:  Tape and Surgilast  Offloading device appllied:  CAM

## 2025-02-04 NOTE — PROGRESS NOTES
Patient ID: Denisa Millan is a 54 y.o. female Date of Birth 1970     Diagnosis:  1. Diabetic ulcer of toe of right foot associated with type 2 diabetes mellitus, with fat layer exposed (HCC)  -     Wound cleansing and dressings Diabetic Ulcer Right;Plantar Toe D1, great; Future; Expected date: 02/04/2025  -     lidocaine (LMX) 4 % cream  -     Wound Procedure Treatment Diabetic Ulcer Right;Plantar Toe D1, great  -     Debridement Diabetic Ulcer Right;Plantar Toe D1, great     Diagnosis ICD-10-CM Associated Orders   1. Diabetic ulcer of toe of right foot associated with type 2 diabetes mellitus, with fat layer exposed (HCC)  E11.621 Wound cleansing and dressings Diabetic Ulcer Right;Plantar Toe D1, great    L97.512 lidocaine (LMX) 4 % cream     Wound Procedure Treatment Diabetic Ulcer Right;Plantar Toe D1, great     Debridement Diabetic Ulcer Right;Plantar Toe D1, great           Assessment & Plan:  -Debridement today: selective debridement. It is almost epithelialized.   -Dressings: dermagram, DSD  -Offloading plan: CAM boot, walk flatfooted  -Signs of infection today: none  -Risk factors: neuropathy, toe deformity  -RTC: 2 weeks      Chief Complaint   Patient presents with   • Follow Up Wound Care Visit           Subjective:   HPI    The following portions of the patient's history were reviewed and updated as appropriate:   Patient Active Problem List   Diagnosis   • Diabetes mellitus with diabetic neuropathy (HCC)   • Hyperlipidemia   • Benign essential hypertension   • Obesity, Class I, BMI 30.0-34.9 (see actual BMI)   • Paroxysmal SVT (supraventricular tachycardia) ([facility])   • Annual physical exam   • Encounter for screening mammogram for breast cancer   • Swelling of right foot   • Other acquired deformities of right foot   • Acquired absence of other right toe(s) (HCC)   • Ulcer of toe of right foot, limited to breakdown of skin (HCC)   • Diabetic ulcer of toe of right foot associated with type 2 diabetes  mellitus, with fat layer exposed ([facility])     Past Medical History:   Diagnosis Date   • Cellulitis of left lower extremity 04/15/2020   • COVID-19 12/22/2020   • Diabetes mellitus ([facility])    • Exposure to SARS-associated coronavirus 04/15/2020   • Fever 04/15/2020   • Foot ulcer, right, with unspecified severity ([facility]) 06/12/2019   • Hospital discharge follow-up 04/20/2022   • Hyperlipidemia    • Hypertension    • Neuropathy in diabetes ([facility])    • Obesity    • Pyogenic inflammation of bone ([facility]) 04/20/2022     Past Surgical History:   Procedure Laterality Date   • AMPUTATION     • FOOT SURGERY     • INCISION AND DRAINAGE INTRA ORAL ABSCESS Left 10/14/2020    Procedure: INCISION AND DRAINAGE  (I&D) WOUND ORAL EXTRAORAL LEFT SUBMANDIBULAR SPACE, LEFT  SPACE SPACE, INTRAORAL LEFT LATERAL PHARYNGEAL SPACE AND LEFT SUBLINGUAL SPACE;  Surgeon: Mitchell Booker DDS;  [facility];  Service: Maxillofacial   • REMOVAL OF IMPACTED TOOTH - COMPLETELY BONY Left 10/14/2020    Procedure: EXTRACTION TEETH MULTIPLE (RETAINED ROOTS #1,3,14, TEETH #4,17,18,19,32;  Surgeon: Mitchell Booker DDS;  [facility];  Service: Maxillofacial   • TOE AMPUTATION Right 04/12/2022    Procedure: AMPUTATION DISTAL PHALYNX RIGHT SECOND TOE;  Surgeon: Luis Carlos Urban DPM;  [facility];  Service: Podiatry   • TONSILLECTOMY AND ADENOIDECTOMY     • WOUND DEBRIDEMENT Right 07/25/2020    Procedure: Excision of non healing diabetic right foot Ulcer, with closure of wound, excision of tibial sesamoid right foot;  Surgeon: Luis Carlos Urban DPM;  [facility];  Service: Podiatry     Social History     Socioeconomic History   • Marital status: /Civil Union     Spouse name: Not on file   • Number of children: Not on file   • Years of education: Not on file   • Highest education level: Not on file   Occupational History   • Not on file   Tobacco Use   • Smoking status: Never     Passive exposure: Never   • Smokeless tobacco: Never    Vaping Use   • Vaping status: Never Used   Substance and Sexual Activity   • Alcohol use: Yes     Alcohol/week: 4.0 standard drinks of alcohol     Types: 4 Standard drinks or equivalent per week     Comment: 4 drinks in a week   • Drug use: Never   • Sexual activity: Yes     Partners: Male     Birth control/protection: None   Other Topics Concern   • Not on file   Social History Narrative   • Not on file     Social Drivers of Health     Financial Resource Strain: Not on file   Food Insecurity: No Food Insecurity (4/12/2022)    Hunger Vital Sign    • Worried About Running Out of Food in the Last Year: Never true    • Ran Out of Food in the Last Year: Never true   Transportation Needs: No Transportation Needs (4/12/2022)    PRAPARE - Transportation    • Lack of Transportation (Medical): No    • Lack of Transportation (Non-Medical): No   Physical Activity: Not on file   Stress: Not on file   Social Connections: Not on file   Intimate Partner Violence: Not on file   Housing Stability: Low Risk  (4/12/2022)    Housing Stability Vital Sign    • Unable to Pay for Housing in the Last Year: No    • Number of Places Lived in the Last Year: 1    • Unstable Housing in the Last Year: No        Current Outpatient Medications:   •  Blood Glucose Monitoring Suppl (OneTouch Verio Reflect) w/Device KIT, Check blood sugars twice daily. Please substitute with appropriate alternative as covered by patient's insurance. Dx: E11.65, Disp: 1 kit, Rfl: 0  •  Empagliflozin (Jardiance) 25 MG TABS, TAKE ONE TABLET BY MOUTH EVERY MORNING, Disp: 90 tablet, Rfl: 1  •  glucose blood (OneTouch Verio) test strip, Check blood sugars twice daily. Please substitute with appropriate alternative as covered by patient's insurance. Dx: E11.65, Disp: 200 each, Rfl: 3  •  insulin degludec (Tresiba FlexTouch) 100 units/mL injection pen, Inject 10 Units under the skin daily at bedtime, Disp: 9 mL, Rfl: 1  •  Insulin Pen Needle 32G X 4 MM MISC, Use daily,  Disp: 90 each, Rfl: 2  •  lisinopril-hydrochlorothiazide (PRINZIDE,ZESTORETIC) 10-12.5 MG per tablet, TAKE ONE TABLET BY MOUTH EVERY DAY, Disp: 90 tablet, Rfl: 1  •  metFORMIN (GLUCOPHAGE) 1000 MG tablet, TAKE 1 TABLET BY MOUTH TWICE A DAY, Disp: 180 tablet, Rfl: 0  •  omeprazole (PriLOSEC) 20 mg delayed release capsule, Take 20 mg by mouth daily, Disp: , Rfl:   •  OneTouch Delica Lancets 33G MISC, Check blood sugars twice daily. Please substitute with appropriate alternative as covered by patient's insurance. Dx: E11.65, Disp: 200 each, Rfl: 3  •  simvastatin (ZOCOR) 40 mg tablet, Take 1 tablet (40 mg total) by mouth daily at bedtime, Disp: 90 tablet, Rfl: 3  •  tirzepatide (Mounjaro) 15 MG/0.5ML, Inject 0.5 mL (15 mg total) under the skin every 7 days, Disp: 2 mL, Rfl: 3  No current facility-administered medications for this visit.  Family History   Problem Relation Age of Onset   • Heart disease Mother    • Diabetes Mother    • Hypertension Mother         Benign Essential    • Coronary artery disease Mother    • Clotting disorder Mother    • Cancer Father         lung    • Diabetes Sister         mellitus    • No Known Problems Maternal Grandmother    • Diabetes Maternal Grandfather         mellitus    • Breast cancer Paternal Grandmother         unknown age   • No Known Problems Paternal Grandfather    • No Known Problems Maternal Aunt    • Breast cancer Paternal Aunt         unknown      Review of Systems  Allergies:  Patient has no known allergies.      Objective:  /68   Pulse 100   Temp (!) 96.6 °F (35.9 °C)   Resp 18     Physical Exam        Wound 01/14/25 Diabetic Ulcer Toe D1, great Right;Plantar (Active)   Enter Singleton score: Singleton Grade 1: Partial or full-thickness ulcer (superficial) 02/04/25 0935   Wound Image   02/04/25 0933   Wound Description Intact;Epithelialization 02/04/25 0935   Shae-wound Assessment Callus;Intact 02/04/25 0935   Wound Length (cm) 0.2 cm 02/04/25 0935   Wound Width  "(cm) 1 cm 02/04/25 0935   Wound Depth (cm) 0.1 cm 02/04/25 0935   Wound Surface Area (cm^2) 0.2 cm^2 02/04/25 0935   Wound Volume (cm^3) 0.02 cm^3 02/04/25 0935   Calculated Wound Volume (cm^3) 0.02 cm^3 02/04/25 0935   Change in Wound Size % 95 02/04/25 0935   Number of tunnels 1 01/14/25 0822   Tunneling 1 4 cm 01/14/25 0822   Tunneling 1 in depth located at from 3 o'clock to 9 o'clock.  Extends width of wound. Toes @ 12 o'clock. 01/14/25 0822   Number of underminings 1 01/14/25 0822   Drainage Amount None 02/04/25 0935   Drainage Description Yellow;Tan 01/21/25 0950   Non-staged Wound Description Full thickness 02/04/25 0935   Wound packed? No 01/21/25 0950   Dressing Status Removed 01/14/25 0822                         Debridement   Wound 01/14/25 Diabetic Ulcer Toe D1, great Right;Plantar    Universal Protocol:  Consent: Verbal consent obtained.  Risks and benefits: risks, benefits and alternatives were discussed  Consent given by: patient  Time out: Immediately prior to procedure a \"time out\" was called to verify the correct patient, procedure, equipment, support staff and site/side marked as required.  Timeout called at: 2/4/2025 9:47 AM.  Patient understanding: patient states understanding of the procedure being performed  Patient identity confirmed: verbally with patient    Debridement Details  Performed by: physician  Debridement type: selective  Pain control: lidocaine 4%      Post-debridement measurements  Length (cm): 1  Width (cm): 0.2  Depth (cm): 0.2  Percent debrided: 100%  Surface Area (cm^2): 0.2  Area Debrided (cm^2): 0.2  Volume (cm^3): 0.04    Tissue and other material debrided: dermis, epidermis and subcutaneous tissue  Devitalized tissue debrided: biofilm, callus, clots, exudate, fibrin, necrotic debris, slough and eschar  Instrument(s) utilized: blade  Bleeding: medium  Hemostasis obtained with: pressure  Procedural pain (0-10): 0  Post-procedural pain: 0   Response to treatment: procedure " "was tolerated well                 Wound Instructions:  Orders Placed This Encounter   Procedures   • Wound cleansing and dressings Diabetic Ulcer Right;Plantar Toe D1, great     Wash your hands with soap and water.    Remove old dressing, discard into plastic bag and place in trash.    Cleanse the wound with Mild Soap (like Dove) & Water prior to applying a clean dressing.   Do not use tissue or cotton balls. Do not scrub the wound. Pat dry using gauze.     Shower only with protection.  You can use a cast cover or sponge bathe at this time.      Right great toe wound:  Apply dermagran to the wound.  Cover & secure with gauze & tape.  Change dressing 3 x weekly & as needed for soilage or dislodgement.     Please try to consume 3-4 servings of protein (30g) each day.  Wear CAM boot at all times. Remove for sleeping.     Follow up [facility] in 2 weeks.     Standing Status:   Future     Expected Date:   2/4/2025     Expiration Date:   2/11/2025   • Wound Procedure Treatment Diabetic Ulcer Right;Plantar Toe D1, great     This order was created via procedure documentation   • Debridement Diabetic Ulcer Right;Plantar Toe D1, great     This order was created via procedure documentation         Zac Peters DPM      Portions of the record may have been created with voice recognition software. Occasional wrong word or \"sound a like\" substitutions may have occurred due to the inherent limitations of voice recognition software. Read the chart carefully and recognize, using context, where substitutions have occurred.      "

## 2025-02-18 ENCOUNTER — OFFICE VISIT (OUTPATIENT)
Dept: WOUND CARE | Facility: CLINIC | Age: 55
End: 2025-02-18
Payer: COMMERCIAL

## 2025-02-18 VITALS
TEMPERATURE: 96.8 F | SYSTOLIC BLOOD PRESSURE: 122 MMHG | HEART RATE: 88 BPM | DIASTOLIC BLOOD PRESSURE: 66 MMHG | RESPIRATION RATE: 16 BRPM

## 2025-02-18 DIAGNOSIS — L97.512 DIABETIC ULCER OF TOE OF RIGHT FOOT ASSOCIATED WITH TYPE 2 DIABETES MELLITUS, WITH FAT LAYER EXPOSED (HCC): Primary | ICD-10-CM

## 2025-02-18 DIAGNOSIS — E11.621 DIABETIC ULCER OF TOE OF RIGHT FOOT ASSOCIATED WITH TYPE 2 DIABETES MELLITUS, WITH FAT LAYER EXPOSED (HCC): Primary | ICD-10-CM

## 2025-02-18 DIAGNOSIS — E11.42 DIABETIC POLYNEUROPATHY ASSOCIATED WITH TYPE 2 DIABETES MELLITUS (HCC): ICD-10-CM

## 2025-02-18 PROCEDURE — 99213 OFFICE O/P EST LOW 20 MIN: CPT | Performed by: PODIATRIST

## 2025-02-18 PROCEDURE — G0463 HOSPITAL OUTPT CLINIC VISIT: HCPCS | Performed by: PODIATRIST

## 2025-02-18 PROCEDURE — 99212 OFFICE O/P EST SF 10 MIN: CPT | Performed by: PODIATRIST

## 2025-02-18 NOTE — PATIENT INSTRUCTIONS
Orders Placed This Encounter   Procedures    Wound cleansing and dressings Diabetic Ulcer Right;Plantar Toe D1, great     Right great toe wound is healed. Keep a cushioned dressing on it for protection only.       Shower, yes. Do not scrub healed area. Pat dry.       Wear CAM boot to keep pressure off of toe, if you have any issues with your regular shoe.         Follow up with Dr. Urban at his office in 3-4 weeks.     Patient is discharged from the wound center.     Standing Status:   Future     Expiration Date:   2/25/2025

## 2025-02-18 NOTE — PROGRESS NOTES
Patient ID: Denisa Millan is a 54 y.o. female Date of Birth 1970     Diagnosis:  1. Diabetic ulcer of toe of right foot associated with type 2 diabetes mellitus, with fat layer exposed (HCC)  -     Wound cleansing and dressings Diabetic Ulcer Right;Plantar Toe D1, great; Future  2. Diabetic polyneuropathy associated with type 2 diabetes mellitus (HCC)  -     Wound cleansing and dressings Diabetic Ulcer Right;Plantar Toe D1, great; Future  -     Ambulatory Referral to Podiatry; Future; Expected date: 03/18/2025     Diagnosis ICD-10-CM Associated Orders   1. Diabetic ulcer of toe of right foot associated with type 2 diabetes mellitus, with fat layer exposed (HCC)  E11.621 Wound cleansing and dressings Diabetic Ulcer Right;Plantar Toe D1, great    L97.512       2. Diabetic polyneuropathy associated with type 2 diabetes mellitus (HCC)  E11.42 Wound cleansing and dressings Diabetic Ulcer Right;Plantar Toe D1, great     Ambulatory Referral to Podiatry           Assessment & Plan:  Wound is healed. Can slowly transition to DM shoes/inxserts. Remain flatfoot when walking for another 4 weeks. No heavy lifting or squatting.  She can resume at risk foot care with Dr. Wilmar ward. DC from wound center    Chief Complaint   Patient presents with   • Follow Up Wound Care Visit     Right great toe wound           Subjective:   F/U DM ulcer great toe. It is healed        The following portions of the patient's history were reviewed and updated as appropriate:   Patient Active Problem List   Diagnosis   • Diabetes mellitus with diabetic neuropathy (HCC)   • Hyperlipidemia   • Benign essential hypertension   • Obesity, Class I, BMI 30.0-34.9 (see actual BMI)   • Paroxysmal SVT (supraventricular tachycardia) ([facility])   • Annual physical exam   • Encounter for screening mammogram for breast cancer   • Swelling of right foot   • Other acquired deformities of right foot   • Acquired absence of other right toe(s) ([facility])   • Ulcer of  toe of right foot, limited to breakdown of skin ([facility])   • Diabetic ulcer of toe of right foot associated with type 2 diabetes mellitus, with fat layer exposed ([facility])     Past Medical History:   Diagnosis Date   • Cellulitis of left lower extremity 04/15/2020   • COVID-19 12/22/2020   • Diabetes mellitus (HCC)    • Exposure to SARS-associated coronavirus 04/15/2020   • Fever 04/15/2020   • Foot ulcer, right, with unspecified severity ([facility]) 06/12/2019   • Hospital discharge follow-up 04/20/2022   • Hyperlipidemia    • Hypertension    • Neuropathy in diabetes ([facility])    • Obesity    • Pyogenic inflammation of bone ([facility]) 04/20/2022     Past Surgical History:   Procedure Laterality Date   • AMPUTATION     • FOOT SURGERY     • INCISION AND DRAINAGE INTRA ORAL ABSCESS Left 10/14/2020    Procedure: INCISION AND DRAINAGE  (I&D) WOUND ORAL EXTRAORAL LEFT SUBMANDIBULAR SPACE, LEFT  SPACE SPACE, INTRAORAL LEFT LATERAL PHARYNGEAL SPACE AND LEFT SUBLINGUAL SPACE;  Surgeon: Mitchell Booker DDS;  [facility];  Service: Maxillofacial   • REMOVAL OF IMPACTED TOOTH - COMPLETELY BONY Left 10/14/2020    Procedure: EXTRACTION TEETH MULTIPLE (RETAINED ROOTS #1,3,14, TEETH #4,17,18,19,32;  Surgeon: Mitchell Booker DDS;  [facility];  Service: Maxillofacial   • TOE AMPUTATION Right 04/12/2022    Procedure: AMPUTATION DISTAL PHALYNX RIGHT SECOND TOE;  Surgeon: Luis Carlos Urban DPM;  [facility];  Service: Podiatry   • TONSILLECTOMY AND ADENOIDECTOMY     • WOUND DEBRIDEMENT Right 07/25/2020    Procedure: Excision of non healing diabetic right foot Ulcer, with closure of wound, excision of tibial sesamoid right foot;  Surgeon: Luis Carlos Urban DPM;  [facility];  Service: Podiatry     Social History     Socioeconomic History   • Marital status: /Civil Union     Spouse name: None   • Number of children: None   • Years of education: None   • Highest education level: None   Occupational History   • None    Tobacco Use   • Smoking status: Never     Passive exposure: Never   • Smokeless tobacco: Never   Vaping Use   • Vaping status: Never Used   Substance and Sexual Activity   • Alcohol use: Yes     Alcohol/week: 4.0 standard drinks of alcohol     Types: 4 Standard drinks or equivalent per week     Comment: 4 drinks in a week   • Drug use: Never   • Sexual activity: Yes     Partners: Male     Birth control/protection: None   Other Topics Concern   • None   Social History Narrative   • None     Social Drivers of Health     Financial Resource Strain: Not on file   Food Insecurity: No Food Insecurity (4/12/2022)    Hunger Vital Sign    • Worried About Running Out of Food in the Last Year: Never true    • Ran Out of Food in the Last Year: Never true   Transportation Needs: No Transportation Needs (4/12/2022)    PRAPARE - Transportation    • Lack of Transportation (Medical): No    • Lack of Transportation (Non-Medical): No   Physical Activity: Not on file   Stress: Not on file   Social Connections: Not on file   Intimate Partner Violence: Not on file   Housing Stability: Low Risk  (4/12/2022)    Housing Stability Vital Sign    • Unable to Pay for Housing in the Last Year: No    • Number of Places Lived in the Last Year: 1    • Unstable Housing in the Last Year: No        Current Outpatient Medications:   •  Blood Glucose Monitoring Suppl (OneTouch Verio Reflect) w/Device KIT, Check blood sugars twice daily. Please substitute with appropriate alternative as covered by patient's insurance. Dx: E11.65, Disp: 1 kit, Rfl: 0  •  Empagliflozin (Jardiance) 25 MG TABS, TAKE ONE TABLET BY MOUTH EVERY MORNING, Disp: 90 tablet, Rfl: 1  •  glucose blood (OneTouch Verio) test strip, Check blood sugars twice daily. Please substitute with appropriate alternative as covered by patient's insurance. Dx: E11.65, Disp: 200 each, Rfl: 3  •  insulin degludec (Tresiba FlexTouch) 100 units/mL injection pen, Inject 10 Units under the skin daily at  bedtime, Disp: 9 mL, Rfl: 1  •  Insulin Pen Needle 32G X 4 MM MISC, Use daily, Disp: 90 each, Rfl: 2  •  lisinopril-hydrochlorothiazide (PRINZIDE,ZESTORETIC) 10-12.5 MG per tablet, TAKE ONE TABLET BY MOUTH EVERY DAY, Disp: 90 tablet, Rfl: 1  •  metFORMIN (GLUCOPHAGE) 1000 MG tablet, TAKE 1 TABLET BY MOUTH TWICE A DAY, Disp: 180 tablet, Rfl: 0  •  omeprazole (PriLOSEC) 20 mg delayed release capsule, Take 20 mg by mouth daily, Disp: , Rfl:   •  OneTouch Delica Lancets 33G MISC, Check blood sugars twice daily. Please substitute with appropriate alternative as covered by patient's insurance. Dx: E11.65, Disp: 200 each, Rfl: 3  •  simvastatin (ZOCOR) 40 mg tablet, Take 1 tablet (40 mg total) by mouth daily at bedtime, Disp: 90 tablet, Rfl: 3  •  tirzepatide (Mounjaro) 15 MG/0.5ML, Inject 0.5 mL (15 mg total) under the skin every 7 days, Disp: 2 mL, Rfl: 3  Family History   Problem Relation Age of Onset   • Heart disease Mother    • Diabetes Mother    • Hypertension Mother         Benign Essential    • Coronary artery disease Mother    • Clotting disorder Mother    • Cancer Father         lung    • Diabetes Sister         mellitus    • No Known Problems Maternal Grandmother    • Diabetes Maternal Grandfather         mellitus    • Breast cancer Paternal Grandmother         unknown age   • No Known Problems Paternal Grandfather    • No Known Problems Maternal Aunt    • Breast cancer Paternal Aunt         unknown      Review of Systems  Allergies:  Patient has no known allergies.      Objective:  /66   Pulse 88   Temp (!) 96.8 °F (36 °C)   Resp 16     Physical Exam  Musculoskeletal:         General: Deformity (rigid right hallux mallet toe deformity. 2nd toe partially amputated and stable) present.                                    Procedures             Wound Instructions:  Orders Placed This Encounter   Procedures   • Wound cleansing and dressings Diabetic Ulcer Right;Plantar Toe D1, great     Right great toe  "wound is healed. Keep a cushioned dressing on it for protection only.       Shower, yes. Do not scrub healed area. Pat dry.       Wear CAM boot to keep pressure off of toe, if you have any issues with your regular shoe.         Follow up with Dr. Urban at his office in 3-4 weeks.     Patient is discharged from the wound center.     Standing Status:   Future     Expiration Date:   2/25/2025   • Ambulatory Referral to Podiatry     Standing Status:   Future     Expected Date:   3/18/2025     Expiration Date:   2/18/2026     Referral Priority:   Routine     Referral Type:   Consult - AMB     Referral Reason:   Specialty Services Required     Referred to Provider:   Luis Carlos Urban DPM     Requested Specialty:   Podiatry     Number of Visits Requested:   1     Expiration Date:   2/18/2026         Zac Peters DPM      Portions of the record may have been created with voice recognition software. Occasional wrong word or \"sound a like\" substitutions may have occurred due to the inherent limitations of voice recognition software. Read the chart carefully and recognize, using context, where substitutions have occurred.      "

## 2025-03-06 DIAGNOSIS — E11.40 TYPE 2 DIABETES MELLITUS WITH DIABETIC NEUROPATHY, WITHOUT LONG-TERM CURRENT USE OF INSULIN (HCC): Chronic | ICD-10-CM

## 2025-03-06 DIAGNOSIS — E66.811 OBESITY, CLASS I, BMI 30.0-34.9 (SEE ACTUAL BMI): ICD-10-CM

## 2025-03-06 NOTE — TELEPHONE ENCOUNTER
Reason for call:   [x] Refill   [] Prior Auth  [] Other:     Office:   [x] PCP/Provider -   [] Specialty/Provider -     Medication: Mounjaro     Dose/Frequency: 15 mg/0.5 mL. Inject 0.5 mL under the skin every 7 days     Quantity: 2 mL     Pharmacy: [facility] - [place], [place] - [address] [phone]     Local Pharmacy   Does the patient have enough for 3 days?   [] Yes   [x] No - Send as HP to POD

## 2025-03-13 ENCOUNTER — TELEPHONE (OUTPATIENT)
Age: 55
End: 2025-03-13

## 2025-03-13 NOTE — TELEPHONE ENCOUNTER
PA for Mounjaro 15 mg/0.5 ml  APPROVED     Date(s) approved March 13, 2025 to March 13, 2026     Case # [id number]     Patient advised by          [x]MyChart Message  []Phone call   [x]LMOM  []L/M to call office as no active Communication consent on file  []Unable to leave detailed message as VM not approved on Communication consent       Pharmacy advised by    [x]Fax  []Phone call  []Secure Chat    Specialty Pharmacy    []=     Approval letter scanned into Media Yes

## 2025-03-13 NOTE — TELEPHONE ENCOUNTER
PA for mounjaro 15mg/0.5 ml SUBMITTED to future scripts     via    []CMM-KEY:   [x]Surescripts-Case ID # [id number]   []Availity-Auth ID # NDC #   []Faxed to plan   []Other website   []Phone call Case ID #     []PA sent as URGENT    All office notes, labs and other pertaining documents and studies sent. Clinical questions answered. Awaiting determination from insurance company.     Turnaround time for your insurance to make a decision on your Prior Authorization can take 7-21 business days.

## 2025-03-15 ENCOUNTER — HOSPITAL ENCOUNTER (EMERGENCY)
Facility: HOSPITAL | Age: 55
Discharge: HOME | End: 2025-03-15
Attending: EMERGENCY MEDICINE
Payer: COMMERCIAL

## 2025-03-15 ENCOUNTER — APPOINTMENT (EMERGENCY)
Dept: RADIOLOGY | Facility: HOSPITAL | Age: 55
End: 2025-03-15
Payer: COMMERCIAL

## 2025-03-15 VITALS
TEMPERATURE: 97.1 F | RESPIRATION RATE: 18 BRPM | SYSTOLIC BLOOD PRESSURE: 142 MMHG | OXYGEN SATURATION: 100 % | DIASTOLIC BLOOD PRESSURE: 79 MMHG | HEART RATE: 91 BPM

## 2025-03-15 DIAGNOSIS — S61.219A FINGER LACERATION: Primary | ICD-10-CM

## 2025-03-15 PROCEDURE — 90715 TDAP VACCINE 7 YRS/> IM: CPT

## 2025-03-15 PROCEDURE — 90471 IMMUNIZATION ADMIN: CPT

## 2025-03-15 PROCEDURE — 73130 X-RAY EXAM OF HAND: CPT

## 2025-03-15 PROCEDURE — 99283 EMERGENCY DEPT VISIT LOW MDM: CPT

## 2025-03-15 PROCEDURE — 99284 EMERGENCY DEPT VISIT MOD MDM: CPT | Performed by: EMERGENCY MEDICINE

## 2025-03-15 PROCEDURE — 12001 RPR S/N/AX/GEN/TRNK 2.5CM/<: CPT | Performed by: EMERGENCY MEDICINE

## 2025-03-15 RX ORDER — SULFAMETHOXAZOLE AND TRIMETHOPRIM 800; 160 MG/1; MG/1
1 TABLET ORAL ONCE
Status: DISCONTINUED | OUTPATIENT
Start: 2025-03-15 | End: 2025-03-15

## 2025-03-15 RX ORDER — DOXYCYCLINE 100 MG/1
100 CAPSULE ORAL ONCE
Status: COMPLETED | OUTPATIENT
Start: 2025-03-15 | End: 2025-03-15

## 2025-03-15 RX ORDER — LIDOCAINE HYDROCHLORIDE 10 MG/ML
10 INJECTION, SOLUTION EPIDURAL; INFILTRATION; INTRACAUDAL; PERINEURAL ONCE
Status: COMPLETED | OUTPATIENT
Start: 2025-03-15 | End: 2025-03-15

## 2025-03-15 RX ORDER — DOXYCYCLINE 100 MG/1
100 CAPSULE ORAL 2 TIMES DAILY
Qty: 10 CAPSULE | Refills: 0 | Status: SHIPPED | OUTPATIENT
Start: 2025-03-15 | End: 2025-03-20

## 2025-03-15 RX ADMIN — LIDOCAINE HYDROCHLORIDE 10 ML: 10 INJECTION, SOLUTION EPIDURAL; INFILTRATION; INTRACAUDAL; PERINEURAL at 17:39

## 2025-03-15 RX ADMIN — TETANUS TOXOID, REDUCED DIPHTHERIA TOXOID AND ACELLULAR PERTUSSIS VACCINE, ADSORBED 0.5 ML: 5; 2.5; 8; 8; 2.5 SUSPENSION INTRAMUSCULAR at 18:58

## 2025-03-15 RX ADMIN — DOXYCYCLINE HYCLATE 100 MG: 100 CAPSULE ORAL at 18:58

## 2025-03-15 NOTE — DISCHARGE INSTRUCTIONS
You were evaluated in the emergency department after a finger laceration.  Take the prescribed Bactrim for the next 5 days and follow-up with hand surgery.  Return to the emergency department for reevaluation if you experience fevers, worsening swelling, pain, or discharge from the finger.

## 2025-03-16 ENCOUNTER — RESULTS FOLLOW-UP (OUTPATIENT)
Dept: EMERGENCY DEPT | Facility: HOSPITAL | Age: 55
End: 2025-03-16

## 2025-03-16 NOTE — ED PROVIDER NOTES
Time reflects when diagnosis was documented in both MDM as applicable and the Disposition within this note       Time User Action Codes Description Comment    3/15/2025  6:52 PM Luis Alberto Cosby Add [T29.219A] Finger laceration           ED Disposition       ED Disposition   Discharge    Condition   Stable    Date/Time   Sat Mar 15, 2025  6:52 PM    Comment   Denisa Millan discharge to home/self care.                   Assessment & Plan       Medical Decision Making  On-call hand surgery consulted for case, recommended ceiling nailbed with absorbable sutures and outpatient follow-up rather than removal of nail due to appearance and characteristics of the laceration it was felt that removal of the nail would cause more tissue damage than benefit.  Patient x-ray additionally showing tuft fracture.  Will place in splint, given patient history of diabetes, will prescribe course of doxycycline.  Tetanus updated.  Return precautions discussed, all questions answered prior to discharge    Amount and/or Complexity of Data Reviewed  Radiology: ordered.    Risk  Prescription drug management.             Medications   lidocaine (PF) (XYLOCAINE-MPF) 1 % injection 10 mL (10 mL Infiltration Given by Other 3/15/25 7338)   tetanus-diphtheria-acellular pertussis (BOOSTRIX) IM injection 0.5 mL (0.5 mL Intramuscular Given 3/15/25 1858)   doxycycline hyclate (VIBRAMYCIN) capsule 100 mg (100 mg Oral Given 3/15/25 1858)       ED Risk Strat Scores                            SBIRT 22yo+      Flowsheet Row Most Recent Value   Initial Alcohol Screen: US AUDIT-C     1. How often do you have a drink containing alcohol? 0 Filed at: 03/15/2025 1625   2. How many drinks containing alcohol do you have on a typical day you are drinking?  0 Filed at: 03/15/2025 1625   3a. Male UNDER 65: How often do you have five or more drinks on one occasion? 0 Filed at: 03/15/2025 1625   3b. FEMALE Any Age, or MALE 65+: How often do you have 4 or more drinks on  one occassion? 0 Filed at: 03/15/2025 1625   Audit-C Score 0 Filed at: 03/15/2025 1623   ANNA: How many times in the past year have you...    Used an illegal drug or used a prescription medication for non-medical reasons? Never Filed at: 03/15/2025 1625                            History of Present Illness       Chief Complaint   Patient presents with    Finger Laceration     Patient coming in after cutting right 3rd digit tip of finger on metal bar stool last night. Bleeding is controlled.        Past Medical History:   Diagnosis Date    Cellulitis of left lower extremity 04/15/2020    COVID-19 12/22/2020    Diabetes mellitus (HCC)     Exposure to SARS-associated coronavirus 04/15/2020    Fever 04/15/2020    Foot ulcer, right, with unspecified severity (HCC) 06/12/2019    Hospital discharge follow-up 04/20/2022    Hyperlipidemia     Hypertension     Neuropathy in diabetes (HCC)     Obesity     Pyogenic inflammation of bone (HCC) 04/20/2022      Past Surgical History:   Procedure Laterality Date    AMPUTATION      FOOT SURGERY      INCISION AND DRAINAGE INTRA ORAL ABSCESS Left 10/14/2020    Procedure: INCISION AND DRAINAGE  (I&D) WOUND ORAL EXTRAORAL LEFT SUBMANDIBULAR SPACE, LEFT  SPACE SPACE, INTRAORAL LEFT LATERAL PHARYNGEAL SPACE AND LEFT SUBLINGUAL SPACE;  Surgeon: Mitchell Booker DDS;  Location: [facility];  Service: Maxillofacial    REMOVAL OF IMPACTED TOOTH - COMPLETELY BONY Left 10/14/2020    Procedure: EXTRACTION TEETH MULTIPLE (RETAINED ROOTS #1,3,14, TEETH #4,17,18,19,32;  Surgeon: Mitchell Booker DDS;  Location: [facility];  Service: Maxillofacial    TOE AMPUTATION Right 04/12/2022    Procedure: AMPUTATION DISTAL PHALYNX RIGHT SECOND TOE;  Surgeon: Luis Carlos Urban DPM;  Location: [facility];  Service: Podiatry    TONSILLECTOMY AND ADENOIDECTOMY      WOUND DEBRIDEMENT Right 07/25/2020    Procedure: Excision of non healing diabetic right foot Ulcer, with closure of wound, excision of tibial sesamoid  right foot;  Surgeon: Luis Carlos Urban DPM;  Location: [facility];  Service: Podiatry      Family History   Problem Relation Age of Onset    Heart disease Mother     Diabetes Mother     Hypertension Mother         Benign Essential     Coronary artery disease Mother     Clotting disorder Mother     Cancer Father         lung     Diabetes Sister         mellitus     No Known Problems Maternal Grandmother     Diabetes Maternal Grandfather         mellitus     Breast cancer Paternal Grandmother         unknown age    No Known Problems Paternal Grandfather     No Known Problems Maternal Aunt     Breast cancer Paternal Aunt         unknown      Social History     Tobacco Use    Smoking status: Never     Passive exposure: Never    Smokeless tobacco: Never   Vaping Use    Vaping status: Never Used   Substance Use Topics    Alcohol use: Yes     Alcohol/week: 4.0 standard drinks of alcohol     Types: 4 Standard drinks or equivalent per week     Comment: 4 drinks in a week    Drug use: Never      E-Cigarette/Vaping    E-Cigarette Use Never User       E-Cigarette/Vaping Substances    Nicotine No     THC No     CBD No     Flavoring No     Other No     Unknown No       I have reviewed and agree with the history as documented.     Patient is a 54-year-old female presenting for evaluation of a finger laceration she sustained while moving a barstool.  This happened last night at around 7 PM, and patient went to bed, trying to manage to the with a wrap.  However, she woke this morning and the wound was still significant.  She was directed to come in by her  who was worried about it.  Patient is having pain at the distal fingertip where she sustained the laceration, but no impairments on range of motion, neurovascularly intact        Review of Systems   All other systems reviewed and are negative.          Objective       ED Triage Vitals [03/15/25 1624]   Temperature Pulse Blood Pressure Respirations SpO2 Patient Position -  Orthostatic VS   (!) 97.1 °F (36.2 °C) 91 142/79 18 100 % Sitting      Temp Source Heart Rate Source BP Location FiO2 (%) Pain Score    Temporal Monitor Left arm -- 8      Vitals      Date and Time Temp Pulse SpO2 Resp BP Pain Score FACES Pain Rating User   03/15/25 1624 97.1 °F (36.2 °C) 91 100 % 18 142/79 8 -- DJS            Physical Exam  Vitals and nursing note reviewed.   Constitutional:       General: She is not in acute distress.     Appearance: She is not ill-appearing.   HENT:      Head: Normocephalic and atraumatic.      Nose: Nose normal.      Mouth/Throat:      Mouth: Mucous membranes are moist.      Pharynx: Oropharynx is clear.   Cardiovascular:      Rate and Rhythm: Normal rate and regular rhythm.   Pulmonary:      Effort: Pulmonary effort is normal.      Breath sounds: Normal breath sounds.   Abdominal:      General: Abdomen is flat. Bowel sounds are normal.      Palpations: Abdomen is soft.   Musculoskeletal:         General: Normal range of motion.      Cervical back: Normal range of motion.      Comments: See clinical media, approximate 1.5 cm laceration through fingernail without violation of the nailbed   Skin:     General: Skin is warm and dry.   Neurological:      General: No focal deficit present.      Mental Status: She is alert and oriented to person, place, and time.   Psychiatric:         Mood and Affect: Mood normal.         Results Reviewed       None            XR hand 3+ views RIGHT   Final Interpretation by Cristian Hernadez MD (03/16 0913)      Nondistressed phalangeal tuft fracture of the third finger         Computerized Assisted Algorithm (CAA) may have been used to analyze all applicable images.         Workstation performed: [id number]             Universal Protocol:  procedure performed by consultantConsent: Verbal consent obtained.  Risks and benefits: risks, benefits and alternatives were discussed  Consent given by: patient  Time out: Immediately prior to procedure a  "\"time out\" was called to verify the correct patient, procedure, equipment, support staff and site/side marked as required.  Laceration repair    Date/Time: 3/16/2025 4:55 PM    Performed by: Luis Alberto Cosby MD  Authorized by: Luis Alberto Cosby MD  Body area: upper extremity  Location details: right long finger  Laceration length: 2 cm  Tendon involvement: none  Nerve involvement: none  Anesthesia: local infiltration    Anesthesia:  Local Anesthetic: lidocaine 1% without epinephrine    Wound Dehiscence:  Superficial Wound Dehiscence: simple closure      Procedure Details:  Irrigation solution: saline and tap water  Irrigation method: tap and syringe  Amount of cleaning: extensive  Debridement: none  Degree of undermining: none  Wound skin closure material used: 5-0 vicryl.  Technique: simple  Approximation: close  Approximation difficulty: simple  Dressing: 4x4 sterile gauze  Patient tolerance: patient tolerated the procedure well with no immediate complications          ED Medication and Procedure Management   Prior to Admission Medications   Prescriptions Last Dose Informant Patient Reported? Taking?   Blood Glucose Monitoring Suppl (OneTouch Verio Reflect) w/Device KIT  Self No No   Sig: Check blood sugars twice daily. Please substitute with appropriate alternative as covered by patient's insurance. Dx: E11.65   Empagliflozin (Jardiance) 25 MG TABS  Self No No   Sig: TAKE ONE TABLET BY MOUTH EVERY MORNING   Insulin Pen Needle 32G X 4 MM MISC  Self No No   Sig: Use daily   OneTouch Delica Lancets 33G MISC  Self No No   Sig: Check blood sugars twice daily. Please substitute with appropriate alternative as covered by patient's insurance. Dx: E11.65   Tirzepatide 15 MG/0.5ML SOAJ   No No   Sig: Inject 0.5 mL under the skin every 7 days   glucose blood (OneTouch Verio) test strip  Self No No   Sig: Check blood sugars twice daily. Please substitute with appropriate alternative as covered by patient's insurance. Dx: E11.65 "   insulin degludec (Tresiba FlexTouch) 100 units/mL injection pen  Self No No   Sig: Inject 10 Units under the skin daily at bedtime   lisinopril-hydrochlorothiazide (PRINZIDE,ZESTORETIC) 10-12.5 MG per tablet  Self No No   Sig: TAKE ONE TABLET BY MOUTH EVERY DAY   metFORMIN (GLUCOPHAGE) 1000 MG tablet   No No   Sig: TAKE 1 TABLET BY MOUTH TWICE A DAY   omeprazole (PriLOSEC) 20 mg delayed release capsule  Self Yes No   Sig: Take 20 mg by mouth daily   simvastatin (ZOCOR) 40 mg tablet  Self No No   Sig: Take 1 tablet (40 mg total) by mouth daily at bedtime      Facility-Administered Medications: None     Discharge Medication List as of 3/15/2025  6:55 PM        START taking these medications    Details   doxycycline hyclate (VIBRAMYCIN) 100 mg capsule Take 1 capsule (100 mg total) by mouth 2 (two) times a day for 5 days, Starting Sat 3/15/2025, Until Thu 3/20/2025, Normal           CONTINUE these medications which have NOT CHANGED    Details   Blood Glucose Monitoring Suppl (OneTouch Verio Reflect) w/Device KIT Check blood sugars twice daily. Please substitute with appropriate alternative as covered by patient's insurance. Dx: E11.65, Normal      Empagliflozin (Jardiance) 25 MG TABS TAKE ONE TABLET BY MOUTH EVERY MORNING, Normal      glucose blood (OneTouch Verio) test strip Check blood sugars twice daily. Please substitute with appropriate alternative as covered by patient's insurance. Dx: E11.65, Normal      insulin degludec (Tresiba FlexTouch) 100 units/mL injection pen Inject 10 Units under the skin daily at bedtime, Starting Tue 12/3/2024, Normal      Insulin Pen Needle 32G X 4 MM MISC Use daily, Starting Tue 12/3/2024, Normal      lisinopril-hydrochlorothiazide (PRINZIDE,ZESTORETIC) 10-12.5 MG per tablet TAKE ONE TABLET BY MOUTH EVERY DAY, Normal      metFORMIN (GLUCOPHAGE) 1000 MG tablet TAKE 1 TABLET BY MOUTH TWICE A DAY, Starting Tue 1/14/2025, Normal      omeprazole (PriLOSEC) 20 mg delayed release capsule  Take 20 mg by mouth daily, Historical Med      OneTouch Delica Lancets 33G MISC Check blood sugars twice daily. Please substitute with appropriate alternative as covered by patient's insurance. Dx: E11.65, Normal      simvastatin (ZOCOR) 40 mg tablet Take 1 tablet (40 mg total) by mouth daily at bedtime, Starting Wed 2/23/2022, Normal      Tirzepatide 15 MG/0.5ML SOAJ Inject 0.5 mL under the skin every 7 days, Starting Thu 3/6/2025, Normal             ED SEPSIS DOCUMENTATION   Time reflects when diagnosis was documented in both MDM as applicable and the Disposition within this note       Time User Action Codes Description Comment    3/15/2025  6:52 PM Luis Alberto Cosby Add [S61.219A] Finger laceration                  Luis Alberto Cosby MD  03/16/25 9065

## 2025-03-17 NOTE — ED ATTENDING ATTESTATION
I, Hari Ahumada MD, saw and evaluated the patient. I have discussed the patient with the resident and agree with the resident's findings, Plan of Care, and MDM as documented in the resident's note, except where noted. All available labs and Radiology studies were reviewed.  I was present for key portions of any procedure(s) performed by the resident and I was immediately available to provide assistance.    At this point I agree with the current assessment done in the Emergency Department.  I have conducted an independent evaluation of this patient a history and physical is as follows:    53 yo female with a history of DM, hyperlipidemia, and HTN presents to the ED for evaluation of a finger injury. The patient accidentally crushed her distal right 3rd finger with a metal barstool yesterday around 1900. She sustained a laceration through the nail. She wrapped the finger and went to bed. This afternoon her  looked at the wound and encouraged her to go to the hospital. (+) Mild pain at the wound. No active bleeding. (+) FROM of the finger. No numbness or weakness. Last tetanus unknown. No other specific complaints.    ROS: per resident physician note    Gen: NAD, AA&Ox3  HEENT: PERRL, EOMI  Neck: supple  CV: RRR  Lungs: CTA B/L  Abdomen: soft, NT/ND  Right Hand: (+) 1.5 cm laceration through fingernail without nailbed involvement (see Media tab), finger neurovascularly intact  Neuro: 5/5 strength all extremities, sensation grossly intact  Skin: no rash    ED Course  The patient is comfortable appearing with stable vital signs despite her complaints. (+) Laceration through right third fingernail on exam. X-rays show an underlying tuft fracture. Case discussed with Hand Surgery --> consultant recommended irrigation and closing nailbed with absorbable sutures. See procedure note. Otherwise plan for prophylactic antibiotics and close follow up with Hand in the office early next week. The patient is agreeable  to this plan. Tetanus updated. Strict return precautions provided.      Critical Care Time  Procedures

## 2025-03-19 ENCOUNTER — OFFICE VISIT (OUTPATIENT)
Dept: OBGYN CLINIC | Facility: HOSPITAL | Age: 55
End: 2025-03-19
Payer: COMMERCIAL

## 2025-03-19 ENCOUNTER — OFFICE VISIT (OUTPATIENT)
Dept: OCCUPATIONAL THERAPY | Facility: HOSPITAL | Age: 55
End: 2025-03-19
Payer: COMMERCIAL

## 2025-03-19 VITALS — BODY MASS INDEX: 31.71 KG/M2 | HEIGHT: 67 IN | WEIGHT: 202 LBS

## 2025-03-19 DIAGNOSIS — S61.319A LACERATION OF NAIL BED OF FINGER, INITIAL ENCOUNTER: Primary | ICD-10-CM

## 2025-03-19 DIAGNOSIS — S61.219A FINGER LACERATION: ICD-10-CM

## 2025-03-19 PROCEDURE — 99204 OFFICE O/P NEW MOD 45 MIN: CPT | Performed by: ORTHOPAEDIC SURGERY

## 2025-03-19 PROCEDURE — L3933 FO W/O JOINTS CF: HCPCS | Performed by: OCCUPATIONAL THERAPIST

## 2025-03-19 PROCEDURE — 11760 REPAIR OF NAIL BED: CPT | Performed by: ORTHOPAEDIC SURGERY

## 2025-03-19 NOTE — PROGRESS NOTES
ORTHOPAEDIC HAND, WRIST, AND ELBOW OFFICE  VISIT     Name: Denisa Millan      : 1970      MRN: [id number]  Encounter Provider: Xander Sampson MD  Encounter Date: 3/19/2025   Encounter department: [facility]  :  Assessment & Plan  Laceration of nail bed of finger, initial encounter  Right long finger nailbed laceration with tuft fracture sustained 3/15/2025  Nailbed repair was performed today in the office without complication  Patient was advised to obtain a fingertip protection splint from occupational therapy  She was advised to keep the dressing on for 5 days  She can then remove the dressing in place a new gauze, Coban and splint over top  She was advised to continue with her antibiotics  She will follow-up in 1 week for reevaluation of the wound  Orders:  •  Ambulatory Referral to PT/OT Hand Therapy; Future  •  Nail removal              History of Present Illness   HPI  Chief Complaint   Patient presents with   • Right Hand - Follow-up     ED - 3/15  DOI - 3/14       Denisa Millan is a 54 y.o. female who presents right hand long finger pain.  She states on Saturday, 3/15/2025 she sustained a laceration to the tip of her finger while she was at a bar.  She went to the emergency room of which Vicryl sutures were placed across the nail and the nailbed.  She had an x-ray which demonstrated a tuft fracture.  She was given antibiotics.  She was instructed to follow-up with orthopedics.  Today she presents with pain and discomfort noted over the distal phalanx.  There are Vicryl sutures across the nailbed with the nail present.      REVIEW OF SYSTEMS:  General: no fever, no chills  HEENT:  No loss of hearing or eyesight problems  Eyes:  No red eyes  Respiratory:  No coughing, shortness of breath or wheezing  Cardiovascular:  No chest pain, no palpitations  GI:  Abdomen soft nontender, denies nausea  Endocrine:  No muscle weakness, no frequent urination, no  "excessive thirst  Urinary:  No dysuria, no incontinence  Musculoskeletal: see HPI and PE  SKIN:  No skin rash, no dry skin  Neurological:  No headaches, no confusion  Psychiatric:  No suicide thoughts, no anxiety, no depression  Review of all other systems is negative    Objective   Ht 5' 7\" (1.702 m)   Wt 91.6 kg (202 lb)   BMI 31.64 kg/m²      General: well developed and well nourished, alert, oriented times 3, and appears comfortable  Psychiatric: Normal  HEENT: Trachea Midline, No torticollis  Cardiovascular: No discernable arrhythmia  Pulmonary: No wheezing or stridor  Abdomen: No rebound or guarding  Extremities: No peripheral edema  Skin: No masses, erythema, lacerations, fluctation, ulcerations  Neurovascular: Sensation Intact to the Median, Ulnar, Radial Nerve, Motor Intact to the Median, Ulnar, Radial Nerve, and Pulses Intact    Musculoskeletal exam:  Right long finger  Laceration appreciated across the nail dorsally  Vicryl sutures are placed across the nail  She is able to actively flex and extend the FDS FDP  Sensation is intact to light touch      STUDIES REVIEWED:  Images were reviewed in PACS and demonstrate: X-rays of the right long finger demonstrated a tuft fracture at the distal phalanx with minimal displacement      PROCEDURES PERFORMED:  Nail removal    Date/Time: 3/19/2025 9:00 AM    Performed by: Xander Sampson MD  Authorized by: Xander Sampson MD    Patient location:  ClinicUniversal Protocol:  Consent: Verbal consent obtained.  Risks and benefits: risks, benefits and alternatives were discussed  Consent given by: patient  Patient understanding: patient states understanding of the procedure being performed  Patient consent: the patient's understanding of the procedure matches consent given  Site marked: the operative site was marked  Patient identity confirmed: verbally with patient    Location:     Hand:  R long finger  Pre-procedure details:     Skin preparation:  Betadine    " Preparation: Patient was prepped and draped in the usual sterile fashion    Anesthesia (see MAR for exact dosages):     Anesthesia method:  Local infiltration    Local anesthetic:  Lidocaine 1% w/o epi  Nail Removal:     Nail removed:  Complete    Nail bed sutured: yes      Nail bed suture material:  5-0 chromic gut    Number of sutures:  6  Ingrown nail:     Wedge excision of skin: no      Nail matrix removed or ablated:  None  Nails trimmed:     Number of nails trimmed:  0  Post-procedure details:     Dressing:  4x4 sterile gauze (coban)    Patient tolerance of procedure:  Tolerated well, no immediate complications    -      Scribe Attestation    I,:  Humberto Dhaliwal PA-C am acting as a scribe while in the presence of the attending physician.:       I,:  Xander Sampson MD personally performed the services described in this documentation    as scribed in my presence.:

## 2025-03-19 NOTE — PROGRESS NOTES
Orthosis    Diagnosis:   1. Laceration of nail bed of finger, initial encounter          Indication: Fracture and soft tissue repair    Location: Right  long finger  Supplies: Custom Fit Orthotic  Orthosis type: Cap splint   Wearing Schedule: Remove for hygiene only  Describe Position: Digit in full extension IP and MP, spacer for protection    Precautions: Fracture and Soft tissue repair    Patient or Caregiver expresses understanding of wearing Schedule and Precautions? Yes  Patient or Caregiver able to don/doff orthotic independently?Yes    Written orders provided to patient? Yes  Orders Obtained: Written  Orders Obtained from: [facility]    Return for evaluation and treatment No

## 2025-03-24 ENCOUNTER — TELEPHONE (OUTPATIENT)
Dept: OBGYN CLINIC | Facility: HOSPITAL | Age: 55
End: 2025-03-24

## 2025-03-24 NOTE — TELEPHONE ENCOUNTER
Hello,    Please advise if a forced appointment can be accommodated for the patient:    Call back #: [phone]    Insurance: [employer]    Reason for appointment: 1 week fu    Requested doctor and/or location: Dr Sampson/ [facility]      Thank you.

## 2025-03-31 ENCOUNTER — OFFICE VISIT (OUTPATIENT)
Dept: OBGYN CLINIC | Facility: CLINIC | Age: 55
End: 2025-03-31
Payer: COMMERCIAL

## 2025-03-31 VITALS — WEIGHT: 202 LBS | BODY MASS INDEX: 31.71 KG/M2 | HEIGHT: 67 IN

## 2025-03-31 DIAGNOSIS — S61.319A LACERATION OF NAIL BED OF FINGER, INITIAL ENCOUNTER: Primary | ICD-10-CM

## 2025-03-31 PROCEDURE — 99213 OFFICE O/P EST LOW 20 MIN: CPT | Performed by: ORTHOPAEDIC SURGERY

## 2025-03-31 NOTE — PROGRESS NOTES
ORTHOPAEDIC HAND, WRIST, AND ELBOW OFFICE  VISIT     Name: Denisa Millan      : 1970      MRN: [id number]  Encounter Provider: Xander Sampson MD  Encounter Date: 3/31/2025   Encounter department: [facility]  :  Assessment & Plan  Laceration of nail bed of finger, initial encounter  Right long finger nailbed laceration with tuft fracture sustained 3/15/2025   Nailbed repair was performed in the office on 3/19/25 without complication.   Today the nailbed is appropriately realigning.  Discussed what should be done as her nail continues to grow.  She may d/c splint.   She may use band-aid over her finger, do should not submerge her finger for another week.   Patient may slowly return to activity as tolerated.  She will follow up in 3-4 weeks for wound check.                 History of Present Illness   HPI  Chief Complaint   Patient presents with   • Right Middle Finger - Follow-up     Nailbed Repair- 3/19/25 in office        Denisa Millan is a 54 y.o. female who presents right hand long finger pain.  She states on Saturday, 3/15/2025 she sustained a laceration to the tip of her finger while she was at a bar.  She went to the emergency room of which Vicryl sutures were placed across the nail and the nailbed.  She had an x-ray which demonstrated a tuft fracture.  She was given antibiotics.  She was then seen on 3/19/2025 where she had a nailbed repair performed in the office without complications.  Patient was advised to obtain finger protection splint from occupational therapy which she continues to wear at all times.  Patient states she removed her dressing approximately 4 days ago and put Xeroform and gauze over her finger.  Patient has finished her antibiotics.  Patient denies drainage.  Patient denies fevers or chills.  Patient reports occasional swelling within her right long finger.      REVIEW OF SYSTEMS:  General: no fever, no chills  HEENT:  No  "loss of hearing or eyesight problems  Eyes:  No red eyes  Respiratory:  No coughing, shortness of breath or wheezing  Cardiovascular:  No chest pain, no palpitations  GI:  Abdomen soft nontender, denies nausea  Endocrine:  No muscle weakness, no frequent urination, no excessive thirst  Urinary:  No dysuria, no incontinence  Musculoskeletal: see HPI and PE  SKIN:  No skin rash, no dry skin  Neurological:  No headaches, no confusion  Psychiatric:  No suicide thoughts, no anxiety, no depression  Review of all other systems is negative    Objective   Ht 5' 7\" (1.702 m)   Wt 91.6 kg (202 lb)   BMI 31.64 kg/m²      General: well developed and well nourished, alert, oriented times 3, and appears comfortable  Psychiatric: Normal  HEENT: Trachea Midline, No torticollis  Cardiovascular: No discernable arrhythmia  Pulmonary: No wheezing or stridor  Abdomen: No rebound or guarding  Extremities: No peripheral edema  Skin: No masses, erythema, lacerations, fluctation, ulcerations  Neurovascular: Sensation Intact to the Median, Ulnar, Radial Nerve, Motor Intact to the Median, Ulnar, Radial Nerve, and Pulses Intact    Musculoskeletal exam:  Right long finger  Laceration appreciated across the nail dorsally  Vicryl sutures are placed across the nail  She is able to actively flex and extend the FDS FDP  Sensation is intact to light touch  Nail bed appropriately realigned.       STUDIES REVIEWED:  No new images today.      PROCEDURES PERFORMED:  Procedures  -            Scribe Attestation    I,:  Sin Sexton am acting as a scribe while in the presence of the attending physician.:       I,:  Xander Sampson MD personally performed the services described in this documentation    as scribed in my presence.:           "

## 2025-04-03 ENCOUNTER — APPOINTMENT (OUTPATIENT)
Dept: LAB | Facility: CLINIC | Age: 55
End: 2025-04-03
Payer: COMMERCIAL

## 2025-04-03 ENCOUNTER — RESULTS FOLLOW-UP (OUTPATIENT)
Dept: FAMILY MEDICINE CLINIC | Facility: CLINIC | Age: 55
End: 2025-04-03

## 2025-04-03 DIAGNOSIS — E78.5 HYPERLIPIDEMIA, UNSPECIFIED HYPERLIPIDEMIA TYPE: Chronic | ICD-10-CM

## 2025-04-03 DIAGNOSIS — I10 BENIGN ESSENTIAL HYPERTENSION: ICD-10-CM

## 2025-04-03 DIAGNOSIS — E11.40 TYPE 2 DIABETES MELLITUS WITH DIABETIC NEUROPATHY, WITHOUT LONG-TERM CURRENT USE OF INSULIN (HCC): Chronic | ICD-10-CM

## 2025-04-03 LAB
ALBUMIN SERPL BCG-MCNC: 4.2 G/DL (ref 3.5–5)
ALP SERPL-CCNC: 119 U/L (ref 34–104)
ALT SERPL W P-5'-P-CCNC: 15 U/L (ref 7–52)
ANION GAP SERPL CALCULATED.3IONS-SCNC: 12 MMOL/L (ref 4–13)
AST SERPL W P-5'-P-CCNC: 19 U/L (ref 13–39)
BILIRUB SERPL-MCNC: 0.5 MG/DL (ref 0.2–1)
BUN SERPL-MCNC: 24 MG/DL (ref 5–25)
CALCIUM SERPL-MCNC: 9.6 MG/DL (ref 8.4–10.2)
CHLORIDE SERPL-SCNC: 101 MMOL/L (ref 96–108)
CHOLEST SERPL-MCNC: 199 MG/DL (ref ?–200)
CO2 SERPL-SCNC: 24 MMOL/L (ref 21–32)
CREAT SERPL-MCNC: 0.85 MG/DL (ref 0.6–1.3)
CREAT UR-MCNC: 69.2 MG/DL
EST. AVERAGE GLUCOSE BLD GHB EST-MCNC: 177 MG/DL
GFR SERPL CREATININE-BSD FRML MDRD: 77 ML/MIN/1.73SQ M
GLUCOSE P FAST SERPL-MCNC: 210 MG/DL (ref 65–99)
HBA1C MFR BLD: 7.8 %
HDLC SERPL-MCNC: 52 MG/DL
LDLC SERPL CALC-MCNC: 132 MG/DL (ref 0–100)
MICROALBUMIN UR-MCNC: <7 MG/L
POTASSIUM SERPL-SCNC: 3.9 MMOL/L (ref 3.5–5.3)
PROT SERPL-MCNC: 7.2 G/DL (ref 6.4–8.4)
SODIUM SERPL-SCNC: 137 MMOL/L (ref 135–147)
TRIGL SERPL-MCNC: 74 MG/DL (ref ?–150)
TSH SERPL DL<=0.05 MIU/L-ACNC: 2.08 UIU/ML (ref 0.45–4.5)

## 2025-04-03 PROCEDURE — 82570 ASSAY OF URINE CREATININE: CPT

## 2025-04-03 PROCEDURE — 80053 COMPREHEN METABOLIC PANEL: CPT

## 2025-04-03 PROCEDURE — 80061 LIPID PANEL: CPT

## 2025-04-03 PROCEDURE — 84443 ASSAY THYROID STIM HORMONE: CPT

## 2025-04-03 PROCEDURE — 36415 COLL VENOUS BLD VENIPUNCTURE: CPT

## 2025-04-03 PROCEDURE — 82043 UR ALBUMIN QUANTITATIVE: CPT

## 2025-04-03 PROCEDURE — 83036 HEMOGLOBIN GLYCOSYLATED A1C: CPT

## 2025-04-07 ENCOUNTER — OFFICE VISIT (OUTPATIENT)
Dept: FAMILY MEDICINE CLINIC | Facility: CLINIC | Age: 55
End: 2025-04-07
Payer: COMMERCIAL

## 2025-04-07 VITALS
BODY MASS INDEX: 31.27 KG/M2 | HEIGHT: 67 IN | SYSTOLIC BLOOD PRESSURE: 120 MMHG | TEMPERATURE: 96.8 F | RESPIRATION RATE: 16 BRPM | HEART RATE: 90 BPM | WEIGHT: 199.2 LBS | DIASTOLIC BLOOD PRESSURE: 70 MMHG | OXYGEN SATURATION: 99 %

## 2025-04-07 DIAGNOSIS — E78.5 HYPERLIPIDEMIA, UNSPECIFIED HYPERLIPIDEMIA TYPE: Chronic | ICD-10-CM

## 2025-04-07 DIAGNOSIS — E66.811 OBESITY, CLASS I, BMI 30.0-34.9 (SEE ACTUAL BMI): ICD-10-CM

## 2025-04-07 DIAGNOSIS — I10 BENIGN ESSENTIAL HYPERTENSION: Primary | ICD-10-CM

## 2025-04-07 DIAGNOSIS — E11.40 TYPE 2 DIABETES MELLITUS WITH DIABETIC NEUROPATHY, WITHOUT LONG-TERM CURRENT USE OF INSULIN (HCC): Chronic | ICD-10-CM

## 2025-04-07 PROBLEM — L97.511 ULCER OF TOE OF RIGHT FOOT, LIMITED TO BREAKDOWN OF SKIN (HCC): Status: RESOLVED | Noted: 2024-10-16 | Resolved: 2025-04-07

## 2025-04-07 PROBLEM — M79.89 SWELLING OF RIGHT FOOT: Status: RESOLVED | Noted: 2023-10-03 | Resolved: 2025-04-07

## 2025-04-07 PROCEDURE — 99214 OFFICE O/P EST MOD 30 MIN: CPT | Performed by: FAMILY MEDICINE

## 2025-04-07 RX ORDER — ROSUVASTATIN CALCIUM 20 MG/1
20 TABLET, COATED ORAL DAILY
Qty: 90 TABLET | Refills: 3 | Status: SHIPPED | OUTPATIENT
Start: 2025-04-07

## 2025-04-07 NOTE — PROGRESS NOTES
"Name: Denisa Millan      : 1970      MRN: [id number]  Encounter Provider: Cassie Nickerson DO  Encounter Date: 2025   Encounter department: [facility]    :  Assessment & Plan  Benign essential hypertension  Stable on current meds       Type 2 diabetes mellitus with diabetic neuropathy, without long-term current use of insulin (HCC)    Lab Results   Component Value Date    HGBA1C 7.8 (H) 2025       Orders:  •  Tirzepatide 15 MG/0.5ML SOAJ; Inject 0.5 mL under the skin every 7 days  •  Hemoglobin A1C; Future    Hyperlipidemia, unspecified hyperlipidemia type  Stable on zocor  Orders:  •  rosuvastatin (CRESTOR) 20 MG tablet; Take 1 tablet (20 mg total) by mouth daily  •  Comprehensive metabolic panel; Future  •  Lipid Panel with Direct LDL reflex; Future  •  TSH, 3rd generation with Free T4 reflex; Future    Obesity, Class I, BMI 30.0-34.9 (see actual BMI)    At gym 3 days week 1 hour  Orders:  •  Tirzepatide 15 MG/0.5ML SOAJ; Inject 0.5 mL under the skin every 7 days        Assessment & Plan         History of Present Illness     History of Present Illness  Had change of job  Doing hospice care  Foot is healed  Injured finger  Sliced finger on metal, needed stitches under nail by ortho       Review of Systems   Constitutional: Negative.    HENT: Negative.     Eyes: Negative.    Respiratory: Negative.     Cardiovascular: Negative.    Gastrointestinal: Negative.    Endocrine: Negative.    Genitourinary: Negative.    Skin:  Positive for wound (finger laceration).   Allergic/Immunologic: Negative.    Neurological: Negative.    Hematological: Negative.    Psychiatric/Behavioral: Negative.       Objective   /70 (BP Location: Left arm, Patient Position: Sitting, Cuff Size: Standard)   Pulse 90   Temp (!) 96.8 °F (36 °C) (Tympanic)   Resp 16   Ht 5' 7\" (1.702 m)   Wt 90.4 kg (199 lb 3.2 oz)   SpO2 99%   BMI 31.20 kg/m²     Physical Exam    Physical Exam  Vitals and nursing " note reviewed.   Constitutional:       Appearance: Normal appearance. She is well-developed.   HENT:      Head: Normocephalic and atraumatic.      Right Ear: External ear normal.      Left Ear: External ear normal.      Nose: Nose normal.   Eyes:      Conjunctiva/sclera: Conjunctivae normal.      Pupils: Pupils are equal, round, and reactive to light.   Cardiovascular:      Rate and Rhythm: Normal rate and regular rhythm.      Heart sounds: Normal heart sounds.   Pulmonary:      Effort: Pulmonary effort is normal.      Breath sounds: Normal breath sounds.   Abdominal:      General: Bowel sounds are normal.      Palpations: Abdomen is soft.   Musculoskeletal:         General: Normal range of motion.      Cervical back: Normal range of motion and neck supple.   Skin:     General: Skin is warm and dry.      Capillary Refill: Capillary refill takes less than 2 seconds.   Neurological:      Mental Status: She is alert and oriented to person, place, and time.   Psychiatric:         Behavior: Behavior normal.         Thought Content: Thought content normal.         Judgment: Judgment normal.

## 2025-04-07 NOTE — ASSESSMENT & PLAN NOTE
At gym 3 days week 1 hour  Orders:  •  Tirzepatide 15 MG/0.5ML SOAJ; Inject 0.5 mL under the skin every 7 days

## 2025-04-07 NOTE — ASSESSMENT & PLAN NOTE
Lab Results   Component Value Date    HGBA1C 7.8 (H) 04/03/2025       Orders:  •  Tirzepatide 15 MG/0.5ML SOAJ; Inject 0.5 mL under the skin every 7 days  •  Hemoglobin A1C; Future

## 2025-04-07 NOTE — ASSESSMENT & PLAN NOTE
Stable on zocor  Orders:  •  rosuvastatin (CRESTOR) 20 MG tablet; Take 1 tablet (20 mg total) by mouth daily  •  Comprehensive metabolic panel; Future  •  Lipid Panel with Direct LDL reflex; Future  •  TSH, 3rd generation with Free T4 reflex; Future

## 2025-05-05 ENCOUNTER — OFFICE VISIT (OUTPATIENT)
Dept: OBGYN CLINIC | Facility: CLINIC | Age: 55
End: 2025-05-05
Payer: COMMERCIAL

## 2025-05-05 VITALS — BODY MASS INDEX: 31.23 KG/M2 | WEIGHT: 199 LBS | HEIGHT: 67 IN

## 2025-05-05 DIAGNOSIS — S61.319A LACERATION OF NAIL BED OF FINGER, INITIAL ENCOUNTER: Primary | ICD-10-CM

## 2025-05-05 PROCEDURE — 99213 OFFICE O/P EST LOW 20 MIN: CPT | Performed by: ORTHOPAEDIC SURGERY

## 2025-05-05 NOTE — PROGRESS NOTES
ORTHOPAEDIC HAND, WRIST, AND ELBOW OFFICE  VISIT     Name: Denisa Millan      : 1970      MRN: [id number]  Encounter Provider: Xander Sampson MD  Encounter Date: 2025   Encounter department: [facility]  :  Assessment & Plan  Laceration of nail bed of finger, initial encounter    Right long finger nailbed laceration with tuft fracture sustained 3/15/2025  Nailbed repair was performed in office 3/19/2025 without complication  Today nailbed is appropriately realigning, 50% proximal.  Discussed that this will continue to grow appropriately.  Encouraged to continue with ROM of right long finger.  Patient may slowly return to activity as tolerated.  Patient may follow-up in 6 months for reevaluation.             History of Present Illness   HPI  Chief Complaint   Patient presents with   • Right Middle Finger - Follow-up     Nailbed Repair- 3/19/25 in office        Denisa Millan is a 54 y.o. female who presents right hand long finger pain.  Patient had a nailbed repair on 3/19/2025 in office.  Patient states that she overall is doing well.  Patient states she does have a dull achy discomfort along the tip of her finger.  Patient states her nail continues to grow appropriately and is overall pleased with her progress.  Patient does states she does have limited range of motion of her fingertip.    REVIEW OF SYSTEMS:  General: no fever, no chills  HEENT:  No loss of hearing or eyesight problems  Eyes:  No red eyes  Respiratory:  No coughing, shortness of breath or wheezing  Cardiovascular:  No chest pain, no palpitations  GI:  Abdomen soft nontender, denies nausea  Endocrine:  No muscle weakness, no frequent urination, no excessive thirst  Urinary:  No dysuria, no incontinence  Musculoskeletal: see HPI and PE  SKIN:  No skin rash, no dry skin  Neurological:  No headaches, no confusion  Psychiatric:  No suicide thoughts, no anxiety, no depression  Review of  "all other systems is negative    Objective   Ht 5' 7\" (1.702 m)   Wt 90.3 kg (199 lb)   BMI 31.17 kg/m²      General: well developed and well nourished, alert, oriented times 3, and appears comfortable  Psychiatric: Normal  HEENT: Trachea Midline, No torticollis  Cardiovascular: No discernable arrhythmia  Pulmonary: No wheezing or stridor  Abdomen: No rebound or guarding  Extremities: No peripheral edema  Skin: No masses, erythema, lacerations, fluctation, ulcerations  Neurovascular: Sensation Intact to the Median, Ulnar, Radial Nerve, Motor Intact to the Median, Ulnar, Radial Nerve, and Pulses Intact    Musculoskeletal exam:  Right long finger  Laceration appreciated across the nail dorsally  Sensation is intact to light touch  Nail bed appropriately realigned-50% proximal.   Dependent rubor  Limited DIP joint ROM of right long finger    STUDIES REVIEWED:  No new images today.      PROCEDURES PERFORMED:  Procedures  -        Scribe Attestation    I,:  Sin Sexton am acting as a scribe while in the presence of the attending physician.:       I,:  Xander Sampson MD personally performed the services described in this documentation    as scribed in my presence.:           "

## 2025-07-29 ENCOUNTER — APPOINTMENT (OUTPATIENT)
Dept: LAB | Facility: CLINIC | Age: 55
End: 2025-07-29
Payer: COMMERCIAL

## 2025-07-29 DIAGNOSIS — E11.40 TYPE 2 DIABETES MELLITUS WITH DIABETIC NEUROPATHY, WITHOUT LONG-TERM CURRENT USE OF INSULIN (HCC): Chronic | ICD-10-CM

## 2025-07-29 DIAGNOSIS — E78.5 HYPERLIPIDEMIA, UNSPECIFIED HYPERLIPIDEMIA TYPE: Chronic | ICD-10-CM

## 2025-07-29 LAB
ALBUMIN SERPL BCG-MCNC: 3.8 G/DL (ref 3.5–5)
ALP SERPL-CCNC: 110 U/L (ref 34–104)
ALT SERPL W P-5'-P-CCNC: 12 U/L (ref 7–52)
ANION GAP SERPL CALCULATED.3IONS-SCNC: 10 MMOL/L (ref 4–13)
AST SERPL W P-5'-P-CCNC: 19 U/L (ref 13–39)
BILIRUB SERPL-MCNC: 0.53 MG/DL (ref 0.2–1)
BUN SERPL-MCNC: 17 MG/DL (ref 5–25)
CALCIUM SERPL-MCNC: 9 MG/DL (ref 8.4–10.2)
CHLORIDE SERPL-SCNC: 106 MMOL/L (ref 96–108)
CHOLEST SERPL-MCNC: 163 MG/DL (ref ?–200)
CO2 SERPL-SCNC: 23 MMOL/L (ref 21–32)
CREAT SERPL-MCNC: 0.79 MG/DL (ref 0.6–1.3)
EST. AVERAGE GLUCOSE BLD GHB EST-MCNC: 183 MG/DL
GFR SERPL CREATININE-BSD FRML MDRD: 85 ML/MIN/1.73SQ M
GLUCOSE P FAST SERPL-MCNC: 133 MG/DL (ref 65–99)
HBA1C MFR BLD: 8 %
HDLC SERPL-MCNC: 51 MG/DL
LDLC SERPL CALC-MCNC: 102 MG/DL (ref 0–100)
POTASSIUM SERPL-SCNC: 4.3 MMOL/L (ref 3.5–5.3)
PROT SERPL-MCNC: 6.6 G/DL (ref 6.4–8.4)
SODIUM SERPL-SCNC: 139 MMOL/L (ref 135–147)
TRIGL SERPL-MCNC: 50 MG/DL (ref ?–150)
TSH SERPL DL<=0.05 MIU/L-ACNC: 1.64 UIU/ML (ref 0.45–4.5)

## 2025-07-29 PROCEDURE — 84443 ASSAY THYROID STIM HORMONE: CPT

## 2025-07-29 PROCEDURE — 80061 LIPID PANEL: CPT

## 2025-07-29 PROCEDURE — 36415 COLL VENOUS BLD VENIPUNCTURE: CPT

## 2025-07-29 PROCEDURE — 80053 COMPREHEN METABOLIC PANEL: CPT

## 2025-07-29 PROCEDURE — 83036 HEMOGLOBIN GLYCOSYLATED A1C: CPT

## 2025-07-31 ENCOUNTER — OFFICE VISIT (OUTPATIENT)
Dept: FAMILY MEDICINE CLINIC | Facility: CLINIC | Age: 55
End: 2025-07-31
Payer: COMMERCIAL

## 2025-07-31 VITALS
DIASTOLIC BLOOD PRESSURE: 70 MMHG | OXYGEN SATURATION: 99 % | TEMPERATURE: 97.8 F | HEIGHT: 67 IN | HEART RATE: 84 BPM | RESPIRATION RATE: 16 BRPM | SYSTOLIC BLOOD PRESSURE: 112 MMHG | BODY MASS INDEX: 30.98 KG/M2 | WEIGHT: 197.4 LBS

## 2025-07-31 DIAGNOSIS — E78.5 HYPERLIPIDEMIA, UNSPECIFIED HYPERLIPIDEMIA TYPE: Chronic | ICD-10-CM

## 2025-07-31 DIAGNOSIS — E66.811 OBESITY, CLASS I, BMI 30.0-34.9 (SEE ACTUAL BMI): ICD-10-CM

## 2025-07-31 DIAGNOSIS — Z00.00 ANNUAL PHYSICAL EXAM: Primary | ICD-10-CM

## 2025-07-31 DIAGNOSIS — E11.40 TYPE 2 DIABETES MELLITUS WITH DIABETIC NEUROPATHY, WITHOUT LONG-TERM CURRENT USE OF INSULIN (HCC): Chronic | ICD-10-CM

## 2025-07-31 DIAGNOSIS — I10 BENIGN ESSENTIAL HYPERTENSION: ICD-10-CM

## 2025-07-31 DIAGNOSIS — Z12.31 ENCOUNTER FOR SCREENING MAMMOGRAM FOR BREAST CANCER: ICD-10-CM

## 2025-07-31 PROCEDURE — 99396 PREV VISIT EST AGE 40-64: CPT | Performed by: FAMILY MEDICINE

## 2025-07-31 RX ORDER — INSULIN DEGLUDEC 100 U/ML
15 INJECTION, SOLUTION SUBCUTANEOUS
Qty: 13.5 ML | Refills: 3 | Status: SHIPPED | OUTPATIENT
Start: 2025-07-31

## 2025-08-01 ENCOUNTER — TELEPHONE (OUTPATIENT)
Dept: FAMILY MEDICINE CLINIC | Facility: CLINIC | Age: 55
End: 2025-08-01

## 2025-08-05 ENCOUNTER — TELEPHONE (OUTPATIENT)
Age: 55
End: 2025-08-05

## 2025-08-13 ENCOUNTER — TELEPHONE (OUTPATIENT)
Dept: FAMILY MEDICINE CLINIC | Facility: CLINIC | Age: 55
End: 2025-08-13

## (undated) DEVICE — CURITY NON-ADHERENT STRIPS: Brand: CURITY

## (undated) DEVICE — SYRINGE 20ML LL

## (undated) DEVICE — SPONGE STICK WITH PVP-I: Brand: KENDALL

## (undated) DEVICE — PLUMEPEN PRO 10FT

## (undated) DEVICE — PENROSE DRAIN, 18 X 3 8: Brand: CARDINAL HEALTH

## (undated) DEVICE — UNIVERSAL MAJOR EXTREMITY,KIT: Brand: CARDINAL HEALTH

## (undated) DEVICE — NEEDLE 25G X 1 1/2

## (undated) DEVICE — ELECTRODE NEEDLE E-Z CLEAN 2.75IN 7CM -0013

## (undated) DEVICE — GLOVE INDICATOR PI UNDERGLOVE SZ 7.5 BLUE

## (undated) DEVICE — GAUZE SPONGES,16 PLY: Brand: CURITY

## (undated) DEVICE — SYRINGE 10ML LL

## (undated) DEVICE — CUFF TOURNIQUET 18 X 4 IN QUICK CONNECT DISP 1 BLADDER

## (undated) DEVICE — CULTURE TUBE ANAEROBIC

## (undated) DEVICE — SPONGE PVP SCRUB WING STERILE

## (undated) DEVICE — STOCKINETTE REGULAR

## (undated) DEVICE — ACE WRAP 4 IN UNSTERILE

## (undated) DEVICE — STERILE MANDIBLE PACK: Brand: CARDINAL HEALTH

## (undated) DEVICE — SURGIFOAM 7 X 12 SPONGE ABS

## (undated) DEVICE — CULTURE TUBE AEROBIC

## (undated) DEVICE — BANDAGE, ESMARK LF STR 4"X9'(20/CS): Brand: CYPRESS

## (undated) DEVICE — DENTAL BURR ROUND WHITE

## (undated) DEVICE — KERLIX BANDAGE ROLL: Brand: KERLIX

## (undated) DEVICE — SUT CHROMIC 3-0 SH 27 IN G122H

## (undated) DEVICE — INTENDED FOR TISSUE SEPARATION, AND OTHER PROCEDURES THAT REQUIRE A SHARP SURGICAL BLADE TO PUNCTURE OR CUT.: Brand: BARD-PARKER ® CARBON RIB-BACK BLADES

## (undated) DEVICE — IV CATH INTROCAN 18G X 1 1/4 SAFETY

## (undated) DEVICE — DENTAL BURR SIDE WHITE

## (undated) DEVICE — CURITY STRETCH BANDAGE: Brand: CURITY

## (undated) DEVICE — SUT ETHILON 3-0 FS-1 18 IN 663G

## (undated) DEVICE — GLOVE SRG BIOGEL ECLIPSE 6.5

## (undated) DEVICE — SUPER SPONGES,MEDIUM: Brand: KERLIX

## (undated) DEVICE — INTENDED FOR TISSUE SEPARATION, AND OTHER PROCEDURES THAT REQUIRE A SHARP SURGICAL BLADE TO PUNCTURE OR CUT.: Brand: BARD-PARKER SAFETY BLADES SIZE 15, STERILE

## (undated) DEVICE — THE SIMPULSE SOLO SYSTEM WITH ULTREX RETRACTABLE SPLASH SHIELD TIP: Brand: SIMPULSE SOLO

## (undated) DEVICE — GLOVE SRG BIOGEL 7

## (undated) DEVICE — GLOVE INDICATOR PI UNDERGLOVE SZ 7 BLUE

## (undated) DEVICE — 2000CC GUARDIAN II: Brand: GUARDIAN

## (undated) DEVICE — GLOVE SRG BIOGEL 7.5